# Patient Record
Sex: FEMALE | Race: BLACK OR AFRICAN AMERICAN | NOT HISPANIC OR LATINO | URBAN - METROPOLITAN AREA
[De-identification: names, ages, dates, MRNs, and addresses within clinical notes are randomized per-mention and may not be internally consistent; named-entity substitution may affect disease eponyms.]

---

## 2022-05-08 ENCOUNTER — INPATIENT (INPATIENT)
Facility: HOSPITAL | Age: 87
LOS: 2 days | Discharge: NURSING, REHAB OR LONG-TERM CARE | DRG: 101 | End: 2022-05-11
Attending: INTERNAL MEDICINE | Admitting: INTERNAL MEDICINE
Payer: MEDICARE

## 2022-05-08 VITALS
RESPIRATION RATE: 17 BRPM | OXYGEN SATURATION: 99 % | TEMPERATURE: 99 F | HEART RATE: 62 BPM | DIASTOLIC BLOOD PRESSURE: 76 MMHG | SYSTOLIC BLOOD PRESSURE: 154 MMHG | WEIGHT: 160.94 LBS

## 2022-05-08 DIAGNOSIS — E11.9 TYPE 2 DIABETES MELLITUS WITHOUT COMPLICATIONS: ICD-10-CM

## 2022-05-08 DIAGNOSIS — I10 ESSENTIAL (PRIMARY) HYPERTENSION: ICD-10-CM

## 2022-05-08 DIAGNOSIS — R11.2 NAUSEA WITH VOMITING, UNSPECIFIED: ICD-10-CM

## 2022-05-08 DIAGNOSIS — R56.9 UNSPECIFIED CONVULSIONS: ICD-10-CM

## 2022-05-08 DIAGNOSIS — Z29.9 ENCOUNTER FOR PROPHYLACTIC MEASURES, UNSPECIFIED: ICD-10-CM

## 2022-05-08 DIAGNOSIS — I25.10 ATHEROSCLEROTIC HEART DISEASE OF NATIVE CORONARY ARTERY WITHOUT ANGINA PECTORIS: ICD-10-CM

## 2022-05-08 DIAGNOSIS — I63.9 CEREBRAL INFARCTION, UNSPECIFIED: ICD-10-CM

## 2022-05-08 LAB
ALBUMIN SERPL ELPH-MCNC: 2.9 G/DL — LOW (ref 3.5–5)
ALP SERPL-CCNC: 101 U/L — SIGNIFICANT CHANGE UP (ref 40–120)
ALT FLD-CCNC: 22 U/L DA — SIGNIFICANT CHANGE UP (ref 10–60)
ANION GAP SERPL CALC-SCNC: 6 MMOL/L — SIGNIFICANT CHANGE UP (ref 5–17)
APTT BLD: 29.3 SEC — SIGNIFICANT CHANGE UP (ref 27.5–35.5)
AST SERPL-CCNC: 24 U/L — SIGNIFICANT CHANGE UP (ref 10–40)
BASOPHILS # BLD AUTO: 0.04 K/UL — SIGNIFICANT CHANGE UP (ref 0–0.2)
BASOPHILS NFR BLD AUTO: 0.7 % — SIGNIFICANT CHANGE UP (ref 0–2)
BILIRUB SERPL-MCNC: 0.4 MG/DL — SIGNIFICANT CHANGE UP (ref 0.2–1.2)
BUN SERPL-MCNC: 14 MG/DL — SIGNIFICANT CHANGE UP (ref 7–18)
CALCIUM SERPL-MCNC: 9.2 MG/DL — SIGNIFICANT CHANGE UP (ref 8.4–10.5)
CHLORIDE SERPL-SCNC: 106 MMOL/L — SIGNIFICANT CHANGE UP (ref 96–108)
CO2 SERPL-SCNC: 27 MMOL/L — SIGNIFICANT CHANGE UP (ref 22–31)
CREAT SERPL-MCNC: 1.21 MG/DL — SIGNIFICANT CHANGE UP (ref 0.5–1.3)
EGFR: 41 ML/MIN/1.73M2 — LOW
EOSINOPHIL # BLD AUTO: 0.45 K/UL — SIGNIFICANT CHANGE UP (ref 0–0.5)
EOSINOPHIL NFR BLD AUTO: 7.4 % — HIGH (ref 0–6)
GLUCOSE BLDC GLUCOMTR-MCNC: 117 MG/DL — HIGH (ref 70–99)
GLUCOSE SERPL-MCNC: 170 MG/DL — HIGH (ref 70–99)
HCT VFR BLD CALC: 38.8 % — SIGNIFICANT CHANGE UP (ref 34.5–45)
HGB BLD-MCNC: 12.6 G/DL — SIGNIFICANT CHANGE UP (ref 11.5–15.5)
IMM GRANULOCYTES NFR BLD AUTO: 0.2 % — SIGNIFICANT CHANGE UP (ref 0–1.5)
INR BLD: 1.09 RATIO — SIGNIFICANT CHANGE UP (ref 0.88–1.16)
LYMPHOCYTES # BLD AUTO: 1.58 K/UL — SIGNIFICANT CHANGE UP (ref 1–3.3)
LYMPHOCYTES # BLD AUTO: 26 % — SIGNIFICANT CHANGE UP (ref 13–44)
MCHC RBC-ENTMCNC: 29.6 PG — SIGNIFICANT CHANGE UP (ref 27–34)
MCHC RBC-ENTMCNC: 32.5 GM/DL — SIGNIFICANT CHANGE UP (ref 32–36)
MCV RBC AUTO: 91.3 FL — SIGNIFICANT CHANGE UP (ref 80–100)
MONOCYTES # BLD AUTO: 0.52 K/UL — SIGNIFICANT CHANGE UP (ref 0–0.9)
MONOCYTES NFR BLD AUTO: 8.6 % — SIGNIFICANT CHANGE UP (ref 2–14)
NEUTROPHILS # BLD AUTO: 3.47 K/UL — SIGNIFICANT CHANGE UP (ref 1.8–7.4)
NEUTROPHILS NFR BLD AUTO: 57.1 % — SIGNIFICANT CHANGE UP (ref 43–77)
NRBC # BLD: 0 /100 WBCS — SIGNIFICANT CHANGE UP (ref 0–0)
PLATELET # BLD AUTO: 236 K/UL — SIGNIFICANT CHANGE UP (ref 150–400)
POTASSIUM SERPL-MCNC: 3.4 MMOL/L — LOW (ref 3.5–5.3)
POTASSIUM SERPL-SCNC: 3.4 MMOL/L — LOW (ref 3.5–5.3)
PROT SERPL-MCNC: 8.2 G/DL — SIGNIFICANT CHANGE UP (ref 6–8.3)
PROTHROM AB SERPL-ACNC: 13 SEC — SIGNIFICANT CHANGE UP (ref 10.5–13.4)
RBC # BLD: 4.25 M/UL — SIGNIFICANT CHANGE UP (ref 3.8–5.2)
RBC # FLD: 16.6 % — HIGH (ref 10.3–14.5)
SARS-COV-2 RNA SPEC QL NAA+PROBE: SIGNIFICANT CHANGE UP
SODIUM SERPL-SCNC: 139 MMOL/L — SIGNIFICANT CHANGE UP (ref 135–145)
TROPONIN I, HIGH SENSITIVITY RESULT: 56.7 NG/L — HIGH
TROPONIN I, HIGH SENSITIVITY RESULT: 56.9 NG/L — HIGH
WBC # BLD: 6.07 K/UL — SIGNIFICANT CHANGE UP (ref 3.8–10.5)
WBC # FLD AUTO: 6.07 K/UL — SIGNIFICANT CHANGE UP (ref 3.8–10.5)

## 2022-05-08 PROCEDURE — 70450 CT HEAD/BRAIN W/O DYE: CPT | Mod: 26,MA

## 2022-05-08 PROCEDURE — 99285 EMERGENCY DEPT VISIT HI MDM: CPT

## 2022-05-08 PROCEDURE — 71045 X-RAY EXAM CHEST 1 VIEW: CPT | Mod: 26

## 2022-05-08 RX ORDER — INSULIN LISPRO 100/ML
VIAL (ML) SUBCUTANEOUS EVERY 6 HOURS
Refills: 0 | Status: DISCONTINUED | OUTPATIENT
Start: 2022-05-08 | End: 2022-05-11

## 2022-05-08 RX ORDER — SODIUM CHLORIDE 9 MG/ML
1000 INJECTION, SOLUTION INTRAVENOUS
Refills: 0 | Status: DISCONTINUED | OUTPATIENT
Start: 2022-05-08 | End: 2022-05-11

## 2022-05-08 RX ORDER — FUROSEMIDE 40 MG
1 TABLET ORAL
Qty: 0 | Refills: 0 | DISCHARGE

## 2022-05-08 RX ORDER — HEPARIN SODIUM 5000 [USP'U]/ML
5000 INJECTION INTRAVENOUS; SUBCUTANEOUS EVERY 8 HOURS
Refills: 0 | Status: DISCONTINUED | OUTPATIENT
Start: 2022-05-08 | End: 2022-05-11

## 2022-05-08 RX ORDER — INSULIN GLARGINE 100 [IU]/ML
8 INJECTION, SOLUTION SUBCUTANEOUS AT BEDTIME
Refills: 0 | Status: DISCONTINUED | OUTPATIENT
Start: 2022-05-08 | End: 2022-05-11

## 2022-05-08 RX ORDER — VALPROIC ACID (AS SODIUM SALT) 250 MG/5ML
1000 SOLUTION, ORAL ORAL ONCE
Refills: 0 | Status: COMPLETED | OUTPATIENT
Start: 2022-05-08 | End: 2022-05-08

## 2022-05-08 RX ORDER — DEXTROSE 50 % IN WATER 50 %
15 SYRINGE (ML) INTRAVENOUS ONCE
Refills: 0 | Status: DISCONTINUED | OUTPATIENT
Start: 2022-05-08 | End: 2022-05-11

## 2022-05-08 RX ORDER — DEXTROSE 50 % IN WATER 50 %
25 SYRINGE (ML) INTRAVENOUS ONCE
Refills: 0 | Status: DISCONTINUED | OUTPATIENT
Start: 2022-05-08 | End: 2022-05-11

## 2022-05-08 RX ORDER — ONDANSETRON 8 MG/1
4 TABLET, FILM COATED ORAL EVERY 4 HOURS
Refills: 0 | Status: DISCONTINUED | OUTPATIENT
Start: 2022-05-08 | End: 2022-05-09

## 2022-05-08 RX ORDER — LEVETIRACETAM 250 MG/1
500 TABLET, FILM COATED ORAL EVERY 12 HOURS
Refills: 0 | Status: DISCONTINUED | OUTPATIENT
Start: 2022-05-08 | End: 2022-05-09

## 2022-05-08 RX ORDER — POTASSIUM CHLORIDE 20 MEQ
10 PACKET (EA) ORAL
Refills: 0 | Status: COMPLETED | OUTPATIENT
Start: 2022-05-08 | End: 2022-05-08

## 2022-05-08 RX ADMIN — Medication 60 MILLIGRAM(S): at 16:28

## 2022-05-08 RX ADMIN — Medication 1 MILLIGRAM(S): at 15:06

## 2022-05-08 RX ADMIN — Medication 100 MILLIEQUIVALENT(S): at 20:03

## 2022-05-08 RX ADMIN — ONDANSETRON 4 MILLIGRAM(S): 8 TABLET, FILM COATED ORAL at 21:15

## 2022-05-08 RX ADMIN — Medication 1000 MILLIGRAM(S): at 19:15

## 2022-05-08 RX ADMIN — Medication 100 MILLIEQUIVALENT(S): at 22:24

## 2022-05-08 RX ADMIN — INSULIN GLARGINE 8 UNIT(S): 100 INJECTION, SOLUTION SUBCUTANEOUS at 22:46

## 2022-05-08 RX ADMIN — Medication 100 MILLIEQUIVALENT(S): at 21:16

## 2022-05-08 RX ADMIN — HEPARIN SODIUM 5000 UNIT(S): 5000 INJECTION INTRAVENOUS; SUBCUTANEOUS at 22:24

## 2022-05-08 NOTE — ED ADULT TRIAGE NOTE - CHIEF COMPLAINT QUOTE
Pt from [facility] c/o facial twisting x this morning. As per Daughter, pt with hx.of stroke x last year, noted residual right side weakness.

## 2022-05-08 NOTE — ED ADULT NURSE NOTE - CHIEF COMPLAINT QUOTE
Pt from [facility] c/o facial twisting x this morning. As per Daughter, pt with hx.of stroke x last year, noted residual right side weakness.

## 2022-05-08 NOTE — H&P ADULT - PROBLEM SELECTOR PLAN 1
p/w r sided facial twitching  given depakon 1g in ed  c/w keppra 500 bid as per home meds p/w r sided facial twitching  given depakon 1g in ed  c/w keppra 500 bid as per home meds  neuro checks  CT head with L MCA chronic infarct  eeg  neuro dr raya consulted

## 2022-05-08 NOTE — H&P ADULT - HISTORY OF PRESENT ILLNESS
89F w/ PMH L MCA CVA w/ R side residual 4/2021, DM on insulin, celexa 10 qd, hld, htn, ischemic heart disease, seizures presents to ED with R sided facial twitching. Pt at baseline is aphasic from her previous stroke but is able to follow some commands, and is able to nod yes or no. As per NH papers, pt with keppra for seizures. Son Hari at bedside states that he and the rest of the family take turns visiting her and she was at her baseline yesterday. She started having r facial twitching and is less interactive 5/8 than before. No known fever, chills, sob.    Pt with episode of vomiting in ed x1.

## 2022-05-08 NOTE — ED PROVIDER NOTE - OBJECTIVE STATEMENT
89 year old female with a PHMx of stroke with dense right leg and arm weakness, aphasia and able to receptively understand presents to the ED with complaints of non-stop right-sided facial twitching. As per daughter, states patient was from nursing home. Patient is still able to understand and react normally. Denies having seizures before. States patient is not taking treatment for that. Denies head trauma. History is from daughter and paper from EMS. Patient is unable to give history due to aphasia.   NKDA.

## 2022-05-08 NOTE — H&P ADULT - NSHPPHYSICALEXAM_GEN_ALL_CORE
General - NAD, sitting up in bed, well groomed  Eyes - PERRLA, EOM intact  ENT - Nonicteric sclerae, PERRLA, EOMI. Oropharynx clear. Moist mucous membranes. Conjunctivae appear well perfused.   Neck - No noticeable or palpable swelling, redness or rash around throat or on face  Lymph Nodes - No lymphadenopathy  Cardiovascular - RRR no m/r/g, no JVD, no carotid bruits  Lungs - Clear to auscultation, no use of accessory muscles, no crackles or wheezes.  Skin - No rashes, skin warm and dry, no erythematous areas  Abdomen - Normal bowel sounds, abdomen soft and nontender  Rectal – Rectal exam not performed since no symptoms indicated blood loss.  Extremities - No edema, cyanosis or clubbing  Musculoskeletal - 5/5 strength, normal range of motion, no swollen or erythematous joints.   Neuro– responds to name, and is able to recognize her son's face. able to  with her left hand, and follows some one step commands. (+) aphasia

## 2022-05-08 NOTE — ED PROVIDER NOTE - PROGRESS NOTE DETAILS
Discussed case with . Request patient be given Keppra Load 1000mg and Valproic Acid 250mg 3x a day. (Dariela) s/o from Dr Schmitz to [facility] ([place]) - labs trop 56  EKG - nsr, rate 67, QTc 469, lateral twi (Dariela) - CT head w/ chronic changes  Pt still with ?seizure activity in face despite meds  PCP Dr Dennis who requests admission to Dr Yates  Endorsed to MAR

## 2022-05-08 NOTE — H&P ADULT - PROBLEM SELECTOR PLAN 4
on lipitor and plavix  pt asphasic and r sided residual  pt was on pureed nectar thick diet  f/u dysphagia screen  resume diet if passes, but with aspiration and seizure precautions  npo for now

## 2022-05-08 NOTE — ED PROVIDER NOTE - CLINICAL SUMMARY MEDICAL DECISION MAKING FREE TEXT BOX
Appears to be a partial simple seizure. Will screen to rule out infection. Will obtain CT head for intracranial hemorrhage and discuss with neurology.

## 2022-05-08 NOTE — ED ADULT NURSE NOTE - OBJECTIVE STATEMENT
Daughter at bedside stated that pt. has a new onset of right sided facial twitch this morning when she went to visit her mother at NH. Daughter denies any hx of seizure but pt. has hx of stroke, unable to talk but still understands.

## 2022-05-08 NOTE — ED PROVIDER NOTE - MUSCULOSKELETAL, MLM
Patient is phasic. Right arm and right leg paretic. Twitching on the right side of the face. Patient is aphasic. Right arm and right leg paretic. Twitching on the right side of the face.

## 2022-05-08 NOTE — H&P ADULT - PROBLEM SELECTOR PLAN 3
Pt's caregiver called back about rescheduling his appointment for 1/17/20 (per MD). They were offered 1/24/20 but they are not able to make that day due to meetings with the state. Please advise as to when else they can be seen.   zofran prn

## 2022-05-09 DIAGNOSIS — F32.9 MAJOR DEPRESSIVE DISORDER, SINGLE EPISODE, UNSPECIFIED: ICD-10-CM

## 2022-05-09 LAB
ALBUMIN SERPL ELPH-MCNC: 2.1 G/DL — LOW (ref 3.5–5)
ALP SERPL-CCNC: 75 U/L — SIGNIFICANT CHANGE UP (ref 40–120)
ALT FLD-CCNC: 17 U/L DA — SIGNIFICANT CHANGE UP (ref 10–60)
ANION GAP SERPL CALC-SCNC: 5 MMOL/L — SIGNIFICANT CHANGE UP (ref 5–17)
AST SERPL-CCNC: 15 U/L — SIGNIFICANT CHANGE UP (ref 10–40)
BASOPHILS # BLD AUTO: 0.04 K/UL — SIGNIFICANT CHANGE UP (ref 0–0.2)
BASOPHILS NFR BLD AUTO: 0.6 % — SIGNIFICANT CHANGE UP (ref 0–2)
BILIRUB SERPL-MCNC: 0.4 MG/DL — SIGNIFICANT CHANGE UP (ref 0.2–1.2)
BUN SERPL-MCNC: 14 MG/DL — SIGNIFICANT CHANGE UP (ref 7–18)
CALCIUM SERPL-MCNC: 8.8 MG/DL — SIGNIFICANT CHANGE UP (ref 8.4–10.5)
CHLORIDE SERPL-SCNC: 111 MMOL/L — HIGH (ref 96–108)
CO2 SERPL-SCNC: 27 MMOL/L — SIGNIFICANT CHANGE UP (ref 22–31)
CREAT SERPL-MCNC: 1.02 MG/DL — SIGNIFICANT CHANGE UP (ref 0.5–1.3)
EGFR: 50 ML/MIN/1.73M2 — LOW
EOSINOPHIL # BLD AUTO: 0.31 K/UL — SIGNIFICANT CHANGE UP (ref 0–0.5)
EOSINOPHIL NFR BLD AUTO: 4.3 % — SIGNIFICANT CHANGE UP (ref 0–6)
GLUCOSE BLDC GLUCOMTR-MCNC: 114 MG/DL — HIGH (ref 70–99)
GLUCOSE BLDC GLUCOMTR-MCNC: 118 MG/DL — HIGH (ref 70–99)
GLUCOSE BLDC GLUCOMTR-MCNC: 137 MG/DL — HIGH (ref 70–99)
GLUCOSE BLDC GLUCOMTR-MCNC: 237 MG/DL — HIGH (ref 70–99)
GLUCOSE BLDC GLUCOMTR-MCNC: 56 MG/DL — LOW (ref 70–99)
GLUCOSE BLDC GLUCOMTR-MCNC: 59 MG/DL — LOW (ref 70–99)
GLUCOSE BLDC GLUCOMTR-MCNC: 61 MG/DL — LOW (ref 70–99)
GLUCOSE BLDC GLUCOMTR-MCNC: 95 MG/DL — SIGNIFICANT CHANGE UP (ref 70–99)
GLUCOSE SERPL-MCNC: 83 MG/DL — SIGNIFICANT CHANGE UP (ref 70–99)
HCT VFR BLD CALC: 28.2 % — LOW (ref 34.5–45)
HGB BLD-MCNC: 9.4 G/DL — LOW (ref 11.5–15.5)
IMM GRANULOCYTES NFR BLD AUTO: 0.1 % — SIGNIFICANT CHANGE UP (ref 0–1.5)
LYMPHOCYTES # BLD AUTO: 2.04 K/UL — SIGNIFICANT CHANGE UP (ref 1–3.3)
LYMPHOCYTES # BLD AUTO: 28.2 % — SIGNIFICANT CHANGE UP (ref 13–44)
MCHC RBC-ENTMCNC: 30.3 PG — SIGNIFICANT CHANGE UP (ref 27–34)
MCHC RBC-ENTMCNC: 33.3 GM/DL — SIGNIFICANT CHANGE UP (ref 32–36)
MCV RBC AUTO: 91 FL — SIGNIFICANT CHANGE UP (ref 80–100)
MONOCYTES # BLD AUTO: 0.54 K/UL — SIGNIFICANT CHANGE UP (ref 0–0.9)
MONOCYTES NFR BLD AUTO: 7.5 % — SIGNIFICANT CHANGE UP (ref 2–14)
NEUTROPHILS # BLD AUTO: 4.3 K/UL — SIGNIFICANT CHANGE UP (ref 1.8–7.4)
NEUTROPHILS NFR BLD AUTO: 59.3 % — SIGNIFICANT CHANGE UP (ref 43–77)
NRBC # BLD: 0 /100 WBCS — SIGNIFICANT CHANGE UP (ref 0–0)
PLATELET # BLD AUTO: 203 K/UL — SIGNIFICANT CHANGE UP (ref 150–400)
POTASSIUM SERPL-MCNC: 3.9 MMOL/L — SIGNIFICANT CHANGE UP (ref 3.5–5.3)
POTASSIUM SERPL-SCNC: 3.9 MMOL/L — SIGNIFICANT CHANGE UP (ref 3.5–5.3)
PROT SERPL-MCNC: 6.5 G/DL — SIGNIFICANT CHANGE UP (ref 6–8.3)
RBC # BLD: 3.1 M/UL — LOW (ref 3.8–5.2)
RBC # FLD: 16.6 % — HIGH (ref 10.3–14.5)
SODIUM SERPL-SCNC: 143 MMOL/L — SIGNIFICANT CHANGE UP (ref 135–145)
TROPONIN I, HIGH SENSITIVITY RESULT: 35.3 NG/L — SIGNIFICANT CHANGE UP
WBC # BLD: 7.24 K/UL — SIGNIFICANT CHANGE UP (ref 3.8–10.5)
WBC # FLD AUTO: 7.24 K/UL — SIGNIFICANT CHANGE UP (ref 3.8–10.5)

## 2022-05-09 PROCEDURE — 95819 EEG AWAKE AND ASLEEP: CPT | Mod: 26

## 2022-05-09 RX ORDER — LEVETIRACETAM 250 MG/1
750 TABLET, FILM COATED ORAL EVERY 12 HOURS
Refills: 0 | Status: DISCONTINUED | OUTPATIENT
Start: 2022-05-09 | End: 2022-05-10

## 2022-05-09 RX ORDER — SODIUM CHLORIDE 9 MG/ML
1000 INJECTION, SOLUTION INTRAVENOUS
Refills: 0 | Status: DISCONTINUED | OUTPATIENT
Start: 2022-05-09 | End: 2022-05-09

## 2022-05-09 RX ORDER — CLOPIDOGREL BISULFATE 75 MG/1
75 TABLET, FILM COATED ORAL DAILY
Refills: 0 | Status: DISCONTINUED | OUTPATIENT
Start: 2022-05-09 | End: 2022-05-11

## 2022-05-09 RX ORDER — ATORVASTATIN CALCIUM 80 MG/1
40 TABLET, FILM COATED ORAL AT BEDTIME
Refills: 0 | Status: DISCONTINUED | OUTPATIENT
Start: 2022-05-09 | End: 2022-05-11

## 2022-05-09 RX ORDER — CITALOPRAM 10 MG/1
10 TABLET, FILM COATED ORAL DAILY
Refills: 0 | Status: DISCONTINUED | OUTPATIENT
Start: 2022-05-09 | End: 2022-05-11

## 2022-05-09 RX ORDER — LOSARTAN POTASSIUM 100 MG/1
100 TABLET, FILM COATED ORAL DAILY
Refills: 0 | Status: DISCONTINUED | OUTPATIENT
Start: 2022-05-09 | End: 2022-05-11

## 2022-05-09 RX ORDER — ONDANSETRON 8 MG/1
4 TABLET, FILM COATED ORAL EVERY 8 HOURS
Refills: 0 | Status: DISCONTINUED | OUTPATIENT
Start: 2022-05-09 | End: 2022-05-11

## 2022-05-09 RX ORDER — LEVETIRACETAM 250 MG/1
2000 TABLET, FILM COATED ORAL ONCE
Refills: 0 | Status: COMPLETED | OUTPATIENT
Start: 2022-05-09 | End: 2022-05-09

## 2022-05-09 RX ADMIN — ATORVASTATIN CALCIUM 40 MILLIGRAM(S): 80 TABLET, FILM COATED ORAL at 21:49

## 2022-05-09 RX ADMIN — HEPARIN SODIUM 5000 UNIT(S): 5000 INJECTION INTRAVENOUS; SUBCUTANEOUS at 05:20

## 2022-05-09 RX ADMIN — LEVETIRACETAM 600 MILLIGRAM(S): 250 TABLET, FILM COATED ORAL at 15:41

## 2022-05-09 RX ADMIN — LEVETIRACETAM 420 MILLIGRAM(S): 250 TABLET, FILM COATED ORAL at 05:20

## 2022-05-09 RX ADMIN — HEPARIN SODIUM 5000 UNIT(S): 5000 INJECTION INTRAVENOUS; SUBCUTANEOUS at 21:49

## 2022-05-09 RX ADMIN — INSULIN GLARGINE 8 UNIT(S): 100 INJECTION, SOLUTION SUBCUTANEOUS at 22:00

## 2022-05-09 RX ADMIN — CITALOPRAM 10 MILLIGRAM(S): 10 TABLET, FILM COATED ORAL at 22:32

## 2022-05-09 RX ADMIN — SODIUM CHLORIDE 50 MILLILITER(S): 9 INJECTION, SOLUTION INTRAVENOUS at 08:13

## 2022-05-09 RX ADMIN — LEVETIRACETAM 400 MILLIGRAM(S): 250 TABLET, FILM COATED ORAL at 21:50

## 2022-05-09 RX ADMIN — LOSARTAN POTASSIUM 100 MILLIGRAM(S): 100 TABLET, FILM COATED ORAL at 21:49

## 2022-05-09 RX ADMIN — HEPARIN SODIUM 5000 UNIT(S): 5000 INJECTION INTRAVENOUS; SUBCUTANEOUS at 13:14

## 2022-05-09 RX ADMIN — CLOPIDOGREL BISULFATE 75 MILLIGRAM(S): 75 TABLET, FILM COATED ORAL at 21:50

## 2022-05-09 NOTE — PROGRESS NOTE ADULT - SUBJECTIVE AND OBJECTIVE BOX
NP Note discussed with  Primary Attending    INTERVAL HPI/OVERNIGHT EVENTS: no new complaints    MEDICATIONS  (STANDING):  dextrose 5%. 1000 milliLiter(s) (50 mL/Hr) IV Continuous <Continuous>  dextrose 5%. 1000 milliLiter(s) (50 mL/Hr) IV Continuous <Continuous>  dextrose 50% Injectable 25 Gram(s) IV Push once  heparin   Injectable 5000 Unit(s) SubCutaneous every 8 hours  insulin glargine Injectable (LANTUS) 8 Unit(s) SubCutaneous at bedtime  insulin lispro (ADMELOG) corrective regimen sliding scale   SubCutaneous every 6 hours  levETIRAcetam  IVPB 500 milliGRAM(s) IV Intermittent every 12 hours    MEDICATIONS  (PRN):  dextrose Oral Gel 15 Gram(s) Oral once PRN Blood Glucose LESS THAN 70 milliGRAM(s)/deciliter  ondansetron Injectable 4 milliGRAM(s) IV Push every 4 hours PRN Nausea and/or Vomiting      __________________________________________________  REVIEW OF SYSTEMS:    CONSTITUTIONAL: No fever,   EYES: no acute visual disturbances  NECK: No pain or stiffness  RESPIRATORY: No cough; No shortness of breath  CARDIOVASCULAR: No chest pain, no palpitations  GASTROINTESTINAL: No pain. No nausea or vomiting; No diarrhea   NEUROLOGICAL: No headache or numbness, no tremors  MUSCULOSKELETAL: No joint pain, no muscle pain  GENITOURINARY: no dysuria, no frequency, no hesitancy  PSYCHIATRY: no depression , no anxiety  ALL OTHER  ROS negative        Vital Signs Last 24 Hrs  T(C): 36.5 (09 May 2022 11:37), Max: 37 (08 May 2022 15:56)  T(F): 97.7 (09 May 2022 11:37), Max: 98.6 (08 May 2022 15:56)  HR: 64 (09 May 2022 11:37) (59 - 67)  BP: 149/69 (09 May 2022 11:37) (142/74 - 160/79)  BP(mean): --  RR: 18 (09 May 2022 11:37) (17 - 18)  SpO2: 96% (09 May 2022 11:37) (95% - 98%)    ________________________________________________  PHYSICAL EXAM:  GENERAL: NAD  HEENT: Normocephalic;  conjunctivae and sclerae clear; moist mucous membranes;   NECK : supple  CHEST/LUNG: Clear to auscultation bilaterally with good air entry   HEART: S1 S2  regular; no murmurs, gallops or rubs  ABDOMEN: Soft, Nontender, Nondistended; Bowel sounds present  EXTREMITIES: no cyanosis; no edema; no calf tenderness  SKIN: warm and dry; no rash  NERVOUS SYSTEM:  Awake and alert; Oriented  to place, person and time ; no new deficits    _________________________________________________  LABS:                        9.4    7.24  )-----------( 203      ( 09 May 2022 05:12 )             28.2     05-09    143  |  111<H>  |  14  ----------------------------<  83  3.9   |  27  |  1.02    Ca    8.8      09 May 2022 05:12    TPro  6.5  /  Alb  2.1<L>  /  TBili  0.4  /  DBili  x   /  AST  15  /  ALT  17  /  AlkPhos  75  05-09    PT/INR - ( 08 May 2022 15:30 )   PT: 13.0 sec;   INR: 1.09 ratio         PTT - ( 08 May 2022 15:30 )  PTT:29.3 sec    CAPILLARY BLOOD GLUCOSE      POCT Blood Glucose.: 137 mg/dL (09 May 2022 11:30)  POCT Blood Glucose.: 95 mg/dL (09 May 2022 09:50)  POCT Blood Glucose.: 61 mg/dL (09 May 2022 08:49)  POCT Blood Glucose.: 59 mg/dL (09 May 2022 07:56)  POCT Blood Glucose.: 56 mg/dL (09 May 2022 07:51)  POCT Blood Glucose.: 114 mg/dL (09 May 2022 00:26)  POCT Blood Glucose.: 117 mg/dL (08 May 2022 22:45)        RADIOLOGY & ADDITIONAL TESTS:    Imaging  Reviewed:  YES    Consultant(s) Notes Reviewed:   YES      Plan of care was discussed with patient and /or primary care giver; all questions and concerns were addressed  NP Note discussed with  Primary Attending    INTERVAL HPI/OVERNIGHT EVENTS: seen at bedside, slurred speech. states she is not in pain, feeling okay on exam.     MEDICATIONS  (STANDING):  dextrose 5%. 1000 milliLiter(s) (50 mL/Hr) IV Continuous <Continuous>  dextrose 5%. 1000 milliLiter(s) (50 mL/Hr) IV Continuous <Continuous>  dextrose 50% Injectable 25 Gram(s) IV Push once  heparin   Injectable 5000 Unit(s) SubCutaneous every 8 hours  insulin glargine Injectable (LANTUS) 8 Unit(s) SubCutaneous at bedtime  insulin lispro (ADMELOG) corrective regimen sliding scale   SubCutaneous every 6 hours  levETIRAcetam  IVPB 500 milliGRAM(s) IV Intermittent every 12 hours    MEDICATIONS  (PRN):  dextrose Oral Gel 15 Gram(s) Oral once PRN Blood Glucose LESS THAN 70 milliGRAM(s)/deciliter  ondansetron Injectable 4 milliGRAM(s) IV Push every 4 hours PRN Nausea and/or Vomiting      __________________________________________________  REVIEW OF SYSTEMS:    CONSTITUTIONAL: No fever,   EYES: no acute visual disturbances  NECK: No pain or stiffness  RESPIRATORY: No cough; No shortness of breath  CARDIOVASCULAR: No chest pain, no palpitations  GASTROINTESTINAL: No pain. No nausea or vomiting; No diarrhea   NEUROLOGICAL: No headache or numbness, no tremors  MUSCULOSKELETAL: No joint pain, no muscle pain  GENITOURINARY: no dysuria, no frequency, no hesitancy  PSYCHIATRY: no depression , no anxiety  ALL OTHER  ROS negative        Vital Signs Last 24 Hrs  T(C): 36.5 (09 May 2022 11:37), Max: 37 (08 May 2022 15:56)  T(F): 97.7 (09 May 2022 11:37), Max: 98.6 (08 May 2022 15:56)  HR: 64 (09 May 2022 11:37) (59 - 67)  BP: 149/69 (09 May 2022 11:37) (142/74 - 160/79)  BP(mean): --  RR: 18 (09 May 2022 11:37) (17 - 18)  SpO2: 96% (09 May 2022 11:37) (95% - 98%)    ________________________________________________  PHYSICAL EXAM:  GENERAL: NAD  HEENT: Normocephalic;  conjunctivae and sclerae clear; moist mucous membranes;   NECK : supple  CHEST/LUNG: Clear to auscultation bilaterally with good air entry   HEART: S1 S2  regular; no murmurs, gallops or rubs  ABDOMEN: Soft, Nontender, Nondistended; Bowel sounds present  EXTREMITIES: no cyanosis; no edema; no calf tenderness  SKIN: warm and dry; no rash  NERVOUS SYSTEM:  Awake and alert; Oriented  to place, person and time ; no new deficits    _________________________________________________  LABS:                        9.4    7.24  )-----------( 203      ( 09 May 2022 05:12 )             28.2     05-09    143  |  111<H>  |  14  ----------------------------<  83  3.9   |  27  |  1.02    Ca    8.8      09 May 2022 05:12    TPro  6.5  /  Alb  2.1<L>  /  TBili  0.4  /  DBili  x   /  AST  15  /  ALT  17  /  AlkPhos  75  05-09    PT/INR - ( 08 May 2022 15:30 )   PT: 13.0 sec;   INR: 1.09 ratio         PTT - ( 08 May 2022 15:30 )  PTT:29.3 sec    CAPILLARY BLOOD GLUCOSE      POCT Blood Glucose.: 137 mg/dL (09 May 2022 11:30)  POCT Blood Glucose.: 95 mg/dL (09 May 2022 09:50)  POCT Blood Glucose.: 61 mg/dL (09 May 2022 08:49)  POCT Blood Glucose.: 59 mg/dL (09 May 2022 07:56)  POCT Blood Glucose.: 56 mg/dL (09 May 2022 07:51)  POCT Blood Glucose.: 114 mg/dL (09 May 2022 00:26)  POCT Blood Glucose.: 117 mg/dL (08 May 2022 22:45)        RADIOLOGY & ADDITIONAL TESTS:    Imaging  Reviewed:  YES  < from: CT Head No Cont (05.08.22 @ 17:30) >    ACC: [id number] EXAM:  CT BRAIN                          PROCEDURE DATE:  05/08/2022          INTERPRETATION:  CLINICAL INDICATION: Seizures. Evaluate for intracranial   hemorrhage.    TECHNIQUE: Axial CT scanning of the brain was obtained from the skull   base to the vertex without the administration of intravenous contrast.   Reformatted coronal and sagittal images were subsequently obtained and   reviewed.    COMPARISON: None    FINDINGS:  There is no CT evidence of acute transcortical infarct. Age-related   involutional changes and chronic microvascular ischemic changes. Left MCA   chronic infarct. Chronic lacunar infarcts involving the bilateral   cerebral hemispheres.    There is no hydrocephalus, mass effect, or acute intracranial hemorrhage.   No extra-axial collection. Basal cisterns are patent.    The visualized paranasal sinuses and mastoid air cells are clear.    The calvarium is intact.    Bilateral cataract surgery.    IMPRESSION:  No hydrocephalus, acute intracranial hemorrhage, or mass effect.  Left MCA chronic infarct.    --- End of Report ---    DARA VOGEL MD; Attending Radiologist  This document has been electronically signed. May  8 2022  5:50PM    < end of copied text >  < from: Xray Chest 1 View- PORTABLE-Urgent (Xray Chest 1 View- PORTABLE-Urgent .) (05.08.22 @ 15:10) >    IMPRESSION: Grossly clear lungs.    < end of copied text >    Consultant(s) Notes Reviewed:   YES      Plan of care was discussed with patient and /or primary care giver; all questions and concerns were addressed

## 2022-05-09 NOTE — PATIENT PROFILE ADULT - FALL HARM RISK - HARM RISK INTERVENTIONS

## 2022-05-09 NOTE — PROGRESS NOTE ADULT - ASSESSMENT
89F w/ PMH L MCA CVA w/ R side residual 4/2021, DM on insulin, celexa 10 qd, hld, htn, ischemic heart disease, seizures presents to ED with R sided facial twitching admitted for seizures. 89F w/ PMH L MCA CVA w/ R side residual 4/2021, DM on insulin, celexa 10 qd, hld, htn, ischemic heart disease, seizures presents to ED with R sided facial twitching admitted for seizures. Neurology consulted. increased Keppra dose per dr. Brandon. Diet advanced. Hypoglycemia resolved. `

## 2022-05-09 NOTE — EEG REPORT - NS EEG TEXT BOX
REPORT OF ROUTINE EEG WITH VIDEO  [facility]: [address], [address], [place] [address], Phone: [phone] [facility]: [address], [place] [address], Phone: [phone] Office: [address] [place] [address], [facility] [[email or web address]] [address], [place] [address], Phone: [phone]  Patient Name: MICHAEL BARFIELD   Age: 89 year : 1925 MRN #: -, Location: ER HOLD BED 16 A Referring Physician: DR JUAREZ  EEG #: [id number] Study Date: 2022   Start Time: 4:51:00 PM    Study Duration: 27.3 		  Technical Information:					 On Instrument: - Placement and Labeling of Electrodes: The EEG was performed utilizing 20 channels referential EEG connections (coronal over temporal over parasagittal montage) using all standard 10-20 electrode placements with EKG.  Recording was at a sampling rate of 256 samples per second per channel.  Time synchronized digital video recording was done simultaneously with EEG recording.  A low light infrared camera was used for low light recording.  Clarence and seizure detection algorithms were utilized. CSA Technical Component: Quantitative EEG analysis using a separate Compressed Spectral Array (CSA) software package was conducted in real-time and run at bedside after set up by the technician, digitally displaying the power of electrographic frequencies included in the 1-30Hz band using a graded color map.  This data was reviewed and interpreted independently, and is reported in a separate section below.  History: 89 Y/O FEMALE H/O : L MCA CVA w/R SIDE RESIDUAL ( 2021 ), DM ON INSULIN, ISCHEMIC HEART DESEASE, SEIZURE,  P/W : RIGHT SIDED FACIAL TWITCHING. PATIENT AT BASELINE IS APHASIC FROM HER PREVIOUS STROKE. PATIENT CAN NOT COMMUNICATE. AS PER SON AT BEDSIDE PATIENT IS RIGHT HANDED. INTERMITTENT RIGHT SIDE FACIAL TWITCHING WITH SOME CRYING  Medication Keppra (Levetiracetam)  Study Interpretation:  The study is significantly degraded by continuous muscle artifact correlated with reported facial twitching.  The artifact limits interpretation. Only intermittent brief fragments of the background may be assessed.  FINDINGS:  The background was continuous, spontaneously variable and reactive.  During wakefulness, a clear posterior rhythm is not seen.    Background Slowing: Generalized slowing: the background consists of mix of theta diffusely. Focal slowing: there is a relative loss of amplitude over the left hemisphere.   Sleep Background: Drowsiness and stage II sleep transients were not recorded.  Non-epileptiform activity: There is continuous periodic muscle artifact.   Epileptiform Activity:  No clear epileptiform discharges were present.  Events: There is continuous periodic facial twitching also manifesting as subtle jerks of head. No clear EEG correlate was appreciated.   Activation Procedures:  -Hyperventilation was not performed.   -Photic stimulation was not performed.  Artifacts: Intermittent myogenic and movement artifacts were noted.  ECG: The heart rate on single channel ECG was predominantly between 70-80 BPM.  EEG Classification / Summary:  Technically difficult study with limited interpretation Asymmetry, attenuation of cerebral activity over left hemisphere. Background slowing, generalized.  Clinical Impression:  Technically difficult study due to prevalent periodic muscle artifact.  Possible structural abnormality in the left hemisphere.  Possible mild nonspecific diffuse or multifocal cerebral dysfunction.  There continuous periodic muscle artifact corresponding to facial jerks seen on video.  Although EEG in this case is non-diagnostic, it is possible to have focal motor seizure with surface negative EEG due to the orientation and depth of the central sulcus and the increased distance of sulcal seizure focus from the scalp.  ________________________________________    Omar Polanco MD, PhD Director, Epilepsy Division, [facility]  ------------------------------------ EEG Reading Room: [phone] On Call Service After Hours: [phone]

## 2022-05-09 NOTE — PROGRESS NOTE ADULT - PROBLEM SELECTOR PLAN 3
zofran prn pt takes losartan 100mg and Lasix 10mg daily  resume losartan for now  Will add lasix if bp not controlled  monitor BP/HR

## 2022-05-09 NOTE — CONSULT NOTE ADULT - ASSESSMENT
Focal motor seizures, S/P large L MCA ischemic stroke ~ a year ago.    Suboptimal regimen of 500mg BID levetiracetam.    Pt is WIDE awake and follows instructions (within the limits imposed by her stroke).      NON-INJURIOUS FOCAL SEIZURE ACTIVITY IS NOT A MEDICAL EMERGENCY  - See below.      RECOMMENDATIONS        Levetiracetam 2000 mg IV NOW.    Increase maintenance regimen of levetiracetam to 750mg Q 12 hrs starting with evening dose today.  Oral administration is OK if she can swallow meds.      Serum magnesium level (I have ordered).      Does not need continuous VEEG.  I have D/C'ed the order.    R/O infection (UTI, pneumonia) as trigger for Sz activity.        +++++++++++++++++++++++++++++++++++++++++++++++++    The following guidelines are provided for reference regarding diagnosis and management of seizures in an acute setting.               ** GUIDELINES RE DIAGNOSIS OF GENERALIZED MOTOR SEIZURES**    "Seizure" is not an exam finding (such as pedal edema); it is a medical diagnosis based on exam findings.  Just like appendicitis is a medical diagnosis, so is seizure.   Thus seizure should never be reported as an observation.  The diagnosis should made on the basis of supporting evidence on examination, and the evidence documented in the chart.     RATHER THAN ENTERING IN A CHART NOTE THAT A PATIENT HAD A SEIZURE,  ENTER A DETAILED DESCRIPTION OF THE MOTOR PHENOMENA, PUPILLARY AND EYELID FINDINGS, DURATION, etc,       Generalized motor status epilepticus is a neurologic emergency. The following are NOT neurologic emergencies: focal motor status, sensory seizures, an isolated generalized motor seizure, complex partial seizures.      Please do not administer parenteral medications for seizures, in particular do not administer benzodiazepines IV, UNLESS the Pt is in generalized motor status epilepticus for at least 5 mins. [An exception may be indicated for non-convulsive status epilepticus.  This requires EEG diagnosis and appropriate electrophysiologic monitoring.]  IV benzo administration can cause irreversible (fatal) cardiac electrical mechanical dissociation (pulseless electrical activity) and is therefore contraindicated for focal motor seizures, for pseudoseizures, and for generalized motor seizures unless status epilepticus is present or imminent.      We recommend using a stopwatch or the timer feature of your cell phone when a possible seizure is occurring.  There is a common tendency in a stressful situation to overestimate the duration of the episode.      In order for a clinical seizure to be a generalized motor seizure:     1) pupils must be dilated and unreactive (rare exception if pupils fixed from for example trauma, or "pinpoint" chronically from a pontine lesion);   2) oculocephalic reflexes are absent (no "doll's eyes");  3) eyelids are open (in 99% of episodes).    In addition, the patient routinely becomes tachycardic, hypoxic, tachypneic.    Head turning from side to side, pelvic thrusting, bicycling movements, hand waving are NOT typical manifestations of generalized motor seizures.    Caveat: Not all convulsions are true seizures.    WE ADVISE AGAINST ORDERING PRN ANTICONVULSANT MEDICATION. Entering an order for medication PRN seizure means (usually) requiring a nurse to make a medical diagnosis, which is beyond the nursing scope of practice.   In any event, the emergency is generalized motor status epilepticus, NOT a single seizure. Therefore there is time to make a diagnosis.    Focal motor seizures are not medical emergencies.  Furthermore, particularly when they occur in patients with underlying structural lesions, they can be notoriously difficult if not impossible to suppress entirely.  A bed-bound Pt who has brief focal seizures should not be treated like a patient in generalized motor status epilepticus; IV benzodiazepines are NOT indicated.  Even if there is focal motor status epilepticus, it is not a medical emergency if the patient is not injuring him(her)self.

## 2022-05-09 NOTE — CHART NOTE - NSCHARTNOTEFT_GEN_A_CORE
PRELIMINARY EEG REVIEW    EEG reviewed  Date: 05-09-22    - Diffuse temporalis artifact obscuring much of the recording concerning for facial myoclonus  - recommend reload keppra 1000 mg IV once over 15 minutes and increase standing dose to 750 IV TID    This Preliminary report is based on fellow review. Final report pending Completion of study tomorrow morning and following attending review.    Reading Room: [phone]  On Call Service After Hours: [phone]    Joe Banda MD PGY-5  Epilepsy Fellow

## 2022-05-09 NOTE — PROGRESS NOTE ADULT - PROBLEM SELECTOR PLAN 6
takes lantus 16 qhs  fs q6  lantus 8 qhs for now  sliding scale q6 on plavix  Resume home meds no complaints   c/w zofran prn

## 2022-05-09 NOTE — PROGRESS NOTE ADULT - PROBLEM SELECTOR PLAN 1
p/w r sided facial twitching  given depakon 1g in ed  c/w keppra 500 bid as per home meds  neuro checks  CT head with L MCA chronic infarct  eeg  neuro dr raya consulted pt takes keppra 500 bid at home  p/w r sided facial twitching  given depakon 1g in ed  neuro checks  CT head with L MCA chronic infarct  f/u EEG  neuro dr Brandon consulted  give keppra 2g stat then increase 750mg BID per neurology.   Seizure precaution

## 2022-05-09 NOTE — PATIENT PROFILE ADULT - ARE SIGNIFICANT INDICATORS COMPLETE.
PROCEDURE:ANKLE TWO VIEWS RIGHT

INDICATION:Right ankle pain

COMPARISON:None.

 

FINDINGS:

 

The bones are diffusely osteopenic. No acute, displaced fracture or 

dislocation. Posttraumatic deformity of the distal fibula with 

associated tibiotalar joint space narrowing and subchondral sclerosis. 

No gross soft tissue abnormality.

 

CONCLUSION:

No acute osseous abnormality.

 

Posttraumatic changes of the distal fibula with probable 

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

 

 

 

Dictated by:  Kota Colon M.D. on 4/16/2018 at 11:55     

Electronically approved by:  Kota Colon M.D. on 4/16/2018 at 11:55 No Yes

## 2022-05-09 NOTE — PROGRESS NOTE ADULT - TREATMENT GUIDELINE COMMENT
discussed with son at bedside, test results, treatment plan, goals of care including MOLST. family wishes Full code. aggressive tx.

## 2022-05-09 NOTE — PROGRESS NOTE ADULT - PROBLEM SELECTOR PLAN 2
pt on PO meds Pt takes lantus 16 qhs at home  episode hypoglycemia  56 now improving  to 90s with advanced diet, IVF D5  hold lantus for now  monitor FS ACHS  f/u A1C

## 2022-05-09 NOTE — PROGRESS NOTE ADULT - PROBLEM SELECTOR PLAN 5
on plavix  hold while npo.  resume when able to take meds no complaints   c/w zofran prn pt takes celexa 10mg, Nuedexta 20-10 q12h (not sure hx PBA)  hold Nuedexta for now  monitor mood  resume Celexa

## 2022-05-10 DIAGNOSIS — Z71.89 OTHER SPECIFIED COUNSELING: ICD-10-CM

## 2022-05-10 DIAGNOSIS — E87.6 HYPOKALEMIA: ICD-10-CM

## 2022-05-10 DIAGNOSIS — Z02.9 ENCOUNTER FOR ADMINISTRATIVE EXAMINATIONS, UNSPECIFIED: ICD-10-CM

## 2022-05-10 LAB
A1C WITH ESTIMATED AVERAGE GLUCOSE RESULT: 7 % — HIGH (ref 4–5.6)
ALBUMIN SERPL ELPH-MCNC: 2.8 G/DL — LOW (ref 3.5–5)
ALP SERPL-CCNC: 86 U/L — SIGNIFICANT CHANGE UP (ref 40–120)
ALT FLD-CCNC: 17 U/L DA — SIGNIFICANT CHANGE UP (ref 10–60)
ANION GAP SERPL CALC-SCNC: 6 MMOL/L — SIGNIFICANT CHANGE UP (ref 5–17)
AST SERPL-CCNC: 19 U/L — SIGNIFICANT CHANGE UP (ref 10–40)
BILIRUB SERPL-MCNC: 0.6 MG/DL — SIGNIFICANT CHANGE UP (ref 0.2–1.2)
BUN SERPL-MCNC: 12 MG/DL — SIGNIFICANT CHANGE UP (ref 7–18)
CALCIUM SERPL-MCNC: 9.3 MG/DL — SIGNIFICANT CHANGE UP (ref 8.4–10.5)
CHLORIDE SERPL-SCNC: 107 MMOL/L — SIGNIFICANT CHANGE UP (ref 96–108)
CO2 SERPL-SCNC: 27 MMOL/L — SIGNIFICANT CHANGE UP (ref 22–31)
CREAT SERPL-MCNC: 1.06 MG/DL — SIGNIFICANT CHANGE UP (ref 0.5–1.3)
EGFR: 48 ML/MIN/1.73M2 — LOW
ESTIMATED AVERAGE GLUCOSE: 154 MG/DL — HIGH (ref 68–114)
GLUCOSE BLDC GLUCOMTR-MCNC: 102 MG/DL — HIGH (ref 70–99)
GLUCOSE BLDC GLUCOMTR-MCNC: 114 MG/DL — HIGH (ref 70–99)
GLUCOSE BLDC GLUCOMTR-MCNC: 120 MG/DL — HIGH (ref 70–99)
GLUCOSE BLDC GLUCOMTR-MCNC: 139 MG/DL — HIGH (ref 70–99)
GLUCOSE BLDC GLUCOMTR-MCNC: 151 MG/DL — HIGH (ref 70–99)
GLUCOSE BLDC GLUCOMTR-MCNC: 191 MG/DL — HIGH (ref 70–99)
GLUCOSE BLDC GLUCOMTR-MCNC: 86 MG/DL — SIGNIFICANT CHANGE UP (ref 70–99)
GLUCOSE SERPL-MCNC: 97 MG/DL — SIGNIFICANT CHANGE UP (ref 70–99)
HCT VFR BLD CALC: 33.4 % — LOW (ref 34.5–45)
HGB BLD-MCNC: 11.1 G/DL — LOW (ref 11.5–15.5)
MAGNESIUM SERPL-MCNC: 1.8 MG/DL — SIGNIFICANT CHANGE UP (ref 1.6–2.6)
MCHC RBC-ENTMCNC: 30.2 PG — SIGNIFICANT CHANGE UP (ref 27–34)
MCHC RBC-ENTMCNC: 33.2 GM/DL — SIGNIFICANT CHANGE UP (ref 32–36)
MCV RBC AUTO: 91 FL — SIGNIFICANT CHANGE UP (ref 80–100)
NRBC # BLD: 0 /100 WBCS — SIGNIFICANT CHANGE UP (ref 0–0)
PHOSPHATE SERPL-MCNC: 2.7 MG/DL — SIGNIFICANT CHANGE UP (ref 2.5–4.5)
PLATELET # BLD AUTO: 229 K/UL — SIGNIFICANT CHANGE UP (ref 150–400)
POTASSIUM SERPL-MCNC: 3.2 MMOL/L — LOW (ref 3.5–5.3)
POTASSIUM SERPL-SCNC: 3.2 MMOL/L — LOW (ref 3.5–5.3)
PROT SERPL-MCNC: 7.6 G/DL — SIGNIFICANT CHANGE UP (ref 6–8.3)
RBC # BLD: 3.67 M/UL — LOW (ref 3.8–5.2)
RBC # FLD: 16.4 % — HIGH (ref 10.3–14.5)
SODIUM SERPL-SCNC: 140 MMOL/L — SIGNIFICANT CHANGE UP (ref 135–145)
WBC # BLD: 5.98 K/UL — SIGNIFICANT CHANGE UP (ref 3.8–10.5)
WBC # FLD AUTO: 5.98 K/UL — SIGNIFICANT CHANGE UP (ref 3.8–10.5)

## 2022-05-10 PROCEDURE — 99231 SBSQ HOSP IP/OBS SF/LOW 25: CPT

## 2022-05-10 RX ORDER — LEVETIRACETAM 250 MG/1
750 TABLET, FILM COATED ORAL EVERY 12 HOURS
Refills: 0 | Status: DISCONTINUED | OUTPATIENT
Start: 2022-05-10 | End: 2022-05-11

## 2022-05-10 RX ORDER — POTASSIUM CHLORIDE 20 MEQ
40 PACKET (EA) ORAL ONCE
Refills: 0 | Status: COMPLETED | OUTPATIENT
Start: 2022-05-10 | End: 2022-05-10

## 2022-05-10 RX ADMIN — LEVETIRACETAM 400 MILLIGRAM(S): 250 TABLET, FILM COATED ORAL at 05:40

## 2022-05-10 RX ADMIN — CLOPIDOGREL BISULFATE 75 MILLIGRAM(S): 75 TABLET, FILM COATED ORAL at 12:32

## 2022-05-10 RX ADMIN — CITALOPRAM 10 MILLIGRAM(S): 10 TABLET, FILM COATED ORAL at 14:22

## 2022-05-10 RX ADMIN — ATORVASTATIN CALCIUM 40 MILLIGRAM(S): 80 TABLET, FILM COATED ORAL at 21:49

## 2022-05-10 RX ADMIN — HEPARIN SODIUM 5000 UNIT(S): 5000 INJECTION INTRAVENOUS; SUBCUTANEOUS at 21:49

## 2022-05-10 RX ADMIN — Medication 40 MILLIEQUIVALENT(S): at 09:50

## 2022-05-10 RX ADMIN — HEPARIN SODIUM 5000 UNIT(S): 5000 INJECTION INTRAVENOUS; SUBCUTANEOUS at 14:22

## 2022-05-10 RX ADMIN — LEVETIRACETAM 400 MILLIGRAM(S): 250 TABLET, FILM COATED ORAL at 17:37

## 2022-05-10 RX ADMIN — Medication 1: at 23:47

## 2022-05-10 RX ADMIN — LEVETIRACETAM 750 MILLIGRAM(S): 250 TABLET, FILM COATED ORAL at 21:48

## 2022-05-10 RX ADMIN — Medication 1: at 00:23

## 2022-05-10 RX ADMIN — HEPARIN SODIUM 5000 UNIT(S): 5000 INJECTION INTRAVENOUS; SUBCUTANEOUS at 05:37

## 2022-05-10 NOTE — PROGRESS NOTE ADULT - SUBJECTIVE AND OBJECTIVE BOX
To be completed        Per report of routine EEG with video recording (selected quotes):  "The study is significantly degraded by continuous muscle artifact correlated with reported facial twitching.  The artifact limits interpretation. Only intermittent brief fragments of the background may be assessed.    FINDINGS:  The background was continuous, spontaneously variable and reactive.  During wakefulness, a clear posterior rhythm is not seen.      Background Slowing:  Generalized slowing: the background consists of mix of theta diffusely.  Focal slowing: there is a relative loss of amplitude over the left hemisphere.     Sleep Background:  Drowsiness and stage II sleep transients were not recorded.    Non-epileptiform activity:  There is continuous periodic muscle artifact.     Epileptiform Activity:   No clear epileptiform discharges were present.    Events:  There is continuous periodic facial twitching also manifesting as subtle jerks of head.  No clear EEG correlate was appreciated.     Activation Procedures:   -Hyperventilation was not performed.    -Photic stimulation was not performed.    Artifacts:  Intermittent myogenic and movement artifacts were noted.    ECG:  The heart rate on single channel ECG was predominantly between 70-80 BPM.    EEG Classification / Summary:    Technically difficult study with limited interpretation  Asymmetry, attenuation of cerebral activity over left hemisphere.  Background slowing, generalized.    Clinical Impression:    Technically difficult study due to prevalent periodic muscle artifact.   Possible structural abnormality in the left hemisphere.   Possible mild nonspecific diffuse or multifocal cerebral dysfunction.   There continuous periodic muscle artifact corresponding to facial jerks seen on video.  Although EEG in this case is non-diagnostic, it is possible to have focal motor seizure with surface negative EEG due to the orientation and depth of the central sulcus and the increased distance of sulcal seizure focus from the scalp."    EXAMINATION    Awake, alert.      Continuously present are multifocal asymmetric asynchronous myoclonic jerks involving muscles of the face, neck, upper torso, and both UEs (RUE > LUE).  There is a low amplitude ~4 Hz rhythmic tremor of the L fingers (flex/ext).

## 2022-05-10 NOTE — PROGRESS NOTE ADULT - SUBJECTIVE AND OBJECTIVE BOX
NP Note discussed with  primary attending    Patient is a 89y old  Female who presents with a chief complaint of Seizure (09 May 2022 15:01)      INTERVAL HPI/OVERNIGHT EVENTS: no new complaints    MEDICATIONS  (STANDING):  atorvastatin 40 milliGRAM(s) Oral at bedtime  citalopram 10 milliGRAM(s) Oral daily  clopidogrel Tablet 75 milliGRAM(s) Oral daily  dextrose 5%. 1000 milliLiter(s) (50 mL/Hr) IV Continuous <Continuous>  dextrose 50% Injectable 25 Gram(s) IV Push once  heparin   Injectable 5000 Unit(s) SubCutaneous every 8 hours  insulin glargine Injectable (LANTUS) 8 Unit(s) SubCutaneous at bedtime  insulin lispro (ADMELOG) corrective regimen sliding scale   SubCutaneous every 6 hours  levETIRAcetam  IVPB 750 milliGRAM(s) IV Intermittent every 12 hours  losartan 100 milliGRAM(s) Oral daily    MEDICATIONS  (PRN):  dextrose Oral Gel 15 Gram(s) Oral once PRN Blood Glucose LESS THAN 70 milliGRAM(s)/deciliter  ondansetron   Disintegrating Tablet 4 milliGRAM(s) Oral every 8 hours PRN Nausea and/or Vomiting      __________________________________________________  REVIEW OF SYSTEMS:    Not accessed due to decreased cognition     Vital Signs Last 24 Hrs  T(C): 36.7 (10 May 2022 14:50), Max: 37.3 (09 May 2022 18:54)  T(F): 98.1 (10 May 2022 14:50), Max: 99.2 (10 May 2022 01:38)  HR: 99 (10 May 2022 14:50) (72 - 99)  BP: 159/69 (10 May 2022 14:50) (142/59 - 176/89)  BP(mean): 118 (09 May 2022 15:30) (118 - 118)  RR: 18 (10 May 2022 14:50) (17 - 18)  SpO2: 97% (10 May 2022 14:50) (94% - 97%)    ________________________________________________  PHYSICAL EXAM:  GENERAL: NAD  HEENT: Normocephalic;  conjunctivae and sclerae clear; moist mucous membranes;   NECK : supple  CHEST/LUNG: Clear to ausculitation bilaterally with good air entry   HEART: S1 S2  regular; no murmurs, gallops or rubs  ABDOMEN: Soft, Nontender, Nondistended; Bowel sounds present  EXTREMITIES: no cyanosis; no edema; no calf tenderness  SKIN: warm and dry; no rash  NERVOUS SYSTEM:  Awake and alert to person only, + facial twitching    _________________________________________________  LABS:                        11.1   5.98  )-----------( 229      ( 10 May 2022 07:19 )             33.4     05-10    140  |  107  |  12  ----------------------------<  97  3.2<L>   |  27  |  1.06    Ca    9.3      10 May 2022 07:19  Phos  2.7     05-10  Mg     1.8     05-10    TPro  7.6  /  Alb  2.8<L>  /  TBili  0.6  /  DBili  x   /  AST  19  /  ALT  17  /  AlkPhos  86  05-10    PT/INR - ( 08 May 2022 15:30 )   PT: 13.0 sec;   INR: 1.09 ratio         PTT - ( 08 May 2022 15:30 )  PTT:29.3 sec    CAPILLARY BLOOD GLUCOSE      POCT Blood Glucose.: 114 mg/dL (10 May 2022 11:39)  POCT Blood Glucose.: 102 mg/dL (10 May 2022 08:03)  POCT Blood Glucose.: 86 mg/dL (10 May 2022 06:08)  POCT Blood Glucose.: 191 mg/dL (10 May 2022 00:05)  POCT Blood Glucose.: 237 mg/dL (09 May 2022 21:56)  POCT Blood Glucose.: 118 mg/dL (09 May 2022 17:43)        RADIOLOGY & ADDITIONAL TESTS:  < from: CT Head No Cont (05.08.22 @ 17:30) >    IMPRESSION:  No hydrocephalus, acute intracranial hemorrhage, or mass effect.  Left MCA chronic infarct.    --- End of Report ---      < end of copied text >      Imaging Personally Reviewed:  YES/NO    Consultant(s) Notes Reviewed:   YES/ No    Care Discussed with Consultants :     Plan of care was discussed with patient and /or primary care giver; all questions and concerns were addressed and care was aligned with patient's wishes.     NP Note discussed with  primary attending    Patient is a 89y old  Female who presents with a chief complaint of Seizure (09 May 2022 15:01)      INTERVAL HPI/OVERNIGHT EVENTS: no new complaints    MEDICATIONS  (STANDING):  atorvastatin 40 milliGRAM(s) Oral at bedtime  citalopram 10 milliGRAM(s) Oral daily  clopidogrel Tablet 75 milliGRAM(s) Oral daily  dextrose 5%. 1000 milliLiter(s) (50 mL/Hr) IV Continuous <Continuous>  dextrose 50% Injectable 25 Gram(s) IV Push once  heparin   Injectable 5000 Unit(s) SubCutaneous every 8 hours  insulin glargine Injectable (LANTUS) 8 Unit(s) SubCutaneous at bedtime  insulin lispro (ADMELOG) corrective regimen sliding scale   SubCutaneous every 6 hours  levETIRAcetam  IVPB 750 milliGRAM(s) IV Intermittent every 12 hours  losartan 100 milliGRAM(s) Oral daily    MEDICATIONS  (PRN):  dextrose Oral Gel 15 Gram(s) Oral once PRN Blood Glucose LESS THAN 70 milliGRAM(s)/deciliter  ondansetron   Disintegrating Tablet 4 milliGRAM(s) Oral every 8 hours PRN Nausea and/or Vomiting      __________________________________________________  REVIEW OF SYSTEMS:    Not accessed due to impaired cognition     Vital Signs Last 24 Hrs  T(C): 36.7 (10 May 2022 14:50), Max: 37.3 (09 May 2022 18:54)  T(F): 98.1 (10 May 2022 14:50), Max: 99.2 (10 May 2022 01:38)  HR: 99 (10 May 2022 14:50) (72 - 99)  BP: 159/69 (10 May 2022 14:50) (142/59 - 176/89)  BP(mean): 118 (09 May 2022 15:30) (118 - 118)  RR: 18 (10 May 2022 14:50) (17 - 18)  SpO2: 97% (10 May 2022 14:50) (94% - 97%)    ________________________________________________  PHYSICAL EXAM:  GENERAL: NAD  HEENT: Normocephalic;  conjunctivae and sclerae clear; moist mucous membranes;   NECK : supple  CHEST/LUNG: Clear to ausculitation bilaterally with good air entry   HEART: S1 S2  regular; no murmurs, gallops or rubs  ABDOMEN: Soft, Nontender, Nondistended; Bowel sounds present  EXTREMITIES: no cyanosis; no edema; no calf tenderness  SKIN: warm and dry; no rash  NERVOUS SYSTEM:  Awake and alert to person only, + facial twitching    _________________________________________________  LABS:                        11.1   5.98  )-----------( 229      ( 10 May 2022 07:19 )             33.4     05-10    140  |  107  |  12  ----------------------------<  97  3.2<L>   |  27  |  1.06    Ca    9.3      10 May 2022 07:19  Phos  2.7     05-10  Mg     1.8     05-10    TPro  7.6  /  Alb  2.8<L>  /  TBili  0.6  /  DBili  x   /  AST  19  /  ALT  17  /  AlkPhos  86  05-10    PT/INR - ( 08 May 2022 15:30 )   PT: 13.0 sec;   INR: 1.09 ratio         PTT - ( 08 May 2022 15:30 )  PTT:29.3 sec    CAPILLARY BLOOD GLUCOSE      POCT Blood Glucose.: 114 mg/dL (10 May 2022 11:39)  POCT Blood Glucose.: 102 mg/dL (10 May 2022 08:03)  POCT Blood Glucose.: 86 mg/dL (10 May 2022 06:08)  POCT Blood Glucose.: 191 mg/dL (10 May 2022 00:05)  POCT Blood Glucose.: 237 mg/dL (09 May 2022 21:56)  POCT Blood Glucose.: 118 mg/dL (09 May 2022 17:43)        RADIOLOGY & ADDITIONAL TESTS:  < from: CT Head No Cont (05.08.22 @ 17:30) >    IMPRESSION:  No hydrocephalus, acute intracranial hemorrhage, or mass effect.  Left MCA chronic infarct.    --- End of Report ---      < end of copied text >      Imaging Personally Reviewed:  YES/NO    Consultant(s) Notes Reviewed:   YES/ No    Care Discussed with Consultants :     Plan of care was discussed with patient and /or primary care giver; all questions and concerns were addressed and care was aligned with patient's wishes.

## 2022-05-10 NOTE — PROGRESS NOTE ADULT - ASSESSMENT
89F w/ PMH L MCA CVA w/ R side residual 4/2021, DM on insulin, celexa 10 qd, hld, htn, ischemic heart disease, seizures presents to ED with R sided facial twitching admitted for seizures. CT Head no  acute intracranial hemorrhage, or mass effect. Patient admitted for seizure disorder, & started on IV Keppra. Neuro consulted. F/U with EEG reports & neuro recommendation. PT consulted. GOC discussion done with family. Family wants full code with aggressive treatment.      89F w/ PMH L MCA CVA w/ R side residual 4/2021, DM on insulin, celexa 10 qd, hld, htn, ischemic heart disease, seizures presents to ED with R sided facial twitching admitted for seizures. CT Head no  acute intracranial hemorrhage, or mass effect. Patient admitted for seizure disorder, & started on IV Keppra. Neuro consulted. F/U with EEG noted, unable to r/o seizures due to ongoing facial twitching. Neuro follow up in progress. PT consulted.

## 2022-05-10 NOTE — PROGRESS NOTE ADULT - ASSESSMENT
She is NOT having focal seizures at this time.     Did she ever have seizures, or did she only have myoclonic jerks?    She is having multifocal myoclonic jerks.  While in her age groups they are most commonly seen after hypoxic ischemic brain injury, this patient has not, insofar as we know, had had no such insult.  Myoclonic jerks and tremors can be toxic side effects of medications, such as citalopram and escitalopram.  In rare cases, levetiracetam (at toxic levels) can cause myoclonic jerks.        RECOMMENDATIONS    D/C citalopram.    Levetiracetam serum level (I have ordered it).    Decrease levetiracetam to 500mg Q 12 hrs.     She is NOT having focal seizures at this time.     Did she ever have seizures, or did she only have myoclonic jerks?    She is having multifocal myoclonic jerks.  While in her age groups they are most commonly seen after hypoxic ischemic brain injury, this patient has not, insofar as we know, had had no such insult.  Myoclonic jerks and tremors can be toxic side effects of medications, such as citalopram and escitalopram.  In rare cases, levetiracetam (at toxic levels) can cause myoclonic jerks.        RECOMMENDATIONS    D/C citalopram.    Levetiracetam serum level (I have ordered it).    Decrease levetiracetam to 500mg Q 12 hrs.      Find out from Pt's family if she had an episode of cerebral hypoxia (such as from cardiac arrest, pneumonia or other pulmonary problem), and if so when.

## 2022-05-10 NOTE — PROGRESS NOTE ADULT - PROBLEM SELECTOR PLAN 2
-C/W on Losartan  -DASH diet -Potassium 3.2  -Replaced with PO potassium  -Monitor BMP -Potassium 3.2   -Replaced with PO potassium  -Monitor BMP

## 2022-05-10 NOTE — PROGRESS NOTE ADULT - PROBLEM SELECTOR PLAN 9
-Patient came from nursing home, discharge disposition likely back to nursing home pending clinical course  -GOC discussed with family, family wants full code -C/W Citalopram -Refer to GOC from 5/9  -Pt is full code

## 2022-05-10 NOTE — PROGRESS NOTE ADULT - PROBLEM SELECTOR PLAN 10
-Patient came from nursing home, discharge disposition likely back to nursing home pending clinical course  -PT consulted  -GOC discussed with family, family wants full code  -Spoke with pt's daughter & son at bedside, updated on treatment plan. All questions answered, agreed with plan of care -Patient came from nursing home, discharge disposition likely back to nursing home pending clinical course  -PT consulted  -Spoke with pt's daughter & son at bedside, updated on treatment plan. All questions answered, agreed with plan of care

## 2022-05-10 NOTE — PROGRESS NOTE ADULT - PROBLEM SELECTOR PLAN 1
-Admitted with R sided facial twitching  -CT head with L MCA chronic infarct  -C/W Keppra  -Seizure & aspiration precaution  -Neuro Dr Brandon consulted  -F/U EEG reports -Admitted with R sided facial twitching  -CT head with L MCA chronic infarct  -C/W Keppra  -Seizure & aspiration precaution  -Neuro Dr Brandon following  - EEG reports noted

## 2022-05-11 VITALS
HEART RATE: 91 BPM | TEMPERATURE: 99 F | SYSTOLIC BLOOD PRESSURE: 157 MMHG | RESPIRATION RATE: 18 BRPM | DIASTOLIC BLOOD PRESSURE: 71 MMHG | OXYGEN SATURATION: 94 %

## 2022-05-11 LAB
ANION GAP SERPL CALC-SCNC: 6 MMOL/L — SIGNIFICANT CHANGE UP (ref 5–17)
BUN SERPL-MCNC: 8 MG/DL — SIGNIFICANT CHANGE UP (ref 7–18)
CALCIUM SERPL-MCNC: 9.1 MG/DL — SIGNIFICANT CHANGE UP (ref 8.4–10.5)
CHLORIDE SERPL-SCNC: 106 MMOL/L — SIGNIFICANT CHANGE UP (ref 96–108)
CO2 SERPL-SCNC: 28 MMOL/L — SIGNIFICANT CHANGE UP (ref 22–31)
CREAT SERPL-MCNC: 0.89 MG/DL — SIGNIFICANT CHANGE UP (ref 0.5–1.3)
EGFR: 59 ML/MIN/1.73M2 — LOW
GLUCOSE BLDC GLUCOMTR-MCNC: 135 MG/DL — HIGH (ref 70–99)
GLUCOSE BLDC GLUCOMTR-MCNC: 236 MG/DL — HIGH (ref 70–99)
GLUCOSE SERPL-MCNC: 115 MG/DL — HIGH (ref 70–99)
HCT VFR BLD CALC: 32 % — LOW (ref 34.5–45)
HGB BLD-MCNC: 10.6 G/DL — LOW (ref 11.5–15.5)
MCHC RBC-ENTMCNC: 30.3 PG — SIGNIFICANT CHANGE UP (ref 27–34)
MCHC RBC-ENTMCNC: 33.1 GM/DL — SIGNIFICANT CHANGE UP (ref 32–36)
MCV RBC AUTO: 91.4 FL — SIGNIFICANT CHANGE UP (ref 80–100)
NRBC # BLD: 0 /100 WBCS — SIGNIFICANT CHANGE UP (ref 0–0)
PLATELET # BLD AUTO: 204 K/UL — SIGNIFICANT CHANGE UP (ref 150–400)
POTASSIUM SERPL-MCNC: 3.4 MMOL/L — LOW (ref 3.5–5.3)
POTASSIUM SERPL-SCNC: 3.4 MMOL/L — LOW (ref 3.5–5.3)
RBC # BLD: 3.5 M/UL — LOW (ref 3.8–5.2)
RBC # FLD: 16.8 % — HIGH (ref 10.3–14.5)
SODIUM SERPL-SCNC: 140 MMOL/L — SIGNIFICANT CHANGE UP (ref 135–145)
WBC # BLD: 4.94 K/UL — SIGNIFICANT CHANGE UP (ref 3.8–10.5)
WBC # FLD AUTO: 4.94 K/UL — SIGNIFICANT CHANGE UP (ref 3.8–10.5)

## 2022-05-11 PROCEDURE — 71045 X-RAY EXAM CHEST 1 VIEW: CPT

## 2022-05-11 PROCEDURE — 96374 THER/PROPH/DIAG INJ IV PUSH: CPT

## 2022-05-11 PROCEDURE — 93005 ELECTROCARDIOGRAM TRACING: CPT

## 2022-05-11 PROCEDURE — 95819 EEG AWAKE AND ASLEEP: CPT

## 2022-05-11 PROCEDURE — 95957 EEG DIGITAL ANALYSIS: CPT

## 2022-05-11 PROCEDURE — 83036 HEMOGLOBIN GLYCOSYLATED A1C: CPT

## 2022-05-11 PROCEDURE — 99285 EMERGENCY DEPT VISIT HI MDM: CPT

## 2022-05-11 PROCEDURE — 70450 CT HEAD/BRAIN W/O DYE: CPT | Mod: MA

## 2022-05-11 PROCEDURE — 85730 THROMBOPLASTIN TIME PARTIAL: CPT

## 2022-05-11 PROCEDURE — 80048 BASIC METABOLIC PNL TOTAL CA: CPT

## 2022-05-11 PROCEDURE — 87635 SARS-COV-2 COVID-19 AMP PRB: CPT

## 2022-05-11 PROCEDURE — 85025 COMPLETE CBC W/AUTO DIFF WBC: CPT

## 2022-05-11 PROCEDURE — 85027 COMPLETE CBC AUTOMATED: CPT

## 2022-05-11 PROCEDURE — 84484 ASSAY OF TROPONIN QUANT: CPT

## 2022-05-11 PROCEDURE — 80053 COMPREHEN METABOLIC PANEL: CPT

## 2022-05-11 PROCEDURE — 80177 DRUG SCRN QUAN LEVETIRACETAM: CPT

## 2022-05-11 PROCEDURE — 83735 ASSAY OF MAGNESIUM: CPT

## 2022-05-11 PROCEDURE — 95707 EEG W/O VID 2-12HR CONT MNTR: CPT

## 2022-05-11 PROCEDURE — 82962 GLUCOSE BLOOD TEST: CPT

## 2022-05-11 PROCEDURE — 85610 PROTHROMBIN TIME: CPT

## 2022-05-11 PROCEDURE — 36415 COLL VENOUS BLD VENIPUNCTURE: CPT

## 2022-05-11 PROCEDURE — 84100 ASSAY OF PHOSPHORUS: CPT

## 2022-05-11 RX ORDER — ONDANSETRON 8 MG/1
4 TABLET, FILM COATED ORAL ONCE
Refills: 0 | Status: COMPLETED | OUTPATIENT
Start: 2022-05-11 | End: 2022-05-11

## 2022-05-11 RX ORDER — POTASSIUM CHLORIDE 20 MEQ
40 PACKET (EA) ORAL ONCE
Refills: 0 | Status: COMPLETED | OUTPATIENT
Start: 2022-05-11 | End: 2022-05-11

## 2022-05-11 RX ORDER — CITALOPRAM 10 MG/1
1 TABLET, FILM COATED ORAL
Qty: 0 | Refills: 0 | DISCHARGE

## 2022-05-11 RX ORDER — LEVETIRACETAM 250 MG/1
500 TABLET, FILM COATED ORAL
Refills: 0 | Status: DISCONTINUED | OUTPATIENT
Start: 2022-05-11 | End: 2022-05-11

## 2022-05-11 RX ADMIN — HEPARIN SODIUM 5000 UNIT(S): 5000 INJECTION INTRAVENOUS; SUBCUTANEOUS at 06:57

## 2022-05-11 RX ADMIN — CLOPIDOGREL BISULFATE 75 MILLIGRAM(S): 75 TABLET, FILM COATED ORAL at 12:57

## 2022-05-11 RX ADMIN — Medication 40 MILLIEQUIVALENT(S): at 10:07

## 2022-05-11 RX ADMIN — LEVETIRACETAM 750 MILLIGRAM(S): 250 TABLET, FILM COATED ORAL at 06:57

## 2022-05-11 RX ADMIN — LOSARTAN POTASSIUM 100 MILLIGRAM(S): 100 TABLET, FILM COATED ORAL at 06:57

## 2022-05-11 RX ADMIN — HEPARIN SODIUM 5000 UNIT(S): 5000 INJECTION INTRAVENOUS; SUBCUTANEOUS at 14:02

## 2022-05-11 RX ADMIN — ONDANSETRON 4 MILLIGRAM(S): 8 TABLET, FILM COATED ORAL at 10:07

## 2022-05-11 NOTE — CHART NOTE - NSCHARTNOTEFT_GEN_A_CORE
Spoke to Neurology today and pt was recommended for repeat EEG after discontinuing Celexa for ongoing Multifocal myoclonic jerks   Pt's HCP/Son Hari and daughter Caitlyn at bedside, Neurology recs were reviewed, pt's family adamantly refused EEG and wants the pt to be discharged back to facility today, educated on risks and benefits, vernalized understanding but still refused repeat EEG at this time. Discharge medications changes were reviewed at length with good understanding     Above d/w Dr. Yates and Neuro Dr. Brandon

## 2022-05-11 NOTE — DISCHARGE NOTE PROVIDER - HOSPITAL COURSE
89F w/ PMH L MCA CVA w/ R side residual 4/2021, DM on insulin, celexa 10 qd, hld, htn, ischemic heart disease, seizures presents to ED with R sided facial twitching admitted for seizures. CT Head no  acute intracranial hemorrhage, or mass effect. Patient admitted for seizure disorder, & started on IV Keppra.   Neuro consulted. EEG done , unable to r/o seizures due to ongoing facial twitching. Keppra decreased to 500 mg.  Patient is recommended for a follow up EEG but patient's family refused, requesting pt to be discharged back to the   nursing home. Decision was made to discharge pt back to nursing home and follow up with repeat EEG.   89F w/ PMH L MCA CVA w/ R side residual 4/2021, DM on insulin, celexa 10 qd, hld, htn, ischemic heart disease, seizures presents to ED with R sided facial twitching admitted for seizures. CT Head no  acute intracranial hemorrhage, or mass effect. Patient admitted for seizure disorder, & started on IV Keppra.   Neuro consulted. EEG done , unable to r/o seizures due to ongoing facial twitching. Keppra decreased to 500 mg.  Patient is recommended for a follow up EEG but patient's family refused, requesting pt to be discharged back to the   nursing home. Decision was made to discharge pt back to nursing home and follow up with repeat EEG.  Please note that this a brief summary of hospital course please refer to daily progress notes and consult notes for full course and events

## 2022-05-11 NOTE — DISCHARGE NOTE PROVIDER - CARE PROVIDERS DIRECT ADDRESSES
Patient complained of a productive cough with yellow/green sputum, rhinorrhea, sore throat, headache, fatigue, body aches, nausea, diarrhea, a decreased appetite, loss of taste and smell, ear congestion, yellow/green discharge from his eyes, SOB due to the congestion with intermittent wheezing, and a temperature of 99.0, but stated he feels like he is burning up.  Patient stated he is taking Claritin and trying to stay hydrated, but he is dizzy at times and his urine is dark.  He no longer has an albuterol inhaler.  These symptoms have been present for 5 days.  His family was recently in [place] and they have the same symptoms.  His daughter was diagnosed with a URI and was prescribed antibiotics, but she is not feeling any better.  Patient has been vaccinated against COVID-19 and has not received a booster.  Patient agreeable to go to  for an evaluation to determine if IV rehydration is needed.  RN discussed COVID-19 testing site information, symptom management, isolation precautions and emergency warning signs that warrant immediate medical attention.     Reason for Disposition  • [1] COVID-19 infection suspected by caller or triager AND [2] mild symptoms (cough, fever, or others) AND [3] no complications or SOB    Protocols used: CORONAVIRUS (COVID-19) DIAGNOSED OR [id number]      
Patient was in [place] and took his daughter to urgent care - doctor told them it was upper respiratory infection. Patient states for the last 5 days he has the same thing his daughter has. Coughing up mucus yellow and green, eyes draining green and yellow and congestion. Patient hasn't been tested for covid  
,DirectAddress_Unknown,[email or web address]

## 2022-05-11 NOTE — DISCHARGE NOTE PROVIDER - NSDCCPCAREPLAN_GEN_ALL_CORE_FT
PRINCIPAL DISCHARGE DIAGNOSIS  Diagnosis: Myoclonic jerking  Assessment and Plan of Treatment: You were admitted for facial twitching &  found to have multifocal myoclonic kerk on EEG result. You were evaluated by neurologist, and treated with an increased dose of Keppra. You are being sent back to the nursing home with decreased dose of Keppra 500mg as you were taking before. Your Celaxa was discontinued because it may be related to the symptoms you are having. Follow up with [facility] MD for follow up EEG      SECONDARY DISCHARGE DIAGNOSES  Diagnosis: HTN (hypertension)  Assessment and Plan of Treatment: You have a history of high B/P, you were being maintained on your blood pressure medications while here in the hospital. Continue to take meds as prescribed and follow up with your PCP.    Diagnosis: DM (diabetes mellitus)  Assessment and Plan of Treatment: Continue to monitor your blood sugar and take your medications and insulin as ordered.    Diagnosis: Cerebrovascular accident (CVA)  Assessment and Plan of Treatment: Continue to take your Plavix as prescribed for your stroke history.    Diagnosis: Major depression  Assessment and Plan of Treatment: We are discontinuing Celexa, one of your medications for your depression. We are doing so in recommendation of neurologist to monitor for any subsequent jerking. This will help to determine if your current jerking could be attributed to this medication. Follow up with your PCP.    Diagnosis: CAD (coronary artery disease)  Assessment and Plan of Treatment: Continue to take your cholesterole medications as prescribed. Maintain low fat diet. Follow up with your PCP.

## 2022-05-11 NOTE — DISCHARGE NOTE PROVIDER - PROVIDER TOKENS
PROVIDER:[TOKEN:[[id number]],FOLLOWUP:[1 week]],PROVIDER:[TOKEN:[[id number]],FOLLOWUP:[1 week]]

## 2022-05-11 NOTE — DISCHARGE NOTE NURSING/CASE MANAGEMENT/SOCIAL WORK - NSDCPEFALRISK_GEN_ALL_CORE
For information on Fall & Injury Prevention, visit: [email or web address] OR  [email or web address] OR  https://www.cdc.gov/steadi/patient.html

## 2022-05-11 NOTE — DISCHARGE NOTE PROVIDER - NSDCMRMEDTOKEN_GEN_ALL_CORE_FT
acetaminophen 325 mg oral tablet, disintegratin tab(s) orally every 4 hours, As Needed  ascorbic acid 500 mg/5 mL oral liquid: 5 milliliter(s) orally once a day  atorvastatin 80 mg oral tablet: 1 tab(s) orally once a day  Basaglar KwikPen 100 units/mL subcutaneous solution: 16 unit(s) subcutaneous once a day (at bedtime)  cetirizine 10 mg oral tablet: 1 tab(s) orally once a day  Keppra 100 mg/mL oral solution: 5 milliliter(s) orally 2 times a day  Lasix 20 mg oral tablet: 0.5 tab(s) orally once a day  losartan 100 mg oral tablet: 1 tab(s) orally once a day  NovoLOG FlexPen 100 units/mL injectable solution: sliding scale  Nuedexta 20 mg-10 mg oral capsule: 1 cap(s) orally every 12 hours  Plavix 75 mg oral tablet: 1 tab(s) orally once a day

## 2022-05-11 NOTE — DISCHARGE NOTE NURSING/CASE MANAGEMENT/SOCIAL WORK - PATIENT PORTAL LINK FT
You can access the FollowMyHealth Patient Portal offered by [facility] by registering at the following website: [email or web address]. By joining [facility]’s FollowMyHealth portal, you will also be able to view your health information using other applications (apps) compatible with our system.

## 2022-05-11 NOTE — DISCHARGE NOTE PROVIDER - CARE PROVIDER_API CALL
Geovany Dennis)  Medicine  [address], [address] [place] [address]  Phone: [phone]  Fax: [phone]  Follow Up Time: 1 week    Renee Wallace)  Clinical Neurophysiology; Neurology; Sleep Medicine  [address] [facility] [place] [address]  Phone: [phone]  Fax: [phone]  Follow Up Time: 1 week

## 2022-05-11 NOTE — PROGRESS NOTE ADULT - SUBJECTIVE AND OBJECTIVE BOX
INTERVAL HPI/OVERNIGHT EVENTS:  Patient seen,events noticed,stable  VITAL SIGNS:  T(F): 98.4 (05-11-22 @ 04:42)  HR: 79 (05-11-22 @ 04:42)  BP: 175/80 (05-11-22 @ 04:42)  RR: 18 (05-11-22 @ 04:42)  SpO2: 98% (05-11-22 @ 04:42)  Wt(kg): --    PHYSICAL EXAM:  awake,no distress  Constitutional:  Eyes:  ENMT:perrla  Neck:  Respiratory:clear  Cardiovascular:s1s2,m-none  Gastrointestinal:soft,bs pos  Extremities:  Vascular:  Neurological:no focal deficit  Musculoskeletal:    MEDICATIONS  (STANDING):  atorvastatin 40 milliGRAM(s) Oral at bedtime  clopidogrel Tablet 75 milliGRAM(s) Oral daily  dextrose 5%. 1000 milliLiter(s) (50 mL/Hr) IV Continuous <Continuous>  dextrose 50% Injectable 25 Gram(s) IV Push once  heparin   Injectable 5000 Unit(s) SubCutaneous every 8 hours  insulin glargine Injectable (LANTUS) 8 Unit(s) SubCutaneous at bedtime  insulin lispro (ADMELOG) corrective regimen sliding scale   SubCutaneous every 6 hours  levETIRAcetam  Solution 500 milliGRAM(s) Oral two times a day  losartan 100 milliGRAM(s) Oral daily    MEDICATIONS  (PRN):  dextrose Oral Gel 15 Gram(s) Oral once PRN Blood Glucose LESS THAN 70 milliGRAM(s)/deciliter      Allergies    No Known Allergies    Intolerances        LABS:                        10.6   4.94  )-----------( 204      ( 11 May 2022 06:38 )             32.0     05-11    140  |  106  |  8   ----------------------------<  115<H>  3.4<L>   |  28  |  0.89    Ca    9.1      11 May 2022 06:38  Phos  2.7     05-10  Mg     1.8     05-10    TPro  7.6  /  Alb  2.8<L>  /  TBili  0.6  /  DBili  x   /  AST  19  /  ALT  17  /  AlkPhos  86  05-10          RADIOLOGY & ADDITIONAL TESTS:      Assessment and Plan:   · Assessment	  89F w/ PMH L MCA CVA w/ R side residual 4/2021, DM on insulin, celexa 10 qd, hld, htn, ischemic heart disease, seizures presents to ED with R sided facial twitching admitted for seizures. CT Head no  acute intracranial hemorrhage, or mass effect. Patient admitted for seizure disorder, & started on IV Keppra. Neuro consulted. F/U with EEG noted, unable to r/o seizures due to ongoing facial twitching. Neuro follow up in progress. PT consulted.        Problem/Plan - 1:  ·  Problem: Seizure.   ·  Plan: -Admitted with R sided facial twitching  -CT head with L MCA chronic infarct  -C/W Keppra  -Seizure & aspiration precaution  -Neuro Dr Brandon following  - EEG reports noted.-d/c planning back to nh today     Problem/Plan - 2:  ·  Problem: Acute hypokalemia.   ·  Plan: -Potassium 3.2   -Replaced with PO potassium  -Monitor BMP.     Problem/Plan - 3:  ·  Problem: HTN (hypertension).   ·  Plan: -C/W on Losartan  -DASH diet.     Problem/Plan - 4:  ·  Problem: DM (diabetes mellitus).  ·  Plan: -SSI & adjust as needed  -Diabetic diet.     Problem/Plan - 5:  ·  Problem: Cerebrovascular accident (CVA).  ·  Plan: -C/W lipitor and plavix.     Problem/Plan - 6:  ·  Problem: CAD (coronary artery disease).  ·  Plan: -CW Plavix & Lipitor.     Problem/Plan - 7:  ·  Problem: Prophylactic measure.  ·  Plan: -DVT PPx: Heparin sq.     Problem/Plan - 8:  ·  Problem: Major depression.  ·  Plan: -C/W Citalopram.     Problem/Plan - 9:  ·  Problem: Advance care planning.  ·  Plan: -Refer to GOC from 5/9  -Pt is full code.     Problem/Plan - 10:  ·  Problem: Discharge planning issues.   ·  Plan; -Patient came from nursing home, discharge disposition likely back to nursing home pending clinical course  -PT consulted  -Spoke with pt's daughter & son at bedside, updated on treatment plan. All questions answered, agreed with plan of care.

## 2022-05-16 LAB — LEVETIRACETAM SERPL-MCNC: 53.2 UG/ML — HIGH (ref 10–40)

## 2023-03-20 ENCOUNTER — EMERGENCY (EMERGENCY)
Facility: HOSPITAL | Age: 88
LOS: 1 days | Discharge: HOME | End: 2023-03-20
Attending: EMERGENCY MEDICINE
Payer: MEDICARE

## 2023-03-20 VITALS
SYSTOLIC BLOOD PRESSURE: 163 MMHG | TEMPERATURE: 98 F | OXYGEN SATURATION: 95 % | RESPIRATION RATE: 19 BRPM | DIASTOLIC BLOOD PRESSURE: 77 MMHG | HEART RATE: 68 BPM

## 2023-03-20 VITALS
WEIGHT: 164.91 LBS | DIASTOLIC BLOOD PRESSURE: 86 MMHG | HEART RATE: 73 BPM | OXYGEN SATURATION: 95 % | RESPIRATION RATE: 18 BRPM | TEMPERATURE: 98 F | SYSTOLIC BLOOD PRESSURE: 163 MMHG

## 2023-03-20 PROBLEM — I63.9 CEREBRAL INFARCTION, UNSPECIFIED: Chronic | Status: ACTIVE | Noted: 2022-05-10

## 2023-03-20 PROCEDURE — 70450 CT HEAD/BRAIN W/O DYE: CPT | Mod: 26,MA

## 2023-03-20 PROCEDURE — 73502 X-RAY EXAM HIP UNI 2-3 VIEWS: CPT

## 2023-03-20 PROCEDURE — 73620 X-RAY EXAM OF FOOT: CPT

## 2023-03-20 PROCEDURE — 72125 CT NECK SPINE W/O DYE: CPT | Mod: 26,MA

## 2023-03-20 PROCEDURE — 73562 X-RAY EXAM OF KNEE 3: CPT

## 2023-03-20 PROCEDURE — 73620 X-RAY EXAM OF FOOT: CPT | Mod: 26,RT

## 2023-03-20 PROCEDURE — 73030 X-RAY EXAM OF SHOULDER: CPT

## 2023-03-20 PROCEDURE — 70450 CT HEAD/BRAIN W/O DYE: CPT | Mod: MA

## 2023-03-20 PROCEDURE — 73502 X-RAY EXAM HIP UNI 2-3 VIEWS: CPT | Mod: 26,RT

## 2023-03-20 PROCEDURE — 99285 EMERGENCY DEPT VISIT HI MDM: CPT | Mod: FS

## 2023-03-20 PROCEDURE — 73562 X-RAY EXAM OF KNEE 3: CPT | Mod: 26,RT

## 2023-03-20 PROCEDURE — 72125 CT NECK SPINE W/O DYE: CPT | Mod: MA

## 2023-03-20 PROCEDURE — 71045 X-RAY EXAM CHEST 1 VIEW: CPT

## 2023-03-20 PROCEDURE — 99284 EMERGENCY DEPT VISIT MOD MDM: CPT | Mod: 25

## 2023-03-20 PROCEDURE — 73030 X-RAY EXAM OF SHOULDER: CPT | Mod: 26,RT

## 2023-03-20 PROCEDURE — 71045 X-RAY EXAM CHEST 1 VIEW: CPT | Mod: 26

## 2023-03-20 NOTE — ED PROVIDER NOTE - CLINICAL SUMMARY MEDICAL DECISION MAKING FREE TEXT BOX
89-year-old female, brought via EMS from nursing home for evaluation of an unwitnessed fall.  Patient is well-appearing, vital signs stable, afebrile.  Based on patient presentation and nursing home notes there is a lower suspicion that there was related to seizure activity.  CT head/spine was negative for acute injury.  X-ray shows chronic changes.  No acute fracture.  Will arrange transportation to go back to nursing home.

## 2023-03-20 NOTE — ED PROVIDER NOTE - OBJECTIVE STATEMENT
89 y/o woman history of left MCA CVA with residual right hemiplegia/hemiparesis, aphasia, DM, HTN, Seizures. Presents to ED today after unwitnessed fall from bed at SNF. She was found lying anterior with right side of face on the floor. No neurological changes noted prior to transport to the ED. Patient with pain with movement of the right shoulder, right foot, knee and hip. No pain noted with moved of the left extremities, neck or spine. She wears a diaper for chronic incontinence. Her skin is intact throughout. Her son is at the bedside and confirms that this pain is new and this is her baseline mental status. 89 y/o woman history of left MCA CVA with residual right hemiplegia/hemiparesis, aphasia, DM, HTN, Seizures. Presents to ED today after unwitnessed fall from bed at SNF. She was found lying anterior with right side of face on the protective floor mat on the floor. No neurological changes noted prior to transport to the ED as per nursing home note. Her son is at the bedside and confirms that this pain is new and this is her baseline mental status.

## 2023-03-20 NOTE — ED PROVIDER NOTE - PROGRESS NOTE DETAILS
Discussed results with son at bedside and with the from nursing home.  We will book ambulance for transportation to go back home.

## 2023-03-20 NOTE — ED ADULT NURSE NOTE - WAS YOUR LAST COVID-19 VACCINE GREATER THAN OR EQUAL TO TWO MONTHS AGO?
3rd attempt to contact patient. Left message to call clinic back. Unable to reach letter sent.     Yes

## 2023-03-20 NOTE — ED PROVIDER NOTE - PHYSICAL EXAMINATION
Patient is at baseline mental status (per son) alert, follows simple command, baseline right hemiplegia. No noted swelling, hematoma or abrasions. Grimaces and grunts with pain with movement of the right shoulder, right hip, right, knee and right foot. B/l +2 pedal edema, +pulses throughout, skin intact, <3 sec capillary refill. Baseline movement of left side. No SOB, arrhythmia, no abdominal tenderness, + BSx4 quadrants. Patient is at baseline mental status (per son) alert, follows simple command, baseline right hemiplegia. No noted swelling, hematoma or abrasions. Grimaces and grunts with pain with movement of the right shoulder, right hip, right, knee and right foot. B/l +2 pedal edema, +pulses throughout, skin intact, <3 sec capillary refill. Baseline movement of left side. No SOB, arrhythmia, no abdominal tenderness, + BSx4 quadrants. Her skin is intact throughout.

## 2023-03-20 NOTE — ED PROVIDER NOTE - ATTENDING APP SHARED VISIT CONTRIBUTION OF CARE
Agree with NP H&P.   89 y/o female with PMHx of CVA with residual right sided deficits, aphasia, DM, HTN, and seizures disorder presents to ED after unwitnessed fall from bed at nursing home.  The  patient was found on the floor.  Exam as per NP. Will check CT head/ cervical spine, xrays, and reassess.

## 2023-03-20 NOTE — ED PROVIDER NOTE - PATIENT PORTAL LINK FT
You can access the FollowMyHealth Patient Portal offered by [facility] by registering at the following website: [email or web address]. By joining [employer]’s FollowMyHealth portal, you will also be able to view your health information using other applications (apps) compatible with our system.

## 2023-04-04 NOTE — ED POST DISCHARGE NOTE - ADDITIONAL DOCUMENTATION
Spoke with Dr Sonny GEORGE at [facility] - already completed course of antibiotics based off CXR, not having respiratory symptoms at this time

## 2023-06-24 ENCOUNTER — EMERGENCY (EMERGENCY)
Facility: HOSPITAL | Age: 88
LOS: 1 days | Discharge: HOME | End: 2023-06-24
Attending: STUDENT IN AN ORGANIZED HEALTH CARE EDUCATION/TRAINING PROGRAM | Admitting: STUDENT IN AN ORGANIZED HEALTH CARE EDUCATION/TRAINING PROGRAM
Payer: MEDICARE

## 2023-06-24 VITALS
RESPIRATION RATE: 15 BRPM | HEART RATE: 66 BPM | DIASTOLIC BLOOD PRESSURE: 63 MMHG | SYSTOLIC BLOOD PRESSURE: 150 MMHG | TEMPERATURE: 98 F | OXYGEN SATURATION: 97 %

## 2023-06-24 VITALS
SYSTOLIC BLOOD PRESSURE: 165 MMHG | WEIGHT: 164.91 LBS | OXYGEN SATURATION: 95 % | HEART RATE: 60 BPM | DIASTOLIC BLOOD PRESSURE: 91 MMHG | RESPIRATION RATE: 22 BRPM | TEMPERATURE: 98 F

## 2023-06-24 LAB
ALBUMIN SERPL ELPH-MCNC: 3 G/DL — LOW (ref 3.5–5)
ALP SERPL-CCNC: 80 U/L — SIGNIFICANT CHANGE UP (ref 40–120)
ALT FLD-CCNC: 15 U/L DA — SIGNIFICANT CHANGE UP (ref 10–60)
ANION GAP SERPL CALC-SCNC: 6 MMOL/L — SIGNIFICANT CHANGE UP (ref 5–17)
AST SERPL-CCNC: 18 U/L — SIGNIFICANT CHANGE UP (ref 10–40)
BASOPHILS # BLD AUTO: 0.05 K/UL — SIGNIFICANT CHANGE UP (ref 0–0.2)
BASOPHILS NFR BLD AUTO: 0.9 % — SIGNIFICANT CHANGE UP (ref 0–2)
BILIRUB SERPL-MCNC: 0.4 MG/DL — SIGNIFICANT CHANGE UP (ref 0.2–1.2)
BUN SERPL-MCNC: 23 MG/DL — HIGH (ref 7–18)
CALCIUM SERPL-MCNC: 8.8 MG/DL — SIGNIFICANT CHANGE UP (ref 8.4–10.5)
CHLORIDE SERPL-SCNC: 113 MMOL/L — HIGH (ref 96–108)
CO2 SERPL-SCNC: 27 MMOL/L — SIGNIFICANT CHANGE UP (ref 22–31)
CREAT SERPL-MCNC: 1.35 MG/DL — HIGH (ref 0.5–1.3)
EGFR: 36 ML/MIN/1.73M2 — LOW
EOSINOPHIL # BLD AUTO: 0.36 K/UL — SIGNIFICANT CHANGE UP (ref 0–0.5)
EOSINOPHIL NFR BLD AUTO: 6.7 % — HIGH (ref 0–6)
GLUCOSE SERPL-MCNC: 152 MG/DL — HIGH (ref 70–99)
HCT VFR BLD CALC: 33.5 % — LOW (ref 34.5–45)
HGB BLD-MCNC: 10.5 G/DL — LOW (ref 11.5–15.5)
IMM GRANULOCYTES NFR BLD AUTO: 0.4 % — SIGNIFICANT CHANGE UP (ref 0–0.9)
LYMPHOCYTES # BLD AUTO: 1.59 K/UL — SIGNIFICANT CHANGE UP (ref 1–3.3)
LYMPHOCYTES # BLD AUTO: 29.4 % — SIGNIFICANT CHANGE UP (ref 13–44)
MAGNESIUM SERPL-MCNC: 2.1 MG/DL — SIGNIFICANT CHANGE UP (ref 1.6–2.6)
MCHC RBC-ENTMCNC: 30.2 PG — SIGNIFICANT CHANGE UP (ref 27–34)
MCHC RBC-ENTMCNC: 31.3 GM/DL — LOW (ref 32–36)
MCV RBC AUTO: 96.3 FL — SIGNIFICANT CHANGE UP (ref 80–100)
MONOCYTES # BLD AUTO: 0.47 K/UL — SIGNIFICANT CHANGE UP (ref 0–0.9)
MONOCYTES NFR BLD AUTO: 8.7 % — SIGNIFICANT CHANGE UP (ref 2–14)
NEUTROPHILS # BLD AUTO: 2.91 K/UL — SIGNIFICANT CHANGE UP (ref 1.8–7.4)
NEUTROPHILS NFR BLD AUTO: 53.9 % — SIGNIFICANT CHANGE UP (ref 43–77)
NRBC # BLD: 0 /100 WBCS — SIGNIFICANT CHANGE UP (ref 0–0)
NT-PROBNP SERPL-SCNC: 3941 PG/ML — HIGH (ref 0–450)
PLATELET # BLD AUTO: 200 K/UL — SIGNIFICANT CHANGE UP (ref 150–400)
POTASSIUM SERPL-MCNC: 4 MMOL/L — SIGNIFICANT CHANGE UP (ref 3.5–5.3)
POTASSIUM SERPL-SCNC: 4 MMOL/L — SIGNIFICANT CHANGE UP (ref 3.5–5.3)
PROCALCITONIN SERPL-MCNC: 0.04 NG/ML — SIGNIFICANT CHANGE UP (ref 0.02–0.1)
PROT SERPL-MCNC: 7.3 G/DL — SIGNIFICANT CHANGE UP (ref 6–8.3)
RAPID RVP RESULT: SIGNIFICANT CHANGE UP
RBC # BLD: 3.48 M/UL — LOW (ref 3.8–5.2)
RBC # FLD: 17.2 % — HIGH (ref 10.3–14.5)
SARS-COV-2 RNA SPEC QL NAA+PROBE: SIGNIFICANT CHANGE UP
SODIUM SERPL-SCNC: 146 MMOL/L — HIGH (ref 135–145)
TROPONIN I, HIGH SENSITIVITY RESULT: 48.8 NG/L — SIGNIFICANT CHANGE UP
WBC # BLD: 5.4 K/UL — SIGNIFICANT CHANGE UP (ref 3.8–10.5)
WBC # FLD AUTO: 5.4 K/UL — SIGNIFICANT CHANGE UP (ref 3.8–10.5)

## 2023-06-24 PROCEDURE — 99285 EMERGENCY DEPT VISIT HI MDM: CPT

## 2023-06-24 PROCEDURE — 93005 ELECTROCARDIOGRAM TRACING: CPT

## 2023-06-24 PROCEDURE — 82962 GLUCOSE BLOOD TEST: CPT

## 2023-06-24 PROCEDURE — 83880 ASSAY OF NATRIURETIC PEPTIDE: CPT

## 2023-06-24 PROCEDURE — 71045 X-RAY EXAM CHEST 1 VIEW: CPT

## 2023-06-24 PROCEDURE — 36415 COLL VENOUS BLD VENIPUNCTURE: CPT

## 2023-06-24 PROCEDURE — 80053 COMPREHEN METABOLIC PANEL: CPT

## 2023-06-24 PROCEDURE — 71275 CT ANGIOGRAPHY CHEST: CPT | Mod: 26,MA

## 2023-06-24 PROCEDURE — 71275 CT ANGIOGRAPHY CHEST: CPT | Mod: MA

## 2023-06-24 PROCEDURE — 83735 ASSAY OF MAGNESIUM: CPT

## 2023-06-24 PROCEDURE — 84484 ASSAY OF TROPONIN QUANT: CPT

## 2023-06-24 PROCEDURE — 93010 ELECTROCARDIOGRAM REPORT: CPT

## 2023-06-24 PROCEDURE — 85025 COMPLETE CBC W/AUTO DIFF WBC: CPT

## 2023-06-24 PROCEDURE — 84145 PROCALCITONIN (PCT): CPT

## 2023-06-24 PROCEDURE — 71045 X-RAY EXAM CHEST 1 VIEW: CPT | Mod: 26

## 2023-06-24 PROCEDURE — 96374 THER/PROPH/DIAG INJ IV PUSH: CPT | Mod: XU

## 2023-06-24 PROCEDURE — 99285 EMERGENCY DEPT VISIT HI MDM: CPT | Mod: 25

## 2023-06-24 PROCEDURE — 0225U NFCT DS DNA&RNA 21 SARSCOV2: CPT

## 2023-06-24 RX ORDER — FUROSEMIDE 40 MG
40 TABLET ORAL ONCE
Refills: 0 | Status: COMPLETED | OUTPATIENT
Start: 2023-06-24 | End: 2023-06-24

## 2023-06-24 RX ADMIN — Medication 40 MILLIGRAM(S): at 15:56

## 2023-06-24 NOTE — ED PROVIDER NOTE - PATIENT PORTAL LINK FT
You can access the FollowMyHealth Patient Portal offered by [facility] by registering at the following website: [email or web address]. By joining [employer]’s FollowMyHealth portal, you will also be able to view your health information using other applications (apps) compatible with our system.

## 2023-06-24 NOTE — ED PROVIDER NOTE - PROGRESS NOTE DETAILS
Attending Keysha: labs and imaging consistent w/ CHF exacerbation. pancreatic lesion partially imaged, son reports pt has had this for years, no further ED work up needed. lasix given. spoke w/ RN Elena at [facility]. Pt is requiring 2L of oxygen and RN reports they are able to accommodate oxygen for the pt and are comfortable w/ her being discharged back to them (family also prefers discharge back to facility rather than admission). ambulance arranged to take pt back.

## 2023-06-24 NOTE — ED ADULT NURSE NOTE - NSFALLUNIVINTERV_ED_ALL_ED
Bed/Stretcher in lowest position, wheels locked, appropriate side rails in place/Call bell, personal items and telephone in reach/Instruct patient to call for assistance before getting out of bed/chair/stretcher/Non-slip footwear applied when patient is off stretcher/Agency to call system/Physically safe environment - no spills, clutter or unnecessary equipment/Purposeful proactive rounding/Room/bathroom lighting operational, light cord in reach

## 2023-06-24 NOTE — ED PROVIDER NOTE - CLINICAL SUMMARY MEDICAL DECISION MAKING FREE TEXT BOX
89 y/o F w/ PMH as above presenting w/ SOB and hypoxia. Pt overall well appearing, no acute distress. Given hypoxia and relatively bed bound status (bed to wheelchair only per son) will obtain CTA to r/o PE. Eval for PNA. Possible CHF exacerbation as well. Doubtful ACS. Plan for labs, imaging, EKG, oxygen, meds PRN. Will reassess the need for additional interventions as clinically warranted.

## 2023-06-24 NOTE — ED PROVIDER NOTE - PHYSICAL EXAMINATION
Gen: NAD, awake and alert, non-toxic  Head: NCAT  HEENT: EOMI, oral mucosa moist, normal conjunctiva  Lung: Crackles at the bases b/l, no respiratory distress, oxygen 84% on room air, mid 90s on 2L NC  CV: Bradycardic, no murmurs  Abd: non distended, soft, nontender, no guarding, no CVA tenderness  MSK: no visible deformities  Neuro: Limited due to prior CVA. follows commands, able to sit up in bed  Skin: Warm, well perfused  Psych: normal affect

## 2023-06-24 NOTE — ED PROVIDER NOTE - NSFOLLOWUPINSTRUCTIONS_ED_ALL_ED_FT
1) Follow up with your doctor at the facility  2) Return to the ED immediately for new or worsening symptoms   3) Please continue to take any home medications as prescribed  4) Your test results from your ED visit were discussed with you prior to discharge  5) You were provided with a copy of your test results    Congestive Heart Failure (CHF)    Congestive heart failure is a chronic condition in which the heart has trouble pumping blood. In some cases of heart failure, fluid may back up into your lungs or you may have swelling (edema) in your lower legs. There are many causes of heart failure including high blood pressure, coronary artery disease, abnormal heart valves, heart muscle disease, lung disease, diabetes, etc. Symptoms include shortness of breath with activity or when lying flat, cough, swelling of the legs, fatigue, or increased urination during the night.     Treatment is aimed at managing the symptoms of heart failure and may include lifestyle changes, medications, or surgical procedures. Take medicines only as directed by your health care provider and do not stop unless instructed to do so. Eat heart-healthy foods with low or no trans/saturated fats, cholesterol and salt. Weigh yourself every day for early recognition of fluid accumulation.    SEEK IMMEDIATE MEDICAL CARE IF YOU HAVE ANY OF THE FOLLOWING SYMPTOMS: shortness of breath, change in mental status, chest pain, lightheadedness/dizziness/fainting, or worsening of symptoms including not being able to conduct normal physical activity.

## 2023-06-24 NOTE — ED PROVIDER NOTE - OBJECTIVE STATEMENT
89 y/o F w/ PMH of CVA (residual aphasia), HTN, CHF, CAD, HLD, anemia, DM, DVT, seizure presenting w/ SOB. BIBEMS from nursing facility. Seen w/ children. Pt found to have difficulty breathing by daughter today at facility. EMS were called and pt was found to be hypoxic to 84% on room air, improved to high 90s on NC.

## 2023-06-30 ENCOUNTER — INPATIENT (INPATIENT)
Facility: HOSPITAL | Age: 88
LOS: 4 days | Discharge: NURSING, REHAB OR LONG-TERM CARE | DRG: 101 | End: 2023-07-05
Attending: INTERNAL MEDICINE | Admitting: INTERNAL MEDICINE
Payer: MEDICARE

## 2023-06-30 VITALS
HEART RATE: 63 BPM | TEMPERATURE: 99 F | RESPIRATION RATE: 18 BRPM | OXYGEN SATURATION: 95 % | DIASTOLIC BLOOD PRESSURE: 66 MMHG | SYSTOLIC BLOOD PRESSURE: 146 MMHG

## 2023-06-30 DIAGNOSIS — R25.1 TREMOR, UNSPECIFIED: ICD-10-CM

## 2023-06-30 LAB
ALBUMIN SERPL ELPH-MCNC: 2.7 G/DL — LOW (ref 3.5–5)
ALP SERPL-CCNC: 72 U/L — SIGNIFICANT CHANGE UP (ref 40–120)
ALT FLD-CCNC: 15 U/L DA — SIGNIFICANT CHANGE UP (ref 10–60)
ANION GAP SERPL CALC-SCNC: 5 MMOL/L — SIGNIFICANT CHANGE UP (ref 5–17)
APTT BLD: 22.9 SEC — LOW (ref 27.5–35.5)
AST SERPL-CCNC: 27 U/L — SIGNIFICANT CHANGE UP (ref 10–40)
BASOPHILS # BLD AUTO: 0.04 K/UL — SIGNIFICANT CHANGE UP (ref 0–0.2)
BASOPHILS NFR BLD AUTO: 0.7 % — SIGNIFICANT CHANGE UP (ref 0–2)
BILIRUB SERPL-MCNC: 0.3 MG/DL — SIGNIFICANT CHANGE UP (ref 0.2–1.2)
BUN SERPL-MCNC: 23 MG/DL — HIGH (ref 7–18)
CALCIUM SERPL-MCNC: 8.4 MG/DL — SIGNIFICANT CHANGE UP (ref 8.4–10.5)
CHLORIDE SERPL-SCNC: 108 MMOL/L — SIGNIFICANT CHANGE UP (ref 96–108)
CO2 SERPL-SCNC: 28 MMOL/L — SIGNIFICANT CHANGE UP (ref 22–31)
CREAT SERPL-MCNC: 1.31 MG/DL — HIGH (ref 0.5–1.3)
EGFR: 37 ML/MIN/1.73M2 — LOW
EOSINOPHIL # BLD AUTO: 0.48 K/UL — SIGNIFICANT CHANGE UP (ref 0–0.5)
EOSINOPHIL NFR BLD AUTO: 8 % — HIGH (ref 0–6)
GLUCOSE SERPL-MCNC: 153 MG/DL — HIGH (ref 70–99)
HCT VFR BLD CALC: 32 % — LOW (ref 34.5–45)
HGB BLD-MCNC: 10.2 G/DL — LOW (ref 11.5–15.5)
IMM GRANULOCYTES NFR BLD AUTO: 0.3 % — SIGNIFICANT CHANGE UP (ref 0–0.9)
INR BLD: 1.17 RATIO — HIGH (ref 0.88–1.16)
LYMPHOCYTES # BLD AUTO: 1.53 K/UL — SIGNIFICANT CHANGE UP (ref 1–3.3)
LYMPHOCYTES # BLD AUTO: 25.6 % — SIGNIFICANT CHANGE UP (ref 13–44)
MCHC RBC-ENTMCNC: 30.4 PG — SIGNIFICANT CHANGE UP (ref 27–34)
MCHC RBC-ENTMCNC: 31.9 GM/DL — LOW (ref 32–36)
MCV RBC AUTO: 95.5 FL — SIGNIFICANT CHANGE UP (ref 80–100)
MONOCYTES # BLD AUTO: 0.79 K/UL — SIGNIFICANT CHANGE UP (ref 0–0.9)
MONOCYTES NFR BLD AUTO: 13.2 % — SIGNIFICANT CHANGE UP (ref 2–14)
NEUTROPHILS # BLD AUTO: 3.12 K/UL — SIGNIFICANT CHANGE UP (ref 1.8–7.4)
NEUTROPHILS NFR BLD AUTO: 52.2 % — SIGNIFICANT CHANGE UP (ref 43–77)
NRBC # BLD: 0 /100 WBCS — SIGNIFICANT CHANGE UP (ref 0–0)
PLATELET # BLD AUTO: 200 K/UL — SIGNIFICANT CHANGE UP (ref 150–400)
POTASSIUM SERPL-MCNC: 4 MMOL/L — SIGNIFICANT CHANGE UP (ref 3.5–5.3)
POTASSIUM SERPL-SCNC: 4 MMOL/L — SIGNIFICANT CHANGE UP (ref 3.5–5.3)
PROT SERPL-MCNC: 6.8 G/DL — SIGNIFICANT CHANGE UP (ref 6–8.3)
PROTHROM AB SERPL-ACNC: 13.9 SEC — HIGH (ref 10.5–13.4)
RBC # BLD: 3.35 M/UL — LOW (ref 3.8–5.2)
RBC # FLD: 16.6 % — HIGH (ref 10.3–14.5)
SODIUM SERPL-SCNC: 141 MMOL/L — SIGNIFICANT CHANGE UP (ref 135–145)
WBC # BLD: 5.98 K/UL — SIGNIFICANT CHANGE UP (ref 3.8–10.5)
WBC # FLD AUTO: 5.98 K/UL — SIGNIFICANT CHANGE UP (ref 3.8–10.5)

## 2023-06-30 PROCEDURE — 99285 EMERGENCY DEPT VISIT HI MDM: CPT

## 2023-06-30 PROCEDURE — 70450 CT HEAD/BRAIN W/O DYE: CPT | Mod: 26,MA

## 2023-06-30 NOTE — ED PROVIDER NOTE - OBJECTIVE STATEMENT
89 y.o w/ pmh of seizure presenting with generalized twitching today. patient has history of stroke, baseline nonverbal. patient responds to her name when called, currently noting "twitching" in the b/l arm and torso but respond to verbal stimuli. mental status baseline per patient's son.

## 2023-06-30 NOTE — ED ADULT NURSE NOTE - OBJECTIVE STATEMENT
Patient presented to the ED from [facility] with tremors throughout the body. As per patient's son patient at baseline has facial tremors, but today is the first time where the patient has tremors throughout the body. Patient presented to the ED from [facility] with tremors throughout the body. As per patient's son patient at baseline has facial tremors, but today is the first time where the patient has tremors throughout the body. Patient awake and alert however, unable to verbalize. Patient presented with stage 2 ulcer to sacrum. Patient with right sided weakness from previous stroke. Patient on 2L oxygen, unlabored breathing noted. No distress noted.

## 2023-06-30 NOTE — ED ADULT NURSE NOTE - ED STAT RN HANDOFF DETAILS
Pt is alert and awake. No distress noted. Unlabored breathing noted. Pt noted to be incontinent of stool/urine. PM care provided. Pt is turned and repositioned for comfort. No sign of acute/respiratory distress noted. Safety maintained. Pt is transferred to [facility] in stable condition.

## 2023-06-30 NOTE — ED PROVIDER NOTE - CLINICAL SUMMARY MEDICAL DECISION MAKING FREE TEXT BOX
Patient presenting for "twitching". nh state concern for seizure but patient awake, alert. clinically does not appear to be seizures. will obtain lab, keppra level. ct, ed obs and reassess

## 2023-06-30 NOTE — ED ADULT NURSE NOTE - NSFALLRISKINTERV_ED_ALL_ED
Assistance OOB with selected safe patient handling equipment if applicable/Assistance with ambulation/Communicate fall risk and risk factors to all staff, patient, and family/Monitor gait and stability/Provide visual cue: yellow wristband, yellow gown, etc/Reinforce activity limits and safety measures with patient and family/Call bell, personal items and telephone in reach/Instruct patient to call for assistance before getting out of bed/chair/stretcher/Non-slip footwear applied when patient is off stretcher/[place] to call system/Physically safe environment - no spills, clutter or unnecessary equipment/Purposeful Proactive Rounding/Room/bathroom lighting operational, light cord in reach

## 2023-06-30 NOTE — ED ADULT NURSE NOTE - CHIEF COMPLAINT QUOTE
Pt BIBA from [facility],  As per information from nursing home papers noticed twitching to facial area pt was given keppra 500mg at 5 pm and the twitches stopped and at 6:45 pm the twitching to face, extremities and body started. pt has history of seizure.

## 2023-06-30 NOTE — ED ADULT TRIAGE NOTE - CHIEF COMPLAINT QUOTE
Detail Level: Detailed Pt BIBA from [facility],  As per information from nursing home papers noticed twitching to facial area pt was given keppra 500mg at 5 pm and the twitches stopped and at 6:45 pm the twitching to face, extremities and body started. pt has history of seizure.

## 2023-07-01 DIAGNOSIS — Z86.73 PERSONAL HISTORY OF TRANSIENT ISCHEMIC ATTACK (TIA), AND CEREBRAL INFARCTION WITHOUT RESIDUAL DEFICITS: ICD-10-CM

## 2023-07-01 DIAGNOSIS — I10 ESSENTIAL (PRIMARY) HYPERTENSION: ICD-10-CM

## 2023-07-01 DIAGNOSIS — E78.5 HYPERLIPIDEMIA, UNSPECIFIED: ICD-10-CM

## 2023-07-01 DIAGNOSIS — E11.9 TYPE 2 DIABETES MELLITUS WITHOUT COMPLICATIONS: ICD-10-CM

## 2023-07-01 DIAGNOSIS — E87.6 HYPOKALEMIA: ICD-10-CM

## 2023-07-01 DIAGNOSIS — D64.9 ANEMIA, UNSPECIFIED: ICD-10-CM

## 2023-07-01 DIAGNOSIS — N18.9 CHRONIC KIDNEY DISEASE, UNSPECIFIED: ICD-10-CM

## 2023-07-01 DIAGNOSIS — Z29.9 ENCOUNTER FOR PROPHYLACTIC MEASURES, UNSPECIFIED: ICD-10-CM

## 2023-07-01 DIAGNOSIS — R56.9 UNSPECIFIED CONVULSIONS: ICD-10-CM

## 2023-07-01 LAB
ALBUMIN SERPL ELPH-MCNC: 2.7 G/DL — LOW (ref 3.5–5)
ALP SERPL-CCNC: 67 U/L — SIGNIFICANT CHANGE UP (ref 40–120)
ALT FLD-CCNC: 9 U/L DA — LOW (ref 10–60)
ANION GAP SERPL CALC-SCNC: 4 MMOL/L — LOW (ref 5–17)
AST SERPL-CCNC: 15 U/L — SIGNIFICANT CHANGE UP (ref 10–40)
BILIRUB SERPL-MCNC: 0.4 MG/DL — SIGNIFICANT CHANGE UP (ref 0.2–1.2)
BUN SERPL-MCNC: 21 MG/DL — HIGH (ref 7–18)
CALCIUM SERPL-MCNC: 8.4 MG/DL — SIGNIFICANT CHANGE UP (ref 8.4–10.5)
CHLORIDE SERPL-SCNC: 107 MMOL/L — SIGNIFICANT CHANGE UP (ref 96–108)
CO2 SERPL-SCNC: 30 MMOL/L — SIGNIFICANT CHANGE UP (ref 22–31)
CREAT SERPL-MCNC: 1.34 MG/DL — HIGH (ref 0.5–1.3)
EGFR: 36 ML/MIN/1.73M2 — LOW
GLUCOSE BLDC GLUCOMTR-MCNC: 126 MG/DL — HIGH (ref 70–99)
GLUCOSE BLDC GLUCOMTR-MCNC: 177 MG/DL — HIGH (ref 70–99)
GLUCOSE BLDC GLUCOMTR-MCNC: 185 MG/DL — HIGH (ref 70–99)
GLUCOSE BLDC GLUCOMTR-MCNC: 219 MG/DL — HIGH (ref 70–99)
GLUCOSE SERPL-MCNC: 161 MG/DL — HIGH (ref 70–99)
HCT VFR BLD CALC: 30.9 % — LOW (ref 34.5–45)
HGB BLD-MCNC: 9.9 G/DL — LOW (ref 11.5–15.5)
MAGNESIUM SERPL-MCNC: 1.8 MG/DL — SIGNIFICANT CHANGE UP (ref 1.6–2.6)
MCHC RBC-ENTMCNC: 30.5 PG — SIGNIFICANT CHANGE UP (ref 27–34)
MCHC RBC-ENTMCNC: 32 GM/DL — SIGNIFICANT CHANGE UP (ref 32–36)
MCV RBC AUTO: 95.1 FL — SIGNIFICANT CHANGE UP (ref 80–100)
NRBC # BLD: 0 /100 WBCS — SIGNIFICANT CHANGE UP (ref 0–0)
PHOSPHATE SERPL-MCNC: 2.2 MG/DL — LOW (ref 2.5–4.5)
PLATELET # BLD AUTO: 176 K/UL — SIGNIFICANT CHANGE UP (ref 150–400)
POTASSIUM SERPL-MCNC: 2.9 MMOL/L — CRITICAL LOW (ref 3.5–5.3)
POTASSIUM SERPL-SCNC: 2.9 MMOL/L — CRITICAL LOW (ref 3.5–5.3)
PROT SERPL-MCNC: 6.5 G/DL — SIGNIFICANT CHANGE UP (ref 6–8.3)
RBC # BLD: 3.25 M/UL — LOW (ref 3.8–5.2)
RBC # FLD: 16.5 % — HIGH (ref 10.3–14.5)
SODIUM SERPL-SCNC: 141 MMOL/L — SIGNIFICANT CHANGE UP (ref 135–145)
WBC # BLD: 5.4 K/UL — SIGNIFICANT CHANGE UP (ref 3.8–10.5)
WBC # FLD AUTO: 5.4 K/UL — SIGNIFICANT CHANGE UP (ref 3.8–10.5)

## 2023-07-01 RX ORDER — CETIRIZINE HYDROCHLORIDE 10 MG/1
1 TABLET ORAL
Qty: 0 | Refills: 0 | DISCHARGE

## 2023-07-01 RX ORDER — LOSARTAN POTASSIUM 100 MG/1
100 TABLET, FILM COATED ORAL DAILY
Refills: 0 | Status: DISCONTINUED | OUTPATIENT
Start: 2023-07-01 | End: 2023-07-05

## 2023-07-01 RX ORDER — ATORVASTATIN CALCIUM 80 MG/1
20 TABLET, FILM COATED ORAL AT BEDTIME
Refills: 0 | Status: DISCONTINUED | OUTPATIENT
Start: 2023-07-01 | End: 2023-07-05

## 2023-07-01 RX ORDER — POTASSIUM CHLORIDE 20 MEQ
40 PACKET (EA) ORAL ONCE
Refills: 0 | Status: COMPLETED | OUTPATIENT
Start: 2023-07-01 | End: 2023-07-01

## 2023-07-01 RX ORDER — INSULIN GLARGINE 100 [IU]/ML
16 INJECTION, SOLUTION SUBCUTANEOUS
Qty: 0 | Refills: 0 | DISCHARGE

## 2023-07-01 RX ORDER — ATORVASTATIN CALCIUM 80 MG/1
1 TABLET, FILM COATED ORAL
Qty: 0 | Refills: 0 | DISCHARGE

## 2023-07-01 RX ORDER — SPIRONOLACTONE 25 MG/1
25 TABLET, FILM COATED ORAL DAILY
Refills: 0 | Status: DISCONTINUED | OUTPATIENT
Start: 2023-07-01 | End: 2023-07-05

## 2023-07-01 RX ORDER — AMLODIPINE BESYLATE 2.5 MG/1
5 TABLET ORAL DAILY
Refills: 0 | Status: DISCONTINUED | OUTPATIENT
Start: 2023-07-01 | End: 2023-07-05

## 2023-07-01 RX ORDER — FUROSEMIDE 40 MG
40 TABLET ORAL DAILY
Refills: 0 | Status: DISCONTINUED | OUTPATIENT
Start: 2023-07-01 | End: 2023-07-05

## 2023-07-01 RX ORDER — HEPARIN SODIUM 5000 [USP'U]/ML
5000 INJECTION INTRAVENOUS; SUBCUTANEOUS EVERY 8 HOURS
Refills: 0 | Status: DISCONTINUED | OUTPATIENT
Start: 2023-07-01 | End: 2023-07-05

## 2023-07-01 RX ORDER — ACETAMINOPHEN 500 MG
650 TABLET ORAL EVERY 6 HOURS
Refills: 0 | Status: DISCONTINUED | OUTPATIENT
Start: 2023-07-01 | End: 2023-07-05

## 2023-07-01 RX ORDER — INSULIN LISPRO 100/ML
VIAL (ML) SUBCUTANEOUS AT BEDTIME
Refills: 0 | Status: DISCONTINUED | OUTPATIENT
Start: 2023-07-01 | End: 2023-07-02

## 2023-07-01 RX ORDER — CLOPIDOGREL BISULFATE 75 MG/1
1 TABLET, FILM COATED ORAL
Qty: 0 | Refills: 0 | DISCHARGE

## 2023-07-01 RX ORDER — FUROSEMIDE 40 MG
0.5 TABLET ORAL
Qty: 0 | Refills: 0 | DISCHARGE

## 2023-07-01 RX ORDER — POTASSIUM PHOSPHATE, MONOBASIC POTASSIUM PHOSPHATE, DIBASIC 236; 224 MG/ML; MG/ML
15 INJECTION, SOLUTION INTRAVENOUS ONCE
Refills: 0 | Status: DISCONTINUED | OUTPATIENT
Start: 2023-07-01 | End: 2023-07-01

## 2023-07-01 RX ORDER — POTASSIUM PHOSPHATE, MONOBASIC POTASSIUM PHOSPHATE, DIBASIC 236; 224 MG/ML; MG/ML
30 INJECTION, SOLUTION INTRAVENOUS ONCE
Refills: 0 | Status: COMPLETED | OUTPATIENT
Start: 2023-07-01 | End: 2023-07-01

## 2023-07-01 RX ORDER — INSULIN LISPRO 100/ML
VIAL (ML) SUBCUTANEOUS
Refills: 0 | Status: DISCONTINUED | OUTPATIENT
Start: 2023-07-01 | End: 2023-07-02

## 2023-07-01 RX ORDER — INSULIN ASPART 100 [IU]/ML
0 INJECTION, SOLUTION SUBCUTANEOUS
Qty: 0 | Refills: 0 | DISCHARGE

## 2023-07-01 RX ORDER — POTASSIUM CHLORIDE 20 MEQ
10 PACKET (EA) ORAL
Refills: 0 | Status: DISCONTINUED | OUTPATIENT
Start: 2023-07-01 | End: 2023-07-01

## 2023-07-01 RX ORDER — LEVETIRACETAM 250 MG/1
5 TABLET, FILM COATED ORAL
Qty: 0 | Refills: 0 | DISCHARGE

## 2023-07-01 RX ADMIN — LOSARTAN POTASSIUM 100 MILLIGRAM(S): 100 TABLET, FILM COATED ORAL at 05:27

## 2023-07-01 RX ADMIN — ATORVASTATIN CALCIUM 20 MILLIGRAM(S): 80 TABLET, FILM COATED ORAL at 21:57

## 2023-07-01 RX ADMIN — Medication 2: at 12:31

## 2023-07-01 RX ADMIN — Medication 40 MILLIEQUIVALENT(S): at 10:06

## 2023-07-01 RX ADMIN — HEPARIN SODIUM 5000 UNIT(S): 5000 INJECTION INTRAVENOUS; SUBCUTANEOUS at 13:47

## 2023-07-01 RX ADMIN — POTASSIUM PHOSPHATE, MONOBASIC POTASSIUM PHOSPHATE, DIBASIC 83.33 MILLIMOLE(S): 236; 224 INJECTION, SOLUTION INTRAVENOUS at 06:40

## 2023-07-01 RX ADMIN — Medication 40 MILLIGRAM(S): at 05:27

## 2023-07-01 RX ADMIN — HEPARIN SODIUM 5000 UNIT(S): 5000 INJECTION INTRAVENOUS; SUBCUTANEOUS at 05:27

## 2023-07-01 RX ADMIN — Medication 1: at 08:39

## 2023-07-01 RX ADMIN — SPIRONOLACTONE 25 MILLIGRAM(S): 25 TABLET, FILM COATED ORAL at 05:27

## 2023-07-01 RX ADMIN — HEPARIN SODIUM 5000 UNIT(S): 5000 INJECTION INTRAVENOUS; SUBCUTANEOUS at 21:57

## 2023-07-01 RX ADMIN — AMLODIPINE BESYLATE 5 MILLIGRAM(S): 2.5 TABLET ORAL at 05:27

## 2023-07-01 NOTE — H&P ADULT - PROBLEM SELECTOR PLAN 5
pmhx HLD and CVA on Atorvastatin   c/w home meds Hb 10.2 on admission  no active bleeding   likely anemia of chronic disease   monitor CBC BUN, SCr of 23 and 1.31 on admission [baseline on 24 June 2023: 23, 1.35]  likely CKD 2/2 DM  bladder scan neg for retention  monitor CMP

## 2023-07-01 NOTE — H&P ADULT - NSICDXPASTMEDICALHX_GEN_ALL_CORE_FT
PAST MEDICAL HISTORY:  DM (diabetes mellitus)     HLD (hyperlipidemia)     HTN (hypertension)     Stroke

## 2023-07-01 NOTE — H&P ADULT - PROBLEM SELECTOR PLAN 6
DVT - Heparin sub cut pmhx HTN  on  Norvasc, Lasix, Losartan, Spironolactone  will continue home medication with the holding parameters Hb 10.2 on admission  no active bleeding   likely anemia of chronic disease   monitor CBC

## 2023-07-01 NOTE — H&P ADULT - PROBLEM SELECTOR PLAN 3
hx of DM on Actos (=pioglitazone)  c/w ISS pmhx of CVA  CT head neg for acute findings, chronic L MCA   pt nonverbal on baseline, follows commands, cries   c/w home meds Nuedexta [Dextromethorpan/ Quinidine]

## 2023-07-01 NOTE — H&P ADULT - PROBLEM SELECTOR PLAN 2
pmhx of CVA  CT head neg for acute findings, chronic L MCA   pt nonverbal on baseline, follows commands, cries   c/w home meds Nuedexta [Dextromethorpan/ Quinidine] K 2.9 on AM   s/p KCl 40 powder and Kphos 30   f/u CMP

## 2023-07-01 NOTE — H&P ADULT - PROBLEM SELECTOR PLAN 1
p/w generalized twitch, pmhx of seizure  CT head neg for acute findings, chronic L MCA   f/u Keppra level  EEG in AM  passed bedside dysphagia screen   Neuro Dr. ??? consulted p/w generalized twitch, pmhx of seizure  CT head neg for acute findings, chronic L MCA   f/u Keppra level  EEG in AM  passed bedside dysphagia screen   Neuro Dr. Lara consulted

## 2023-07-01 NOTE — H&P ADULT - NSHPPHYSICALEXAM_GEN_ALL_CORE
GENERAL: NAD, lying in bed comfortably  HEAD:  Atraumatic, Normocephalic  EYES: EOMI, PERRLA, conjunctiva and sclera clear  ENT: Moist mucous membranes  NECK: Supple, No JVD  CHEST/LUNG: Clear to auscultation bilaterally; No rales, rhonchi, wheezing, or rubs. Unlabored respirations  HEART: Regular rate and rhythm; No murmurs, rubs, or gallops  ABDOMEN: Bowel sounds present; Soft, Nontender, Nondistended.   EXTREMITIES:  2+ Peripheral Pulses, brisk capillary refill. No clubbing, cyanosis, +2 bilateral LE edema   NERVOUS SYSTEM:  Alert, orientation could not be assessed, +aphasia, however follows commands, 5/5 Strength in Lt UE and LE, 3/5 in RUE and 1/5 in RLE, facial droop   SKIN: No rashes or lesions

## 2023-07-01 NOTE — H&P ADULT - PROBLEM SELECTOR PLAN 7
pmhx HLD and CVA on Atorvastatin   c/w home meds pmhx HTN  on  Norvasc, Lasix, Losartan, Spironolactone  will continue home medication with the holding parameters

## 2023-07-01 NOTE — H&P ADULT - ASSESSMENT
89F w/ PMH L MCA CVA w/ R side residual weakness, DM HLD, HTN, ischemic heart disease, seizures presents to ED with  generalized twitching. Pt is admitted to rule out seizure.

## 2023-07-01 NOTE — H&P ADULT - NSHPREVIEWOFSYSTEMS_GEN_ALL_CORE
ROS unable to be fully obtained as pt is nonverbal, however communicates by nodding and raising her hand. Denied any pain at the time of exam and showed decreased strength and mobility of the Rt upper and lower extremity,

## 2023-07-01 NOTE — PATIENT PROFILE ADULT - FUNCTIONAL ASSESSMENT - BASIC MOBILITY 6.
1-calculated by average /Not able to assess (calculate score using [facility] averaging method)

## 2023-07-01 NOTE — H&P ADULT - HISTORY OF PRESENT ILLNESS
89F w/ PMH L MCA CVA w/ R side residual weakness, DM HLD, HTN, ischemic heart disease, seizures presents to ED with  generalized twitching. Pt nonverbal and unable to provide history. Collateral information was taken from the ED which stated that  "noting "twitching" in the b/l arm and torso but respond to verbal stimuli. mental status baseline per patient's son."

## 2023-07-01 NOTE — PATIENT PROFILE ADULT - FALL HARM RISK - HARM RISK INTERVENTIONS
Assistance OOB with selected safe patient handling equipment/Discuss with provider need for PT consult/Monitor for mental status changes/Move patient closer to nurses' station/Tailored Fall Risk Interventions/Bed in lowest position, wheels locked, appropriate side rails in place/Call bell, personal items and telephone in reach/[place] to call system/Physically safe environment - no spills, clutter or unnecessary equipment/Purposeful Proactive Rounding/Room/bathroom lighting operational, light cord in reach

## 2023-07-01 NOTE — H&P ADULT - PROBLEM SELECTOR PLAN 4
pmhx HTN  on  Norvasc, Lasix, Losartan, Spironolactone  will continue home medication with the holding parameters BUN, SCr of 23 and 1.31 on admission [baseline on 24 June 2023: 23, 1.35]  likely CKD 2/2 DM  monitor CMP hx of DM on Actos (=pioglitazone)  c/w ISS

## 2023-07-02 LAB
ANION GAP SERPL CALC-SCNC: 1 MMOL/L — LOW (ref 5–17)
ANION GAP SERPL CALC-SCNC: 1 MMOL/L — LOW (ref 5–17)
ANION GAP SERPL CALC-SCNC: 11 MMOL/L — SIGNIFICANT CHANGE UP (ref 5–17)
BUN SERPL-MCNC: 13 MG/DL — SIGNIFICANT CHANGE UP (ref 7–18)
BUN SERPL-MCNC: 14 MG/DL — SIGNIFICANT CHANGE UP (ref 7–18)
BUN SERPL-MCNC: 14 MG/DL — SIGNIFICANT CHANGE UP (ref 7–18)
CALCIUM SERPL-MCNC: 8.2 MG/DL — LOW (ref 8.4–10.5)
CALCIUM SERPL-MCNC: 8.4 MG/DL — SIGNIFICANT CHANGE UP (ref 8.4–10.5)
CALCIUM SERPL-MCNC: 8.4 MG/DL — SIGNIFICANT CHANGE UP (ref 8.4–10.5)
CHLORIDE SERPL-SCNC: 106 MMOL/L — SIGNIFICANT CHANGE UP (ref 96–108)
CHLORIDE SERPL-SCNC: 107 MMOL/L — SIGNIFICANT CHANGE UP (ref 96–108)
CHLORIDE SERPL-SCNC: 107 MMOL/L — SIGNIFICANT CHANGE UP (ref 96–108)
CO2 SERPL-SCNC: 23 MMOL/L — SIGNIFICANT CHANGE UP (ref 22–31)
CO2 SERPL-SCNC: 26 MMOL/L — SIGNIFICANT CHANGE UP (ref 22–31)
CO2 SERPL-SCNC: 28 MMOL/L — SIGNIFICANT CHANGE UP (ref 22–31)
CREAT SERPL-MCNC: 1.2 MG/DL — SIGNIFICANT CHANGE UP (ref 0.5–1.3)
CREAT SERPL-MCNC: 1.27 MG/DL — SIGNIFICANT CHANGE UP (ref 0.5–1.3)
CREAT SERPL-MCNC: 1.36 MG/DL — HIGH (ref 0.5–1.3)
EGFR: 35 ML/MIN/1.73M2 — LOW
EGFR: 38 ML/MIN/1.73M2 — LOW
EGFR: 41 ML/MIN/1.73M2 — LOW
GLUCOSE BLDC GLUCOMTR-MCNC: 159 MG/DL — HIGH (ref 70–99)
GLUCOSE BLDC GLUCOMTR-MCNC: 205 MG/DL — HIGH (ref 70–99)
GLUCOSE BLDC GLUCOMTR-MCNC: 213 MG/DL — HIGH (ref 70–99)
GLUCOSE SERPL-MCNC: 141 MG/DL — HIGH (ref 70–99)
GLUCOSE SERPL-MCNC: 160 MG/DL — HIGH (ref 70–99)
GLUCOSE SERPL-MCNC: 209 MG/DL — HIGH (ref 70–99)
HCT VFR BLD CALC: 30.4 % — LOW (ref 34.5–45)
HGB BLD-MCNC: 9.5 G/DL — LOW (ref 11.5–15.5)
MAGNESIUM SERPL-MCNC: 1.7 MG/DL — SIGNIFICANT CHANGE UP (ref 1.6–2.6)
MCHC RBC-ENTMCNC: 29.8 PG — SIGNIFICANT CHANGE UP (ref 27–34)
MCHC RBC-ENTMCNC: 31.3 GM/DL — LOW (ref 32–36)
MCV RBC AUTO: 95.3 FL — SIGNIFICANT CHANGE UP (ref 80–100)
NRBC # BLD: 0 /100 WBCS — SIGNIFICANT CHANGE UP (ref 0–0)
PHOSPHATE SERPL-MCNC: 2.6 MG/DL — SIGNIFICANT CHANGE UP (ref 2.5–4.5)
PLATELET # BLD AUTO: 162 K/UL — SIGNIFICANT CHANGE UP (ref 150–400)
POTASSIUM SERPL-MCNC: 2.9 MMOL/L — CRITICAL LOW (ref 3.5–5.3)
POTASSIUM SERPL-MCNC: 4.2 MMOL/L — SIGNIFICANT CHANGE UP (ref 3.5–5.3)
POTASSIUM SERPL-MCNC: 5 MMOL/L — SIGNIFICANT CHANGE UP (ref 3.5–5.3)
POTASSIUM SERPL-SCNC: 2.9 MMOL/L — CRITICAL LOW (ref 3.5–5.3)
POTASSIUM SERPL-SCNC: 4.2 MMOL/L — SIGNIFICANT CHANGE UP (ref 3.5–5.3)
POTASSIUM SERPL-SCNC: 5 MMOL/L — SIGNIFICANT CHANGE UP (ref 3.5–5.3)
RBC # BLD: 3.19 M/UL — LOW (ref 3.8–5.2)
RBC # FLD: 16.7 % — HIGH (ref 10.3–14.5)
SODIUM SERPL-SCNC: 134 MMOL/L — LOW (ref 135–145)
SODIUM SERPL-SCNC: 136 MMOL/L — SIGNIFICANT CHANGE UP (ref 135–145)
SODIUM SERPL-SCNC: 140 MMOL/L — SIGNIFICANT CHANGE UP (ref 135–145)
WBC # BLD: 5.34 K/UL — SIGNIFICANT CHANGE UP (ref 3.8–10.5)
WBC # FLD AUTO: 5.34 K/UL — SIGNIFICANT CHANGE UP (ref 3.8–10.5)

## 2023-07-02 PROCEDURE — 99497 ADVNCD CARE PLAN 30 MIN: CPT

## 2023-07-02 PROCEDURE — 70498 CT ANGIOGRAPHY NECK: CPT | Mod: 26

## 2023-07-02 PROCEDURE — 70496 CT ANGIOGRAPHY HEAD: CPT | Mod: 26

## 2023-07-02 PROCEDURE — 71045 X-RAY EXAM CHEST 1 VIEW: CPT | Mod: 26

## 2023-07-02 PROCEDURE — 0042T: CPT

## 2023-07-02 PROCEDURE — 99223 1ST HOSP IP/OBS HIGH 75: CPT | Mod: FS

## 2023-07-02 PROCEDURE — 70450 CT HEAD/BRAIN W/O DYE: CPT | Mod: 26

## 2023-07-02 RX ORDER — ASPIRIN/CALCIUM CARB/MAGNESIUM 324 MG
300 TABLET ORAL DAILY
Refills: 0 | Status: DISCONTINUED | OUTPATIENT
Start: 2023-07-02 | End: 2023-07-05

## 2023-07-02 RX ORDER — LEVETIRACETAM 250 MG/1
500 TABLET, FILM COATED ORAL EVERY 12 HOURS
Refills: 0 | Status: DISCONTINUED | OUTPATIENT
Start: 2023-07-02 | End: 2023-07-05

## 2023-07-02 RX ORDER — POTASSIUM CHLORIDE 20 MEQ
10 PACKET (EA) ORAL
Refills: 0 | Status: COMPLETED | OUTPATIENT
Start: 2023-07-02 | End: 2023-07-02

## 2023-07-02 RX ORDER — INSULIN LISPRO 100/ML
VIAL (ML) SUBCUTANEOUS EVERY 6 HOURS
Refills: 0 | Status: DISCONTINUED | OUTPATIENT
Start: 2023-07-02 | End: 2023-07-05

## 2023-07-02 RX ORDER — LEVETIRACETAM 250 MG/1
1000 TABLET, FILM COATED ORAL ONCE
Refills: 0 | Status: COMPLETED | OUTPATIENT
Start: 2023-07-02 | End: 2023-07-02

## 2023-07-02 RX ORDER — SODIUM CHLORIDE 9 MG/ML
1000 INJECTION, SOLUTION INTRAVENOUS
Refills: 0 | Status: DISCONTINUED | OUTPATIENT
Start: 2023-07-02 | End: 2023-07-03

## 2023-07-02 RX ORDER — MAGNESIUM SULFATE 500 MG/ML
1 VIAL (ML) INJECTION ONCE
Refills: 0 | Status: COMPLETED | OUTPATIENT
Start: 2023-07-02 | End: 2023-07-02

## 2023-07-02 RX ADMIN — Medication 1: at 17:25

## 2023-07-02 RX ADMIN — LEVETIRACETAM 420 MILLIGRAM(S): 250 TABLET, FILM COATED ORAL at 15:30

## 2023-07-02 RX ADMIN — SODIUM CHLORIDE 60 MILLILITER(S): 9 INJECTION, SOLUTION INTRAVENOUS at 10:11

## 2023-07-02 RX ADMIN — Medication 100 GRAM(S): at 14:31

## 2023-07-02 RX ADMIN — LOSARTAN POTASSIUM 100 MILLIGRAM(S): 100 TABLET, FILM COATED ORAL at 06:20

## 2023-07-02 RX ADMIN — Medication 2: at 12:04

## 2023-07-02 RX ADMIN — Medication 300 MILLIGRAM(S): at 18:33

## 2023-07-02 RX ADMIN — AMLODIPINE BESYLATE 5 MILLIGRAM(S): 2.5 TABLET ORAL at 06:19

## 2023-07-02 RX ADMIN — Medication 2 MILLIGRAM(S): at 08:00

## 2023-07-02 RX ADMIN — SPIRONOLACTONE 25 MILLIGRAM(S): 25 TABLET, FILM COATED ORAL at 06:20

## 2023-07-02 RX ADMIN — Medication 100 MILLIEQUIVALENT(S): at 10:11

## 2023-07-02 RX ADMIN — HEPARIN SODIUM 5000 UNIT(S): 5000 INJECTION INTRAVENOUS; SUBCUTANEOUS at 22:15

## 2023-07-02 RX ADMIN — Medication 40 MILLIGRAM(S): at 06:20

## 2023-07-02 RX ADMIN — Medication 100 MILLIEQUIVALENT(S): at 12:45

## 2023-07-02 RX ADMIN — HEPARIN SODIUM 5000 UNIT(S): 5000 INJECTION INTRAVENOUS; SUBCUTANEOUS at 06:19

## 2023-07-02 RX ADMIN — LEVETIRACETAM 400 MILLIGRAM(S): 250 TABLET, FILM COATED ORAL at 08:00

## 2023-07-02 RX ADMIN — Medication 100 MILLIEQUIVALENT(S): at 11:44

## 2023-07-02 RX ADMIN — HEPARIN SODIUM 5000 UNIT(S): 5000 INJECTION INTRAVENOUS; SUBCUTANEOUS at 15:30

## 2023-07-02 RX ADMIN — Medication 2 MILLIGRAM(S): at 08:10

## 2023-07-02 NOTE — CONSULT NOTE ADULT - SUBJECTIVE AND OBJECTIVE BOX
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 worsening of underlying epilepsy      RECOMMENDATIONS:    - Maintain Levetiracetam 500mg PO/IV BID for now    - Routine EEG approved and pending to evaluate for subclinical seizures - If present, increase Levetiracetam dosage to at least 750mg PO/IV BID    - Secondary stroke prevention (for chronic ischemic stroke):       - Start Aspirin 81mg PO Daily (or Aspirin 300mg KS Daily if NPO)       - Increase Atorvastatin from 20mg to 40mg PO QHS if not NPO (may adjust dose further if needed to achieve LDL < 70 mg/dL)       - No role for permissive hypertension for chronic ischemic stroke - Goal /80, Treat BP > 140/90    - Workup and management of metabolic abnormalities and diabetes mellitus type 2 as per primary team    - PT/OT when able to participate    - DVT ppx: SCDs, Heparin          NOTE TO BE COMPLETED - PLEASE REFER TO ABOVE ONLY AND IGNORE INFORMATION BELOW    *******************************      CHIEF COMPLAINT:  Patient is a 89y old  Female who presents with a chief complaint of r/o seizure (02 Jul 2023 09:49)      HPI:  89F w/ PMH L MCA CVA w/ R side residual weakness, DM HLD, HTN, ischemic heart disease, seizures presents to ED with  generalized twitching. Pt nonverbal and unable to provide history. Collateral information was taken from the ED which stated that  "noting "twitching" in the b/l arm and torso but respond to verbal stimuli. mental status baseline per patient's son."  (01 Jul 2023 01:33)      NEURO HPI:      PAST MEDICAL & SURGICAL HISTORY:  Stroke  HTN (hypertension)  HLD (hyperlipidemia)  DM (diabetes mellitus)      MEDICATIONS:  acetaminophen     Tablet .. 650 milliGRAM(s) Oral every 6 hours PRN  amLODIPine   Tablet 5 milliGRAM(s) Oral daily  atorvastatin 20 milliGRAM(s) Oral at bedtime  dextrose 5% + lactated ringers 1000 milliLiter(s) IV Continuous <Continuous>  furosemide    Tablet 40 milliGRAM(s) Oral daily  heparin   Injectable 5000 Unit(s) SubCutaneous every 8 hours  insulin lispro (ADMELOG) corrective regimen sliding scale   SubCutaneous every 6 hours  levETIRAcetam  IVPB 500 milliGRAM(s) IV Intermittent every 12 hours  losartan 100 milliGRAM(s) Oral daily  spironolactone 25 milliGRAM(s) Oral daily      ALLERGIES:  No Known Allergies      FAMILY HISTORY:        SOCIAL HISTORY:  Denies alcohol, tobacco, or illicit drug use      REVIEW OF SYSTEMS:  GENERAL: No fever, weight changes, fatigue  EYES: No eye pain or discharge  EAR/NOSE/MOUTH/THROAT: No sinus or throat pain; No difficulty hearing  NECK: No pain or stiffness  RESPIRATORY: No cough, wheezing, chills, or hemoptysis  CARDIOVASCULAR: No chest pain, palpitations, shortness of breath, or dyspnea on exertion  GASTROINTESTINAL: No abdominal pain, nausea, vomiting, hematemesis, diarrhea, or constipation  GENITOURINARY: No dysuria, frequency, hematuria, or incontinence  SKIN: No rashes or lesions  ENDOCRINE: No heat or cold intolerance  HEMATOLOGIC: No easy bruising or bleeding  PSYCHIATRIC: No depression, anxiety, or mood swings  MUSCULOSKELETAL: No joint pain or swelling  NEUROLOGICAL: As per HPI          OBJECTIVE:    Vital Signs Last 24 Hrs  T(C): 36.1 (02 Jul 2023 13:03), Max: 37.1 (02 Jul 2023 07:35)  T(F): 97 (02 Jul 2023 13:03), Max: 98.7 (02 Jul 2023 07:35)  HR: 63 (02 Jul 2023 13:03) (63 - 121)  BP: 117/64 (02 Jul 2023 13:03) (110/60 - 191/101)  BP(mean): --  RR: 20 (02 Jul 2023 13:03) (20 - 20)  SpO2: 97% (02 Jul 2023 13:03) (90% - 100%)    Parameters below as of 02 Jul 2023 13:03  Patient On (Oxygen Delivery Method): nasal cannula  O2 Flow (L/min): 3      General Examination:  General: No acute distress  HEENT: Atraumatic, Normocephalic  Respiratory: CTA B/l.  No crackles, rhonchi, or wheezes.  Cardiovascular: RRR.  Normal S1 & S2.  Normal b/l radial and pedal pulses.    Neurological Examination:  General / Mental Status: AAO x 3.  No aphasia or dysarthria.  Naming and repetition intact.  Cranial Nerves: VFF x 4.  PERRL.  EOMI x 2, No nystagmus or diplopia.  B/l V1-V3 equal and intact to light touch and pinprick.  Symmetric facial movement and palate elevation.  B/l hearing equal to finger rub.  5/5 strength with b/l sternocleidomastoid and trapezius.  Midline tongue protrusion, with no atrophy or fasciculations.  Motor: Normal bulk & tone in all four extremities.  5/5 strength throughout all four extremities.  No downward drift, rigidity, spasticity, or tremors in any of the four extremities.  Sensory: Intact to light touch and pinprick in all four extremities.  Negative Romberg.  Reflex: 2+ and symmetric at b/l biceps, triceps, brachioradialis, patellae, and ankles.  Downgoing toes b/l.  Coordination: No dysmetria with b/l finger-to-nose and heel raise tests.  Symmetric rapid alternating movements b/l.  Gait: Normal, narrow-based gait.  No difficulty with tiptoe, heel, and tandem gaits.        LABORATORY VALUES:                        9.5    5.34  )-----------( 162      ( 02 Jul 2023 08:30 )             30.4       07-02    134<L>  |  107  |  14  ----------------------------<  141<H>  5.0   |  26  |  1.27    Ca    8.2<L>      02 Jul 2023 16:15  Phos  2.6     07-02  Mg     1.7     07-02    TPro  6.5  /  Alb  2.7<L>  /  TBili  0.4  /  DBili  x   /  AST  15  /  ALT  9<L>  /  AlkPhos  67  07-01                    NEUROIMAGING:          Please contact the Neurology consult service with any neurological questions.    Jose Ramon Lara MD   of Neurology  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 worsening of underlying epilepsy      RECOMMENDATIONS:    - Maintain Levetiracetam 500mg PO/IV BID for now    - Routine EEG approved and pending to evaluate for subclinical seizures - If present, increase Levetiracetam dosage to at least 750mg PO/IV BID    - Secondary stroke prevention (for chronic ischemic stroke):       - Start Aspirin 81mg PO Daily (or Aspirin 300mg [place] Daily if NPO)       - Increase Atorvastatin from 20mg to 40mg PO QHS if not NPO (may adjust dose further if needed to achieve LDL < 70 mg/dL)       - No role for permissive hypertension for chronic ischemic stroke - Goal /80, Treat BP > 140/90    - Workup and management of metabolic abnormalities and diabetes mellitus type 2 as per primary team    - PT/OT when able to participate    - DVT ppx: SCDs, Heparin          *******************************      CHIEF COMPLAINT:  Patient is a 89y old  Female who presents with a chief complaint of r/o seizure (02 Jul 2023 09:49)      HPI:  89F w/ PMH L MCA CVA w/ R side residual weakness, DM HLD, HTN, ischemic heart disease, seizures presents to ED with generalized twitching. Pt nonverbal and unable to provide history. Collateral information was taken from the ED which stated that "noting "twitching" in the b/l arm and torso but respond to verbal stimuli. Mental status is at baseline per patient's son."  (01 Jul 2023 01:33)      NEURO HPI:  89 RHF with right-sided hemiparesis from chronic left MCA territory ischemic stroke, as well as post-stroke epilepsy characterized by head movements, managed with Levetiracetam 500mg PO BID. She presented to the Emergency Department after having a witnessed episode of twitching affecting both arms and torso.      PAST MEDICAL & SURGICAL HISTORY:  Stroke  HTN (hypertension)  HLD (hyperlipidemia)  DM (diabetes mellitus)      MEDICATIONS:  acetaminophen     Tablet .. 650 milliGRAM(s) Oral every 6 hours PRN  amLODIPine   Tablet 5 milliGRAM(s) Oral daily  atorvastatin 20 milliGRAM(s) Oral at bedtime  dextrose 5% + lactated ringers 1000 milliLiter(s) IV Continuous <Continuous>  furosemide    Tablet 40 milliGRAM(s) Oral daily  heparin   Injectable 5000 Unit(s) SubCutaneous every 8 hours  insulin lispro (ADMELOG) corrective regimen sliding scale   SubCutaneous every 6 hours  levETIRAcetam  IVPB 500 milliGRAM(s) IV Intermittent every 12 hours  losartan 100 milliGRAM(s) Oral daily  spironolactone 25 milliGRAM(s) Oral daily      ALLERGIES:  No Known Allergies      FAMILY HISTORY:  No reported family history of epilepsy      SOCIAL HISTORY:  Denies alcohol, tobacco, or illicit drug use      REVIEW OF SYSTEMS: Limited due to encephalopathy  GENERAL: No fever  EYES: No eye discharge  EAR/NOSE/MOUTH/THROAT: No sinus discharge  NECK: No stiffness  RESPIRATORY: No cough  CARDIOVASCULAR: No shortness of breath  GASTROINTESTINAL: No nausea, vomiting, hematemesis  GENITOURINARY: No frequency, hematuria  SKIN: No rashes or lesions  ENDOCRINE: No reported heat or cold intolerance  HEMATOLOGIC: No reported easy bruising or bleeding  PSYCHIATRIC: No known depression or anxiety  MUSCULOSKELETAL: No joint swelling  NEUROLOGICAL: As per HPI          OBJECTIVE:    Vital Signs Last 24 Hrs  T(C): 36.1 (02 Jul 2023 13:03), Max: 37.1 (02 Jul 2023 07:35)  T(F): 97 (02 Jul 2023 13:03), Max: 98.7 (02 Jul 2023 07:35)  HR: 63 (02 Jul 2023 13:03) (63 - 121)  BP: 117/64 (02 Jul 2023 13:03) (110/60 - 191/101)  RR: 20 (02 Jul 2023 13:03) (20 - 20)  SpO2: 97% (02 Jul 2023 13:03) (90% - 100%)  Parameters below as of 02 Jul 2023 13:03  Patient On (Oxygen Delivery Method): nasal cannula  O2 Flow (L/min): 3    General Examination:  General: Lethargic, Awakens to voice, No acute distress  HEENT: Atraumatic, Normocephalic  Respiratory: Rapid rate, Diminished breath sounds b/l  Cardiovascular: RRR.  Normal S1 & S2.  Normal b/l radial and pedal pulses.    Neurological Examination:  General / Mental Status: AAO x 1 (only to person).  Minimally verbal, with no apparent aphasia or dysarthria.  Cranial Nerves: B/l blink to threat present.  PERRL.  EOMI x 2, No nystagmus.  B/l V1-V3 equal and intact to light touch and pinprick.  Symmetric facial movement and palate elevation.  B/l hearing diminished to finger rub.  At least 3/5 strength with b/l sternocleidomastoid and trapezius, limited by poor effort.  Midline tongue protrusion.  Motor: Diminished bulk & tone in all four extremities.  At least 3/5 strength throughout all four extremities, all of which drift to bed after passive elevation.  Sensory: Intact to light touch and pinprick in all four extremities.  Negative Romberg.  Reflex: 1+ and symmetric at b/l biceps, triceps, brachioradialis, patellae, and ankles.  Mute toes b/l.  Unable to assess coordination, gait, or Romberg testing in patient due to poor cooperation with multi-step tasks.          LABORATORY VALUES:                        9.5    5.34  )-----------( 162      ( 02 Jul 2023 08:30 )             30.4       07-02    134<L>  |  107  |  14  ----------------------------<  141<H>  5.0   |  26  |  1.27    Ca    8.2<L>      02 Jul 2023 16:15  Phos  2.6     07-02  Mg     1.7     07-02    TPro  6.5  /  Alb  2.7<L>  /  TBili  0.4  /  DBili  x   /  AST  15  /  ALT  9<L>  /  AlkPhos  67  07-01          NEUROIMAGING:      CT Head (6/30/23):  - No acute intracranial abnormality  - Chronic left frontal, left parietal, and right cerebellar infarcts  - Chronic microvascular changes      CT Head & CTA Head/Neck & CT Perfusion Head (7/2/23):  - No acute intracranial structural abnormality  - Chronic left frontal, left parietal, and right cerebellar infarcts  - Chronic microvascular changes          Please contact the Neurology consult service with any neurological questions.    Jose Ramon Lara MD   of Neurology  [facility] at [facility]

## 2023-07-02 NOTE — CONSULT NOTE ADULT - TIME BILLING
I counseled the patient, family at bedside, and primary team about the testing indicated to help determine the etiology of the patient's presenting symptoms, as well as the medications to maintain for secondary stroke prevention and seizure prophylaxis.

## 2023-07-02 NOTE — STROKE CODE NOTE - NIH STROKE SCALE DATE
Neck,  no lymphadenopathy
02-Jul-2023 08:04
Consent (Spinal Accessory)/Introductory Paragraph: The rationale for Mohs was explained to the patient and consent was obtained. The risks, benefits and alternatives to therapy were discussed in detail. Specifically, the risks of damage to the spinal accessory nerve, infection, scarring, bleeding, prolonged wound healing, incomplete removal, allergy to anesthesia, and recurrence were addressed. Prior to the procedure, the treatment site was clearly identified and confirmed by the patient. All components of Universal Protocol/PAUSE Rule completed.

## 2023-07-02 NOTE — CHART NOTE - NSCHARTNOTEFT_GEN_A_CORE
Rapid Response was called due to patient having worsening tremors and with 1 episode of vomiting concerned for seizures vs stroke  Pt was given 2 doses of ativan and loaded with keppra IV  Pt was then brought to CT scan for r/o stroke, which was negative for any acute findings.     #Seizures  s/p keppra 1g  will continue renal dose keppra 500 mg bid IVPB  keep NPO  add D5 LR gentle hydration  f/u EEG to see if there is improvement  f/u CXR to r/o aspiration pneumonia  f/u neuro recs  GOC completed, family wants DNR/DNI    #Electrolyte abnormality  will replete K and magnesium  monitor BMP    #Diabetes  ISS q6h  will continue FS q6h while NPO    #CKD  CrCl <30  renal dose all medications Rapid Response was called due to patient having worsening tremors and with 1 episode of vomiting concerned for seizures vs stroke  Pt was given 2 doses of ativan and loaded with keppra IV  Pt was then brought to CT scan for r/o stroke, which was negative for any acute findings.     #Seizures  s/p keppra 1g  will continue renal dose keppra 500 mg bid IVPB  keep NPO  add D5 LR gentle hydration  f/u EEG to see if there is improvement  f/u CXR to r/o aspiration pneumonia  f/u neuro recs  GOC completed, family wants DNR/DNI    #Electrolyte abnormality  will replete K and magnesium  monitor BMP    #Diabetes  ISS q6h  will continue FS q6h while NPO    #chronic CVA  add aspirin 300 mg rectally while npo, consider change to 81 mg po when able  f/u lipid panel and a1c     #CKD  CrCl <30  renal dose all medications

## 2023-07-02 NOTE — GOALS OF CARE CONVERSATION - ADVANCED CARE PLANNING - CONVERSATION DETAILS
Pt unable to make medical decisions due to history of prior stroke.   Pt is from nursing facility and was originally full code.   Pt currently having active seizures and is at risk of cardiac/respiratory arrest.     Discussed with health care proxy patient's son Hari at bedside about patient's clinical status and treatment options, son verbalized that pt is at her baseline mental status.  Family understands patient's advanced co-mordities and limited room for improvement in clinical and functional status. Family wants to continue limited medical interventions, such as IV fluids, antibiotics, etc. Family is not interested in pursuing heroic measures such as CPR, intubation, vasopressors, central lines and feeding tube.  MOLST form completed, pt is DNR/DNI and all questions/concerns were answered. Pt unable to make medical decisions due to history of prior stroke.   Pt is from [facility] and was originally full code.   Pt currently having active seizures and is at risk of cardiac/respiratory arrest.     Discussed with health care proxy patient's son Hari at bedside about patient's clinical status and treatment options, son verbalized that pt is at her baseline mental status.  Family understands patient's advanced co-mordities and limited room for improvement in clinical and functional status. Family wants to continue limited medical interventions, such as IV fluids, antibiotics, etc. Family is not interested in pursuing heroic measures such as CPR, intubation, vasopressors, central lines and feeding tube.  MOLST form completed, pt is DNR/DNI and all questions/concerns were answered.

## 2023-07-03 DIAGNOSIS — Z02.9 ENCOUNTER FOR ADMINISTRATIVE EXAMINATIONS, UNSPECIFIED: ICD-10-CM

## 2023-07-03 LAB
A1C WITH ESTIMATED AVERAGE GLUCOSE RESULT: 6.7 % — HIGH (ref 4–5.6)
ANION GAP SERPL CALC-SCNC: 6 MMOL/L — SIGNIFICANT CHANGE UP (ref 5–17)
BUN SERPL-MCNC: 13 MG/DL — SIGNIFICANT CHANGE UP (ref 7–18)
CALCIUM SERPL-MCNC: 8.8 MG/DL — SIGNIFICANT CHANGE UP (ref 8.4–10.5)
CHLORIDE SERPL-SCNC: 108 MMOL/L — SIGNIFICANT CHANGE UP (ref 96–108)
CHOLEST SERPL-MCNC: 125 MG/DL — SIGNIFICANT CHANGE UP
CO2 SERPL-SCNC: 28 MMOL/L — SIGNIFICANT CHANGE UP (ref 22–31)
CREAT SERPL-MCNC: 1.17 MG/DL — SIGNIFICANT CHANGE UP (ref 0.5–1.3)
EGFR: 42 ML/MIN/1.73M2 — LOW
ESTIMATED AVERAGE GLUCOSE: 146 MG/DL — HIGH (ref 68–114)
GLUCOSE BLDC GLUCOMTR-MCNC: 117 MG/DL — HIGH (ref 70–99)
GLUCOSE BLDC GLUCOMTR-MCNC: 118 MG/DL — HIGH (ref 70–99)
GLUCOSE BLDC GLUCOMTR-MCNC: 120 MG/DL — HIGH (ref 70–99)
GLUCOSE BLDC GLUCOMTR-MCNC: 83 MG/DL — SIGNIFICANT CHANGE UP (ref 70–99)
GLUCOSE SERPL-MCNC: 100 MG/DL — HIGH (ref 70–99)
HCT VFR BLD CALC: 33.7 % — LOW (ref 34.5–45)
HDLC SERPL-MCNC: 63 MG/DL — SIGNIFICANT CHANGE UP
HGB BLD-MCNC: 10.6 G/DL — LOW (ref 11.5–15.5)
LIPID PNL WITH DIRECT LDL SERPL: 50 MG/DL — SIGNIFICANT CHANGE UP
MAGNESIUM SERPL-MCNC: 2.2 MG/DL — SIGNIFICANT CHANGE UP (ref 1.6–2.6)
MCHC RBC-ENTMCNC: 29.9 PG — SIGNIFICANT CHANGE UP (ref 27–34)
MCHC RBC-ENTMCNC: 31.5 GM/DL — LOW (ref 32–36)
MCV RBC AUTO: 95.2 FL — SIGNIFICANT CHANGE UP (ref 80–100)
NON HDL CHOLESTEROL: 62 MG/DL — SIGNIFICANT CHANGE UP
NRBC # BLD: 0 /100 WBCS — SIGNIFICANT CHANGE UP (ref 0–0)
PHOSPHATE SERPL-MCNC: 2.6 MG/DL — SIGNIFICANT CHANGE UP (ref 2.5–4.5)
PLATELET # BLD AUTO: 161 K/UL — SIGNIFICANT CHANGE UP (ref 150–400)
POTASSIUM SERPL-MCNC: 3.9 MMOL/L — SIGNIFICANT CHANGE UP (ref 3.5–5.3)
POTASSIUM SERPL-SCNC: 3.9 MMOL/L — SIGNIFICANT CHANGE UP (ref 3.5–5.3)
RBC # BLD: 3.54 M/UL — LOW (ref 3.8–5.2)
RBC # FLD: 17 % — HIGH (ref 10.3–14.5)
SODIUM SERPL-SCNC: 142 MMOL/L — SIGNIFICANT CHANGE UP (ref 135–145)
TRIGL SERPL-MCNC: 60 MG/DL — SIGNIFICANT CHANGE UP
WBC # BLD: 4.8 K/UL — SIGNIFICANT CHANGE UP (ref 3.8–10.5)
WBC # FLD AUTO: 4.8 K/UL — SIGNIFICANT CHANGE UP (ref 3.8–10.5)

## 2023-07-03 PROCEDURE — 95816 EEG AWAKE AND DROWSY: CPT | Mod: 26

## 2023-07-03 RX ORDER — SODIUM CHLORIDE 9 MG/ML
1000 INJECTION, SOLUTION INTRAVENOUS
Refills: 0 | Status: DISCONTINUED | OUTPATIENT
Start: 2023-07-03 | End: 2023-07-04

## 2023-07-03 RX ADMIN — HEPARIN SODIUM 5000 UNIT(S): 5000 INJECTION INTRAVENOUS; SUBCUTANEOUS at 06:38

## 2023-07-03 RX ADMIN — Medication 300 MILLIGRAM(S): at 12:00

## 2023-07-03 RX ADMIN — HEPARIN SODIUM 5000 UNIT(S): 5000 INJECTION INTRAVENOUS; SUBCUTANEOUS at 22:00

## 2023-07-03 RX ADMIN — LEVETIRACETAM 420 MILLIGRAM(S): 250 TABLET, FILM COATED ORAL at 02:43

## 2023-07-03 RX ADMIN — SODIUM CHLORIDE 60 MILLILITER(S): 9 INJECTION, SOLUTION INTRAVENOUS at 17:00

## 2023-07-03 RX ADMIN — HEPARIN SODIUM 5000 UNIT(S): 5000 INJECTION INTRAVENOUS; SUBCUTANEOUS at 14:35

## 2023-07-03 RX ADMIN — LEVETIRACETAM 420 MILLIGRAM(S): 250 TABLET, FILM COATED ORAL at 14:34

## 2023-07-03 NOTE — PROGRESS NOTE ADULT - PROBLEM SELECTOR PLAN 8
Pt. is from [facility]  Failed S/S, currently NPO except meds  Will likely need repeat S/S testing once more awake  F/U PT reccs

## 2023-07-03 NOTE — SWALLOW BEDSIDE ASSESSMENT ADULT - NS SPL SWALLOW CLINIC TRIAL FT
Pt coughed on initial PO ice trial; throat clear on third PO ice trial. Pt inconsistently responsive to SLP and son verbal cue to swallow.

## 2023-07-03 NOTE — DIETITIAN INITIAL EVALUATION ADULT - PERTINENT LABORATORY DATA
07-03    142  |  108  |  13  ----------------------------<  100<H>  3.9   |  28  |  1.17    Ca    8.8      03 Jul 2023 07:55  Phos  2.6     07-03  Mg     2.2     07-03    POCT Blood Glucose.: 83 mg/dL (07-03-23 @ 06:38)

## 2023-07-03 NOTE — SWALLOW BEDSIDE ASSESSMENT ADULT - PHARYNGEAL PHASE
Delayed pharyngeal swallow/Decreased laryngeal elevation/Cough post oral intake/Throat clear post oral intake Delayed pharyngeal swallow/Decreased laryngeal elevation/Absent trigger of swallow/Throat clear post oral intake

## 2023-07-03 NOTE — PROGRESS NOTE ADULT - PROBLEM SELECTOR PLAN 2
pmhx of CVA  CT head neg for acute findings, chronic L MCA   pt nonverbal on baseline, follows commands, smiles  c/w home meds Nuedexta [Dextromethorpan/ Quinidine]  f/u PT reccs

## 2023-07-03 NOTE — DIETITIAN INITIAL EVALUATION ADULT - OTHER INFO
Patient from [facility] admitted for seizure control. S/p Rapid Response Time on 07/02/23 was called due to patient having worsening tremors and with 1 episode of vomiting concerned for seizures vs stroke. Placed on NPO pending Speech/Swallowing consult.   Visited pt. alert & awake with minimal verbal response, [place] [facility] pt. "doing a lot better" today, usual oral intake "okay" & "eating pureed meals" at NH, able to recall last weight at 150 Lbs? prior to admit, dosing wt. 145.8 Lbs on 07/03/23 noted. Presently pt. placed on NPO pending Speech/Swallowing consult & Seen by Wound Care RN,    Patient from [facility] admitted for seizure control. S/p Rapid Response Time on 07/02/23 was called due to patient having worsening tremors and with 1 episode of vomiting concerned for seizures vs stroke.   Visited pt. alert & awake with minimal verbal response, son - Hari at [facility], reports pt. "doing a lot better" today, usual oral intake "okay" & "eating pureed meals" at NH, able to recall last weight at 150 Lbs? prior to admit, dosing wt. 145.8 Lbs on 07/03/23 noted. Presently pt. placed on NPO pending Speech/Swallowing consult & Seen by Wound Care RN,

## 2023-07-03 NOTE — DIETITIAN INITIAL EVALUATION ADULT - FACTORS AFF FOOD INTAKE
weakness/difficulty chewing/difficulty feeding self/difficulty swallowing/difficulty with food procurement/preparation/[employer]/ethnic/cultural/personal food preferences/other (specify)

## 2023-07-03 NOTE — EEG REPORT - NS EEG TEXT BOX
TECH INFORMATION:   Patient Status: Awake.     This is a Routine EEG.     Placement and Labeling of Electrodes: The EEG was performed utilizing at least 20 channel referential EEG connections (coronal over temporal over parasagittal montage) with inferior temporal electrodes when indicting and using all standard 10-20 electrode placements with EKG, with additional electrodes placed in the inferior temporal region using the modified 10-10 montage electrode placements for elective admissions, or if deemed necessary.  Recording was at  a sampling rate of 256 samples per second per channel. Time synchronized digital video recording was done simultaneously with EEG recording.  A low light infrared camera was used for low light recording..    History:  This is a 89 year old Female patient with a history of seizures; left MCA stroke.    EEG Report:  Reason for Test: diagnostic evaluation of paraoxy events and (generalized twitch).    DESCRIPTION OF RECORDING:   The recording is generally poorly organized with intermittently appearing PDR at 7-8Hz better formed on the right compared to left side. Otherwise the background mainly consists of generalized polymorphic synchronous theta with intermittent polymorphic delta activity.     Drowsiness is not reached.     Stage II of sleep was not reached.     Hyperventilation was not performed.     Photic stimulation produced no changes in the recording.    SPECIAL FINDINGS:   There is superimposed muscle artifact.    EEG CLASSIFICATION:   Findings:   Generalized background slowing polymorphic theta with intermittent polymorphic delta activity.    INTERPRETATION:   No focal, lateralized or epileptiform features were present.     This is an abnormal EEG consistent with mild generalized diffuse cerebral dysfunction.    This is a preliminary report pending attending review.    Electronic Signatures:  Gulshan Hamilton)  (Signed 03-Jul-2023 15:03)  	Authored: TECH INFORMATION, DESCRIPTION OF RECORDING, SPECIAL FINDINGS, EEG CLASSIFICATION, INTERPRETATION      Last Updated: 03-Jul-2023 15:03 by Gulshan Hamilton)     MICHAEL BARFIELD [id number]     Study Date: 07-03-23  Duration: 22 min  --------------------------------------------------------------------------------------------------  History:  CC/ HPI Patient is a 89y old  Female who presents with a chief complaint of r/o seizure (03 Jul 2023 15:56)    MEDICATIONS  (STANDING):  amLODIPine   Tablet 5 milliGRAM(s) Oral daily  aspirin Suppository 300 milliGRAM(s) Rectal daily  atorvastatin 20 milliGRAM(s) Oral at bedtime  dextrose 5% + lactated ringers 1000 milliLiter(s) (60 mL/Hr) IV Continuous <Continuous>  furosemide    Tablet 40 milliGRAM(s) Oral daily  heparin   Injectable 5000 Unit(s) SubCutaneous every 8 hours  insulin lispro (ADMELOG) corrective regimen sliding scale   SubCutaneous every 6 hours  levETIRAcetam  IVPB 500 milliGRAM(s) IV Intermittent every 12 hours  losartan 100 milliGRAM(s) Oral daily  spironolactone 25 milliGRAM(s) Oral daily    --------------------------------------------------------------------------------------------------  Study Interpretation:    [[[Abbreviation Key:  PDR=alpha rhythm/posterior dominant rhythm. A-P=anterior posterior.  Amplitude: ‘very low’:<20; ‘low’:20-49; ‘medium’:; ‘high’:>150uV.  Persistence for periodic/rhythmic patterns (% of epoch) ‘rare’:<1%; ‘occasional’:1-10%; ‘frequent’:10-50%; ‘abundant’:50-90%; ‘continuous’:>90%.  Persistence for sporadic discharges: ‘rare’:<1/hr; ‘occasional’:1/min-1/hr; ‘frequent’:>1/min; ‘abundant’:>1/10 sec.  RPP=rhythmic and periodic patterns; GRDA=generalized rhythmic delta activity; FIRDA=frontal intermittent GRDA; LRDA=lateralized rhythmic delta activity; TIRDA=temporal intermittent rhythmic delta activity;  LPD=PLED=lateralized periodic discharges; GPD=generalized periodic discharges; BIPDs =bilateral independent periodic discharges; Mf=multifocal; SIRPDs=stimulus induced rhythmic, periodic, or ictal appearing discharges; BIRDs=brief potentially ictal rhythmic discharges >4 Hz, lasting .5-10s; PFA (paroxysmal bursts >13 Hz or =8 Hz <10s).  Modifiers: +F=with fast component; +S=with spike component; +R=with rhythmic component.  S-B=burst suppression pattern.  Max=maximal. N1-drowsy; N2-stage II sleep; N3-slow wave sleep. SSS/BETS=small sharp spikes/benign epileptiform transients of sleep. HV=hyperventilation; PS=photic stimulation]]]    Daily EEG Visual Analysis    FINDINGS:      Background:  Continuity: Continuous  Symmetry: Symmetric  Posterior dominant rhythm (PDR): 8 Hz, reactive to eye closure. Symmetric low-amplitude frontal beta in wakefulness.  State Change: Present  Voltage: Normal  Anterior Posterior Gradient: Present  Other background findings: Intermittent diffuse polymorphic delta slowing in wakefulness.  Breach: Absent    Background Slowing:  Generalized slowing: As above  Focal slowing: PDR intermittently less well formed on the left; intermittent left hemispheric focal polymorphic delta and theta slowing    State Changes:   Drowsiness and stage 2 sleep are not captured.    Sporadic Epileptiform Discharges:    None    Rhythmic and Periodic Patterns (RPPs):  None     Electrographic and Electroclinical seizures:  None    Other Clinical Events:  None    Activation Procedures:   Hyperventilation was not performed.    Photic stimulation was performed and did not elicit any abnormalities.      Artifacts:  Frequent myogenic and movement artifacts are present, limiting interpretation.    EKG:  Single-lead EKG shows regular rhythm.    EEG Classification / Summary:  Abnormal routine EEG in the awake state.  -Interpretation is limited by frequent myogenic and movement artifacts  -Left hemispheric focal slowing  -Mild diffuse slowing  -No epileptiform abnormalities captured    Clinical Impression:  -Interpretation is limited by frequent myogenic and movement artifacts  -Left hemispheric focal cerebral dysfunction can be structural or functional in etiology.   -Mild diffuse cerebral dysfunction is nonspecific in etiology.           -------------------------------------------------------------------------------------------------------  [facility] EEG Reading Room Ph#: [phone]  Epilepsy Answering Service after 5PM and before 8:30AM: Ph#: [phone]    Opal Parker MD  Attending Physician, [facility]

## 2023-07-03 NOTE — SWALLOW BEDSIDE ASSESSMENT ADULT - SWALLOW EVAL: DIAGNOSIS
Pt p/w s&s of oropharyngeal dysphagia c/b poor oral grading, poor bolus formation, tongue pumping, decreased A-P movement of bolus, bolus holding, delayed swallow trigger, decreased hyolaryngeal elevation; suspected premature spillage of bolus into hypopharynx. Pt throat cleared and coughed on ice and moderately thick liquid trials.

## 2023-07-03 NOTE — DIETITIAN INITIAL EVALUATION ADULT - PHYSCIAL ASSESSMENT
obese/debilitated/other (specify) Observed & per NH documents Ht. 5' 0"   Admission Ht. 5' 4"? on 06/30/23

## 2023-07-03 NOTE — ADVANCED PRACTICE NURSE CONSULT - ASSESSMENT
This is a 89yr old female patient admitted for Tremors, presenting with evidence of a healed wound to the Coccyx area; but the patient is without pressure injury

## 2023-07-03 NOTE — DIETITIAN INITIAL EVALUATION ADULT - PROBLEM SELECTOR PLAN 1
p/w generalized twitch, pmhx of seizure  CT head neg for acute findings, chronic L MCA   f/u Keppra level  EEG in AM  passed bedside dysphagia screen   Neuro Dr. Lara consulted

## 2023-07-03 NOTE — PROGRESS NOTE ADULT - ASSESSMENT
89F w/ PMH L MCA CVA w/ R side residual weakness, DM HLD, HTN, ischemic heart disease, seizures presents to ED with  generalized twitching. Pt is admitted to rule out seizure. Pt. with an episode of  worsening tremors and with 1 episode of vomiting on 7/2 with concern for seizures vs stroke, underwent CTH which was negative for acute finding, MRI brain which showed chronic infarcts and EEG with preliminary results negative for subclinical seizures,  Pt. followed by neuro Dr. Lara, recc Keppra 500mg BID for now.  Course of hospitalization c/b lethargy associated with difficulty swallowing.  Failed s/s eval, recc NPO except meds.  Pt, will need to be re evaluated prior to discharge back to [facility].  Pt.'s son and daughter in attendance, updated on pt.'s condition and continued plan of care.

## 2023-07-03 NOTE — PROGRESS NOTE ADULT - PROBLEM SELECTOR PLAN 1
likely 2/2 metabolic abnormalities vs. worsening epilepsy per neuro  p/w generalized twitch, pmhx of seizure  CT head neg for acute findings, chronic L MCA   MRI brain negative for acute findings  EEG preliminary results negative for subclinical seizures  Cont Keppra 500mg BID  Failed S/S  NPO except meds for now  Neuro Dr. Lara following  Will need repeat S/S eval  Seizure precautions

## 2023-07-03 NOTE — SWALLOW BEDSIDE ASSESSMENT ADULT - ADDITIONAL RECOMMENDATIONS
1) Anthony Free Water Protocol: Pt may have ice chips any time ONLY after oral care is completed, for QOL.  2) Continue Palliative Care consult to determine the GOC with re: to provision of nutrition in this patient.   3) SLP will monitor and f/u if needed

## 2023-07-03 NOTE — DIETITIAN INITIAL EVALUATION ADULT - PROBLEM SELECTOR PLAN 7
pmhx HTN  on  Norvasc, Lasix, Losartan, Spironolactone  will continue home medication with the holding parameters

## 2023-07-03 NOTE — DIETITIAN INITIAL EVALUATION ADULT - NS FNS DIET ORDER
NPO - 07/02/23  Special Instructions for Nursing: CODE STROKE: NPO until Dysphagia Screen Bedside Swallow Evaluation completed & passed

## 2023-07-03 NOTE — DIETITIAN INITIAL EVALUATION ADULT - ORAL INTAKE PTA/DIET HISTORY
NH diet order - Ground consistency, thin liquids, fluid restrictions 1000 ml/day  No known food allergies

## 2023-07-03 NOTE — SWALLOW BEDSIDE ASSESSMENT ADULT - SLP PERTINENT HISTORY OF CURRENT PROBLEM
89F w/ PMH L MCA CVA w/ R side residual weakness, DM HLD, HTN, ischemic heart disease, seizures presents to ED with  generalized twitching. Pt nonverbal and unable to provide history. Collateral information was taken from the ED which stated that  "noting "twitching" in the b/l arm and torso but respond to verbal stimuli. mental status baseline per patient's son." CXR (7/2): Progression of congestion. CT ANGIO HEAD (7/2): 1. Extensive ischemic changes in the left hemisphere with a large area of hypoperfusion in the left KYM and MCA distribution, as well as an area of underlying core infarction as well. 2. Extensive atherosclerotic changes and calcified plaque in the aortic arch and both cavernous and supraclinoid carotid arteries, but only mild disease in the neck. No large vessel occlusion suggested. Attenuation of distal left KYM and MCA vessels, as outlined above. No visible proximal KYM or MCA thrombus. Follow-up MR imaging of the brain recommended for further assessment.

## 2023-07-03 NOTE — DIETITIAN INITIAL EVALUATION ADULT - PROBLEM SELECTOR PLAN 5
BUN, SCr of 23 and 1.31 on admission [baseline on 24 June 2023: 23, 1.35]  likely CKD 2/2 DM  bladder scan neg for retention  monitor CMP

## 2023-07-03 NOTE — DIETITIAN INITIAL EVALUATION ADULT - PERTINENT MEDS FT
MEDICATIONS  (STANDING):  amLODIPine   Tablet 5 milliGRAM(s) Oral daily  aspirin Suppository 300 milliGRAM(s) Rectal daily  atorvastatin 20 milliGRAM(s) Oral at bedtime  dextrose 5% + lactated ringers 1000 milliLiter(s) (60 mL/Hr) IV Continuous <Continuous>  furosemide    Tablet 40 milliGRAM(s) Oral daily  heparin   Injectable 5000 Unit(s) SubCutaneous every 8 hours  insulin lispro (ADMELOG) corrective regimen sliding scale   SubCutaneous every 6 hours  levETIRAcetam  IVPB 500 milliGRAM(s) IV Intermittent every 12 hours  losartan 100 milliGRAM(s) Oral daily  spironolactone 25 milliGRAM(s) Oral daily    MEDICATIONS  (PRN):  acetaminophen     Tablet .. 650 milliGRAM(s) Oral every 6 hours PRN Temp greater or equal to 38C (100.4F), Mild Pain (1 - 3)

## 2023-07-03 NOTE — DIETITIAN INITIAL EVALUATION ADULT - PROBLEM SELECTOR PLAN 3
pmhx of CVA  CT head neg for acute findings, chronic L MCA   pt nonverbal on baseline, follows commands, cries   c/w home meds Nuedexta [Dextromethorpan/ Quinidine]

## 2023-07-03 NOTE — SWALLOW BEDSIDE ASSESSMENT ADULT - COMMENTS
Pt inconsistently responsive to SLP and son verbal cue to swallow. Pt throat clear on initial moderately thick liquid PO trial; absent swallow trigger of third trial. Pt suctioned; exam terminated. HOB elevated to 90°. A+Ox0; pt awake, very lethargic; pt eyes closed throughout exam, open w/ verbal cues. R facial droop visualized. Pt nonverbal; inconsistent delayed response to one step directions (i.e., open mouth, close mouth, swallow). Son at bedside. Oral suctioning provided at end of exam.

## 2023-07-04 LAB
ANION GAP SERPL CALC-SCNC: 8 MMOL/L — SIGNIFICANT CHANGE UP (ref 5–17)
BUN SERPL-MCNC: 9 MG/DL — SIGNIFICANT CHANGE UP (ref 7–18)
CALCIUM SERPL-MCNC: 9 MG/DL — SIGNIFICANT CHANGE UP (ref 8.4–10.5)
CHLORIDE SERPL-SCNC: 107 MMOL/L — SIGNIFICANT CHANGE UP (ref 96–108)
CO2 SERPL-SCNC: 25 MMOL/L — SIGNIFICANT CHANGE UP (ref 22–31)
CREAT SERPL-MCNC: 1.1 MG/DL — SIGNIFICANT CHANGE UP (ref 0.5–1.3)
EGFR: 46 ML/MIN/1.73M2 — LOW
GLUCOSE BLDC GLUCOMTR-MCNC: 108 MG/DL — HIGH (ref 70–99)
GLUCOSE BLDC GLUCOMTR-MCNC: 121 MG/DL — HIGH (ref 70–99)
GLUCOSE BLDC GLUCOMTR-MCNC: 129 MG/DL — HIGH (ref 70–99)
GLUCOSE BLDC GLUCOMTR-MCNC: 141 MG/DL — HIGH (ref 70–99)
GLUCOSE BLDC GLUCOMTR-MCNC: 156 MG/DL — HIGH (ref 70–99)
GLUCOSE SERPL-MCNC: 144 MG/DL — HIGH (ref 70–99)
HCT VFR BLD CALC: 32.4 % — LOW (ref 34.5–45)
HGB BLD-MCNC: 10.5 G/DL — LOW (ref 11.5–15.5)
LEVETIRACETAM SERPL-MCNC: 36.9 UG/ML — SIGNIFICANT CHANGE UP (ref 10–40)
MCHC RBC-ENTMCNC: 30.3 PG — SIGNIFICANT CHANGE UP (ref 27–34)
MCHC RBC-ENTMCNC: 32.4 GM/DL — SIGNIFICANT CHANGE UP (ref 32–36)
MCV RBC AUTO: 93.4 FL — SIGNIFICANT CHANGE UP (ref 80–100)
NRBC # BLD: 0 /100 WBCS — SIGNIFICANT CHANGE UP (ref 0–0)
PLATELET # BLD AUTO: 187 K/UL — SIGNIFICANT CHANGE UP (ref 150–400)
POTASSIUM SERPL-MCNC: 3.9 MMOL/L — SIGNIFICANT CHANGE UP (ref 3.5–5.3)
POTASSIUM SERPL-SCNC: 3.9 MMOL/L — SIGNIFICANT CHANGE UP (ref 3.5–5.3)
RBC # BLD: 3.47 M/UL — LOW (ref 3.8–5.2)
RBC # FLD: 16.8 % — HIGH (ref 10.3–14.5)
SODIUM SERPL-SCNC: 140 MMOL/L — SIGNIFICANT CHANGE UP (ref 135–145)
WBC # BLD: 5.77 K/UL — SIGNIFICANT CHANGE UP (ref 3.8–10.5)
WBC # FLD AUTO: 5.77 K/UL — SIGNIFICANT CHANGE UP (ref 3.8–10.5)

## 2023-07-04 RX ORDER — FUROSEMIDE 40 MG
40 TABLET ORAL ONCE
Refills: 0 | Status: COMPLETED | OUTPATIENT
Start: 2023-07-04 | End: 2023-07-04

## 2023-07-04 RX ORDER — SODIUM CHLORIDE 9 MG/ML
1000 INJECTION, SOLUTION INTRAVENOUS
Refills: 0 | Status: DISCONTINUED | OUTPATIENT
Start: 2023-07-04 | End: 2023-07-05

## 2023-07-04 RX ADMIN — SODIUM CHLORIDE 60 MILLILITER(S): 9 INJECTION, SOLUTION INTRAVENOUS at 13:24

## 2023-07-04 RX ADMIN — Medication 300 MILLIGRAM(S): at 13:31

## 2023-07-04 RX ADMIN — LEVETIRACETAM 420 MILLIGRAM(S): 250 TABLET, FILM COATED ORAL at 14:47

## 2023-07-04 RX ADMIN — HEPARIN SODIUM 5000 UNIT(S): 5000 INJECTION INTRAVENOUS; SUBCUTANEOUS at 06:46

## 2023-07-04 RX ADMIN — HEPARIN SODIUM 5000 UNIT(S): 5000 INJECTION INTRAVENOUS; SUBCUTANEOUS at 21:41

## 2023-07-04 RX ADMIN — LEVETIRACETAM 420 MILLIGRAM(S): 250 TABLET, FILM COATED ORAL at 03:46

## 2023-07-04 RX ADMIN — Medication 40 MILLIGRAM(S): at 06:46

## 2023-07-04 RX ADMIN — HEPARIN SODIUM 5000 UNIT(S): 5000 INJECTION INTRAVENOUS; SUBCUTANEOUS at 13:24

## 2023-07-05 VITALS
OXYGEN SATURATION: 96 % | TEMPERATURE: 100 F | HEART RATE: 94 BPM | DIASTOLIC BLOOD PRESSURE: 69 MMHG | RESPIRATION RATE: 20 BRPM | SYSTOLIC BLOOD PRESSURE: 119 MMHG

## 2023-07-05 LAB
GLUCOSE BLDC GLUCOMTR-MCNC: 115 MG/DL — HIGH (ref 70–99)
GLUCOSE BLDC GLUCOMTR-MCNC: 120 MG/DL — HIGH (ref 70–99)
GLUCOSE BLDC GLUCOMTR-MCNC: 136 MG/DL — HIGH (ref 70–99)
SARS-COV-2 RNA SPEC QL NAA+PROBE: SIGNIFICANT CHANGE UP

## 2023-07-05 PROCEDURE — 80061 LIPID PANEL: CPT

## 2023-07-05 PROCEDURE — 95957 EEG DIGITAL ANALYSIS: CPT

## 2023-07-05 PROCEDURE — 80048 BASIC METABOLIC PNL TOTAL CA: CPT

## 2023-07-05 PROCEDURE — 36415 COLL VENOUS BLD VENIPUNCTURE: CPT

## 2023-07-05 PROCEDURE — 92610 EVALUATE SWALLOWING FUNCTION: CPT

## 2023-07-05 PROCEDURE — 95816 EEG AWAKE AND DROWSY: CPT

## 2023-07-05 PROCEDURE — 85730 THROMBOPLASTIN TIME PARTIAL: CPT

## 2023-07-05 PROCEDURE — 80053 COMPREHEN METABOLIC PANEL: CPT

## 2023-07-05 PROCEDURE — 82962 GLUCOSE BLOOD TEST: CPT

## 2023-07-05 PROCEDURE — 83036 HEMOGLOBIN GLYCOSYLATED A1C: CPT

## 2023-07-05 PROCEDURE — 84100 ASSAY OF PHOSPHORUS: CPT

## 2023-07-05 PROCEDURE — 92526 ORAL FUNCTION THERAPY: CPT

## 2023-07-05 PROCEDURE — 80177 DRUG SCRN QUAN LEVETIRACETAM: CPT

## 2023-07-05 PROCEDURE — 70450 CT HEAD/BRAIN W/O DYE: CPT

## 2023-07-05 PROCEDURE — 85027 COMPLETE CBC AUTOMATED: CPT

## 2023-07-05 PROCEDURE — 85610 PROTHROMBIN TIME: CPT

## 2023-07-05 PROCEDURE — 71045 X-RAY EXAM CHEST 1 VIEW: CPT

## 2023-07-05 PROCEDURE — 99285 EMERGENCY DEPT VISIT HI MDM: CPT | Mod: 25

## 2023-07-05 PROCEDURE — 83735 ASSAY OF MAGNESIUM: CPT

## 2023-07-05 PROCEDURE — 70496 CT ANGIOGRAPHY HEAD: CPT

## 2023-07-05 PROCEDURE — 0042T: CPT

## 2023-07-05 PROCEDURE — 70498 CT ANGIOGRAPHY NECK: CPT

## 2023-07-05 PROCEDURE — 87635 SARS-COV-2 COVID-19 AMP PRB: CPT

## 2023-07-05 PROCEDURE — 85025 COMPLETE CBC W/AUTO DIFF WBC: CPT

## 2023-07-05 PROCEDURE — 93005 ELECTROCARDIOGRAM TRACING: CPT

## 2023-07-05 RX ORDER — LEVETIRACETAM 250 MG/1
7.5 TABLET, FILM COATED ORAL
Refills: 0 | DISCHARGE
Start: 2023-07-05

## 2023-07-05 RX ORDER — LEVETIRACETAM 250 MG/1
500 TABLET, FILM COATED ORAL ONCE
Refills: 0 | Status: COMPLETED | OUTPATIENT
Start: 2023-07-05 | End: 2023-07-05

## 2023-07-05 RX ORDER — FUROSEMIDE 40 MG
40 TABLET ORAL DAILY
Refills: 0 | Status: DISCONTINUED | OUTPATIENT
Start: 2023-07-05 | End: 2023-07-05

## 2023-07-05 RX ORDER — NITROGLYCERIN 6.5 MG
1 CAPSULE, EXTENDED RELEASE ORAL
Refills: 0 | DISCHARGE

## 2023-07-05 RX ADMIN — HEPARIN SODIUM 5000 UNIT(S): 5000 INJECTION INTRAVENOUS; SUBCUTANEOUS at 06:14

## 2023-07-05 RX ADMIN — Medication 40 MILLIGRAM(S): at 06:14

## 2023-07-05 RX ADMIN — Medication 300 MILLIGRAM(S): at 12:23

## 2023-07-05 RX ADMIN — LEVETIRACETAM 500 MILLIGRAM(S): 250 TABLET, FILM COATED ORAL at 17:06

## 2023-07-05 RX ADMIN — LEVETIRACETAM 420 MILLIGRAM(S): 250 TABLET, FILM COATED ORAL at 03:34

## 2023-07-05 RX ADMIN — SODIUM CHLORIDE 60 MILLILITER(S): 9 INJECTION, SOLUTION INTRAVENOUS at 06:16

## 2023-07-05 NOTE — DISCHARGE NOTE PROVIDER - NSDCCPCAREPLAN_GEN_ALL_CORE_FT
PRINCIPAL DISCHARGE DIAGNOSIS  Diagnosis: Seizure  Assessment and Plan of Treatment: You presented with tremors. You were followed by the neurologist and EEG was negative for seizures. Please continue taking keppra 500 twice daily and follow up with the neurologist Dr. Lara.   A seizure is a burst of electrical activity in your brain. A seizure may start in one part of your brain, or both sides may be affected. The seizure may last a few seconds or up to 5 minutes. A new-onset seizure is a seizure that happens for the first time. Some common triggers are alcohol, drugs, lack of sleep, fever, or an infection. High or low blood sugar levels, pregnancy, a head injury, or a stroke could also trigger a seizure. The cause of your seizure may not be known. You have a higher risk for another seizure within the next 2 years.  DISCHARGE INSTRUCTIONS:  Call your local emergency number (671 in the [place]) or have someone else call for any of the following:  Your seizure lasts longer than 5 minutes.  You have a second seizure that happens within 24 hours of your first.  You have trouble breathing after a seizure.  You cannot be woken after your seizure.  You have more than 1 seizure before you are fully awake or aware.         SECONDARY DISCHARGE DIAGNOSES  Diagnosis: History of CVA (cerebrovascular accident)  Assessment and Plan of Treatment: You had a history of stroke. CT head was negative for stroke. MRI showed old stroke. Please continue your home medication nuedexta. Maintain aspiration precautions. You were followed by speech and swallow and reccommended pureed diet.       Diagnosis: HLD (hyperlipidemia)  Assessment and Plan of Treatment: Hyperlipidemia is a high level of lipids (fats) in your blood. These lipids include cholesterol or triglycerides. Lipids are made by your body. They also come from the foods you eat. Your body needs lipids to work properly, but high levels increase your risk for heart disease, heart attack, and stroke.  Eat heart-healthy foods. A dietitian or your provider can give you more information on low-sodium plans or the DASH (Dietary Approaches to Stop Hypertension) eating plan. The DASH plan is low in sodium, processed sugar, unhealthy fats, and total fat. It is high in potassium, calcium, and fiber. It is high in potassium, calcium, and fiber. These can be found in vegetables, fruit, and whole-grain foods. The following are ways to get more heart-healthy foods:  Please continue taking aspirin and atorvastatin . Please follow up with your primary within one week.    Diagnosis: DM (diabetes mellitus)  Assessment and Plan of Treatment: You have a history of diabtes. Please continue the medication pioglatazone   Make sure you get your HgA1c checked every three months.  If you take oral diabetes medications, check your blood glucose two times a day.  If you take insulin, check your blood glucose before meals and at bedtime.  It's important not to skip any meals.  Keep a log of your blood glucose results and always take it with you to your doctor appointments.  Keep a list of your current medications including injectables and over the counter medications and bring this medication list with you to all your doctor appointments.  If you have not seen your ophthalmologist this year call for appointment.  Check your feet daily for redness, sores, or openings. Do not self treat. If no improvement in two days call your primary care physician for an appointment.  Low blood sugar (hypoglycemia) is a blood sugar below 70mg/dl. Check your blood sugar if you feel signs/symptoms of hypoglycemia. If your blood sugar is below 70 take 15 grams of carbohydrates (ex 4 oz of apple juice, 3-4 glucose tablets, or 4-6 oz of regular soda) wait 15 minutes and repeat blood sugar to make sure it comes up above 70.  If your blood sugar is above 70 and you are due for a meal, have a meal.  If you are not due for a meal have a snack.  This snack helps keeps your blood sugar at a safe range.    Diagnosis: Anemia  Assessment and Plan of Treatment: Your hemoglobin was 10.2 during your hospital stay. Please follow up with your primary to trend your lab work.       Diagnosis: HTN (hypertension)  Assessment and Plan of Treatment: your blood pressure ranged between: (119/69 - 173/76)  Please continue  Norvasc, Lasix, Losartan, Spironolactone  follow up with your primary care doctor or cardiologist.  try to take your blood pressure readings twice a day, morning and night time.   Notify your doctor if you have any of the following symptoms:   Dizziness, Lightheadedness, Blurry vision, Headache, Chest pain, Shortness of breath

## 2023-07-05 NOTE — DISCHARGE NOTE NURSING/CASE MANAGEMENT/SOCIAL WORK - NSDCPEFALRISK_GEN_ALL_CORE
For information on Fall & Injury Prevention, visit: [email or web address] OR  [email or web address] OR  https://www.cdc.gov/steadi/patient.html

## 2023-07-05 NOTE — DISCHARGE NOTE NURSING/CASE MANAGEMENT/SOCIAL WORK - NSDCPEPTSTROKESIGNS_GEN_ALL_CORE
Sudden numbness or weakness of the face, arm, or leg, especially on one side of the body. Confusion, trouble speaking or understanding. Trouble seeing in one or both eyes. Trouble walking, dizziness, loss of balance or coordination. Severe headache.  used

## 2023-07-05 NOTE — DISCHARGE NOTE PROVIDER - NSDCMRMEDTOKEN_GEN_ALL_CORE_FT
acetaminophen 325 mg oral tablet, disintegratin tab(s) orally every 4 hours, As Needed  Actos 15 mg oral tablet: 1 orally once a day  ascorbic acid 500 mg/5 mL oral liquid: 5 milliliter(s) orally once a day  atorvastatin 20 mg oral tablet: 1 orally once a day (at bedtime)  Lasix 40 mg oral tablet: 1 orally once a day  levETIRAcetam 100 mg/mL intravenous solution: 5 milliliter(s) intravenous every 12 hours  losartan 100 mg oral tablet: 1 tab(s) orally once a day  Norvasc 10 mg oral tablet: 1 orally once a day  Nuedexta 20 mg-10 mg oral capsule: 1 cap(s) orally every 12 hours  spironolactone 25 mg oral tablet: 1 orally once a day

## 2023-07-05 NOTE — DISCHARGE NOTE PROVIDER - CARE PROVIDER_API CALL
Geovany Dennis  Internal Medicine  [address], [address] [place] [id number]  Phone: [phone]  Fax: [phone]  Follow Up Time: 1 week    Jose Ramon Lara  Neurology  [address] [place] [address], [facility] [address]  [place] [address]  Phone: [phone]  Fax: [phone]  Follow Up Time: 1 week

## 2023-07-05 NOTE — DISCHARGE NOTE NURSING/CASE MANAGEMENT/SOCIAL WORK - PATIENT PORTAL LINK FT
You can access the FollowMyHealth Patient Portal offered by [facility] by registering at the following website: [email or web address]. By joining [facility]’s FollowMyHealth portal, you will also be able to view your health information using other applications (apps) compatible with our system.

## 2023-07-05 NOTE — DISCHARGE NOTE PROVIDER - HOSPITAL COURSE
89F w/ PMH L MCA CVA w/ R side residual weakness, DM HLD, HTN, ischemic heart disease, seizures presents to ED with  generalized twitching. Pt is admitted to rule out seizure. Pt. with an episode of  worsening tremors and with 1 episode of vomiting on 7/2 with concern for seizures vs stroke, underwent CTH which was negative for acute finding, MRI brain which showed chronic infarcts and EEG with preliminary results negative for subclinical seizures,  Pt. followed by neuro Dr. Lara, [facility] Keppra 500mg BID for now.  Course of hospitalization c/b lethargy associated with difficulty swallowing. Pt reevaluated for speech and swallow and recommended pureed. Patient is now ready to be discharge back to [facility].  Pt.'s son and daughter in attendance, updated on pt.'s condition and continued plan of care.        Given the clinical course, decision was made to discharge pt with outpatient follow up   Discharge plan discussed with attending   This is only a brief summary for the whole hospital course, please follow up with EMR.        89F w/ PMH L MCA CVA w/ R side residual weakness, DM HLD, HTN, ischemic heart disease, presents to ED with generalized seizures. Pt is admitted to medicine for further work up of seizures vs. stroke. Pt underwent CTH which was negative for infarcts, hemorrhage, and mass.  MRI brain showed chronic infarcts and EEG with preliminary results negative for subclinical seizures,  Pt. followed by neuro Dr. Lara, [facility] Keppra 500mg BID for now.  Patient will continue medication after discharge. Course of hospitalization c/b lethargy associated with difficulty swallowing. Pt reevaluated for speech and swallow and recommended pureed. Patient is now ready to be discharge back to [facility].  Pt.'s son and daughter in attendance, updated on pt.'s condition and continued plan of care.        Given the clinical course, decision was made to discharge pt with outpatient follow up   Discharge plan discussed with attending   This is only a brief summary for the whole hospital course, please follow up with EMR.

## 2023-07-05 NOTE — DISCHARGE NOTE PROVIDER - PROVIDER TOKENS
PROVIDER:[TOKEN:[[id number]],FOLLOWUP:[1 week]],PROVIDER:[TOKEN:[[id number]],FOLLOWUP:[1 week]]

## 2023-07-05 NOTE — PROGRESS NOTE ADULT - SUBJECTIVE AND OBJECTIVE BOX
INTERVAL HPI/OVERNIGHT EVENTS:  Patient seen,events noticed for overnight  VITAL SIGNS:  T(F): 97.5 (07-02-23 @ 09:00)  HR: 78 (07-02-23 @ 09:00)  BP: 115/66 (07-02-23 @ 09:00)  RR: 20 (07-02-23 @ 09:00)  SpO2: 99% (07-02-23 @ 09:00)  Wt(kg): --    PHYSICAL EXAM:  awake  Constitutional:  Eyes:  ENMT:perrla  Neck:  Respiratory:clear  Cardiovascular:s1s2,m-none  Gastrointestinal:soft,bs pos  Extremities:  Vascular:  Neurological:no focal deficit  Musculoskeletal:    MEDICATIONS  (STANDING):  amLODIPine   Tablet 5 milliGRAM(s) Oral daily  atorvastatin 20 milliGRAM(s) Oral at bedtime  dextrose 5% + lactated ringers 1000 milliLiter(s) (60 mL/Hr) IV Continuous <Continuous>  furosemide    Tablet 40 milliGRAM(s) Oral daily  heparin   Injectable 5000 Unit(s) SubCutaneous every 8 hours  insulin lispro (ADMELOG) corrective regimen sliding scale   SubCutaneous every 6 hours  levETIRAcetam  IVPB 500 milliGRAM(s) IV Intermittent every 12 hours  losartan 100 milliGRAM(s) Oral daily  magnesium sulfate  IVPB 1 Gram(s) IV Intermittent once  potassium chloride  10 mEq/100 mL IVPB 10 milliEquivalent(s) IV Intermittent every 1 hour  spironolactone 25 milliGRAM(s) Oral daily    MEDICATIONS  (PRN):  acetaminophen     Tablet .. 650 milliGRAM(s) Oral every 6 hours PRN Temp greater or equal to 38C (100.4F), Mild Pain (1 - 3)      Allergies    No Known Allergies    Intolerances        LABS:                        9.5    5.34  )-----------( 162      ( 02 Jul 2023 08:30 )             30.4     07-02    140  |  106  |  14  ----------------------------<  209<H>  2.9<LL>   |  23  |  1.36<H>    Ca    8.4      02 Jul 2023 08:30  Phos  2.6     07-02  Mg     1.7     07-02    TPro  6.5  /  Alb  2.7<L>  /  TBili  0.4  /  DBili  x   /  AST  15  /  ALT  9<L>  /  AlkPhos  67  07-01    PT/INR - ( 30 Jun 2023 21:50 )   PT: 13.9 sec;   INR: 1.17 ratio         PTT - ( 30 Jun 2023 21:50 )  PTT:22.9 sec  Urinalysis Basic - ( 02 Jul 2023 08:30 )    Color: x / Appearance: x / SG: x / pH: x  Gluc: 209 mg/dL / Ketone: x  / Bili: x / Urobili: x   Blood: x / Protein: x / Nitrite: x   Leuk Esterase: x / RBC: x / WBC x   Sq Epi: x / Non Sq Epi: x / Bacteria: x        RADIOLOGY & ADDITIONAL TESTS:       Assessment:  · Assessment	  89F w/ PMH L MCA CVA w/ R side residual weakness, DM HLD, HTN, ischemic heart disease, seizures presents to ED with  generalized twitching. Pt is admitted to rule out seizure.        Problem/Plan - 1:  ·  Problem: Seizure.   ·  Plan: p/w generalized twitch, pmhx of seizure  CT head neg for acute findings, chronic L MCA   f/u Keppra level  EEG in AM  passed bedside dysphagia screen   Neuro Dr. Lara consulted.     Problem/Plan - 2:  ·  Problem: Hypokalemia.   supplement K   f/u CMP.     Problem/Plan - 3:  ·  Problem: History of CVA (cerebrovascular accident).   ·  Plan: pmhx of CVA  CT head neg for acute findings, chronic L MCA   pt nonverbal on baseline, follows commands, cries   c/w home meds Nuedexta [Dextromethorpan/ Quinidine].     Problem/Plan - 4:  ·  Problem: DM (diabetes mellitus).   ·  Plan: hx of DM on Actos (=pioglitazone)  c/w ISS.     Problem/Plan - 5:  ·  Problem: Chronic kidney disease (CKD).   ·  Plan: BUN, SCr of 23 and 1.31 on admission [baseline on 24 June 2023: 23, 1.35]  likely CKD 2/2 DM  bladder scan neg for retention  monitor CMP.     Problem/Plan - 6:  ·  Problem: Anemia.   ·  Plan: Hb 10.2 on admission  no active bleeding   likely anemia of chronic disease   monitor CBC.     Problem/Plan - 7:  ·  Problem: HTN (hypertension).   ·  Plan: pmhx HTN  on  Norvasc, Lasix, Losartan, Spironolactone  will continue home medication with the holding parameters.     Problem/Plan - 8:  ·  Problem: HLD (hyperlipidemia).   ·  Plan: pmhx HLD and CVA on Atorvastatin   c/w home meds.     Problem/Plan - 9:  ·  Problem: Prophylactic measure.   ·  Plan: DVT - Heparin sub cut.      
INTERVAL HPI/OVERNIGHT EVENTS:  Patient seen,more awake,no acute issues  VITAL SIGNS:  T(F): 99.6 (07-05-23 @ 13:56)  HR: 94 (07-05-23 @ 13:56)  BP: 119/69 (07-05-23 @ 13:56)  RR: 20 (07-05-23 @ 13:56)  SpO2: 96% (07-05-23 @ 13:56)  Wt(kg): --    PHYSICAL EXAM:  awake  Constitutional:  Eyes:  ENMT:perrla  Neck:  Respiratory:clear  Cardiovascular:s1s2,m-none  Gastrointestinal:soft,bs pos  Extremities:  Vascular:  Neurological:no focal deficit  Musculoskeletal:    MEDICATIONS  (STANDING):  amLODIPine   Tablet 5 milliGRAM(s) Oral daily  aspirin Suppository 300 milliGRAM(s) Rectal daily  atorvastatin 20 milliGRAM(s) Oral at bedtime  furosemide   Injectable 40 milliGRAM(s) IV Push daily  heparin   Injectable 5000 Unit(s) SubCutaneous every 8 hours  insulin lispro (ADMELOG) corrective regimen sliding scale   SubCutaneous every 6 hours  lactated ringers. 1000 milliLiter(s) (60 mL/Hr) IV Continuous <Continuous>  levETIRAcetam  IVPB 500 milliGRAM(s) IV Intermittent every 12 hours  losartan 100 milliGRAM(s) Oral daily  spironolactone 25 milliGRAM(s) Oral daily    MEDICATIONS  (PRN):  acetaminophen     Tablet .. 650 milliGRAM(s) Oral every 6 hours PRN Temp greater or equal to 38C (100.4F), Mild Pain (1 - 3)      Allergies    No Known Allergies    Intolerances        LABS:                        10.5   5.77  )-----------( 187      ( 04 Jul 2023 06:22 )             32.4     07-04    140  |  107  |  9   ----------------------------<  144<H>  3.9   |  25  |  1.10    Ca    9.0      04 Jul 2023 06:22        Urinalysis Basic - ( 04 Jul 2023 06:22 )    Color: x / Appearance: x / SG: x / pH: x  Gluc: 144 mg/dL / Ketone: x  / Bili: x / Urobili: x   Blood: x / Protein: x / Nitrite: x   Leuk Esterase: x / RBC: x / WBC x   Sq Epi: x / Non Sq Epi: x / Bacteria: x        RADIOLOGY & ADDITIONAL TESTS:      Assessment and Plan:   · Assessment	  89F w/ PMH L MCA CVA w/ R side residual weakness, DM HLD, HTN, ischemic heart disease, seizures presents to ED with  generalized twitching. Pt is admitted to rule out seizure. Pt. with an episode of  worsening tremors and with 1 episode of vomiting on 7/2 with concern for seizures vs stroke, underwent CTH which was negative for acute finding, MRI brain which showed chronic infarcts and EEG with preliminary results negative for subclinical seizures,  Pt. followed by neuro Dr. Lara, recc Keppra 500mg BID for now.  Course of hospitalization c/b lethargy associated with difficulty swallowing.  Failed s/s eval, recc NPO except meds.  Pt, will need to be re evaluated prior to discharge back to [facility].  Pt.'s son and daughter in attendance, updated on pt.'s condition and continued plan of care.         Problem/Plan - 1:  ·  Problem: Seizure.   ·  stable clinically  p/w generalized twitch, pmhx of seizure  Cont Keppra 500mg BID  Failed S/S  NPO except meds for now  Neuro Dr. Lara following  Will need repeat S/S eval  Seizure precautions.  speech and swallow evaluation prior to d/c     Problem/Plan - 2:  ·  Problem: History of CVA (cerebrovascular accident).   ·  Plan: pmhx of CVA  c/w home meds Nuedexta [Dextromethorpan/ Quinidine]  f/u PT reccs.     Problem/Plan - 3:  ·  Problem: DM (diabetes mellitus).   ·  Plan: hx of DM on Actos (=pioglitazone)  c/w ISS.     Problem/Plan - 4:  ·  Problem: Anemia.   · stable  monitor CBC.     Problem/Plan - 5:  ·  Problem: HTN (hypertension).   ·  Plan: pmhx HTN  on  Norvasc, Lasix, Losartan, Spironolactone  will continue home medication with the holding parameters.     Problem/Plan - 6:  ·  Problem: HLD (hyperlipidemia).   ·  Plan: pmhx HLD and CVA on Atorvastatin   c/w home meds.     Problem/Plan - 7:  ·  Problem: Prophylactic measure.   ·  Plan: DVT - Heparin sub cut.     Problem/Plan - 8:  ·  Problem: Discharge planning issues.   ·  Plan: Pt. is from [facility]  Failed S/S, currently NPO except meds  Will likely need repeat S/S testing once more awake  F/U PT reccs.    
  INTERVAL HPI/OVERNIGHT EVENTS:  Patient seen,events noticed  VITAL SIGNS:  T(F): 98.3 (07-04-23 @ 21:01)  HR: 72 (07-04-23 @ 21:49)  BP: 155/80 (07-04-23 @ 21:49)  RR: 20 (07-04-23 @ 21:01)  SpO2: 95% (07-04-23 @ 21:01)  Wt(kg): --    PHYSICAL EXAM:  awake  Constitutional:  Eyes:  ENMT:perrla  Neck:  Respiratory:clear  Cardiovascular:s1s2,m-none  Gastrointestinal:soft,bs pos  Extremities:  Vascular:  Neurological:no focal deficit  Musculoskeletal:    MEDICATIONS  (STANDING):  amLODIPine   Tablet 5 milliGRAM(s) Oral daily  aspirin Suppository 300 milliGRAM(s) Rectal daily  atorvastatin 20 milliGRAM(s) Oral at bedtime  furosemide    Tablet 40 milliGRAM(s) Oral daily  heparin   Injectable 5000 Unit(s) SubCutaneous every 8 hours  insulin lispro (ADMELOG) corrective regimen sliding scale   SubCutaneous every 6 hours  lactated ringers. 1000 milliLiter(s) (60 mL/Hr) IV Continuous <Continuous>  levETIRAcetam  IVPB 500 milliGRAM(s) IV Intermittent every 12 hours  losartan 100 milliGRAM(s) Oral daily  spironolactone 25 milliGRAM(s) Oral daily    MEDICATIONS  (PRN):  acetaminophen     Tablet .. 650 milliGRAM(s) Oral every 6 hours PRN Temp greater or equal to 38C (100.4F), Mild Pain (1 - 3)      Allergies    No Known Allergies    Intolerances        LABS:                        10.5   5.77  )-----------( 187      ( 04 Jul 2023 06:22 )             32.4     07-04    140  |  107  |  9   ----------------------------<  144<H>  3.9   |  25  |  1.10    Ca    9.0      04 Jul 2023 06:22  Phos  2.6     07-03  Mg     2.2     07-03        Urinalysis Basic - ( 04 Jul 2023 06:22 )    Color: x / Appearance: x / SG: x / pH: x  Gluc: 144 mg/dL / Ketone: x  / Bili: x / Urobili: x   Blood: x / Protein: x / Nitrite: x   Leuk Esterase: x / RBC: x / WBC x   Sq Epi: x / Non Sq Epi: x / Bacteria: x        RADIOLOGY & ADDITIONAL TESTS:      Assessment and Plan:   · Assessment	  89F w/ PMH L MCA CVA w/ R side residual weakness, DM HLD, HTN, ischemic heart disease, seizures presents to ED with  generalized twitching. Pt is admitted to rule out seizure. Pt. with an episode of  worsening tremors and with 1 episode of vomiting on 7/2 with concern for seizures vs stroke, underwent CTH which was negative for acute finding, MRI brain which showed chronic infarcts and EEG with preliminary results negative for subclinical seizures,  Pt. followed by neuro Dr. Lara, recc Keppra 500mg BID for now.  Course of hospitalization c/b lethargy associated with difficulty swallowing.  Failed s/s eval, recc NPO except meds.  Pt, will need to be re evaluated prior to discharge back to [facility].  Pt.'s son and daughter in attendance, updated on pt.'s condition and continued plan of care.         Problem/Plan - 1:  ·  Problem: Seizure.   ·  Plan: likely 2/2 metabolic abnormalities vs. worsening epilepsy per neuro  p/w generalized twitch, pmhx of seizure  CT head neg for acute findings, chronic L MCA   MRI brain negative for acute findings  EEG preliminary results negative for subclinical seizures  Cont Keppra 500mg BID  Failed S/S  NPO except meds for now  Neuro Dr. Lara following  Will need repeat S/S eval  Seizure precautions.     Problem/Plan - 2:  ·  Problem: History of CVA (cerebrovascular accident).   ·  Plan: pmhx of CVA  CT head neg for acute findings, chronic L MCA   pt nonverbal on baseline, follows commands, smiles  c/w home meds Nuedexta [Dextromethorpan/ Quinidine]  f/u PT reccs.     Problem/Plan - 3:  ·  Problem: DM (diabetes mellitus).   ·  Plan: hx of DM on Actos (=pioglitazone)  c/w ISS.     Problem/Plan - 4:  ·  Problem: Anemia.   ·  Plan: Hb 10.2 on admission  no active bleeding   likely anemia of chronic disease   monitor CBC.     Problem/Plan - 5:  ·  Problem: HTN (hypertension).   ·  Plan: pmhx HTN  on  Norvasc, Lasix, Losartan, Spironolactone  will continue home medication with the holding parameters.     Problem/Plan - 6:  ·  Problem: HLD (hyperlipidemia).   ·  Plan: pmhx HLD and CVA on Atorvastatin   c/w home meds.     Problem/Plan - 7:  ·  Problem: Prophylactic measure.   ·  Plan: DVT - Heparin sub cut.     Problem/Plan - 8:  ·  Problem: Discharge planning issues.   ·  Plan: Pt. is from [facility]  Failed S/S, currently NPO except meds  Will likely need repeat S/S testing once more awake  F/U PT reccs.  
NP Note discussed with  Primary Attending    Patient is a 89y old  Female who presents with a chief complaint of Tremor     (03 Jul 2023 10:35)      INTERVAL HPI/OVERNIGHT EVENTS: no new complaints    MEDICATIONS  (STANDING):  amLODIPine   Tablet 5 milliGRAM(s) Oral daily  aspirin Suppository 300 milliGRAM(s) Rectal daily  atorvastatin 20 milliGRAM(s) Oral at bedtime  dextrose 5% + lactated ringers 1000 milliLiter(s) (60 mL/Hr) IV Continuous <Continuous>  furosemide    Tablet 40 milliGRAM(s) Oral daily  heparin   Injectable 5000 Unit(s) SubCutaneous every 8 hours  insulin lispro (ADMELOG) corrective regimen sliding scale   SubCutaneous every 6 hours  levETIRAcetam  IVPB 500 milliGRAM(s) IV Intermittent every 12 hours  losartan 100 milliGRAM(s) Oral daily  spironolactone 25 milliGRAM(s) Oral daily    MEDICATIONS  (PRN):  acetaminophen     Tablet .. 650 milliGRAM(s) Oral every 6 hours PRN Temp greater or equal to 38C (100.4F), Mild Pain (1 - 3)      __________________________________________________  REVIEW OF SYSTEMS:    CONSTITUTIONAL: No fever,   EYES: no acute visual disturbances  NECK: No pain or stiffness  RESPIRATORY: No cough; No shortness of breath  CARDIOVASCULAR: No chest pain, no palpitations  GASTROINTESTINAL: No pain. No nausea or vomiting; No diarrhea   NEUROLOGICAL: No headache or numbness, no tremors  MUSCULOSKELETAL: No joint pain, no muscle pain  GENITOURINARY: no dysuria, no frequency, no hesitancy  PSYCHIATRY: no depression , no anxiety  ALL OTHER  ROS negative        Vital Signs Last 24 Hrs  T(C): 36.8 (03 Jul 2023 14:12), Max: 36.8 (03 Jul 2023 04:45)  T(F): 98.2 (03 Jul 2023 14:12), Max: 98.2 (03 Jul 2023 04:45)  HR: 67 (03 Jul 2023 14:12) (59 - 69)  BP: 152/54 (03 Jul 2023 14:12) (128/54 - 152/54)  BP(mean): --  RR: 18 (03 Jul 2023 14:12) (18 - 20)  SpO2: 97% (03 Jul 2023 14:12) (96% - 100%)    Parameters below as of 03 Jul 2023 14:12  Patient On (Oxygen Delivery Method): nasal cannula  O2 Flow (L/min): 3      ________________________________________________  PHYSICAL EXAM:  well developed, non verbal   GENERAL: NAD  HEENT: Normocephalic;  conjunctivae and sclerae clear; moist mucous membranes;   NECK : supple  CHEST/LUNG: Clear to auscultation bilaterally with good air entry   HEART: S1 S2  regular; no murmurs, gallops or rubs  ABDOMEN: Soft, Nontender, Nondistended; Bowel sounds present  EXTREMITIES: no cyanosis; no edema; no calf tenderness  SKIN: warm and dry; no rash  NERVOUS SYSTEM:  Awake and alert; Unable to obtain, pt. is non verbal      LABS:                        10.6   4.80  )-----------( 161      ( 03 Jul 2023 07:55 )             33.7     07-03    142  |  108  |  13  ----------------------------<  100<H>  3.9   |  28  |  1.17    Ca    8.8      03 Jul 2023 07:55  Phos  2.6     07-03  Mg     2.2     07-03        Urinalysis Basic - ( 03 Jul 2023 07:55 )    Color: x / Appearance: x / SG: x / pH: x  Gluc: 100 mg/dL / Ketone: x  / Bili: x / Urobili: x   Blood: x / Protein: x / Nitrite: x   Leuk Esterase: x / RBC: x / WBC x   Sq Epi: x / Non Sq Epi: x / Bacteria: x      CAPILLARY BLOOD GLUCOSE      POCT Blood Glucose.: 117 mg/dL (03 Jul 2023 11:15)  POCT Blood Glucose.: 83 mg/dL (03 Jul 2023 06:38)  POCT Blood Glucose.: 118 mg/dL (03 Jul 2023 00:02)  POCT Blood Glucose.: 159 mg/dL (02 Jul 2023 17:01)    RADIOLOGY & ADDITIONAL TESTS:    < from: CT Angio Neck w/ IV Cont (07.02.23 @ 08:32) >    ACC: [id number] EXAM:  CT ANGIO BRAIN (W)AW IC   ORDERED BY: PEGGY ROSENTHAL     ACC: [id number] EXAM:  CT ANGIO NECK (W)AW IC   ORDERED BY: PEGGY ROESNTHAL     ACC: [id number] EXAM:  CT PERFUSION W MAPS IC   ORDERED BY: PEGGY ROESNTHAL     PROCEDURE DATE:  07/02/2023          INTERPRETATION:  INDICATION: Stroke.    TECHNIQUE:  A thin section CT study of the head and  neck was conducted   during rapid infusion of intravenous contrast (power injected).  In   addition to the axial data, reformatted images were generated using a 3D   workstation. Rapid AI was used to screen for hemorrhage.  100 cc   Omnipaque 350 was administered.    FINDINGS:    AORTIC ARCH extensive calcified plaque is noted in the aortic arch, most   prevalent in the region of the subclavian origin and proximal descending   aorta.  COMMON CAROTID ARTERIES: Laterally patent with scattered plaque. Moderate   tortuosity noted.  RIGHT BIFURCATION: Proximal right internal mildly narrowed by calcified   plaque.  LEFT BIFURCATION: More extensive calcified plaque noted in the bulb with   mild narrowing of the proximal internal carotid.  INTERNAL CAROTID ARTERIES: Bilaterally patent with tortuosity  VERTEBRALS bilaterally patent with moderate plaque in the proximal   vertebral arteries bilaterally. Mild plaque noted in the V4 segments as   well.  MISCELLANEOUS:  Degenerative changes noted throughout the cervical spine   with chronic compression deformities.      BRAIN   a subacute left MCA territory infarct is suggested along with   chronic ischemic changes in the left MCA territory.    Extensive calcified plaque noted in the cavernous and supraclinoid   carotid arteries without occlusion      Both anterior cerebral arteries are patent proximally with well formed A1   segments. There is attenuation of KYM branches more distally, left   greater than right.    Both M1 segments are widely patent. There is generalized attenuation of   distal left MCA branches.    Bilaterally patent.    Vertebrobasilar system is patent that.    The venous sinuses are patent.    CT PERFUSION  rapid imaging was conducted.    There are areas of core infarction as well as the more generalized   hypoperfusion in the left hemisphere.  A large area of penumbra is suggested dated the left hemisphere. There is   scattered areas of hypoperfusion in the right hemisphere as well.    CBF<30% volume: 57 mL predominantly left frontal lobe.  Tmax>6.0 s volume: 170 mL, most extensive in the left hemisphere in the   frontal and anterior parietal region.  Mismatch volume: 113 mL  Mismatch ratio: 3.0    IMPRESSION:      1. Extensive ischemic changes in the left hemisphere with a large area of   hypoperfusion in the left KYM and MCA distribution, as well as an area of   underlying core infarction as well. See above.  2. Extensive atherosclerotic changes and calcified plaque in the aortic   arch and both cavernous and supraclinoid carotid arteries, but only mild   disease in the neck. No large vessel occlusion suggested. Attenuation of   distal left KYM and MCA vessels, as outlined above. No visible proximal   KYM or MCA thrombus.    Follow-up MR imaging of the brain recommended for further assessment.    --- End of Report ---    < end of copied text >    < from: CT Brain Stroke Protocol (07.02.23 @ 07:57) >    ACC: [id number] EXAM:  CT BRAIN STROKE PROTOCOL   ORDERED BY: PEGGY ROSENTHAL     PROCEDURE DATE:  07/02/2023          INTERPRETATION:  EXAM: CT HEAD WITHOUT INTRAVENOUS CONTRAST    CLINICAL INFORMATION: Code stroke.    TECHNIQUE: Noncontrast axial CT images were acquired through the head.   Two-dimensional sagittal and coronal reformats were generated.    COMPARISON STUDY: 6/30/2023 CT brain.    FINDINGS:    BRAIN: No apparent acute infarct, hemorrhage or mass. No hydrocephalus.   Chronic appearingwhite matter hypodensities and volume loss. Large   chronic infarct left MCA territory, small chronic infarct right   cerebellar hemisphere, both similar to prior.    CALVARIUM: No skull fracture or agreesive osseous legions.    EXTRACRANIAL SOFT TISSUES: No acute findings.    VISUALIZED PORTIONS OF THE PARANASAL SINUSES AND MASTOID AIR CELLS: No   air fluid levels.    IMPRESSION:  No acute intracranial findings.    Discussed with Dr. Bairon Rosenthal prior to dictation.    < end of copied text >          Imaging Personally Reviewed:  YES/NO    Consultant(s) Notes Reviewed:   YES/ No    Care Discussed with Consultants :     Plan of care was discussed with patient and /or primary care giver; all questions and concerns were addressed and care was aligned with patient's wishes.

## 2023-07-13 NOTE — DISCHARGE NOTE NURSING/CASE MANAGEMENT/SOCIAL WORK - NURSING SECTION COMPLETE
July 13, 2023       Ilana Burgos  [address]  [facility] [address]        Dear Ms. Burgos,    Please review the enclosed prep instructions for your procedure with FRANDY SHAFER MD.    Procedure(s):  Colonoscopy DOUBLE BALLOON ASSISTED  Date of Procedure: Tuesday, January 9, 2024  Time of Procedure: You will be informed by [facility] of your arrival time 2-3 business days prior to your procedure ASKED FOR 1200PM  Arrival Time: You will be informed by [facility] of your arrival time 2-3 business days prior to your procedure 1030AM  Location:  [facility]  [address] [place]  [address]  [phone]  Please enter the main entrance, pass the glass elevator, and follow the orange signs for GI Services.   : Ciera VICKERS [phone]                                                    COLONOSCOPY WITH DOUBLE NULYTELY WITH BALLOON ASSISTED    A colonoscopy is a procedure to examine your large intestine for abnormalities. A flexible tube that has a camera and light at the tip and as thick as your finger is used in the exam.     PRESCRIPTION INFORMATION:  Please pick-up your prescription for a Nulytely Prep at:    [facility] [place] [address]  [address]  [facility] [address]  Phone: [phone] Fax: [phone]    Do not mix the solution until 2 days before the procedure. Bring these instructions with you when you go to the pharmacy.    SPECIAL INSTRUCTIONS: Please ensure you have two nulytely preps from pharmacy.     ADDITIONAL INSTRUCTIONS:  DO NOT TAKE IRBESARTAN AND HYDROCHLOROTHIAZIDE DAY OF PROCEDURE  HOLD OZEMPIC X 7 DAYS PRIOR TO PROCEDURE.      IF APPLICABLE TO YOU:    SEVEN (7) DAYS BEFORE PROCEDURE:   Do not take any Plavix, Aggrenox, Pepto-Bismol or iron supplements, Vitamin E, supplements unless specifically directed otherwise by your physician.  Do not take any  nonsteroidal anti-inflammatory drugs (NSAIDs): ibuprofen, naproxen, aleve, celebrex, vioxx, etc. If you have any questions or concerns about holding any of these medications, please contact your cardiologist or primary care physician.  It is fine to take Tylenol if needed during this time.  Do not eat popcorn, seeds, nuts or whole kernel corn.    FIVE (5) DAYS BEFORE PROCEDURE:  Hold Coumadin (warfarin) for 5 days. Please contact your cardiologist or primary care physician for any questions or concerns regarding holding this medication.  If you are taking Pradaxa, Xarelto, or Eliquis, please hold 1-2 days prior to the procedure and consult with the prescribing provider. If you have kidney disease, please discuss with your gastroenterologist and the prescribing provider on how many days to hold the medication.  Do not eat products with [place] (a fat substitute found in [employer] and [place] Fat-Free chips).    Make arrangements for someone to drive you home after the procedure because you will be very drowsy. Please make these arrangements in advance. A taxi is not acceptable transportation. If you do not have transportation arranged, we will not be able to do the procedure.    Due to everyone's busy schedules, it is important that you keep your appointment. If you must reschedule or cancel, please notify your physician's office at least 48 hours in advance.    If you are diabetic, on the day of the procedure hold the morning dose of short-acting insulin and oral medications. Take 1/2 of night time long-acting insulin the night prior to the procedure.    If you are taking aspirin, continue to take as prescribed by your physician.    2 DAYS BEFORE PROCEDURE:   You may have only clear liquids to eat or drink all day long. Clear liquids (that you can see through) include water, coffee or tea without creamer, Gatorade/clear sports drinks, popsicles, carbonated or non-carbonated soft drinks, Kamaljit-aid or  other flavored drinks, plain Jell-O without fruit or toppings, hard candy and broth. You may also have fruit juices that are clear such as apple or white grape. Do not have any juices that the fruit has pulp such as orange, pineapple, prunes. You may not have any solid foods or dairy products, and you may not eat or drink anything that is red or purple. The more clear liquids you drink, the better off you will be.   Do not drink any alcoholic beverages for at least 24 hours before the procedure.  Do not take Lomotil, Imodium, Effer-syllium, Metamucil, Citrucel, Konsyl, Hydrocil, or FiberCon.  In the morning, mix the first Nulytely prep with the flavor packet and one gallon of water, shake well to mix, and refrigerate to chill. Do not further dilute the prep in any way.  At 5:00 pm, start drinking the Nulytely prep. Drink one large (8-10 oz.) glass every 10-15 minutes. In most cases, loose, liquid bowel movements will begin within 30 minutes to one hour. You should remain within easy access of a bathroom. Continue to drink the prep regardless of stool frequency until the entire gallon is gone.  Continue to drink clear liquids (your physician recommends 64 ounces of Gatorade) throughout the evening. If you are a diabetic, please do not drink Gatorade since it is made with sugar. An alternative is Powerade Zero which can be bought at the same locations as Gatorade.     DAY BEFORE PROCEDURE:   Repeat the process again as you may have only clear liquids to eat or drink all day long.   Do not drink any alcoholic beverages for at least 24 hours before the procedure.  Do not take Lomotil, Imodium, Effer-syllium, Metamucil, Citrucel, Konsyl, Hydrocil, or FiberCon.  In the morning, mix the Nulytely prep with the flavor packet and one gallon of water, shake well to mix, and refrigerate to chill. Do not further dilute the prep in any way.  At 5:00 pm, start drinking the second Nulytely prep. Drink one large (8-10 oz.) glass  Patient/Caregiver provided printed discharge information. every 10-15 minutes. In most cases, loose, liquid bowel movements will begin within 30 minutes to one hour. You should remain within easy access of a bathroom. Continue to drink the prep regardless of stool frequency until the entire gallon is gone.  Continue to drink clear liquids (your physician recommends 64 ounces of Gatorade) throughout the evening. If you are a diabetic, please do not drink Gatorade since it is made with sugar. An alternative is Powerade Zero which can be bought at the same locations as Gatorade.   Do not eat or drink anything after midnight until after the procedure.     DAY OF PROCEDURE:  Take heart, blood pressure, and other medications not mentioned above with a small sip of water. If possible, please wait to take your remaining medication(s) until after your procedure.  In the morning, your stools should have a clear to see-through cloudy yellow appearance with no formed substance.  If your stools are darker or have formed substance, please call the office at [phone].     POST PROCEDURE INSTRUCTIONS:  You will receive information and instructions following the procedure. In addition:  Do not plan on going to work or doing anything for the rest of the day.  You may resume eating and drinking with no limitations following the procedure.     BIOPSY RESULTS:  If tissue samples were taken during your procedure, your results will take approximately 2 weeks to return.  If your results are normal, you will receive a letter of notification from our office.  If you have been told to return to the clinic for a follow up visit, your results will be reviewed at that time.  If your results require immediate follow up, the doctor or the office staff will contact you personally.

## 2023-09-06 NOTE — ED ADULT NURSE NOTE - NS ED NURSE REPORT GIVEN DT
Called Deanna/PT for Dr Vincent verbal approval for physical therapy for 2 x per week for 3 weeks due to patient    01-Jul-2023 00:10

## 2023-11-05 NOTE — ED PROVIDER NOTE - PROGRESS NOTE DETAILS
No indicators present
discussed case with dr pearce, patient symptoms resolved. ct reviewed. no bleed noted. will admit for observation and neuro work up. clinically stable

## 2024-01-14 ENCOUNTER — EMERGENCY (EMERGENCY)
Facility: HOSPITAL | Age: 89
LOS: 1 days | Discharge: HOME | End: 2024-01-14
Attending: EMERGENCY MEDICINE
Payer: MEDICARE

## 2024-01-14 VITALS
WEIGHT: 126.99 LBS | HEART RATE: 79 BPM | HEIGHT: 64 IN | RESPIRATION RATE: 17 BRPM | SYSTOLIC BLOOD PRESSURE: 144 MMHG | OXYGEN SATURATION: 97 % | TEMPERATURE: 98 F | DIASTOLIC BLOOD PRESSURE: 74 MMHG

## 2024-01-14 PROBLEM — E78.5 HYPERLIPIDEMIA, UNSPECIFIED: Chronic | Status: ACTIVE | Noted: 2023-07-01

## 2024-01-14 PROBLEM — E11.9 TYPE 2 DIABETES MELLITUS WITHOUT COMPLICATIONS: Chronic | Status: ACTIVE | Noted: 2023-07-01

## 2024-01-14 PROBLEM — I10 ESSENTIAL (PRIMARY) HYPERTENSION: Chronic | Status: ACTIVE | Noted: 2023-07-01

## 2024-01-14 LAB
ALBUMIN SERPL ELPH-MCNC: 3.2 G/DL — LOW (ref 3.5–5)
ALBUMIN SERPL ELPH-MCNC: 3.2 G/DL — LOW (ref 3.5–5)
ALP SERPL-CCNC: 67 U/L — SIGNIFICANT CHANGE UP (ref 40–120)
ALP SERPL-CCNC: 67 U/L — SIGNIFICANT CHANGE UP (ref 40–120)
ALT FLD-CCNC: 18 U/L DA — SIGNIFICANT CHANGE UP (ref 10–60)
ALT FLD-CCNC: 18 U/L DA — SIGNIFICANT CHANGE UP (ref 10–60)
ANION GAP SERPL CALC-SCNC: 5 MMOL/L — SIGNIFICANT CHANGE UP (ref 5–17)
ANION GAP SERPL CALC-SCNC: 5 MMOL/L — SIGNIFICANT CHANGE UP (ref 5–17)
ANION GAP SERPL CALC-SCNC: 8 MMOL/L — SIGNIFICANT CHANGE UP (ref 5–17)
ANION GAP SERPL CALC-SCNC: 8 MMOL/L — SIGNIFICANT CHANGE UP (ref 5–17)
APPEARANCE UR: CLEAR — SIGNIFICANT CHANGE UP
APPEARANCE UR: CLEAR — SIGNIFICANT CHANGE UP
AST SERPL-CCNC: 18 U/L — SIGNIFICANT CHANGE UP (ref 10–40)
AST SERPL-CCNC: 18 U/L — SIGNIFICANT CHANGE UP (ref 10–40)
BACTERIA # UR AUTO: ABNORMAL /HPF
BACTERIA # UR AUTO: ABNORMAL /HPF
BASOPHILS # BLD AUTO: 0.03 K/UL — SIGNIFICANT CHANGE UP (ref 0–0.2)
BASOPHILS # BLD AUTO: 0.03 K/UL — SIGNIFICANT CHANGE UP (ref 0–0.2)
BASOPHILS NFR BLD AUTO: 0.7 % — SIGNIFICANT CHANGE UP (ref 0–2)
BASOPHILS NFR BLD AUTO: 0.7 % — SIGNIFICANT CHANGE UP (ref 0–2)
BILIRUB SERPL-MCNC: 0.2 MG/DL — SIGNIFICANT CHANGE UP (ref 0.2–1.2)
BILIRUB SERPL-MCNC: 0.2 MG/DL — SIGNIFICANT CHANGE UP (ref 0.2–1.2)
BILIRUB UR-MCNC: NEGATIVE — SIGNIFICANT CHANGE UP
BILIRUB UR-MCNC: NEGATIVE — SIGNIFICANT CHANGE UP
BUN SERPL-MCNC: 36 MG/DL — HIGH (ref 7–18)
BUN SERPL-MCNC: 36 MG/DL — HIGH (ref 7–18)
BUN SERPL-MCNC: 38 MG/DL — HIGH (ref 7–18)
BUN SERPL-MCNC: 38 MG/DL — HIGH (ref 7–18)
CALCIUM SERPL-MCNC: 9.2 MG/DL — SIGNIFICANT CHANGE UP (ref 8.4–10.5)
CALCIUM SERPL-MCNC: 9.2 MG/DL — SIGNIFICANT CHANGE UP (ref 8.4–10.5)
CALCIUM SERPL-MCNC: 9.7 MG/DL — SIGNIFICANT CHANGE UP (ref 8.4–10.5)
CALCIUM SERPL-MCNC: 9.7 MG/DL — SIGNIFICANT CHANGE UP (ref 8.4–10.5)
CHLORIDE SERPL-SCNC: 108 MMOL/L — SIGNIFICANT CHANGE UP (ref 96–108)
CHLORIDE SERPL-SCNC: 108 MMOL/L — SIGNIFICANT CHANGE UP (ref 96–108)
CHLORIDE SERPL-SCNC: 110 MMOL/L — HIGH (ref 96–108)
CHLORIDE SERPL-SCNC: 110 MMOL/L — HIGH (ref 96–108)
CO2 SERPL-SCNC: 24 MMOL/L — SIGNIFICANT CHANGE UP (ref 22–31)
COLOR SPEC: YELLOW — SIGNIFICANT CHANGE UP
COLOR SPEC: YELLOW — SIGNIFICANT CHANGE UP
COMMENT - URINE: SIGNIFICANT CHANGE UP
COMMENT - URINE: SIGNIFICANT CHANGE UP
CREAT SERPL-MCNC: 1.42 MG/DL — HIGH (ref 0.5–1.3)
CREAT SERPL-MCNC: 1.42 MG/DL — HIGH (ref 0.5–1.3)
CREAT SERPL-MCNC: 1.48 MG/DL — HIGH (ref 0.5–1.3)
CREAT SERPL-MCNC: 1.48 MG/DL — HIGH (ref 0.5–1.3)
DIFF PNL FLD: ABNORMAL
DIFF PNL FLD: ABNORMAL
EGFR: 32 ML/MIN/1.73M2 — LOW
EGFR: 32 ML/MIN/1.73M2 — LOW
EGFR: 33 ML/MIN/1.73M2 — LOW
EGFR: 33 ML/MIN/1.73M2 — LOW
EOSINOPHIL # BLD AUTO: 0.15 K/UL — SIGNIFICANT CHANGE UP (ref 0–0.5)
EOSINOPHIL # BLD AUTO: 0.15 K/UL — SIGNIFICANT CHANGE UP (ref 0–0.5)
EOSINOPHIL NFR BLD AUTO: 3.3 % — SIGNIFICANT CHANGE UP (ref 0–6)
EOSINOPHIL NFR BLD AUTO: 3.3 % — SIGNIFICANT CHANGE UP (ref 0–6)
EPI CELLS # UR: PRESENT
EPI CELLS # UR: PRESENT
GLUCOSE SERPL-MCNC: 153 MG/DL — HIGH (ref 70–99)
GLUCOSE SERPL-MCNC: 153 MG/DL — HIGH (ref 70–99)
GLUCOSE SERPL-MCNC: 178 MG/DL — HIGH (ref 70–99)
GLUCOSE SERPL-MCNC: 178 MG/DL — HIGH (ref 70–99)
GLUCOSE UR QL: >=1000 MG/DL
GLUCOSE UR QL: >=1000 MG/DL
HCT VFR BLD CALC: 37.4 % — SIGNIFICANT CHANGE UP (ref 34.5–45)
HCT VFR BLD CALC: 37.4 % — SIGNIFICANT CHANGE UP (ref 34.5–45)
HGB BLD-MCNC: 11.9 G/DL — SIGNIFICANT CHANGE UP (ref 11.5–15.5)
HGB BLD-MCNC: 11.9 G/DL — SIGNIFICANT CHANGE UP (ref 11.5–15.5)
HYALINE CASTS # UR AUTO: PRESENT
HYALINE CASTS # UR AUTO: PRESENT
IMM GRANULOCYTES NFR BLD AUTO: 0.4 % — SIGNIFICANT CHANGE UP (ref 0–0.9)
IMM GRANULOCYTES NFR BLD AUTO: 0.4 % — SIGNIFICANT CHANGE UP (ref 0–0.9)
KETONES UR-MCNC: NEGATIVE MG/DL — SIGNIFICANT CHANGE UP
KETONES UR-MCNC: NEGATIVE MG/DL — SIGNIFICANT CHANGE UP
LACTATE SERPL-SCNC: 1.6 MMOL/L — SIGNIFICANT CHANGE UP (ref 0.7–2)
LACTATE SERPL-SCNC: 1.6 MMOL/L — SIGNIFICANT CHANGE UP (ref 0.7–2)
LEUKOCYTE ESTERASE UR-ACNC: ABNORMAL
LEUKOCYTE ESTERASE UR-ACNC: ABNORMAL
LYMPHOCYTES # BLD AUTO: 1.65 K/UL — SIGNIFICANT CHANGE UP (ref 1–3.3)
LYMPHOCYTES # BLD AUTO: 1.65 K/UL — SIGNIFICANT CHANGE UP (ref 1–3.3)
LYMPHOCYTES # BLD AUTO: 36.3 % — SIGNIFICANT CHANGE UP (ref 13–44)
LYMPHOCYTES # BLD AUTO: 36.3 % — SIGNIFICANT CHANGE UP (ref 13–44)
MCHC RBC-ENTMCNC: 31.2 PG — SIGNIFICANT CHANGE UP (ref 27–34)
MCHC RBC-ENTMCNC: 31.2 PG — SIGNIFICANT CHANGE UP (ref 27–34)
MCHC RBC-ENTMCNC: 31.8 GM/DL — LOW (ref 32–36)
MCHC RBC-ENTMCNC: 31.8 GM/DL — LOW (ref 32–36)
MCV RBC AUTO: 97.9 FL — SIGNIFICANT CHANGE UP (ref 80–100)
MCV RBC AUTO: 97.9 FL — SIGNIFICANT CHANGE UP (ref 80–100)
MONOCYTES # BLD AUTO: 0.3 K/UL — SIGNIFICANT CHANGE UP (ref 0–0.9)
MONOCYTES # BLD AUTO: 0.3 K/UL — SIGNIFICANT CHANGE UP (ref 0–0.9)
MONOCYTES NFR BLD AUTO: 6.6 % — SIGNIFICANT CHANGE UP (ref 2–14)
MONOCYTES NFR BLD AUTO: 6.6 % — SIGNIFICANT CHANGE UP (ref 2–14)
NEUTROPHILS # BLD AUTO: 2.4 K/UL — SIGNIFICANT CHANGE UP (ref 1.8–7.4)
NEUTROPHILS # BLD AUTO: 2.4 K/UL — SIGNIFICANT CHANGE UP (ref 1.8–7.4)
NEUTROPHILS NFR BLD AUTO: 52.7 % — SIGNIFICANT CHANGE UP (ref 43–77)
NEUTROPHILS NFR BLD AUTO: 52.7 % — SIGNIFICANT CHANGE UP (ref 43–77)
NITRITE UR-MCNC: POSITIVE
NITRITE UR-MCNC: POSITIVE
NRBC # BLD: 0 /100 WBCS — SIGNIFICANT CHANGE UP (ref 0–0)
NRBC # BLD: 0 /100 WBCS — SIGNIFICANT CHANGE UP (ref 0–0)
PH UR: 6 — SIGNIFICANT CHANGE UP (ref 5–8)
PH UR: 6 — SIGNIFICANT CHANGE UP (ref 5–8)
PLATELET # BLD AUTO: 157 K/UL — SIGNIFICANT CHANGE UP (ref 150–400)
PLATELET # BLD AUTO: 157 K/UL — SIGNIFICANT CHANGE UP (ref 150–400)
POTASSIUM SERPL-MCNC: 3.7 MMOL/L — SIGNIFICANT CHANGE UP (ref 3.5–5.3)
POTASSIUM SERPL-MCNC: 3.7 MMOL/L — SIGNIFICANT CHANGE UP (ref 3.5–5.3)
POTASSIUM SERPL-MCNC: 3.8 MMOL/L — SIGNIFICANT CHANGE UP (ref 3.5–5.3)
POTASSIUM SERPL-MCNC: 3.8 MMOL/L — SIGNIFICANT CHANGE UP (ref 3.5–5.3)
POTASSIUM SERPL-SCNC: 3.7 MMOL/L — SIGNIFICANT CHANGE UP (ref 3.5–5.3)
POTASSIUM SERPL-SCNC: 3.7 MMOL/L — SIGNIFICANT CHANGE UP (ref 3.5–5.3)
POTASSIUM SERPL-SCNC: 3.8 MMOL/L — SIGNIFICANT CHANGE UP (ref 3.5–5.3)
POTASSIUM SERPL-SCNC: 3.8 MMOL/L — SIGNIFICANT CHANGE UP (ref 3.5–5.3)
PROT SERPL-MCNC: 7.5 G/DL — SIGNIFICANT CHANGE UP (ref 6–8.3)
PROT SERPL-MCNC: 7.5 G/DL — SIGNIFICANT CHANGE UP (ref 6–8.3)
PROT UR-MCNC: ABNORMAL MG/DL
PROT UR-MCNC: ABNORMAL MG/DL
RBC # BLD: 3.82 M/UL — SIGNIFICANT CHANGE UP (ref 3.8–5.2)
RBC # BLD: 3.82 M/UL — SIGNIFICANT CHANGE UP (ref 3.8–5.2)
RBC # FLD: 14.8 % — HIGH (ref 10.3–14.5)
RBC # FLD: 14.8 % — HIGH (ref 10.3–14.5)
RBC CASTS # UR COMP ASSIST: 15 /HPF — HIGH (ref 0–4)
RBC CASTS # UR COMP ASSIST: 15 /HPF — HIGH (ref 0–4)
SODIUM SERPL-SCNC: 139 MMOL/L — SIGNIFICANT CHANGE UP (ref 135–145)
SODIUM SERPL-SCNC: 139 MMOL/L — SIGNIFICANT CHANGE UP (ref 135–145)
SODIUM SERPL-SCNC: 140 MMOL/L — SIGNIFICANT CHANGE UP (ref 135–145)
SODIUM SERPL-SCNC: 140 MMOL/L — SIGNIFICANT CHANGE UP (ref 135–145)
SP GR SPEC: 1.02 — SIGNIFICANT CHANGE UP (ref 1–1.03)
SP GR SPEC: 1.02 — SIGNIFICANT CHANGE UP (ref 1–1.03)
TROPONIN I, HIGH SENSITIVITY RESULT: 40.3 NG/L — SIGNIFICANT CHANGE UP
TROPONIN I, HIGH SENSITIVITY RESULT: 40.3 NG/L — SIGNIFICANT CHANGE UP
UROBILINOGEN FLD QL: 1 MG/DL — SIGNIFICANT CHANGE UP (ref 0.2–1)
UROBILINOGEN FLD QL: 1 MG/DL — SIGNIFICANT CHANGE UP (ref 0.2–1)
WBC # BLD: 4.55 K/UL — SIGNIFICANT CHANGE UP (ref 3.8–10.5)
WBC # BLD: 4.55 K/UL — SIGNIFICANT CHANGE UP (ref 3.8–10.5)
WBC # FLD AUTO: 4.55 K/UL — SIGNIFICANT CHANGE UP (ref 3.8–10.5)
WBC # FLD AUTO: 4.55 K/UL — SIGNIFICANT CHANGE UP (ref 3.8–10.5)
WBC UR QL: 8 /HPF — HIGH (ref 0–5)
WBC UR QL: 8 /HPF — HIGH (ref 0–5)

## 2024-01-14 PROCEDURE — 85025 COMPLETE CBC W/AUTO DIFF WBC: CPT

## 2024-01-14 PROCEDURE — 87077 CULTURE AEROBIC IDENTIFY: CPT

## 2024-01-14 PROCEDURE — 83605 ASSAY OF LACTIC ACID: CPT

## 2024-01-14 PROCEDURE — 81001 URINALYSIS AUTO W/SCOPE: CPT

## 2024-01-14 PROCEDURE — 70450 CT HEAD/BRAIN W/O DYE: CPT | Mod: ME

## 2024-01-14 PROCEDURE — 36415 COLL VENOUS BLD VENIPUNCTURE: CPT

## 2024-01-14 PROCEDURE — 70450 CT HEAD/BRAIN W/O DYE: CPT | Mod: 26,ME

## 2024-01-14 PROCEDURE — 84484 ASSAY OF TROPONIN QUANT: CPT

## 2024-01-14 PROCEDURE — 99285 EMERGENCY DEPT VISIT HI MDM: CPT

## 2024-01-14 PROCEDURE — 96374 THER/PROPH/DIAG INJ IV PUSH: CPT

## 2024-01-14 PROCEDURE — 80053 COMPREHEN METABOLIC PANEL: CPT

## 2024-01-14 PROCEDURE — 99284 EMERGENCY DEPT VISIT MOD MDM: CPT | Mod: 25

## 2024-01-14 PROCEDURE — 82962 GLUCOSE BLOOD TEST: CPT

## 2024-01-14 PROCEDURE — 87086 URINE CULTURE/COLONY COUNT: CPT

## 2024-01-14 PROCEDURE — 96375 TX/PRO/DX INJ NEW DRUG ADDON: CPT

## 2024-01-14 PROCEDURE — 80048 BASIC METABOLIC PNL TOTAL CA: CPT

## 2024-01-14 PROCEDURE — 87186 SC STD MICRODIL/AGAR DIL: CPT

## 2024-01-14 PROCEDURE — 99284 EMERGENCY DEPT VISIT MOD MDM: CPT

## 2024-01-14 PROCEDURE — G1004: CPT

## 2024-01-14 RX ORDER — LEVETIRACETAM 250 MG/1
1000 TABLET, FILM COATED ORAL EVERY 12 HOURS
Refills: 0 | Status: DISCONTINUED | OUTPATIENT
Start: 2024-01-14 | End: 2024-01-14

## 2024-01-14 RX ORDER — LEVETIRACETAM 250 MG/1
1000 TABLET, FILM COATED ORAL ONCE
Refills: 0 | Status: COMPLETED | OUTPATIENT
Start: 2024-01-14 | End: 2024-01-14

## 2024-01-14 RX ORDER — SODIUM CHLORIDE 9 MG/ML
500 INJECTION INTRAMUSCULAR; INTRAVENOUS; SUBCUTANEOUS ONCE
Refills: 0 | Status: COMPLETED | OUTPATIENT
Start: 2024-01-14 | End: 2024-01-14

## 2024-01-14 RX ORDER — CEFTRIAXONE 500 MG/1
1000 INJECTION, POWDER, FOR SOLUTION INTRAMUSCULAR; INTRAVENOUS ONCE
Refills: 0 | Status: COMPLETED | OUTPATIENT
Start: 2024-01-14 | End: 2024-01-14

## 2024-01-14 RX ADMIN — LEVETIRACETAM 400 MILLIGRAM(S): 250 TABLET, FILM COATED ORAL at 15:12

## 2024-01-14 RX ADMIN — CEFTRIAXONE 100 MILLIGRAM(S): 500 INJECTION, POWDER, FOR SOLUTION INTRAMUSCULAR; INTRAVENOUS at 18:45

## 2024-01-14 RX ADMIN — SODIUM CHLORIDE 500 MILLILITER(S): 9 INJECTION INTRAMUSCULAR; INTRAVENOUS; SUBCUTANEOUS at 16:06

## 2024-01-14 NOTE — ED ADULT NURSE NOTE - NSFALLHARMRISKINTERV_ED_ALL_ED
Assistance OOB with selected safe patient handling equipment if applicable/Assistance with ambulation/Communicate risk of Fall with Harm to all staff, patient, and family/Monitor gait and stability/Provide patient with walking aids/Provide visual cue: red socks, yellow wristband, yellow gown, etc/Reinforce activity limits and safety measures with patient and family/Bed in lowest position, wheels locked, appropriate side rails in place/Call bell, personal items and telephone in reach/Instruct patient to call for assistance before getting out of bed/chair/stretcher/Non-slip footwear applied when patient is off stretcher/[place] to call system/Physically safe environment - no spills, clutter or unnecessary equipment/Purposeful Proactive Rounding/Room/bathroom lighting operational, light cord in reach Assistance OOB with selected safe patient handling equipment if applicable/Assistance with ambulation/Communicate risk of Fall with Harm to all staff, patient, and family/Monitor gait and stability/Provide patient with walking aids/Provide visual cue: red socks, yellow wristband, yellow gown, etc/Reinforce activity limits and safety measures with patient and family/Bed in lowest position, wheels locked, appropriate side rails in place/Call bell, personal items and telephone in reach/Instruct patient to call for assistance before getting out of bed/chair/stretcher/Non-slip footwear applied when patient is off stretcher/[place] to call system/Physically safe environment - no spills, clutter or unnecessary equipment/Purposeful Proactive Rounding/Room/bathroom lighting operational, light cord in reach Assistance OOB with selected safe patient handling equipment if applicable/Assistance with ambulation/Communicate risk of Fall with Harm to all staff, patient, and family/Monitor gait and stability/Provide patient with walking aids/Provide visual cue: red socks, yellow wristband, yellow gown, etc/Reinforce activity limits and safety measures with patient and family/Bed in lowest position, wheels locked, appropriate side rails in place/Call bell, personal items and telephone in reach/Instruct patient to call for assistance before getting out of bed/chair/stretcher/Non-slip footwear applied when patient is off stretcher/[place] to call system/Physically safe environment - no spills, clutter or unnecessary equipment/Purposeful Proactive Rounding/Room/bathroom lighting operational, light cord in reach

## 2024-01-14 NOTE — ED ADULT NURSE NOTE - OBJECTIVE STATEMENT
Patient BIB EMS from [facility]  for evaluation of facial twitching, daughter bedside, reports PMHx CVA. Patient BIB EMS from [facility]  for evaluation of facial twitching, daughter bedside, reports PMHx CVA. Patient BIB EMS from [facility]  for evaluation of facial twitching, daughter bedside, reports PMHx CVA.

## 2024-01-14 NOTE — ED ADULT TRIAGE NOTE - IDEAL BODY WEIGHT(KG)
Patient: Evgeny Carter    Procedure Summary     Date:  05/09/18 Room / Location:  [facility] OR 08 / [facility] OR    Anesthesia Start:  1552 Anesthesia Stop:  1620    Procedure:  CYSTOSCOPY TRANSURETHRAL RESECTION OF BLADDER TUMOR (N/A ) Diagnosis:       Bladder tumor      (Bladder tumor [D49.4])    Surgeon:  Donovan Conner MD Provider:  Anthony Medina MD    Anesthesia Type:  general ASA Status:  4          Anesthesia Type: general  Last vitals  BP   169/70 (05/09/18 1342)   Temp   97.7 °F (36.5 °C) (05/09/18 1342)   Pulse   53 (05/09/18 1342)   Resp   18 (05/09/18 1342)     SpO2   98 % (05/09/18 1342)     Post Anesthesia Care and Evaluation    Patient location during evaluation: PACU  Patient participation: complete - patient participated  Level of consciousness: awake and alert and sleepy but conscious  Pain management: adequate  Airway patency: patent  Anesthetic complications: No anesthetic complications    Cardiovascular status: acceptable  Respiratory status: acceptable  Hydration status: acceptable       55

## 2024-01-14 NOTE — ED PROVIDER NOTE - CLINICAL SUMMARY MEDICAL DECISION MAKING FREE TEXT BOX
ATTG: : Assessment/plan: Questionable persistent seizures versus breakthrough seizure we will check labs, neurology consultation, and reeval for dispo

## 2024-01-14 NOTE — ED PROVIDER NOTE - OBJECTIVE STATEMENT
89 year-old female with PMHx of left MCA with residual right-sided hemiparesis, DM on insulin, HTN, HLD, ischemic heart disease, seizure disorder recently started on Keppra 500 mg twice daily presents to the emergency department by EMS from [facility] for evaluation of facial twitching.  Suspected persistent underlying seizure.  Patient was admitted here in July and started on medication Dr. Lara. Daughter is with her at the bedside and providing hx. 89 year-old female with PMHx of left MCA with residual right-sided hemiparesis, DM on insulin, HTN, HLD, ischemic heart disease, seizure disorder recently started on Keppra 500 mg twice daily presents to the emergency department by EMS from [facility] for evaluation of facial twitching.  Suspected persistent underlying seizure.  Patient was admitted here in July and started on medication Dr. Lara. Daughter is with her at the bedside and providing hx.

## 2024-01-14 NOTE — ED PROVIDER NOTE - PATIENT PORTAL LINK FT
You can access the FollowMyHealth Patient Portal offered by [facility] by registering at the following website: [email or web address] By joining [facility]’s FollowMyHealth portal, you will also be able to view your health information using other applications (apps) compatible with our system. You can access the FollowMyHealth Patient Portal offered by [facility] by registering at the following website: [email or web address]. By joining [facility]’s FollowMyHealth portal, you will also be able to view your health information using other applications (apps) compatible with our system.

## 2024-01-14 NOTE — ED PROVIDER NOTE - NSFOLLOWUPCLINICSTOKEN_GEN_ALL_ED_FT
516017:1-3 Days|| ||00\01||False;848160:4-6 Days|| ||00\01||False; 814078:1-3 Days|| ||00\01||False;106242:4-6 Days|| ||00\01||False;

## 2024-01-14 NOTE — CONSULT NOTE ADULT - SUBJECTIVE AND OBJECTIVE BOX
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    - Of note, given the patient's low GFR in the 30's, 750mg PO BID (1500mg total daily dose) is the maximum recommended dose for this patient.    - Continue home Clopidogrel 75mg PO Daily and Atorvastatin 20mg PO QHS (higher dose reportedly not tolerated) for secondary stroke prevention.    - No role for permissive hypertension - Maintain home antihypertensive medications, Goal /80, Treat BP > 140/90.    - If patient is stable and tolerates the higher dose of Levetiracetam, she may be discharged to her facility with routine follow-up with her established outpatient neurologist.          NOTE TO BE COMPLETED - PLEASE REFER TO ABOVE ONLY AND IGNORE INFORMATION BELOW    *******************************      CHIEF COMPLAINT:  Patient is a 89y old  Female who presents with a chief complaint of     HPI:      NEURO HPI:      PAST MEDICAL & SURGICAL HISTORY:  Stroke  HTN (hypertension)  HLD (hyperlipidemia)  DM (diabetes mellitus)      MEDICATIONS:      ALLERGIES:  No Known Allergies      FAMILY HISTORY:        SOCIAL HISTORY:  Denies alcohol, tobacco, or illicit drug use      REVIEW OF SYSTEMS:  GENERAL: No fever, weight changes, fatigue  EYES: No eye pain or discharge  EAR/NOSE/MOUTH/THROAT: No sinus or throat pain; No difficulty hearing  NECK: No pain or stiffness  RESPIRATORY: No cough, wheezing, chills, or hemoptysis  CARDIOVASCULAR: No chest pain, palpitations, shortness of breath, or dyspnea on exertion  GASTROINTESTINAL: No abdominal pain, nausea, vomiting, hematemesis, diarrhea, or constipation  GENITOURINARY: No dysuria, frequency, hematuria, or incontinence  SKIN: No rashes or lesions  ENDOCRINE: No heat or cold intolerance  HEMATOLOGIC: No easy bruising or bleeding  PSYCHIATRIC: No depression, anxiety, or mood swings  MUSCULOSKELETAL: No joint pain or swelling  NEUROLOGICAL: As per HPI          OBJECTIVE:    Vital Signs Last 24 Hrs  T(C): 36.8 (14 Jan 2024 16:35), Max: 36.8 (14 Jan 2024 16:35)  T(F): 98.3 (14 Jan 2024 16:35), Max: 98.3 (14 Jan 2024 16:35)  HR: 79 (14 Jan 2024 16:35) (79 - 79)  BP: 159/66 (14 Jan 2024 16:35) (144/74 - 159/66)  RR: 18 (14 Jan 2024 16:35) (17 - 18)  SpO2: 100% (14 Jan 2024 16:35) (97% - 100%)  Parameters below as of 14 Jan 2024 16:35  Patient On (Oxygen Delivery Method): room air      General Examination:  General: No acute distress  HEENT: Atraumatic, Normocephalic  Respiratory: CTA B/l.  No crackles, rhonchi, or wheezes.  Cardiovascular: RRR.  Normal S1 & S2.  Normal b/l radial and pedal pulses.    Neurological Examination:  General / Mental Status: AAO x 3.  No aphasia or dysarthria.  Naming and repetition intact.  Cranial Nerves: VFF x 4.  PERRL.  EOMI x 2, No nystagmus or diplopia.  B/l V1-V3 equal and intact to light touch and pinprick.  Symmetric facial movement and palate elevation.  B/l hearing equal to finger rub.  5/5 strength with b/l sternocleidomastoid and trapezius.  Midline tongue protrusion, with no atrophy or fasciculations.  Motor: Normal bulk & tone in all four extremities.  5/5 strength throughout all four extremities.  No downward drift, rigidity, spasticity, or tremors in any of the four extremities.  Sensory: Intact to light touch and pinprick in all four extremities.  Negative Romberg.  Reflex: 2+ and symmetric at b/l biceps, triceps, brachioradialis, patellae, and ankles.  Downgoing toes b/l.  Coordination: No dysmetria with b/l finger-to-nose and heel raise tests.  Symmetric rapid alternating movements b/l.  Gait: Normal, narrow-based gait.  No difficulty with tiptoe, heel, and tandem gaits.        LABORATORY VALUES:                        11.9   4.55  )-----------( 157      ( 14 Jan 2024 13:34 )             37.4       01-14    139  |  110<H>  |  36<H>  ----------------------------<  178<H>  3.7   |  24  |  1.42<H>    Ca    9.2      14 Jan 2024 17:44    TPro  7.5  /  Alb  3.2<L>  /  TBili  0.2  /  DBili  x   /  AST  18  /  ALT  18  /  AlkPhos  67  01-14          NEUROIMAGING:          Please contact the Neurology consult service with any neurological questions.    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    - Of note, given the patient's low GFR in the 30's, 750mg PO BID (1500mg total daily dose) is the maximum recommended dose for this patient.    - Continue home Clopidogrel 75mg PO Daily and Atorvastatin 20mg PO QHS (higher dose reportedly not tolerated) for secondary stroke prevention.    - No role for permissive hypertension - Maintain home antihypertensive medications, Goal /80, Treat BP > 140/90.    - If patient is stable and tolerates the higher dose of Levetiracetam, she may be discharged to her facility with routine follow-up with her established outpatient neurologist.          NOTE TO BE COMPLETED - PLEASE REFER TO ABOVE ONLY AND IGNORE INFORMATION BELOW    *******************************      CHIEF COMPLAINT:  Patient is a 89y old  Female who presents with a chief complaint of     HPI:      NEURO HPI:      PAST MEDICAL & SURGICAL HISTORY:  Stroke  HTN (hypertension)  HLD (hyperlipidemia)  DM (diabetes mellitus)      MEDICATIONS:      ALLERGIES:  No Known Allergies      FAMILY HISTORY:        SOCIAL HISTORY:  Denies alcohol, tobacco, or illicit drug use      REVIEW OF SYSTEMS:  GENERAL: No fever, weight changes, fatigue  EYES: No eye pain or discharge  EAR/NOSE/MOUTH/THROAT: No sinus or throat pain; No difficulty hearing  NECK: No pain or stiffness  RESPIRATORY: No cough, wheezing, chills, or hemoptysis  CARDIOVASCULAR: No chest pain, palpitations, shortness of breath, or dyspnea on exertion  GASTROINTESTINAL: No abdominal pain, nausea, vomiting, hematemesis, diarrhea, or constipation  GENITOURINARY: No dysuria, frequency, hematuria, or incontinence  SKIN: No rashes or lesions  ENDOCRINE: No heat or cold intolerance  HEMATOLOGIC: No easy bruising or bleeding  PSYCHIATRIC: No depression, anxiety, or mood swings  MUSCULOSKELETAL: No joint pain or swelling  NEUROLOGICAL: As per HPI          OBJECTIVE:    Vital Signs Last 24 Hrs  T(C): 36.8 (14 Jan 2024 16:35), Max: 36.8 (14 Jan 2024 16:35)  T(F): 98.3 (14 Jan 2024 16:35), Max: 98.3 (14 Jan 2024 16:35)  HR: 79 (14 Jan 2024 16:35) (79 - 79)  BP: 159/66 (14 Jan 2024 16:35) (144/74 - 159/66)  RR: 18 (14 Jan 2024 16:35) (17 - 18)  SpO2: 100% (14 Jan 2024 16:35) (97% - 100%)  Parameters below as of 14 Jan 2024 16:35  Patient On (Oxygen Delivery Method): room air      General Examination:  General: No acute distress  HEENT: Atraumatic, Normocephalic  Respiratory: CTA B/l.  No crackles, rhonchi, or wheezes.  Cardiovascular: RRR.  Normal S1 & S2.  Normal b/l radial and pedal pulses.    Neurological Examination:  General / Mental Status: AAO x 3.  No aphasia or dysarthria.  Naming and repetition intact.  Cranial Nerves: VFF x 4.  PERRL.  EOMI x 2, No nystagmus or diplopia.  B/l V1-V3 equal and intact to light touch and pinprick.  Symmetric facial movement and palate elevation.  B/l hearing equal to finger rub.  5/5 strength with b/l sternocleidomastoid and trapezius.  Midline tongue protrusion, with no atrophy or fasciculations.  Motor: Normal bulk & tone in all four extremities.  5/5 strength throughout all four extremities.  No downward drift, rigidity, spasticity, or tremors in any of the four extremities.  Sensory: Intact to light touch and pinprick in all four extremities.  Negative Romberg.  Reflex: 2+ and symmetric at b/l biceps, triceps, brachioradialis, patellae, and ankles.  Downgoing toes b/l.  Coordination: No dysmetria with b/l finger-to-nose and heel raise tests.  Symmetric rapid alternating movements b/l.  Gait: Normal, narrow-based gait.  No difficulty with tiptoe, heel, and tandem gaits.        LABORATORY VALUES:                        11.9   4.55  )-----------( 157      ( 14 Jan 2024 13:34 )             37.4       01-14    139  |  110<H>  |  36<H>  ----------------------------<  178<H>  3.7   |  24  |  1.42<H>    Ca    9.2      14 Jan 2024 17:44    TPro  7.5  /  Alb  3.2<L>  /  TBili  0.2  /  DBili  x   /  AST  18  /  ALT  18  /  AlkPhos  67  01-14          NEUROIMAGING:          Please contact the Neurology consult service with any neurological questions.    Jose Ramon Lara MD   of Neurology  [facility]

## 2024-01-14 NOTE — ED ADULT TRIAGE NOTE - CHIEF COMPLAINT QUOTE
Plan:  Lab: [facility]. Vaginitis panel  Florajen 1 probiotic one tablet daily, OTC.  Patient left without further questions or conc  
Facial twitching on and off since CVA 3 years ago

## 2024-01-14 NOTE — ED ADULT NURSE NOTE - HISTORY OF COVID-19 VACCINATION
Patient requesting refill of medication.  Medication has been loaded for review.  Please Fax to local pharmacy. Patient will check with pharmacy in 24 to 48 hours  Comments:        Vaccine status unknown

## 2024-01-14 NOTE — ED PROVIDER NOTE - PHYSICAL EXAMINATION
Gen. no acute distress elderly woman appears better than stated age  HEENT: Pharynx is clear, mild twitching noted on right side of the face   lungs: Bilateral breath sounds  CVS: S1S2   Abd; soft nontender nondistended  Ext: No edema  Neuro: Awake and alert, nonverbal at baseline, follows commands  MSK: Strength 4 out of 5 left upper and left lower extremity, 1 out of 5 right upper and right lower extremity at baseline

## 2024-01-14 NOTE — ED ADULT NURSE NOTE - ED STAT RN HANDOFF DETAILS
[facility] back to [facility] facility awaiting for transportation, daughter at bed side. endorsed to DEMI Ramirez, safety maintained.

## 2024-01-14 NOTE — ED PROVIDER NOTE - NSFOLLOWUPCLINICS_GEN_ALL_ED_FT
[facility]  Internal Medicine  [address] [place] [address]  Phone: [phone]  Fax: [phone]  Follow Up Time: 1-3 Days    [facility]  Neurology  [address] [place] [address]  Phone: [phone]  Fax: [phone]  Follow Up Time: 4-6 Days     [facility]  Internal Medicine  [address] [place] [address]  Phone: [phone]  Fax: [phone]  Follow Up Time: 1-3 Days    [facility]  Neurology  [address] [place] [address]  Phone: [phone]  Fax: [phone]  Follow Up Time: 4-6 Days

## 2024-01-14 NOTE — ED PROVIDER NOTE - PROGRESS NOTE DETAILS
ATTG: : Progress note: spoke with Dr. Lara (Attending Neurologist) who recalls the patient. performed a chart review with him. pt was discharged from the hospital with Keppra dosing.  Recommend giving 1000 mg here as a bolus dose and if no seizure after 2-3 hrs, can be discharged to NH with increased dose to 750mg twice daily. Received from Dr Chavez patino repeat BMP results pending.  UA added and +UTI. Abx given  No further twitching after receiving Keppra dosing.  Pt well appearing  Family advised regarding symptomatic/supportive care, importance of PMD/Neuro follow up, and symptoms to prompt ED return. Received from Dr Chavez patino repeat BMP results pending.  UA added and +UTI. Abx given  No further twitching after receiving Keppra dosing.  Pt well appearing  Family advised regarding symptomatic/supportive care, importance of PMD/Neuro follow up, and symptoms to prompt ED return.    Care discussed w Carol Ann, Nursing Supervisor at [facility] and discharge instructions as well as medication adjustments relayed Received from Dr Chavez patino repeat BMP results pending.  UA added and +UTI. Abx given  No further twitching after receiving Keppra dosing.  Pt well appearing  Family advised regarding symptomatic/supportive care, importance of PMD/Neuro follow up, and symptoms to prompt ED return.    Care discussed w Carol Ann, Nursing Supervisor at [facility] and discharge instructions as well as medication adjustments relayed

## 2024-01-14 NOTE — ED ADULT NURSE NOTE - CAS EDP DISCH TYPE
[facility]/Nursing home [facility]/Nursing home [facility]/Nursing home

## 2024-01-14 NOTE — ED PROVIDER NOTE - NSFOLLOWUPINSTRUCTIONS_ED_ALL_ED_FT
Please follow up with your PMD or Medicine Clinic in 2-3 days.  Follow up with Neurology in 1 week.  Return to the ER for worsening or concerning symptoms.  Increase Keppra to 750mg twice a day  Take Cefdinir (Omnicef) twice a day for 10 days.    - - - - - - - - - - - - -  Seizure, Adult  A seizure is a sudden burst of abnormal electrical and chemical activity in the brain. Seizures usually last from 30 seconds to 2 minutes.    What are the causes?  Common causes of this condition include:  Fever or infection.  Problems that affect the brain. These may include:  A brain or head injury.  Bleeding in the brain.  A brain tumor.  Low levels of blood sugar or salt.  Kidney problems or liver problems.  Conditions that are passed from parent to child (are inherited).  Problems with a substance, such as:  Having a reaction to a drug or a medicine.  Stopping the use of a substance all of a sudden (withdrawal).  A stroke.  Disorders that affect how you develop.  Sometimes, the cause may not be known.    What increases the risk?  Having someone in your family who has epilepsy. In this condition, seizures happen again and again over time. They have no clear cause.  Having had a tonic–clonic seizure before. This type of seizure causes you to:  Tighten the muscles of the whole body.  Lose consciousness.  Having had a head injury or strokes before.  Having had a lack of oxygen at birth.  What are the signs or symptoms?  There are many types of seizures. The symptoms vary depending on the type of seizure you have.    Symptoms during a seizure    Shaking that you cannot control (convulsions) with fast, jerky movements of muscles.  Stiffness of the body.  Breathing problems.  Feeling mixed up (confused).  Staring or not responding to sound or touch.  Head nodding.  Eyes that blink, flutter, or move fast.  Drooling, grunting, or making clicking sounds with your mouth  Losing control of when you pee or poop.  Symptoms before a seizure    Feeling afraid, nervous, or worried.  Feeling like you may vomit.  Feeling like:  You are moving when you are not.  Things around you are moving when they are not.  Feeling like you saw or heard something before (déjà vu).  Odd tastes or smells.  Changes in how you see. You may see flashing lights or spots.  Symptoms after a seizure    Feeling confused.  Feeling sleepy.  Headache.  Sore muscles.  How is this treated?  If your seizure stops on its own, you will not need treatment. If your seizure lasts longer than 5 minutes, you will normally need treatment. Treatment may include:  Medicines given through an IV tube.  Avoiding things, such as medicines, that are known to cause your seizures.  Medicines to prevent seizures.  A device to prevent or control seizures.  Surgery.  A diet low in carbohydrates and high in fat (ketogenic diet).  Follow these instructions at home:  Medicines    Take over-the-counter and prescription medicines only as told by your doctor.  Avoid foods or drinks that may keep your medicine from working, such as alcohol.  Activity    Follow instructions about driving, swimming, or doing things that would be dangerous if you had another seizure. Wait until your doctor says it is safe for you to do these things.  If you live in the [place] ask your local department of [place] when you can drive.  Get a lot of rest.  Teaching others      Teach friends and family what to do when you have a seizure. They should:  Help you get down to the ground.  Protect your head and body.  Loosen any clothing around your neck.  Turn you on your side.  Know whether or not you need emergency care.  Stay with you until you are better.  Also, tell them what not to do if you have a seizure. Tell them:  They should not hold you down.  They should not put anything in your mouth.  General instructions    Avoid anything that gives you seizures.  Keep a seizure diary. Write down:  What you remember about each seizure.  What you think caused each seizure.  Keep all follow-up visits.  Contact a doctor if:  You have another seizure or seizures. Call the doctor each time you have a seizure.  The pattern of your seizures changes.  You keep having seizures with treatment.  You have symptoms of being sick or having an infection.  You are not able to take your medicine.  Get help right away if:  You have any of these problems:  A seizure that lasts longer than 5 minutes.  Many seizures in a row and you do not feel better between seizures.  A seizure that makes it harder to breathe.  A seizure and you can no longer speak or use part of your body.  You do not wake up right after a seizure.  You get hurt during a seizure.  You feel confused or have pain right after a seizure.  These symptoms may be an emergency. Get help right away. Call your local emergency services (865 in the [place]  Do not wait to see if the symptoms will go away.  Do not drive yourself to the hospital.  Summary  A seizure is a sudden burst of abnormal electrical and chemical activity in the brain. Seizures normally last from 30 seconds to 2 minutes.  Causes of seizures include illness, injury to the head, low levels of blood sugar or salt, and certain conditions.  Most seizures will stop on their own in less than 5 minutes. Seizures that last longer than 5 minutes are a medical emergency and need treatment right away.  Many medicines are used to treat seizures. Take over-the-counter and prescription medicines only as told by your doctor.  This information is not intended to replace advice given to you by your health care provider. Make sure you discuss any questions you have with your health care provider. Please follow up with your PMD or Medicine Clinic in 2-3 days.  Follow up with Neurology in 1 week.  Return to the ER for worsening or concerning symptoms.  Increase Keppra to 750mg twice a day  Take Cefdinir (Omnicef) twice a day for 10 days.    - - - - - - - - - - - - -  Seizure, Adult  A seizure is a sudden burst of abnormal electrical and chemical activity in the brain. Seizures usually last from 30 seconds to 2 minutes.    What are the causes?  Common causes of this condition include:  Fever or infection.  Problems that affect the brain. These may include:  A brain or head injury.  Bleeding in the brain.  A brain tumor.  Low levels of blood sugar or salt.  Kidney problems or liver problems.  Conditions that are passed from parent to child (are inherited).  Problems with a substance, such as:  Having a reaction to a drug or a medicine.  Stopping the use of a substance all of a sudden (withdrawal).  A stroke.  Disorders that affect how you develop.  Sometimes, the cause may not be known.    What increases the risk?  Having someone in your family who has epilepsy. In this condition, seizures happen again and again over time. They have no clear cause.  Having had a tonic–clonic seizure before. This type of seizure causes you to:  Tighten the muscles of the whole body.  Lose consciousness.  Having had a head injury or strokes before.  Having had a lack of oxygen at birth.  What are the signs or symptoms?  There are many types of seizures. The symptoms vary depending on the type of seizure you have.    Symptoms during a seizure    Shaking that you cannot control (convulsions) with fast, jerky movements of muscles.  Stiffness of the body.  Breathing problems.  Feeling mixed up (confused).  Staring or not responding to sound or touch.  Head nodding.  Eyes that blink, flutter, or move fast.  Drooling, grunting, or making clicking sounds with your mouth  Losing control of when you pee or poop.  Symptoms before a seizure    Feeling afraid, nervous, or worried.  Feeling like you may vomit.  Feeling like:  You are moving when you are not.  Things around you are moving when they are not.  Feeling like you saw or heard something before (déjà vu).  Odd tastes or smells.  Changes in how you see. You may see flashing lights or spots.  Symptoms after a seizure    Feeling confused.  Feeling sleepy.  Headache.  Sore muscles.  How is this treated?  If your seizure stops on its own, you will not need treatment. If your seizure lasts longer than 5 minutes, you will normally need treatment. Treatment may include:  Medicines given through an IV tube.  Avoiding things, such as medicines, that are known to cause your seizures.  Medicines to prevent seizures.  A device to prevent or control seizures.  Surgery.  A diet low in carbohydrates and high in fat (ketogenic diet).  Follow these instructions at home:  Medicines    Take over-the-counter and prescription medicines only as told by your doctor.  Avoid foods or drinks that may keep your medicine from working, such as alcohol.  Activity    Follow instructions about driving, swimming, or doing things that would be dangerous if you had another seizure. Wait until your doctor says it is safe for you to do these things.  If you live in the [place] ask your local department of [place] when you can drive.  Get a lot of rest.  Teaching others      Teach friends and family what to do when you have a seizure. They should:  Help you get down to the ground.  Protect your head and body.  Loosen any clothing around your neck.  Turn you on your side.  Know whether or not you need emergency care.  Stay with you until you are better.  Also, tell them what not to do if you have a seizure. Tell them:  They should not hold you down.  They should not put anything in your mouth.  General instructions    Avoid anything that gives you seizures.  Keep a seizure diary. Write down:  What you remember about each seizure.  What you think caused each seizure.  Keep all follow-up visits.  Contact a doctor if:  You have another seizure or seizures. Call the doctor each time you have a seizure.  The pattern of your seizures changes.  You keep having seizures with treatment.  You have symptoms of being sick or having an infection.  You are not able to take your medicine.  Get help right away if:  You have any of these problems:  A seizure that lasts longer than 5 minutes.  Many seizures in a row and you do not feel better between seizures.  A seizure that makes it harder to breathe.  A seizure and you can no longer speak or use part of your body.  You do not wake up right after a seizure.  You get hurt during a seizure.  You feel confused or have pain right after a seizure.  These symptoms may be an emergency. Get help right away. Call your local emergency services (257 in the [place]  Do not wait to see if the symptoms will go away.  Do not drive yourself to the hospital.  Summary  A seizure is a sudden burst of abnormal electrical and chemical activity in the brain. Seizures normally last from 30 seconds to 2 minutes.  Causes of seizures include illness, injury to the head, low levels of blood sugar or salt, and certain conditions.  Most seizures will stop on their own in less than 5 minutes. Seizures that last longer than 5 minutes are a medical emergency and need treatment right away.  Many medicines are used to treat seizures. Take over-the-counter and prescription medicines only as told by your doctor.  This information is not intended to replace advice given to you by your health care provider. Make sure you discuss any questions you have with your health care provider.

## 2024-01-15 VITALS
TEMPERATURE: 99 F | SYSTOLIC BLOOD PRESSURE: 156 MMHG | HEART RATE: 76 BPM | OXYGEN SATURATION: 98 % | RESPIRATION RATE: 18 BRPM

## 2024-01-17 LAB
-  AMOXICILLIN/CLAVULANIC ACID: SIGNIFICANT CHANGE UP
-  AMPICILLIN/SULBACTAM: SIGNIFICANT CHANGE UP
-  AMPICILLIN: SIGNIFICANT CHANGE UP
-  AZTREONAM: SIGNIFICANT CHANGE UP
-  CEFAZOLIN: SIGNIFICANT CHANGE UP
-  CEFEPIME: SIGNIFICANT CHANGE UP
-  CEFOXITIN: SIGNIFICANT CHANGE UP
-  CEFTRIAXONE: SIGNIFICANT CHANGE UP
-  CIPROFLOXACIN: SIGNIFICANT CHANGE UP
-  ERTAPENEM: SIGNIFICANT CHANGE UP
-  LEVOFLOXACIN: SIGNIFICANT CHANGE UP
-  MEROPENEM: SIGNIFICANT CHANGE UP
-  NITROFURANTOIN: SIGNIFICANT CHANGE UP
-  PIPERACILLIN/TAZOBACTAM: SIGNIFICANT CHANGE UP
-  TRIMETHOPRIM/SULFAMETHOXAZOLE: SIGNIFICANT CHANGE UP
CULTURE RESULTS: ABNORMAL
METHOD TYPE: SIGNIFICANT CHANGE UP
ORGANISM # SPEC MICROSCOPIC CNT: ABNORMAL
ORGANISM # SPEC MICROSCOPIC CNT: ABNORMAL
SPECIMEN SOURCE: SIGNIFICANT CHANGE UP

## 2024-05-15 NOTE — PROGRESS NOTE ADULT - PROBLEM SELECTOR PLAN 4
5/15/2024         RE: Mayi Macias  [address]  [place] [address]        Dear Colleague,    Thank you for referring your patient, Mayi Macias, to the [facility]. Please see a copy of my visit note below.    DERMATOLOGY EXCISION PROCEDURE NOTE    Dermatology Problem List:  Last skin check 03/14/24, recommended yearly    1. Hx of MIS  - MIS, L lateral buttock, s/p bx 4/4/24  - MIS, R tricep, s/p bx 4/4/24  2. 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 History of dysplastic nevi  - Compound dysplastic nevus with mild atypia - left upper paraspinal back, s/p bx 7/12/22  - Compound nevus with atypical features, right groin s/p bx 6/15/17,  - Compound nevus with moderate dysplastic on the left buttock 6/2017  - Compound dysplastic nevus with moderate to severe atypia, right posterior shoulder, s/p excision 10/6/2016  - Compound dysplastic nevus with moderate to severe atypia, right chest, s/p excision 8/11/2015  4. Hypertrophic scar , right upper back, right chest  - s/p PDL and Core laser treatment   5. Keratosis Pilaris - upper arms   - recommend hydrocortisone or AmLactin   ____________________________________________    Case 1  NAME OF PROCEDURE: Excision intermediate layered linear closure  Staff surgeon: Dr. Chuy Albright   Student: LA NENA Sim  Scrub Nurse: Chery Gambino CMA    PRE-OPERATIVE DIAGNOSIS:  Melanoma in situ  POST-OPERATIVE DIAGNOSIS: Same   LOCATION: left lateral buttock  FINAL EXCISION SIZE(DEFECT SIZE): 2.0 x 2.1 cm  MARGIN: 0.5 cm  FINAL REPAIR LENGTH: 6.4 cm   ANESTHESIA: 18 mL 1% lidocaine with 1:100,000 epinephrine    INDICATIONS: This patient presented with a 1.0 x 1.1 cm Melanoma in situ. Excision was indicated. We discussed the principles of treatment and most likely complications including scarring, bleeding, infection, incomplete excision, wound dehiscence, pain, nerve damage, and recurrence. Informed  consent was obtained and the patient underwent the procedure as follows:    PROCEDURE: The patient was taken to the operative suite. Time-out was performed.  The treatment area was anesthetized with 1% lidocaine with epinephrine. The area was prepped with Chlorhexidine and rinsed with sterile saline and draped with sterile towels. The lesion was delineated and excised down to subcutaneous fat in a elliptical manner. Hemostasis was obtained by electrocoagulation.     REPAIR: An intermediate layered linear closure was selected as the procedure which would maximally preserve both function and cosmesis.    After the excision of the tumor, the area was carefully undermined. Hemostasis was obtained with bipolar electrocoagulation.  Closure was oriented so that the wound was in the patient's natural skin tension lines. The deep subcutaneous and dermal layers were then closed with 3-0 vicryl  and 4-0 monocryl sutures. The epidermis was then carefully approximated along the length of the wound using 4-0 monocryl running subcuticular sutures.     Estimated blood loss was less than 10 ml for all surgical sites. A sterile pressure dressing was applied and wound care instructions, with a written handout, were given. [facility] and ambulatory.    The patient elected for pathology results to automatically release and understands that the clinical staff will contact them as soon as possible to notify them of the results.    Dr. Chuy Albright  was immediately available for the entire surgery and was physicially present for the key portions of the procedure.    Anatomic Pathology Results: pending    _________________________________________________________________    Case 2   NAME OF PROCEDURE: Excision intermediate layered linear closure  Staff surgeon: Dr. Chuy Albright   Student: LA NENA Sim  Scrub Nurse: Chery Gambino CMA    PRE-OPERATIVE DIAGNOSIS:  Melanoma in  situ  POST-OPERATIVE DIAGNOSIS: Same   LOCATION: right tricep  FINAL EXCISION SIZE(DEFECT SIZE): 1.7 x 1.3 cm  MARGIN: 0.5 cm  FINAL REPAIR LENGTH: 4.4 cm   ANESTHESIA: 9 mL 1% lidocaine with 1:100,000 epinephrine    INDICATIONS: This patient presented with a 0.7 x 0.3 cm Melanoma in situ. Excision was indicated. We discussed the principles of treatment and most likely complications including scarring, bleeding, infection, incomplete excision, wound dehiscence, pain, nerve damage, and recurrence. Informed consent was obtained and the patient underwent the procedure as follows:    PROCEDURE: The patient was taken to the operative suite. Time-out was performed.  The treatment area was anesthetized with 1% lidocaine with epinephrine. The area was prepped with Chlorhexidine and rinsed with sterile saline and draped with sterile towels. The lesion was delineated and excised down to subcutaneous fat in a elliptical manner. Hemostasis was obtained by electrocoagulation.     REPAIR: An intermediate layered linear closure was selected as the procedure which would maximally preserve both function and cosmesis.    After the excision of the tumor, the area was carefully undermined. Hemostasis was obtained with bipolar electrocoagulation.  Closure was oriented so that the wound was in the patient's natural skin tension lines. The deep subcutaneous and dermal layers were then closed with 3-0 vicryl and 4-0 monocryl sutures. The epidermis was then carefully approximated along the length of the wound using 4-0 monocryl running subcuticular sutures.     Estimated blood loss was less than 10 ml for all surgical sites. A sterile pressure dressing was applied and wound care instructions, with a written handout, were given. The patient was [facility] alert and ambulatory.    The patient elected for pathology results to automatically release and understands that the clinical staff will contact them as  soon as possible to notify them of the results.    Dr. Chuy Albright  was immediately available for the entire surgery and was physicially present for the key portions of the procedure.    Anatomic Pathology Results: pending    Clinical Follow-Up: 6 month skin check with Leah Redepenning on 10/7/24    Staff Involved:  Scribe/Staff    Scribe Disclosure:   I, Apoorva Hernandez, am serving as a scribe; to document services personally performed by Dr. Chuy Albright - -based on data collection and the provider's statements to me.     Provider Disclosure:   The documentation recorded by the scribe accurately reflects the services I personally performed and the decisions made by me. I personally performed the procedures today.      Chuy Albright DO    Department of Dermatology  [facility]: Phone: [phone], Fax:[phone]  [facility]: Phone: [phone], Fax: [phone]      Again, thank you for allowing me to participate in the care of your patient.        Sincerely,        Chuy Albright MD   on lipitor and plavix  pt asphasic and r sided residual  pt was on pureed nectar thick diet  f/u dysphagia screen  resume diet if passes, but with aspiration and seizure precautions  npo for now on lipitor and plavix  pt asphasic and r sided residual  pt was on pureed nectar thick diet  Pt passed bedside dysphagia screen, f/u official dysphagia screen  Pt is on Puree diet at home.   Advance diet.  resume home meds

## 2024-05-30 ENCOUNTER — INPATIENT (INPATIENT)
Facility: HOSPITAL | Age: 89
LOS: 4 days | Discharge: NURSING, REHAB OR LONG-TERM CARE | DRG: 872 | End: 2024-06-04
Attending: INTERNAL MEDICINE | Admitting: INTERNAL MEDICINE
Payer: MEDICARE

## 2024-05-30 VITALS
DIASTOLIC BLOOD PRESSURE: 49 MMHG | WEIGHT: 118.83 LBS | SYSTOLIC BLOOD PRESSURE: 121 MMHG | OXYGEN SATURATION: 94 % | HEART RATE: 104 BPM | RESPIRATION RATE: 20 BRPM | TEMPERATURE: 101 F

## 2024-05-30 LAB
ALBUMIN SERPL ELPH-MCNC: 2.7 G/DL — LOW (ref 3.5–5)
ALP SERPL-CCNC: 111 U/L — SIGNIFICANT CHANGE UP (ref 40–120)
ALT FLD-CCNC: 21 U/L DA — SIGNIFICANT CHANGE UP (ref 10–60)
ANION GAP SERPL CALC-SCNC: 4 MMOL/L — LOW (ref 5–17)
APTT BLD: 29.3 SEC — SIGNIFICANT CHANGE UP (ref 24.5–35.6)
AST SERPL-CCNC: 23 U/L — SIGNIFICANT CHANGE UP (ref 10–40)
BASOPHILS # BLD AUTO: 0.06 K/UL — SIGNIFICANT CHANGE UP (ref 0–0.2)
BASOPHILS NFR BLD AUTO: 0.4 % — SIGNIFICANT CHANGE UP (ref 0–2)
BILIRUB SERPL-MCNC: 0.5 MG/DL — SIGNIFICANT CHANGE UP (ref 0.2–1.2)
BUN SERPL-MCNC: 50 MG/DL — HIGH (ref 7–18)
CALCIUM SERPL-MCNC: 9 MG/DL — SIGNIFICANT CHANGE UP (ref 8.4–10.5)
CHLORIDE SERPL-SCNC: 105 MMOL/L — SIGNIFICANT CHANGE UP (ref 96–108)
CO2 SERPL-SCNC: 25 MMOL/L — SIGNIFICANT CHANGE UP (ref 22–31)
CREAT SERPL-MCNC: 2.46 MG/DL — HIGH (ref 0.5–1.3)
EGFR: 17 ML/MIN/1.73M2 — LOW
EOSINOPHIL # BLD AUTO: 0.01 K/UL — SIGNIFICANT CHANGE UP (ref 0–0.5)
EOSINOPHIL NFR BLD AUTO: 0.1 % — SIGNIFICANT CHANGE UP (ref 0–6)
GLUCOSE SERPL-MCNC: 189 MG/DL — HIGH (ref 70–99)
HCT VFR BLD CALC: 33.1 % — LOW (ref 34.5–45)
HGB BLD-MCNC: 11.1 G/DL — LOW (ref 11.5–15.5)
IMM GRANULOCYTES NFR BLD AUTO: 2 % — HIGH (ref 0–0.9)
INR BLD: 1.66 RATIO — HIGH (ref 0.85–1.18)
LACTATE SERPL-SCNC: 1.4 MMOL/L — SIGNIFICANT CHANGE UP (ref 0.7–2)
LYMPHOCYTES # BLD AUTO: 0.37 K/UL — LOW (ref 1–3.3)
LYMPHOCYTES # BLD AUTO: 2.6 % — LOW (ref 13–44)
MCHC RBC-ENTMCNC: 31.5 PG — SIGNIFICANT CHANGE UP (ref 27–34)
MCHC RBC-ENTMCNC: 33.5 GM/DL — SIGNIFICANT CHANGE UP (ref 32–36)
MCV RBC AUTO: 94 FL — SIGNIFICANT CHANGE UP (ref 80–100)
MONOCYTES # BLD AUTO: 0.52 K/UL — SIGNIFICANT CHANGE UP (ref 0–0.9)
MONOCYTES NFR BLD AUTO: 3.7 % — SIGNIFICANT CHANGE UP (ref 2–14)
NEUTROPHILS # BLD AUTO: 12.77 K/UL — HIGH (ref 1.8–7.4)
NEUTROPHILS NFR BLD AUTO: 91.2 % — HIGH (ref 43–77)
NRBC # BLD: 0 /100 WBCS — SIGNIFICANT CHANGE UP (ref 0–0)
PLATELET # BLD AUTO: 123 K/UL — LOW (ref 150–400)
POTASSIUM SERPL-MCNC: 3.4 MMOL/L — LOW (ref 3.5–5.3)
POTASSIUM SERPL-SCNC: 3.4 MMOL/L — LOW (ref 3.5–5.3)
PROT SERPL-MCNC: 7 G/DL — SIGNIFICANT CHANGE UP (ref 6–8.3)
PROTHROM AB SERPL-ACNC: 18.6 SEC — HIGH (ref 9.5–13)
RBC # BLD: 3.52 M/UL — LOW (ref 3.8–5.2)
RBC # FLD: 15.7 % — HIGH (ref 10.3–14.5)
SODIUM SERPL-SCNC: 134 MMOL/L — LOW (ref 135–145)
WBC # BLD: 14.01 K/UL — HIGH (ref 3.8–10.5)
WBC # FLD AUTO: 14.01 K/UL — HIGH (ref 3.8–10.5)

## 2024-05-30 PROCEDURE — 99285 EMERGENCY DEPT VISIT HI MDM: CPT

## 2024-05-30 PROCEDURE — 93010 ELECTROCARDIOGRAM REPORT: CPT | Mod: 76

## 2024-05-30 PROCEDURE — 71045 X-RAY EXAM CHEST 1 VIEW: CPT | Mod: 26

## 2024-05-30 RX ORDER — SODIUM CHLORIDE 9 MG/ML
2000 INJECTION INTRAMUSCULAR; INTRAVENOUS; SUBCUTANEOUS ONCE
Refills: 0 | Status: COMPLETED | OUTPATIENT
Start: 2024-05-30 | End: 2024-05-30

## 2024-05-30 RX ORDER — ACETAMINOPHEN 500 MG
975 TABLET ORAL ONCE
Refills: 0 | Status: COMPLETED | OUTPATIENT
Start: 2024-05-30 | End: 2024-05-30

## 2024-05-30 RX ORDER — CEFEPIME 1 G/1
2000 INJECTION, POWDER, FOR SOLUTION INTRAMUSCULAR; INTRAVENOUS ONCE
Refills: 0 | Status: COMPLETED | OUTPATIENT
Start: 2024-05-30 | End: 2024-05-30

## 2024-05-30 RX ORDER — VANCOMYCIN HCL 1 G
1000 VIAL (EA) INTRAVENOUS ONCE
Refills: 0 | Status: COMPLETED | OUTPATIENT
Start: 2024-05-30 | End: 2024-05-30

## 2024-05-30 NOTE — ED ADULT TRIAGE NOTE - CHIEF COMPLAINT QUOTE
sent from [facility] for elevated white count 21.6,qwith nausea and vomiting 2 days ago ,with fever

## 2024-05-31 DIAGNOSIS — A41.9 SEPSIS, UNSPECIFIED ORGANISM: ICD-10-CM

## 2024-05-31 DIAGNOSIS — N17.9 ACUTE KIDNEY FAILURE, UNSPECIFIED: ICD-10-CM

## 2024-05-31 DIAGNOSIS — Z29.9 ENCOUNTER FOR PROPHYLACTIC MEASURES, UNSPECIFIED: ICD-10-CM

## 2024-05-31 DIAGNOSIS — E78.5 HYPERLIPIDEMIA, UNSPECIFIED: ICD-10-CM

## 2024-05-31 DIAGNOSIS — E87.6 HYPOKALEMIA: ICD-10-CM

## 2024-05-31 DIAGNOSIS — I82.409 ACUTE EMBOLISM AND THROMBOSIS OF UNSPECIFIED DEEP VEINS OF UNSPECIFIED LOWER EXTREMITY: ICD-10-CM

## 2024-05-31 DIAGNOSIS — I63.9 CEREBRAL INFARCTION, UNSPECIFIED: ICD-10-CM

## 2024-05-31 DIAGNOSIS — R56.9 UNSPECIFIED CONVULSIONS: ICD-10-CM

## 2024-05-31 DIAGNOSIS — E11.9 TYPE 2 DIABETES MELLITUS WITHOUT COMPLICATIONS: ICD-10-CM

## 2024-05-31 DIAGNOSIS — I10 ESSENTIAL (PRIMARY) HYPERTENSION: ICD-10-CM

## 2024-05-31 DIAGNOSIS — N39.0 URINARY TRACT INFECTION, SITE NOT SPECIFIED: ICD-10-CM

## 2024-05-31 DIAGNOSIS — E83.39 OTHER DISORDERS OF PHOSPHORUS METABOLISM: ICD-10-CM

## 2024-05-31 LAB
ALBUMIN SERPL ELPH-MCNC: 2.2 G/DL — LOW (ref 3.5–5)
ALP SERPL-CCNC: 99 U/L — SIGNIFICANT CHANGE UP (ref 40–120)
ALT FLD-CCNC: 18 U/L DA — SIGNIFICANT CHANGE UP (ref 10–60)
ANION GAP SERPL CALC-SCNC: 7 MMOL/L — SIGNIFICANT CHANGE UP (ref 5–17)
ANISOCYTOSIS BLD QL: SLIGHT — SIGNIFICANT CHANGE UP
APPEARANCE UR: ABNORMAL
AST SERPL-CCNC: 21 U/L — SIGNIFICANT CHANGE UP (ref 10–40)
B PERT DNA SPEC QL NAA+PROBE: SIGNIFICANT CHANGE UP
BASOPHILS # BLD AUTO: 0 K/UL — SIGNIFICANT CHANGE UP (ref 0–0.2)
BASOPHILS NFR BLD AUTO: 0 % — SIGNIFICANT CHANGE UP (ref 0–2)
BILIRUB SERPL-MCNC: 0.5 MG/DL — SIGNIFICANT CHANGE UP (ref 0.2–1.2)
BILIRUB UR-MCNC: NEGATIVE — SIGNIFICANT CHANGE UP
BUN SERPL-MCNC: 52 MG/DL — HIGH (ref 7–18)
C PNEUM DNA SPEC QL NAA+PROBE: SIGNIFICANT CHANGE UP
CALCIUM SERPL-MCNC: 8.1 MG/DL — LOW (ref 8.4–10.5)
CHLORIDE SERPL-SCNC: 108 MMOL/L — SIGNIFICANT CHANGE UP (ref 96–108)
CO2 SERPL-SCNC: 22 MMOL/L — SIGNIFICANT CHANGE UP (ref 22–31)
COLOR SPEC: YELLOW — SIGNIFICANT CHANGE UP
CREAT SERPL-MCNC: 2.32 MG/DL — HIGH (ref 0.5–1.3)
CRP SERPL-MCNC: 266 MG/L — HIGH
DACRYOCYTES BLD QL SMEAR: SLIGHT — SIGNIFICANT CHANGE UP
DIFF PNL FLD: ABNORMAL
E COLI DNA BLD POS QL NAA+NON-PROBE: SIGNIFICANT CHANGE UP
EGFR: 19 ML/MIN/1.73M2 — LOW
EOSINOPHIL # BLD AUTO: 0 K/UL — SIGNIFICANT CHANGE UP (ref 0–0.5)
EOSINOPHIL NFR BLD AUTO: 0 % — SIGNIFICANT CHANGE UP (ref 0–6)
ERYTHROCYTE [SEDIMENTATION RATE] IN BLOOD: 79 MM/HR — HIGH (ref 0–20)
FLUAV H1 2009 PAND RNA SPEC QL NAA+PROBE: SIGNIFICANT CHANGE UP
FLUAV H1 RNA SPEC QL NAA+PROBE: SIGNIFICANT CHANGE UP
FLUAV H3 RNA SPEC QL NAA+PROBE: SIGNIFICANT CHANGE UP
FLUAV SUBTYP SPEC NAA+PROBE: SIGNIFICANT CHANGE UP
FLUBV RNA SPEC QL NAA+PROBE: SIGNIFICANT CHANGE UP
GLUCOSE BLDC GLUCOMTR-MCNC: 125 MG/DL — HIGH (ref 70–99)
GLUCOSE BLDC GLUCOMTR-MCNC: 165 MG/DL — HIGH (ref 70–99)
GLUCOSE BLDC GLUCOMTR-MCNC: 187 MG/DL — HIGH (ref 70–99)
GLUCOSE BLDC GLUCOMTR-MCNC: 220 MG/DL — HIGH (ref 70–99)
GLUCOSE BLDC GLUCOMTR-MCNC: 257 MG/DL — HIGH (ref 70–99)
GLUCOSE SERPL-MCNC: 236 MG/DL — HIGH (ref 70–99)
GLUCOSE UR QL: >=1000 MG/DL
GRAM STN FLD: ABNORMAL
HADV DNA SPEC QL NAA+PROBE: SIGNIFICANT CHANGE UP
HCOV PNL SPEC NAA+PROBE: SIGNIFICANT CHANGE UP
HCT VFR BLD CALC: 28 % — LOW (ref 34.5–45)
HCV AB S/CO SERPL IA: 0.11 S/CO — SIGNIFICANT CHANGE UP (ref 0–0.99)
HCV AB SERPL-IMP: SIGNIFICANT CHANGE UP
HGB BLD-MCNC: 9.3 G/DL — LOW (ref 11.5–15.5)
HIV 1+2 AB+HIV1 P24 AG SERPL QL IA: SIGNIFICANT CHANGE UP
HMPV RNA SPEC QL NAA+PROBE: SIGNIFICANT CHANGE UP
HPIV1 RNA SPEC QL NAA+PROBE: SIGNIFICANT CHANGE UP
HPIV2 RNA SPEC QL NAA+PROBE: SIGNIFICANT CHANGE UP
HPIV3 RNA SPEC QL NAA+PROBE: SIGNIFICANT CHANGE UP
HPIV4 RNA SPEC QL NAA+PROBE: SIGNIFICANT CHANGE UP
HYPOCHROMIA BLD QL: SLIGHT — SIGNIFICANT CHANGE UP
KETONES UR-MCNC: ABNORMAL MG/DL
LEUKOCYTE ESTERASE UR-ACNC: ABNORMAL
LG PLATELETS BLD QL AUTO: SLIGHT — SIGNIFICANT CHANGE UP
LYMPHOCYTES # BLD AUTO: 0.45 K/UL — LOW (ref 1–3.3)
LYMPHOCYTES # BLD AUTO: 3 % — LOW (ref 13–44)
M PNEUMO IGM SER-ACNC: 0.18 INDEX — SIGNIFICANT CHANGE UP (ref 0–0.9)
MACROCYTES BLD QL: SLIGHT — SIGNIFICANT CHANGE UP
MAGNESIUM SERPL-MCNC: 2 MG/DL — SIGNIFICANT CHANGE UP (ref 1.6–2.6)
MANUAL SMEAR VERIFICATION: SIGNIFICANT CHANGE UP
MCHC RBC-ENTMCNC: 31.3 PG — SIGNIFICANT CHANGE UP (ref 27–34)
MCHC RBC-ENTMCNC: 33.2 GM/DL — SIGNIFICANT CHANGE UP (ref 32–36)
MCV RBC AUTO: 94.3 FL — SIGNIFICANT CHANGE UP (ref 80–100)
METHOD TYPE: SIGNIFICANT CHANGE UP
MICROCYTES BLD QL: SLIGHT — SIGNIFICANT CHANGE UP
MONOCYTES # BLD AUTO: 1.36 K/UL — HIGH (ref 0–0.9)
MONOCYTES NFR BLD AUTO: 9 % — SIGNIFICANT CHANGE UP (ref 2–14)
MYCOPLASMA AG SPEC QL: NEGATIVE — SIGNIFICANT CHANGE UP
NEUTROPHILS # BLD AUTO: 13.27 K/UL — HIGH (ref 1.8–7.4)
NEUTROPHILS NFR BLD AUTO: 88 % — HIGH (ref 43–77)
NITRITE UR-MCNC: NEGATIVE — SIGNIFICANT CHANGE UP
NRBC # BLD: 0 /100 WBCS — SIGNIFICANT CHANGE UP (ref 0–0)
OVALOCYTES BLD QL SMEAR: SLIGHT — SIGNIFICANT CHANGE UP
PH UR: 5 — SIGNIFICANT CHANGE UP (ref 5–8)
PHOSPHATE SERPL-MCNC: 2.1 MG/DL — LOW (ref 2.5–4.5)
PLAT MORPH BLD: NORMAL — SIGNIFICANT CHANGE UP
PLATELET # BLD AUTO: 94 K/UL — LOW (ref 150–400)
PLATELET COUNT - ESTIMATE: NORMAL — SIGNIFICANT CHANGE UP
POIKILOCYTOSIS BLD QL AUTO: SLIGHT — SIGNIFICANT CHANGE UP
POLYCHROMASIA BLD QL SMEAR: SLIGHT — SIGNIFICANT CHANGE UP
POTASSIUM SERPL-MCNC: 3.2 MMOL/L — LOW (ref 3.5–5.3)
POTASSIUM SERPL-SCNC: 3.2 MMOL/L — LOW (ref 3.5–5.3)
PROT SERPL-MCNC: 5.8 G/DL — LOW (ref 6–8.3)
PROT UR-MCNC: 100 MG/DL
RAPID RVP RESULT: SIGNIFICANT CHANGE UP
RBC # BLD: 2.97 M/UL — LOW (ref 3.8–5.2)
RBC # FLD: 15.6 % — HIGH (ref 10.3–14.5)
RBC BLD AUTO: ABNORMAL
RSV RNA SPEC QL NAA+PROBE: SIGNIFICANT CHANGE UP
RV+EV RNA SPEC QL NAA+PROBE: SIGNIFICANT CHANGE UP
SARS-COV-2 RNA SPEC QL NAA+PROBE: SIGNIFICANT CHANGE UP
SCHISTOCYTES BLD QL AUTO: SLIGHT — SIGNIFICANT CHANGE UP
SODIUM SERPL-SCNC: 137 MMOL/L — SIGNIFICANT CHANGE UP (ref 135–145)
SP GR SPEC: 1.02 — SIGNIFICANT CHANGE UP (ref 1–1.03)
SPECIMEN SOURCE: SIGNIFICANT CHANGE UP
SPECIMEN SOURCE: SIGNIFICANT CHANGE UP
UROBILINOGEN FLD QL: 1 MG/DL — SIGNIFICANT CHANGE UP (ref 0.2–1)
WBC # BLD: 15.08 K/UL — HIGH (ref 3.8–10.5)
WBC # FLD AUTO: 15.08 K/UL — HIGH (ref 3.8–10.5)

## 2024-05-31 RX ORDER — PIOGLITAZONE HYDROCHLORIDE 15 MG/1
1 TABLET ORAL
Refills: 0 | DISCHARGE

## 2024-05-31 RX ORDER — FUROSEMIDE 40 MG
1 TABLET ORAL
Refills: 0 | DISCHARGE

## 2024-05-31 RX ORDER — NITROGLYCERIN 6.5 MG
1 CAPSULE, EXTENDED RELEASE ORAL
Refills: 0 | DISCHARGE

## 2024-05-31 RX ORDER — ACETAMINOPHEN 500 MG
2 TABLET ORAL
Qty: 0 | Refills: 0 | DISCHARGE

## 2024-05-31 RX ORDER — ATORVASTATIN CALCIUM 80 MG/1
20 TABLET, FILM COATED ORAL AT BEDTIME
Refills: 0 | Status: DISCONTINUED | OUTPATIENT
Start: 2024-05-31 | End: 2024-06-04

## 2024-05-31 RX ORDER — SODIUM CHLORIDE 9 MG/ML
1000 INJECTION, SOLUTION INTRAVENOUS
Refills: 0 | Status: DISCONTINUED | OUTPATIENT
Start: 2024-05-31 | End: 2024-06-04

## 2024-05-31 RX ORDER — GUAIFENESIN/DEXTROMETHORPHAN 600MG-30MG
10 TABLET, EXTENDED RELEASE 12 HR ORAL EVERY 4 HOURS
Refills: 0 | Status: DISCONTINUED | OUTPATIENT
Start: 2024-05-31 | End: 2024-06-04

## 2024-05-31 RX ORDER — ONDANSETRON 8 MG/1
4 TABLET, FILM COATED ORAL EVERY 8 HOURS
Refills: 0 | Status: DISCONTINUED | OUTPATIENT
Start: 2024-05-31 | End: 2024-06-04

## 2024-05-31 RX ORDER — AMLODIPINE BESYLATE 2.5 MG/1
1 TABLET ORAL
Refills: 0 | DISCHARGE

## 2024-05-31 RX ORDER — DAPAGLIFLOZIN 10 MG/1
1 TABLET, FILM COATED ORAL
Refills: 0 | DISCHARGE

## 2024-05-31 RX ORDER — SODIUM CHLORIDE 9 MG/ML
1000 INJECTION, SOLUTION INTRAVENOUS
Refills: 0 | Status: DISCONTINUED | OUTPATIENT
Start: 2024-05-31 | End: 2024-05-31

## 2024-05-31 RX ORDER — NYSTATIN CREAM 100000 [USP'U]/G
1 CREAM TOPICAL
Refills: 0 | DISCHARGE

## 2024-05-31 RX ORDER — SPIRONOLACTONE 25 MG/1
1 TABLET, FILM COATED ORAL
Refills: 0 | DISCHARGE

## 2024-05-31 RX ORDER — LEVETIRACETAM 250 MG/1
750 TABLET, FILM COATED ORAL DAILY
Refills: 0 | Status: DISCONTINUED | OUTPATIENT
Start: 2024-05-31 | End: 2024-06-04

## 2024-05-31 RX ORDER — LEVETIRACETAM 250 MG/1
750 TABLET, FILM COATED ORAL DAILY
Refills: 0 | Status: DISCONTINUED | OUTPATIENT
Start: 2024-05-31 | End: 2024-05-31

## 2024-05-31 RX ORDER — APIXABAN 2.5 MG/1
2.5 TABLET, FILM COATED ORAL EVERY 12 HOURS
Refills: 0 | Status: DISCONTINUED | OUTPATIENT
Start: 2024-05-31 | End: 2024-06-04

## 2024-05-31 RX ORDER — LOSARTAN POTASSIUM 100 MG/1
1 TABLET, FILM COATED ORAL
Qty: 0 | Refills: 0 | DISCHARGE

## 2024-05-31 RX ORDER — INSULIN LISPRO 100/ML
VIAL (ML) SUBCUTANEOUS
Refills: 0 | Status: DISCONTINUED | OUTPATIENT
Start: 2024-05-31 | End: 2024-06-04

## 2024-05-31 RX ORDER — CEFTRIAXONE 500 MG/1
1000 INJECTION, POWDER, FOR SOLUTION INTRAMUSCULAR; INTRAVENOUS EVERY 24 HOURS
Refills: 0 | Status: COMPLETED | OUTPATIENT
Start: 2024-05-31 | End: 2024-06-04

## 2024-05-31 RX ORDER — POTASSIUM PHOSPHATE, MONOBASIC POTASSIUM PHOSPHATE, DIBASIC 236; 224 MG/ML; MG/ML
15 INJECTION, SOLUTION INTRAVENOUS ONCE
Refills: 0 | Status: COMPLETED | OUTPATIENT
Start: 2024-05-31 | End: 2024-05-31

## 2024-05-31 RX ORDER — INSULIN LISPRO 100/ML
VIAL (ML) SUBCUTANEOUS AT BEDTIME
Refills: 0 | Status: DISCONTINUED | OUTPATIENT
Start: 2024-05-31 | End: 2024-06-04

## 2024-05-31 RX ORDER — ACETAMINOPHEN 500 MG
650 TABLET ORAL EVERY 6 HOURS
Refills: 0 | Status: DISCONTINUED | OUTPATIENT
Start: 2024-05-31 | End: 2024-06-04

## 2024-05-31 RX ORDER — DEXTROMETHORPHAN HYDROBROMIDE AND QUINIDINE SULFATE 20; 10 MG/1; MG/1
1 CAPSULE, GELATIN COATED ORAL
Qty: 0 | Refills: 0 | DISCHARGE

## 2024-05-31 RX ORDER — POTASSIUM CHLORIDE 20 MEQ
40 PACKET (EA) ORAL ONCE
Refills: 0 | Status: COMPLETED | OUTPATIENT
Start: 2024-05-31 | End: 2024-05-31

## 2024-05-31 RX ORDER — ASCORBIC ACID 60 MG
5 TABLET,CHEWABLE ORAL
Qty: 0 | Refills: 0 | DISCHARGE

## 2024-05-31 RX ORDER — LEVETIRACETAM 250 MG/1
500 TABLET, FILM COATED ORAL AT BEDTIME
Refills: 0 | Status: DISCONTINUED | OUTPATIENT
Start: 2024-05-31 | End: 2024-06-04

## 2024-05-31 RX ADMIN — CEFEPIME 100 MILLIGRAM(S): 1 INJECTION, POWDER, FOR SOLUTION INTRAMUSCULAR; INTRAVENOUS at 00:24

## 2024-05-31 RX ADMIN — Medication 975 MILLIGRAM(S): at 05:03

## 2024-05-31 RX ADMIN — Medication 1: at 17:39

## 2024-05-31 RX ADMIN — SODIUM CHLORIDE 75 MILLILITER(S): 9 INJECTION, SOLUTION INTRAVENOUS at 17:40

## 2024-05-31 RX ADMIN — SODIUM CHLORIDE 75 MILLILITER(S): 9 INJECTION, SOLUTION INTRAVENOUS at 07:04

## 2024-05-31 RX ADMIN — SODIUM CHLORIDE 2000 MILLILITER(S): 9 INJECTION INTRAMUSCULAR; INTRAVENOUS; SUBCUTANEOUS at 00:24

## 2024-05-31 RX ADMIN — Medication 1: at 12:26

## 2024-05-31 RX ADMIN — LEVETIRACETAM 500 MILLIGRAM(S): 250 TABLET, FILM COATED ORAL at 21:16

## 2024-05-31 RX ADMIN — LEVETIRACETAM 750 MILLIGRAM(S): 250 TABLET, FILM COATED ORAL at 12:24

## 2024-05-31 RX ADMIN — CEFTRIAXONE 100 MILLIGRAM(S): 500 INJECTION, POWDER, FOR SOLUTION INTRAMUSCULAR; INTRAVENOUS at 08:56

## 2024-05-31 RX ADMIN — Medication 650 MILLIGRAM(S): at 21:07

## 2024-05-31 RX ADMIN — APIXABAN 2.5 MILLIGRAM(S): 2.5 TABLET, FILM COATED ORAL at 07:04

## 2024-05-31 RX ADMIN — APIXABAN 2.5 MILLIGRAM(S): 2.5 TABLET, FILM COATED ORAL at 17:39

## 2024-05-31 RX ADMIN — ATORVASTATIN CALCIUM 20 MILLIGRAM(S): 80 TABLET, FILM COATED ORAL at 21:15

## 2024-05-31 RX ADMIN — Medication 2: at 08:55

## 2024-05-31 RX ADMIN — Medication 975 MILLIGRAM(S): at 00:52

## 2024-05-31 RX ADMIN — Medication 250 MILLIGRAM(S): at 01:38

## 2024-05-31 RX ADMIN — POTASSIUM PHOSPHATE, MONOBASIC POTASSIUM PHOSPHATE, DIBASIC 62.5 MILLIMOLE(S): 236; 224 INJECTION, SOLUTION INTRAVENOUS at 07:04

## 2024-05-31 RX ADMIN — Medication 40 MILLIEQUIVALENT(S): at 04:59

## 2024-05-31 NOTE — CONSULT NOTE ADULT - SUBJECTIVE AND OBJECTIVE BOX
HPI:  89-year-old female, from [facility], bedbound nonverbal at baseline, with PMH of 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 Nausea/vomiting 2 days ago. No other symptoms. history is limited due to patient's mental status.  In the ED, patient is comfortable, NAD, febrile Tmax 101.1, tachycardic , rest of vitals are stable, patient was found to be leukocytosis WBC 14, Hb 11.1, hypokalemia K3.4, serum creatinine 2.46 (baseline 1.4), lactate 1.4, RVP negative.  EKG shows sinus tachycardia with premature ventricular complexes.  Patient received cefepime, vancomycin, 2L bolus, Tylenol in the ED.  Patient is admitted for sepsis. (31 May 2024 02:52)      PAST MEDICAL & SURGICAL HISTORY:  Stroke      HTN (hypertension)      HLD (hyperlipidemia)      DM (diabetes mellitus)      DVT (deep venous thrombosis)      CVA (cerebrovascular accident)      Aphasia      Dysphagia      Apraxia      Right-sided muscle weakness      Nonverbal      Seizure          No Known Allergies      Meds:  acetaminophen     Tablet .. 650 milliGRAM(s) Oral every 6 hours PRN  aluminum hydroxide/magnesium hydroxide/simethicone Suspension 30 milliLiter(s) Oral every 4 hours PRN  apixaban 2.5 milliGRAM(s) Oral every 12 hours  atorvastatin 20 milliGRAM(s) Oral at bedtime  cefTRIAXone   IVPB 1000 milliGRAM(s) IV Intermittent every 24 hours  guaifenesin/dextromethorphan Oral Liquid 10 milliLiter(s) Oral every 4 hours PRN  insulin lispro (ADMELOG) corrective regimen sliding scale   SubCutaneous three times a day before meals  insulin lispro (ADMELOG) corrective regimen sliding scale   SubCutaneous at bedtime  lactated ringers. 1000 milliLiter(s) IV Continuous <Continuous>  levETIRAcetam  Solution 500 milliGRAM(s) Oral at bedtime  levETIRAcetam  Solution 750 milliGRAM(s) Oral daily  ondansetron Injectable 4 milliGRAM(s) IV Push every 8 hours PRN      SOCIAL HISTORY:  Smoker:  YES / NO        PACK YEARS:                         WHEN QUIT?  ETOH use:  YES / NO               FREQUENCY / QUANTITY:  Ilicit Drug use:  YES / NO  Occupation:  Assisted device use (Cane / Walker):  Live with:    FAMILY HISTORY:      VITALS:  Vital Signs Last 24 Hrs  T(C): 36.5 (31 May 2024 13:09), Max: 38.4 (30 May 2024 23:57)  T(F): 97.7 (31 May 2024 13:09), Max: 101.1 (30 May 2024 23:57)  HR: 78 (31 May 2024 13:09) (72 - 104)  BP: 104/52 (31 May 2024 13:09) (104/52 - 127/53)  BP(mean): 75 (31 May 2024 06:31) (75 - 75)  RR: 18 (31 May 2024 13:09) (18 - 20)  SpO2: 96% (31 May 2024 13:09) (94% - 99%)    Parameters below as of 31 May 2024 13:09  Patient On (Oxygen Delivery Method): room air        LABS/DIAGNOSTIC TESTS:                          9.3    15.08 )-----------( 94       ( 31 May 2024 04:00 )             28.0     WBC Count: 15.08 K/uL (05-31 @ 04:00)  WBC Count: 14.01 K/uL (05-30 @ 22:40)      05-31    137  |  108  |  52<H>  ----------------------------<  236<H>  3.2<L>   |  22  |  2.32<H>    Ca    8.1<L>      31 May 2024 04:00  Phos  2.1     05-31  Mg     2.0     05-31    TPro  5.8<L>  /  Alb  2.2<L>  /  TBili  0.5  /  DBili  x   /  AST  21  /  ALT  18  /  AlkPhos  99  05-31      Urine Microscopic-Add On (NC) (05.31.24 @ 04:40)   Comment - Urine: few amorphous urates  Squamous Epithelial Cells: Present  Red Blood Cell - Urine: 45 /HPF  White Blood Cell - Urine: 15 /HPF  Bacteria: Many /HPFUrinalysis (05.31.24 @ 04:40)   pH Urine: 5.0  Glucose Qualitative, Urine: >=1000 mg/dL  Blood, Urine: Large  Color: Yellow  Urine Appearance: Turbid  Bilirubin: Negative  Ketone - Urine: Trace mg/dL  Specific Gravity: 1.022  Protein, Urine: 100 mg/dL  Urobilinogen: 1.0 mg/dL  Nitrite: Negative  Leukocyte Esterase Concentration: Moderate      LIVER FUNCTIONS - ( 31 May 2024 04:00 )  Alb: 2.2 g/dL / Pro: 5.8 g/dL / ALK PHOS: 99 U/L / ALT: 18 U/L DA / AST: 21 U/L / GGT: x             PT/INR - ( 30 May 2024 22:40 )   PT: 18.6 sec;   INR: 1.66 ratio         PTT - ( 30 May 2024 22:40 )  PTT:29.3 sec    LACTATE:Lactate, Blood: 1.4 mmol/L (05-30 @ 22:40)      ABG -     CULTURES:   .Blood Blood-Peripheral  05-30 @ 22:50   Growth in anaerobic bottle: Gram Negative Rods  Growth in aerobic bottle: Gram Negative Rods  --    Growth in anaerobic bottle: Gram Negative Rods  Growth in aerobic bottle: Gram Negative Rods      .Blood Blood-Peripheral  05-30 @ 22:40   Growth in anaerobic bottle: Gram Negative Rods  Growth in aerobic bottle: Gram Negative Rods  Direct identification is available within approximately 3-5  hours either by Blood Panel Multiplexed PCR or Direct  MALDI-TOF. Details: [email or web address]  --  Blood Culture PCR          RADIOLOGY:< from: Xray Chest 1 View- PORTABLE-Urgent (05.30.24 @ 23:11) >  ACC: [id number] EXAM:  XR CHEST PORTABLE URGENT 1V   ORDERED BY: PENNIE BLISS     PROCEDURE DATE:  05/30/2024          INTERPRETATION:  INDICATION: Sepsis workup    Portable chest 11:01 PM    COMPARISON: 7/2/2023    FINDINGS:  Heart/Vascular: The heart size, mediastinum, hilum and aorta cannot be   adequately evaluated on this projection. Atherosclerotic change.  Pulmonary: Midline trachea. There is no focal infiltrate, congestion or   effusion.  Bones: There is no fracture.  Lines and catheter: None    Impression:    No acute pulmonary disease.    The visualized bowel gas pattern is nonspecific.    --- End of Report ---             JENNI ROWELL DO; Attending Radiologist  This document has been electronically signed. May 31 2024  2:04PM    < end of copied text >        ROS  [  ] UNABLE TO ELICIT               HPI:  89-year-old female, from [facility], bedbound nonverbal at baseline, with PMH of 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 Nausea/vomiting 2 days ago. No other symptoms. history is limited due to patient's mental status.  In the ED, patient is comfortable, NAD, febrile Tmax 101.1, tachycardic , rest of vitals are stable, patient was found to be leukocytosis WBC 14, Hb 11.1, hypokalemia K3.4, serum creatinine 2.46 (baseline 1.4), lactate 1.4, RVP negative.  EKG shows sinus tachycardia with premature ventricular complexes.  Patient received cefepime, vancomycin, 2L bolus, Tylenol in the ED.  Patient is admitted for sepsis. (31 May 2024 02:52)        History as above, asked to see this patient who is from a NH fevers , nausea and vomiting x 2 days and was found to have an elevated WBC count in the NH also. She apparently had altered Mental status  on admission and has underlying dementia and so no history could be gotten from the patient. She was found to have fevers here along with leukocytosis , a UTI and Bacteremia with gram negative rods yet to be identified. Her Mental status is better currently and is awake and talking a little bit, she even knows she is in the hospital but her age wrong and just does not answer  other questions.        PAST MEDICAL & SURGICAL HISTORY:  Stroke      HTN (hypertension)      HLD (hyperlipidemia)      DM (diabetes mellitus)      DVT (deep venous thrombosis)      CVA (cerebrovascular accident)      Aphasia      Dysphagia      Apraxia      Right-sided muscle weakness      Nonverbal      Seizure          No Known Allergies      Meds:  acetaminophen     Tablet .. 650 milliGRAM(s) Oral every 6 hours PRN  aluminum hydroxide/magnesium hydroxide/simethicone Suspension 30 milliLiter(s) Oral every 4 hours PRN  apixaban 2.5 milliGRAM(s) Oral every 12 hours  atorvastatin 20 milliGRAM(s) Oral at bedtime  cefTRIAXone   IVPB 1000 milliGRAM(s) IV Intermittent every 24 hours  guaifenesin/dextromethorphan Oral Liquid 10 milliLiter(s) Oral every 4 hours PRN  insulin lispro (ADMELOG) corrective regimen sliding scale   SubCutaneous three times a day before meals  insulin lispro (ADMELOG) corrective regimen sliding scale   SubCutaneous at bedtime  lactated ringers. 1000 milliLiter(s) IV Continuous <Continuous>  levETIRAcetam  Solution 500 milliGRAM(s) Oral at bedtime  levETIRAcetam  Solution 750 milliGRAM(s) Oral daily  ondansetron Injectable 4 milliGRAM(s) IV Push every 8 hours PRN      SOCIAL HISTORY: unknown    FAMILY HISTORY: unknown      VITALS:  Vital Signs Last 24 Hrs  T(C): 36.5 (31 May 2024 13:09), Max: 38.4 (30 May 2024 23:57)  T(F): 97.7 (31 May 2024 13:09), Max: 101.1 (30 May 2024 23:57)  HR: 78 (31 May 2024 13:09) (72 - 104)  BP: 104/52 (31 May 2024 13:09) (104/52 - 127/53)  BP(mean): 75 (31 May 2024 06:31) (75 - 75)  RR: 18 (31 May 2024 13:09) (18 - 20)  SpO2: 96% (31 May 2024 13:09) (94% - 99%)    Parameters below as of 31 May 2024 13:09  Patient On (Oxygen Delivery Method): room air        LABS/DIAGNOSTIC TESTS:                          9.3    15.08 )-----------( 94       ( 31 May 2024 04:00 )             28.0     WBC Count: 15.08 K/uL (05-31 @ 04:00)  WBC Count: 14.01 K/uL (05-30 @ 22:40)      05-31    137  |  108  |  52<H>  ----------------------------<  236<H>  3.2<L>   |  22  |  2.32<H>    Ca    8.1<L>      31 May 2024 04:00  Phos  2.1     05-31  Mg     2.0     05-31    TPro  5.8<L>  /  Alb  2.2<L>  /  TBili  0.5  /  DBili  x   /  AST  21  /  ALT  18  /  AlkPhos  99  05-31      Urine Microscopic-Add On (NC) (05.31.24 @ 04:40)   Comment - Urine: few amorphous urates  Squamous Epithelial Cells: Present  Red Blood Cell - Urine: 45 /HPF  White Blood Cell - Urine: 15 /HPF  Bacteria: Many /HPFUrinalysis (05.31.24 @ 04:40)   pH Urine: 5.0  Glucose Qualitative, Urine: >=1000 mg/dL  Blood, Urine: Large  Color: Yellow  Urine Appearance: Turbid  Bilirubin: Negative  Ketone - Urine: Trace mg/dL  Specific Gravity: 1.022  Protein, Urine: 100 mg/dL  Urobilinogen: 1.0 mg/dL  Nitrite: Negative  Leukocyte Esterase Concentration: Moderate      LIVER FUNCTIONS - ( 31 May 2024 04:00 )  Alb: 2.2 g/dL / Pro: 5.8 g/dL / ALK PHOS: 99 U/L / ALT: 18 U/L DA / AST: 21 U/L / GGT: x             PT/INR - ( 30 May 2024 22:40 )   PT: 18.6 sec;   INR: 1.66 ratio         PTT - ( 30 May 2024 22:40 )  PTT:29.3 sec    LACTATE:Lactate, Blood: 1.4 mmol/L (05-30 @ 22:40)      ABG -     CULTURES:   .Blood Blood-Peripheral  05-30 @ 22:50   Growth in anaerobic bottle: Gram Negative Rods  Growth in aerobic bottle: Gram Negative Rods  --    Growth in anaerobic bottle: Gram Negative Rods  Growth in aerobic bottle: Gram Negative Rods      .Blood Blood-Peripheral  05-30 @ 22:40   Growth in anaerobic bottle: Gram Negative Rods  Growth in aerobic bottle: Gram Negative Rods  Direct identification is available within approximately 3-5  hours either by Blood Panel Multiplexed PCR or Direct  MALDI-TOF. Details: [email or web address]  --  Blood Culture PCR          RADIOLOGY:< from: Xray Chest 1 View- PORTABLE-Urgent (05.30.24 @ 23:11) >  ACC: [id number] EXAM:  XR CHEST PORTABLE URGENT 1V   ORDERED BY: PENNIE BLISS     PROCEDURE DATE:  05/30/2024          INTERPRETATION:  INDICATION: Sepsis workup    Portable chest 11:01 PM    COMPARISON: 7/2/2023    FINDINGS:  Heart/Vascular: The heart size, mediastinum, hilum and aorta cannot be   adequately evaluated on this projection. Atherosclerotic change.  Pulmonary: Midline trachea. There is no focal infiltrate, congestion or   effusion.  Bones: There is no fracture.  Lines and catheter: None    Impression:    No acute pulmonary disease.    The visualized bowel gas pattern is nonspecific.    --- End of Report ---             JENNI ROWELL DO; Attending Radiologist  This document has been electronically signed. May 31 2024  2:04PM    < end of copied text >        ROS  [ x ] UNABLE TO ELICIT

## 2024-05-31 NOTE — H&P ADULT - PROBLEM SELECTOR PLAN 9
- Patient takes  Farxiga 10 mg daily, Actos 15 mg daily, insulin sliding scale at home  - will hold home medications  - will start sliding scale  - f/u HgA1c  - diabetic diet

## 2024-05-31 NOTE — ED ADULT NURSE NOTE - OBJECTIVE STATEMENT
Patient BIBA from NH for elevated WBC, HR, and temp. Family present at bedside, patient confused as baseline, with periods of vocalizing but unable to understand speech.

## 2024-05-31 NOTE — ED PROVIDER NOTE - CLINICAL SUMMARY MEDICAL DECISION MAKING FREE TEXT BOX
89-year-old female hx of L MCA CVA w/ R side residual weakness, DM, HLD, HTN, ischemic heart disease, presenting with fevers and elevated WBC count as noted in NH. Labs with WBC 14, TRAVIS. CXR with consolidation in R upper lobe, possible pneumonia. Urine pending. IVF/ABX written. Will admit.

## 2024-05-31 NOTE — PROGRESS NOTE ADULT - ASSESSMENT
89-year-old female, from [facility], bedbound nonverbal at baseline, with PMH of 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  In the ED, patient is comfortable, NAD, febrile Tmax 101.1, tachycardic , rest of vitals are stable, patient was found to be leukocytosis WBC 14, Hb 11.1, hypokalemia K3.4, serum creatinine 2.46 (baseline 1.4), lactate 1.4, RVP negative.  EKG shows sinus tachycardia with premature ventricular complexes.  Patient received cefepime, vancomycin, 2L bolus, Tylenol in the ED.    Patient is admitted for sepsis. UA positive. Bcx grew Ecoli. ID consulted. started Ctx.     GOC discussion held with son at bedside, Full code, wishes aggressive tx.

## 2024-05-31 NOTE — H&P ADULT - NSHPPHYSICALEXAM_GEN_ALL_CORE
GENERAL: NAD,   Elderly female  HEAD:  Atraumatic, Normocephalic  EYES: EOMI, PERRLA, conjunctiva and sclera clear  ENMT: No tonsillar erythema, exudates, or enlargement; Moist mucous membranes, Good dentition, No lesions  NECK: Supple, normal appearance, No JVD; Normal thyroid; Trachea midline  NERVOUS SYSTEM:  Alert & Oriented X 0 (nonverbal at baseline),  Motor Strength 3/5   right upper and lower extremities, motor strength 4/5 left upper and lower extremity  CHEST/LUNG: Lungs clear to auscultation bilaterally, No rales, rhonchi, wheezing   HEART:  tachycardia; No murmurs, rubs, or gallops  ABDOMEN: Soft, Nontender, Nondistended; Bowel sounds present  EXTREMITIES:  2+ Peripheral Pulses, No clubbing, cyanosis, or edema  LYMPH: No lymphadenopathy noted  SKIN: No rashes or lesions;  Good capillary refill

## 2024-05-31 NOTE — H&P ADULT - PROBLEM SELECTOR PLAN 1
p/w fever, HR >90,  lactate 1.4  EKG shows sinus tachycardia with premature ventricular complexes  UA positive  S/p cefepime, vancomycin, 2L bolus, Tylenol in the ED  Sepsis likely in the setting of UTI  Follow-up x-ray  continue with IV fluids  f/u blood cultures, urine cultures  tylenol prn

## 2024-05-31 NOTE — H&P ADULT - PROBLEM SELECTOR PLAN 11
history of seizure  Patient takes Keppra 750 mg daily, 500 mg nightly   follow-up Keppra level  Continue with home meds

## 2024-05-31 NOTE — H&P ADULT - PROBLEM SELECTOR PLAN 7
h/o HTN on Lasix 20 mg QOD, losartan 100 mg daily, Norvasc 10 mg daily, spironolactone 25 mg daily  Monitor BP   hold BP meds in the setting of sepsis  May resume BP meds if blood pressure is elevated  DASH diet

## 2024-05-31 NOTE — H&P ADULT - ASSESSMENT
89-year-old female, from [facility], bedbound nonverbal at baseline, with PMH of 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  In the ED, patient is comfortable, NAD, febrile Tmax 101.1, tachycardic , rest of vitals are stable, patient was found to be leukocytosis WBC 14, Hb 11.1, hypokalemia K3.4, serum creatinine 2.46 (baseline 1.4), lactate 1.4, RVP negative.  EKG shows sinus tachycardia with premature ventricular complexes.  Patient received cefepime, vancomycin, 2L bolus, Tylenol in the ED.  Patient is admitted for sepsis.

## 2024-05-31 NOTE — H&P ADULT - PROBLEM SELECTOR PLAN 6
history of CVA with right-sided residual weakness, aphasia, dysphagia, apraxia,  Patient is bedbound, nonverbal at baseline  Speech and swallow evaluation  Fall risk precautions  Bedrest

## 2024-05-31 NOTE — H&P ADULT - PROBLEM SELECTOR PLAN 5
- Phos 2.1 on admission,   - likely secondary to poor PO intake  - replacing kphos 15 x1  - Continue to monitor electrolytes.

## 2024-05-31 NOTE — SWALLOW BEDSIDE ASSESSMENT ADULT - SWALLOW EVAL: DIAGNOSIS
HOB elevated to 90°. Pt AA+Ox2. Pt alert, able to follow commands. Pt is nonverbal, however, utilizes gestures to confirm comprehension (i.e,. head nod, opening her mouth to show that she swallowed. Son at bedside. Per son, pt was consuming a regular diet prior to hosiptaliation. OSME revealed pt is unable to protrude tongue. Pt is edentulous, does not wear dentures. Per son, pt can consume a regular diet and gums her food down into bits small enough to swallow safely. Pt p/w s&s of oropharyngeal dysphagia c/b prolonged mastication, reduced A-P transport, and reduced hyolaryngeal elevation. No overt s&s of penetration/aspiration noted on all trials.

## 2024-05-31 NOTE — PROGRESS NOTE ADULT - PROBLEM SELECTOR PLAN 4
- K is  3.4 on admission, then K3.2  - likely secondary to poor PO intake  - Continue to monitor electrolytes.  - f/u BMP

## 2024-05-31 NOTE — H&P ADULT - NSICDXPASTMEDICALHX_GEN_ALL_CORE_FT
PAST MEDICAL HISTORY:  Aphasia     Apraxia     CVA (cerebrovascular accident)     DM (diabetes mellitus)     DVT (deep venous thrombosis)     Dysphagia     HLD (hyperlipidemia)     HTN (hypertension)     Nonverbal     Right-sided muscle weakness     Stroke

## 2024-05-31 NOTE — ED ADULT NURSE NOTE - NSFALLHARMRISKINTERV_ED_ALL_ED
Assistance OOB with selected safe patient handling equipment if applicable/Assistance with ambulation/Communicate risk of Fall with Harm to all staff, patient, and family/Monitor gait and stability/Provide visual cue: red socks, yellow wristband, yellow gown, etc/Reinforce activity limits and safety measures with patient and family/Bed in lowest position, wheels locked, appropriate side rails in place/Call bell, personal items and telephone in reach/Instruct patient to call for assistance before getting out of bed/chair/stretcher/Non-slip footwear applied when patient is off stretcher/[place] to call system/Physically safe environment - no spills, clutter or unnecessary equipment/Purposeful Proactive Rounding/Room/bathroom lighting operational, light cord in reach

## 2024-05-31 NOTE — H&P ADULT - HISTORY OF PRESENT ILLNESS
89-year-old female, from [facility], bedbound nonverbal at baseline, with PMH of 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 Nausea/vomiting 2 days ago. No other symptoms. history is limited due to patient's mental status.  In the ED, patient is comfortable, NAD, febrile Tmax 101.1, tachycardic , rest of vitals are stable, patient was found to be leukocytosis WBC 14, Hb 11.1, hypokalemia K3.4, serum creatinine 2.46 (baseline 1.4), lactate 1.4, RVP negative.  EKG shows sinus tachycardia with premature ventricular complexes.  Patient received cefepime, vancomycin, 2L bolus, Tylenol in the ED.  Patient is admitted for sepsis.

## 2024-05-31 NOTE — ED ADULT NURSE NOTE - CHIEF COMPLAINT QUOTE
sent from [facility] for elevated white count 21.6,qwith nausea and vomiting 2 days ago ,with fever

## 2024-05-31 NOTE — H&P ADULT - PROBLEM SELECTOR PLAN 4
- K is  3.4 on admission,   - likely secondary to poor PO intake  - replacing KCl 40 mg x1  - Continue to monitor electrolytes.  - f/u BMP

## 2024-05-31 NOTE — SWALLOW BEDSIDE ASSESSMENT ADULT - COMMENTS
HOB elevated to 90°. Pt AA+Ox2. Pt alert, able to follow commands. Pt is nonverbal, however, utilizes gestures to confirm comprehension (i.e,. head nod, opening her mouth to show that she swallowed. Son at bedside. Per son, pt was consuming a regular diet prior to hosiptaliation. OSME revealed pt is unable to protrude tongue. Pt is edentulous, does not wear dentures. Per son, pt can consume a regular diet and gums her food down into bits small enough to swallow safely.

## 2024-05-31 NOTE — CONSULT NOTE ADULT - ASSESSMENT
Sepsis  UTI  Bacteremia - with gram neg rods  Fevers  Leukocytosis      Plan - Cont Rocephin 1 gm iv q24hrs  repeat blood cultures in 48hrs.

## 2024-05-31 NOTE — ED PROVIDER NOTE - OBJECTIVE STATEMENT
89-year-old female hx of L MCA CVA w/ R side residual weakness, DM, HLD, HTN, ischemic heart disease, presenting with fevers and elevated WBC count as noted in NH. Nausea/vomiting 2 days ago. No other symptoms.

## 2024-05-31 NOTE — SWALLOW BEDSIDE ASSESSMENT ADULT - ESOPHAGEAL PHASE
HOB elevated to 90°. Pt AA+Ox2. Pt alert, able to follow commands. Pt is nonverbal, however, utilizes gestures to confirm comprehension (i.e,. head nod, opening her mouth to show that she swallowed. OSME revealed pt is unable to protrude tongue. Pt is edentulous, does not wear dentures. Per son, pt can consume a regular diet and gums her food down into bits small enough to swallow safely.

## 2024-05-31 NOTE — PROGRESS NOTE ADULT - PROBLEM SELECTOR PLAN 12
-DVT PPx Eliquis 2.5 mg twice daily    DC planning   f/u Bcx repeat AM 6/1  f/u Ucx  SLP for swallowing.   ID recs for abt therapy   From [facility], once optimized will go back to same facility

## 2024-05-31 NOTE — PROGRESS NOTE ADULT - SUBJECTIVE AND OBJECTIVE BOX
NP Note discussed with  Primary Attending    INTERVAL HPI/OVERNIGHT EVENTS: seen at bedside, pt non verbal but able to nod when she was asked for pain, denies pain or discomfort. spoke with son for plan of care as well.     MEDICATIONS  (STANDING):  apixaban 2.5 milliGRAM(s) Oral every 12 hours  atorvastatin 20 milliGRAM(s) Oral at bedtime  cefTRIAXone   IVPB 1000 milliGRAM(s) IV Intermittent every 24 hours  insulin lispro (ADMELOG) corrective regimen sliding scale   SubCutaneous three times a day before meals  insulin lispro (ADMELOG) corrective regimen sliding scale   SubCutaneous at bedtime  lactated ringers. 1000 milliLiter(s) (75 mL/Hr) IV Continuous <Continuous>  levETIRAcetam  Solution 500 milliGRAM(s) Oral at bedtime  levETIRAcetam  Solution 750 milliGRAM(s) Oral daily    MEDICATIONS  (PRN):  acetaminophen     Tablet .. 650 milliGRAM(s) Oral every 6 hours PRN Temp greater or equal to 38C (100.4F), Mild Pain (1 - 3)  aluminum hydroxide/magnesium hydroxide/simethicone Suspension 30 milliLiter(s) Oral every 4 hours PRN Dyspepsia  guaifenesin/dextromethorphan Oral Liquid 10 milliLiter(s) Oral every 4 hours PRN Cough  ondansetron Injectable 4 milliGRAM(s) IV Push every 8 hours PRN Nausea and/or Vomiting      __________________________________________________  REVIEW OF SYSTEMS:  Limited due to ROS, only answer Y/N questions by nodding  Denies pain, N/V. headache       Vital Signs Last 24 Hrs  T(C): 36.7 (31 May 2024 06:31), Max: 38.4 (30 May 2024 23:57)  T(F): 98.1 (31 May 2024 06:31), Max: 101.1 (30 May 2024 23:57)  HR: 72 (31 May 2024 06:31) (72 - 104)  BP: 118/57 (31 May 2024 06:31) (114/52 - 127/53)  BP(mean): 75 (31 May 2024 06:31) (75 - 75)  RR: 18 (31 May 2024 06:31) (18 - 20)  SpO2: 99% (31 May 2024 06:31) (94% - 99%)    Parameters below as of 31 May 2024 06:31  Patient On (Oxygen Delivery Method): room air    ________________________________________________  PHYSICAL EXAM:  GENERAL: NAD,   HEENT: Normocephalic;  conjunctivae and sclerae clear; moist mucous membranes;   NECK : supple  CHEST/LUNG: Clear to auscultation bilaterally with good air entry   HEART: S1 S2  regular; no murmurs, gallops or rubs  ABDOMEN: Soft, globular,  Nontender, Nondistended; Bowel sounds present  EXTREMITIES: no cyanosis; no edema; no calf tenderness  SKIN: warm and dry; no rash  NERVOUS SYSTEM:  Awake and alert; Oriented  to self, aphagic,  facial  un-symmetry right sided weakness   _________________________________________________  LABS:                        9.3    15.08 )-----------( 94       ( 31 May 2024 04:00 )             28.0     05-    137  |  108  |  52<H>  ----------------------------<  236<H>  3.2<L>   |  22  |  2.32<H>    Ca    8.1<L>      31 May 2024 04:00  Phos  2.1       Mg     2.0         TPro  5.8<L>  /  Alb  2.2<L>  /  TBili  0.5  /  DBili  x   /  AST  21  /  ALT  18  /  AlkPhos  99  05    PT/INR - ( 30 May 2024 22:40 )   PT: 18.6 sec;   INR: 1.66 ratio         PTT - ( 30 May 2024 22:40 )  PTT:29.3 sec  Urinalysis Basic - ( 31 May 2024 04:40 )    Color: Yellow / Appearance: Turbid / S.022 / pH: x  Gluc: x / Ketone: Trace mg/dL  / Bili: Negative / Urobili: 1.0 mg/dL   Blood: x / Protein: 100 mg/dL / Nitrite: Negative   Leuk Esterase: Moderate / RBC: 45 /HPF / WBC 15 /HPF   Sq Epi: x / Non Sq Epi: x / Bacteria: Many /HPF      CAPILLARY BLOOD GLUCOSE      POCT Blood Glucose.: 187 mg/dL (31 May 2024 11:32)  POCT Blood Glucose.: 220 mg/dL (31 May 2024 08:38)  POCT Blood Glucose.: 257 mg/dL (31 May 2024 07:35)    RADIOLOGY & ADDITIONAL TESTS:    Imaging  Reviewed:  YES  CXR official read pending     Consultant(s) Notes Reviewed:   YES      Plan of care was discussed with patient and /or primary care giver; all questions and concerns were addressed

## 2024-05-31 NOTE — SWALLOW BEDSIDE ASSESSMENT ADULT - SLP PERTINENT HISTORY OF CURRENT PROBLEM
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 Nausea/vomiting. No other symptoms. history is limited due to patient's mental status.  In the ED, patient is comfortable, NAD, febrile Tmax 101.1, tachycardic , rest of vitals are stable, patient was found to be leukocytosis WBC 14, Hb 11.1, hypokalemia K3.4, serum creatinine 2.46 (baseline 1.4), lactate 1.4, RVP negative.  EKG shows sinus tachycardia with premature ventricular complexes.  Patient received cefepime, vancomycin, 2L bolus, Tylenol in the ED.  Patient is admitted for sepsis.

## 2024-05-31 NOTE — SWALLOW BEDSIDE ASSESSMENT ADULT - CONSISTENCIES ADMINISTERED
applesauce + cookie x3 tspn/soft & bite-sized apple sauce x2 tspn/pureed water x4 cup sip/thin liquid ice chip x2 chips/thin liquid cookie x3 bites/soft & bite-sized

## 2024-05-31 NOTE — PATIENT PROFILE ADULT - FALL HARM RISK - HARM RISK INTERVENTIONS

## 2024-06-01 LAB
-  AMPICILLIN/SULBACTAM: SIGNIFICANT CHANGE UP
-  AMPICILLIN: SIGNIFICANT CHANGE UP
-  AZTREONAM: SIGNIFICANT CHANGE UP
-  CEFAZOLIN: SIGNIFICANT CHANGE UP
-  CEFEPIME: SIGNIFICANT CHANGE UP
-  CEFOXITIN: SIGNIFICANT CHANGE UP
-  CEFTRIAXONE: SIGNIFICANT CHANGE UP
-  CIPROFLOXACIN: SIGNIFICANT CHANGE UP
-  ERTAPENEM: SIGNIFICANT CHANGE UP
-  GENTAMICIN: SIGNIFICANT CHANGE UP
-  IMIPENEM: SIGNIFICANT CHANGE UP
-  LEVOFLOXACIN: SIGNIFICANT CHANGE UP
-  MEROPENEM: SIGNIFICANT CHANGE UP
-  PIPERACILLIN/TAZOBACTAM: SIGNIFICANT CHANGE UP
-  TOBRAMYCIN: SIGNIFICANT CHANGE UP
-  TRIMETHOPRIM/SULFAMETHOXAZOLE: SIGNIFICANT CHANGE UP
A1C WITH ESTIMATED AVERAGE GLUCOSE RESULT: 7.8 % — HIGH (ref 4–5.6)
ANION GAP SERPL CALC-SCNC: 6 MMOL/L — SIGNIFICANT CHANGE UP (ref 5–17)
BASOPHILS # BLD AUTO: 0.04 K/UL — SIGNIFICANT CHANGE UP (ref 0–0.2)
BASOPHILS NFR BLD AUTO: 0.3 % — SIGNIFICANT CHANGE UP (ref 0–2)
BUN SERPL-MCNC: 44 MG/DL — HIGH (ref 7–18)
CALCIUM SERPL-MCNC: 8.6 MG/DL — SIGNIFICANT CHANGE UP (ref 8.4–10.5)
CHLORIDE SERPL-SCNC: 112 MMOL/L — HIGH (ref 96–108)
CO2 SERPL-SCNC: 23 MMOL/L — SIGNIFICANT CHANGE UP (ref 22–31)
CREAT SERPL-MCNC: 1.87 MG/DL — HIGH (ref 0.5–1.3)
CULTURE RESULTS: ABNORMAL
CULTURE RESULTS: ABNORMAL
EGFR: 24 ML/MIN/1.73M2 — LOW
EOSINOPHIL # BLD AUTO: 0.22 K/UL — SIGNIFICANT CHANGE UP (ref 0–0.5)
EOSINOPHIL NFR BLD AUTO: 1.8 % — SIGNIFICANT CHANGE UP (ref 0–6)
ESTIMATED AVERAGE GLUCOSE: 177 MG/DL — HIGH (ref 68–114)
GLUCOSE BLDC GLUCOMTR-MCNC: 141 MG/DL — HIGH (ref 70–99)
GLUCOSE BLDC GLUCOMTR-MCNC: 149 MG/DL — HIGH (ref 70–99)
GLUCOSE BLDC GLUCOMTR-MCNC: 209 MG/DL — HIGH (ref 70–99)
GLUCOSE BLDC GLUCOMTR-MCNC: 296 MG/DL — HIGH (ref 70–99)
GLUCOSE SERPL-MCNC: 141 MG/DL — HIGH (ref 70–99)
HCT VFR BLD CALC: 29.8 % — LOW (ref 34.5–45)
HGB BLD-MCNC: 9.5 G/DL — LOW (ref 11.5–15.5)
IMM GRANULOCYTES NFR BLD AUTO: 0.3 % — SIGNIFICANT CHANGE UP (ref 0–0.9)
LYMPHOCYTES # BLD AUTO: 0.91 K/UL — LOW (ref 1–3.3)
LYMPHOCYTES # BLD AUTO: 7.3 % — LOW (ref 13–44)
MCHC RBC-ENTMCNC: 30.3 PG — SIGNIFICANT CHANGE UP (ref 27–34)
MCHC RBC-ENTMCNC: 31.9 GM/DL — LOW (ref 32–36)
MCV RBC AUTO: 94.9 FL — SIGNIFICANT CHANGE UP (ref 80–100)
METHOD TYPE: SIGNIFICANT CHANGE UP
MONOCYTES # BLD AUTO: 1.42 K/UL — HIGH (ref 0–0.9)
MONOCYTES NFR BLD AUTO: 11.4 % — SIGNIFICANT CHANGE UP (ref 2–14)
NEUTROPHILS # BLD AUTO: 9.83 K/UL — HIGH (ref 1.8–7.4)
NEUTROPHILS NFR BLD AUTO: 78.9 % — HIGH (ref 43–77)
NRBC # BLD: 0 /100 WBCS — SIGNIFICANT CHANGE UP (ref 0–0)
ORGANISM # SPEC MICROSCOPIC CNT: ABNORMAL
PHOSPHATE SERPL-MCNC: 2.7 MG/DL — SIGNIFICANT CHANGE UP (ref 2.5–4.5)
PLATELET # BLD AUTO: 85 K/UL — LOW (ref 150–400)
POTASSIUM SERPL-MCNC: 4 MMOL/L — SIGNIFICANT CHANGE UP (ref 3.5–5.3)
POTASSIUM SERPL-SCNC: 4 MMOL/L — SIGNIFICANT CHANGE UP (ref 3.5–5.3)
RBC # BLD: 3.14 M/UL — LOW (ref 3.8–5.2)
RBC # FLD: 16 % — HIGH (ref 10.3–14.5)
SODIUM SERPL-SCNC: 141 MMOL/L — SIGNIFICANT CHANGE UP (ref 135–145)
SPECIMEN SOURCE: SIGNIFICANT CHANGE UP
SPECIMEN SOURCE: SIGNIFICANT CHANGE UP
WBC # BLD: 12.46 K/UL — HIGH (ref 3.8–10.5)
WBC # FLD AUTO: 12.46 K/UL — HIGH (ref 3.8–10.5)

## 2024-06-01 RX ADMIN — LEVETIRACETAM 500 MILLIGRAM(S): 250 TABLET, FILM COATED ORAL at 21:12

## 2024-06-01 RX ADMIN — APIXABAN 2.5 MILLIGRAM(S): 2.5 TABLET, FILM COATED ORAL at 17:17

## 2024-06-01 RX ADMIN — ATORVASTATIN CALCIUM 20 MILLIGRAM(S): 80 TABLET, FILM COATED ORAL at 21:12

## 2024-06-01 RX ADMIN — APIXABAN 2.5 MILLIGRAM(S): 2.5 TABLET, FILM COATED ORAL at 06:03

## 2024-06-01 RX ADMIN — Medication 1: at 21:27

## 2024-06-01 RX ADMIN — LEVETIRACETAM 750 MILLIGRAM(S): 250 TABLET, FILM COATED ORAL at 12:16

## 2024-06-01 RX ADMIN — CEFTRIAXONE 100 MILLIGRAM(S): 500 INJECTION, POWDER, FOR SOLUTION INTRAMUSCULAR; INTRAVENOUS at 09:20

## 2024-06-01 RX ADMIN — Medication 2: at 12:15

## 2024-06-01 NOTE — PROGRESS NOTE ADULT - SUBJECTIVE AND OBJECTIVE BOX
INTERVAL HPI/OVERNIGHT EVENTS:  Patient seen,doing better,no acute issues  VITAL SIGNS:  T(F): 98.1 (06-01-24 @ 05:04)  HR: 78 (06-01-24 @ 05:04)  BP: 118/60 (06-01-24 @ 05:04)  RR: 17 (06-01-24 @ 05:04)  SpO2: 95% (06-01-24 @ 05:04)  Wt(kg): --    PHYSICAL EXAM:  awake  Constitutional:  Eyes:  ENMT:perrla  Neck:  Respiratory:clear  Cardiovascular:s12 m-none  Gastrointestinal:soft,bs pos  Extremities:  Vascular:  Neurological:no focal deficit  Musculoskeletal:    MEDICATIONS  (STANDING):  apixaban 2.5 milliGRAM(s) Oral every 12 hours  atorvastatin 20 milliGRAM(s) Oral at bedtime  cefTRIAXone   IVPB 1000 milliGRAM(s) IV Intermittent every 24 hours  insulin lispro (ADMELOG) corrective regimen sliding scale   SubCutaneous three times a day before meals  insulin lispro (ADMELOG) corrective regimen sliding scale   SubCutaneous at bedtime  lactated ringers. 1000 milliLiter(s) (75 mL/Hr) IV Continuous <Continuous>  levETIRAcetam  Solution 500 milliGRAM(s) Oral at bedtime  levETIRAcetam  Solution 750 milliGRAM(s) Oral daily    MEDICATIONS  (PRN):  acetaminophen     Tablet .. 650 milliGRAM(s) Oral every 6 hours PRN Temp greater or equal to 38C (100.4F), Mild Pain (1 - 3)  aluminum hydroxide/magnesium hydroxide/simethicone Suspension 30 milliLiter(s) Oral every 4 hours PRN Dyspepsia  guaifenesin/dextromethorphan Oral Liquid 10 milliLiter(s) Oral every 4 hours PRN Cough  ondansetron Injectable 4 milliGRAM(s) IV Push every 8 hours PRN Nausea and/or Vomiting      Allergies    No Known Allergies    Intolerances        LABS:                        9.5    12.46 )-----------( 85       ( 01 Jun 2024 05:55 )             29.8     06-01    141  |  112<H>  |  44<H>  ----------------------------<  141<H>  4.0   |  23  |  1.87<H>    Ca    8.6      01 Jun 2024 05:55  Phos  2.7     06-01  Mg     2.0     05-31    TPro  5.8<L>  /  Alb  2.2<L>  /  TBili  0.5  /  DBili  x   /  AST  21  /  ALT  18  /  AlkPhos  99  05-31    PT/INR - ( 30 May 2024 22:40 )   PT: 18.6 sec;   INR: 1.66 ratio         PTT - ( 30 May 2024 22:40 )  PTT:29.3 sec  Urinalysis Basic - ( 01 Jun 2024 05:55 )    Color: x / Appearance: x / SG: x / pH: x  Gluc: 141 mg/dL / Ketone: x  / Bili: x / Urobili: x   Blood: x / Protein: x / Nitrite: x   Leuk Esterase: x / RBC: x / WBC x   Sq Epi: x / Non Sq Epi: x / Bacteria: x        RADIOLOGY & ADDITIONAL TESTS:       Assessment:  · Assessment	  89-year-old female, from [facility], bedbound nonverbal at baseline, with PMH of 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  In the ED, patient is comfortable, NAD, febrile Tmax 101.1, tachycardic , rest of vitals are stable, patient was found to be leukocytosis WBC 14, Hb 11.1, hypokalemia K3.4, serum creatinine 2.46 (baseline 1.4), lactate 1.4, RVP negative.  EKG shows sinus tachycardia with premature ventricular complexes.  Patient received cefepime, vancomycin, 2L bolus, Tylenol in the ED.  Patient is admitted for sepsis.           Problem/Plan - 1:  ·  Problem: Sepsis.   ·  Plan: p/w fever, HR >90,  lactate 1.4  EKG shows sinus tachycardia with premature ventricular complexes  UA positive  S/p cefepime, vancomycin, 2L bolus, Tylenol in the ED  Sepsis likely in the setting of UTI  Follow-up x-ray  continue with IV fluids  f/u blood cultures, urine cultures  tylenol prn.     Problem/Plan - 2:  ·  Problem: UTI (urinary tract infection).   ·  Plan: p/w  sepsis  UA positive  f/u Urine culture  Start ABx - Rocephin 1g qd.     Problem/Plan - 3:  ·  Problem: TRAVIS (acute kidney injury).   ·  Plan: Pt w/ SCr 2.46on admission  Baseline SCr - 1.4  IVF for now  follow BMP daily.     Problem/Plan - 4:  ·  Problem: Hypokalemia.   ·  Plan: - K is  3.4 on admission,   - likely secondary to poor PO intake  - replacing KCl 40 mg x1  - Continue to monitor electrolytes.  - f/u BMP.     Problem/Plan - 5:  ·  Problem: Hypophosphatemia.   ·  Plan: - Phos 2.1 on admission,   - likely secondary to poor PO intake  - replacing kphos 15 x1  - Continue to monitor electrolytes.     Problem/Plan - 6:  ·  Problem: Stroke.   ·  Plan: history of CVA with right-sided residual weakness, aphasia, dysphagia, apraxia,  Patient is bedbound, nonverbal at baseline  Speech and swallow evaluation  Fall risk precautions  Bedrest.     Problem/Plan - 7:  ·  Problem: HTN (hypertension).   ·  Plan: h/o HTN on Lasix 20 mg QOD, losartan 100 mg daily, Norvasc 10 mg daily, spironolactone 25 mg daily  Monitor BP   hold BP meds in the setting of sepsis  May resume BP meds if blood pressure is elevated  DASH diet.     Problem/Plan - 8:  ·  Problem: HLD (hyperlipidemia).   ·  Plan: - hx of HLD  - pt takes  atorvastatin 20 mg at home  - c/w atorvastatin 20 mg  - Dash Diet.     Problem/Plan - 9:  ·  Problem: DM (diabetes mellitus).   ·  Plan: - Patient takes  Farxiga 10 mg daily, Actos 15 mg daily, insulin sliding scale at home  - will hold home medications  - will start sliding scale  - f/u HgA1c  - diabetic diet.     Problem/Plan - 10:  ·  Problem: DVT (deep venous thrombosis).   ·  Plan; history of DVT  Patient takes Eliquis 2.5 mg twice daily at home  Continue with home meds.     Problem/Plan - 11:  ·  Problem: Seizure.   ·  Plan: history of seizure  Patient takes Keppra 750 mg daily, 500 mg nightly   follow-up Keppra level  Continue with home meds.     Problem/Plan - 12:  ·  Problem: Prophylactic measure.   ·  Plan: DVT PPx Eliquis 2.5 mg twice daily.

## 2024-06-02 LAB
ANION GAP SERPL CALC-SCNC: 6 MMOL/L — SIGNIFICANT CHANGE UP (ref 5–17)
BASOPHILS # BLD AUTO: 0.03 K/UL — SIGNIFICANT CHANGE UP (ref 0–0.2)
BASOPHILS NFR BLD AUTO: 0.3 % — SIGNIFICANT CHANGE UP (ref 0–2)
BUN SERPL-MCNC: 35 MG/DL — HIGH (ref 7–18)
CALCIUM SERPL-MCNC: 8.4 MG/DL — SIGNIFICANT CHANGE UP (ref 8.4–10.5)
CHLORIDE SERPL-SCNC: 115 MMOL/L — HIGH (ref 96–108)
CO2 SERPL-SCNC: 23 MMOL/L — SIGNIFICANT CHANGE UP (ref 22–31)
CREAT SERPL-MCNC: 1.45 MG/DL — HIGH (ref 0.5–1.3)
EGFR: 33 ML/MIN/1.73M2 — LOW
EOSINOPHIL # BLD AUTO: 0.4 K/UL — SIGNIFICANT CHANGE UP (ref 0–0.5)
EOSINOPHIL NFR BLD AUTO: 4.2 % — SIGNIFICANT CHANGE UP (ref 0–6)
GLUCOSE BLDC GLUCOMTR-MCNC: 167 MG/DL — HIGH (ref 70–99)
GLUCOSE BLDC GLUCOMTR-MCNC: 186 MG/DL — HIGH (ref 70–99)
GLUCOSE BLDC GLUCOMTR-MCNC: 262 MG/DL — HIGH (ref 70–99)
GLUCOSE BLDC GLUCOMTR-MCNC: 281 MG/DL — HIGH (ref 70–99)
GLUCOSE BLDC GLUCOMTR-MCNC: 281 MG/DL — HIGH (ref 70–99)
GLUCOSE SERPL-MCNC: 173 MG/DL — HIGH (ref 70–99)
HCT VFR BLD CALC: 27 % — LOW (ref 34.5–45)
HGB BLD-MCNC: 8.9 G/DL — LOW (ref 11.5–15.5)
IMM GRANULOCYTES NFR BLD AUTO: 1.1 % — HIGH (ref 0–0.9)
LYMPHOCYTES # BLD AUTO: 1.16 K/UL — SIGNIFICANT CHANGE UP (ref 1–3.3)
LYMPHOCYTES # BLD AUTO: 12.2 % — LOW (ref 13–44)
MCHC RBC-ENTMCNC: 30.7 PG — SIGNIFICANT CHANGE UP (ref 27–34)
MCHC RBC-ENTMCNC: 33 GM/DL — SIGNIFICANT CHANGE UP (ref 32–36)
MCV RBC AUTO: 93.1 FL — SIGNIFICANT CHANGE UP (ref 80–100)
MONOCYTES # BLD AUTO: 1.41 K/UL — HIGH (ref 0–0.9)
MONOCYTES NFR BLD AUTO: 14.9 % — HIGH (ref 2–14)
NEUTROPHILS # BLD AUTO: 6.38 K/UL — SIGNIFICANT CHANGE UP (ref 1.8–7.4)
NEUTROPHILS NFR BLD AUTO: 67.3 % — SIGNIFICANT CHANGE UP (ref 43–77)
NRBC # BLD: 0 /100 WBCS — SIGNIFICANT CHANGE UP (ref 0–0)
PLATELET # BLD AUTO: 82 K/UL — LOW (ref 150–400)
POTASSIUM SERPL-MCNC: 3.7 MMOL/L — SIGNIFICANT CHANGE UP (ref 3.5–5.3)
POTASSIUM SERPL-SCNC: 3.7 MMOL/L — SIGNIFICANT CHANGE UP (ref 3.5–5.3)
RBC # BLD: 2.9 M/UL — LOW (ref 3.8–5.2)
RBC # FLD: 15.6 % — HIGH (ref 10.3–14.5)
SODIUM SERPL-SCNC: 144 MMOL/L — SIGNIFICANT CHANGE UP (ref 135–145)
WBC # BLD: 9.48 K/UL — SIGNIFICANT CHANGE UP (ref 3.8–10.5)
WBC # FLD AUTO: 9.48 K/UL — SIGNIFICANT CHANGE UP (ref 3.8–10.5)

## 2024-06-02 RX ADMIN — Medication 1: at 22:50

## 2024-06-02 RX ADMIN — Medication 3: at 12:12

## 2024-06-02 RX ADMIN — ATORVASTATIN CALCIUM 20 MILLIGRAM(S): 80 TABLET, FILM COATED ORAL at 21:04

## 2024-06-02 RX ADMIN — Medication 1: at 16:55

## 2024-06-02 RX ADMIN — CEFTRIAXONE 100 MILLIGRAM(S): 500 INJECTION, POWDER, FOR SOLUTION INTRAMUSCULAR; INTRAVENOUS at 08:03

## 2024-06-02 RX ADMIN — LEVETIRACETAM 750 MILLIGRAM(S): 250 TABLET, FILM COATED ORAL at 12:12

## 2024-06-02 RX ADMIN — APIXABAN 2.5 MILLIGRAM(S): 2.5 TABLET, FILM COATED ORAL at 05:04

## 2024-06-02 RX ADMIN — LEVETIRACETAM 500 MILLIGRAM(S): 250 TABLET, FILM COATED ORAL at 21:04

## 2024-06-02 RX ADMIN — Medication 1: at 08:03

## 2024-06-02 RX ADMIN — APIXABAN 2.5 MILLIGRAM(S): 2.5 TABLET, FILM COATED ORAL at 17:00

## 2024-06-02 NOTE — PROGRESS NOTE ADULT - SUBJECTIVE AND OBJECTIVE BOX
INTERVAL HPI/OVERNIGHT EVENTS:  Patient seen,doing better,no acute issues  VITAL SIGNS:  T(F): 98.8 (06-02-24 @ 04:59)  HR: 86 (06-02-24 @ 04:59)  BP: 137/61 (06-02-24 @ 04:59)  RR: 17 (06-02-24 @ 04:59)  SpO2: 95% (06-02-24 @ 04:59)  Wt(kg): --    PHYSICAL EXAM:  awake  Constitutional:  Eyes:  ENMT:perrla  Neck:  Respiratory:clear  Cardiovascular:s1s2,m-none  Gastrointestinal:soft,bs pos  Extremities:  Vascular:  Neurological:no focal deficit  Musculoskeletal:    MEDICATIONS  (STANDING):  apixaban 2.5 milliGRAM(s) Oral every 12 hours  atorvastatin 20 milliGRAM(s) Oral at bedtime  cefTRIAXone   IVPB 1000 milliGRAM(s) IV Intermittent every 24 hours  insulin lispro (ADMELOG) corrective regimen sliding scale   SubCutaneous three times a day before meals  insulin lispro (ADMELOG) corrective regimen sliding scale   SubCutaneous at bedtime  lactated ringers. 1000 milliLiter(s) (75 mL/Hr) IV Continuous <Continuous>  levETIRAcetam  Solution 500 milliGRAM(s) Oral at bedtime  levETIRAcetam  Solution 750 milliGRAM(s) Oral daily    MEDICATIONS  (PRN):  acetaminophen     Tablet .. 650 milliGRAM(s) Oral every 6 hours PRN Temp greater or equal to 38C (100.4F), Mild Pain (1 - 3)  aluminum hydroxide/magnesium hydroxide/simethicone Suspension 30 milliLiter(s) Oral every 4 hours PRN Dyspepsia  guaifenesin/dextromethorphan Oral Liquid 10 milliLiter(s) Oral every 4 hours PRN Cough  ondansetron Injectable 4 milliGRAM(s) IV Push every 8 hours PRN Nausea and/or Vomiting      Allergies    No Known Allergies    Intolerances        LABS:                        8.9    9.48  )-----------( 82       ( 02 Jun 2024 05:42 )             27.0     06-02    144  |  115<H>  |  35<H>  ----------------------------<  173<H>  3.7   |  23  |  1.45<H>    Ca    8.4      02 Jun 2024 05:42  Phos  2.7     06-01        Urinalysis Basic - ( 02 Jun 2024 05:42 )    Color: x / Appearance: x / SG: x / pH: x  Gluc: 173 mg/dL / Ketone: x  / Bili: x / Urobili: x   Blood: x / Protein: x / Nitrite: x   Leuk Esterase: x / RBC: x / WBC x   Sq Epi: x / Non Sq Epi: x / Bacteria: x        RADIOLOGY & ADDITIONAL TESTS:       Assessment:  · Assessment	  89-year-old female, from [facility], bedbound nonverbal at baseline, with PMH of 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  In the ED, patient is comfortable, NAD, febrile Tmax 101.1, tachycardic , rest of vitals are stable, patient was found to be leukocytosis WBC 14, Hb 11.1, hypokalemia K3.4, serum creatinine 2.46 (baseline 1.4), lactate 1.4, RVP negative.  EKG shows sinus tachycardia with premature ventricular complexes.  Patient received cefepime, vancomycin, 2L bolus, Tylenol in the ED.  Patient is admitted for sepsis.           Problem/Plan - 1:  ·  Problem: Sepsis.   EKG shows sinus tachycardia with premature ventricular complexes  UA positive  Sepsis likely in the setting of UTI  continue with IV fluids  f/u blood cultures, urine cultures  tylenol prn.     Problem/Plan - 2:  ·  Problem: UTI (urinary tract infection).   ·  Plan: p/w  sepsis  UA positive  f/u Urine culture  Start ABx - Rocephin 1g qd.     Problem/Plan - 3:  ·  Problem: TRAVIS (acute kidney injury).   ·  Plan: Pt w/ SCr 2.46on admission  Baseline SCr - 1.4  IVF for now  follow BMP daily.     Problem/Plan - 4:  ·  Problem: Hypokalemia-resolved   - likely secondary to poor PO intake  - Continue to monitor electrolytes.  - f/u BMP.     Problem/Plan - 5:  ·  Problem: Hypophosphatemia.   ·  Plan: - Phos 2.1 on admission,   - likely secondary to poor PO intake  - replacing kphos 15 x1  - Continue to monitor electrolytes.     Problem/Plan - 6:  ·  Problem: Stroke.   ·  Plan: history of CVA with right-sided residual weakness, aphasia, dysphagia, apraxia,  Patient is bedbound, nonverbal at baseline  Speech and swallow evaluation  Fall risk precautions  Bedrest.     Problem/Plan - 7:  ·  Problem: HTN (hypertension).   ·  Plan: h/o HTN on Lasix 20 mg QOD, losartan 100 mg daily, Norvasc 10 mg daily, spironolactone 25 mg daily   hold BP meds in the setting of sepsis  DASH diet.     Problem/Plan - 8:  ·  Problem: HLD (hyperlipidemia).   ·  Plan: - hx of HLD  - pt takes  atorvastatin 20 mg at home  - c/w atorvastatin 20 mg  - Dash Diet.     Problem/Plan - 9:  ·  Problem: DM (diabetes mellitus).   ·  Plan: - Patient takes  Farxiga 10 mg daily, Actos 15 mg daily, insulin sliding scale at home  - will hold home medications  - will start sliding scale  - f/u HgA1c  - diabetic diet.     Problem/Plan - 10:  ·  Problem: DVT (deep venous thrombosis).   ·  Plan; history of DVT  Patient takes Eliquis 2.5 mg twice daily at home  Continue with home meds.     Problem/Plan - 11:  ·  Problem: Seizure.   ·  Plan: history of seizure  Patient takes Keppra 750 mg daily, 500 mg nightly   follow-up Keppra level  Continue with home meds.     Problem/Plan - 12:  ·  Problem: Prophylactic measure.   ·  Plan: DVT PPx Eliquis 2.5 mg twice daily.

## 2024-06-03 DIAGNOSIS — Z75.8 OTHER PROBLEMS RELATED TO MEDICAL FACILITIES AND OTHER HEALTH CARE: ICD-10-CM

## 2024-06-03 LAB
-  AMOXICILLIN/CLAVULANIC ACID: SIGNIFICANT CHANGE UP
-  AMPICILLIN/SULBACTAM: SIGNIFICANT CHANGE UP
-  AMPICILLIN: SIGNIFICANT CHANGE UP
-  AZTREONAM: SIGNIFICANT CHANGE UP
-  CEFAZOLIN: SIGNIFICANT CHANGE UP
-  CEFEPIME: SIGNIFICANT CHANGE UP
-  CEFOXITIN: SIGNIFICANT CHANGE UP
-  CEFTRIAXONE: SIGNIFICANT CHANGE UP
-  CEFUROXIME: SIGNIFICANT CHANGE UP
-  CIPROFLOXACIN: SIGNIFICANT CHANGE UP
-  ERTAPENEM: SIGNIFICANT CHANGE UP
-  GENTAMICIN: SIGNIFICANT CHANGE UP
-  IMIPENEM: SIGNIFICANT CHANGE UP
-  LEVOFLOXACIN: SIGNIFICANT CHANGE UP
-  MEROPENEM: SIGNIFICANT CHANGE UP
-  NITROFURANTOIN: SIGNIFICANT CHANGE UP
-  PIPERACILLIN/TAZOBACTAM: SIGNIFICANT CHANGE UP
-  TOBRAMYCIN: SIGNIFICANT CHANGE UP
-  TRIMETHOPRIM/SULFAMETHOXAZOLE: SIGNIFICANT CHANGE UP
ANION GAP SERPL CALC-SCNC: 6 MMOL/L — SIGNIFICANT CHANGE UP (ref 5–17)
BASOPHILS # BLD AUTO: 0.06 K/UL — SIGNIFICANT CHANGE UP (ref 0–0.2)
BASOPHILS NFR BLD AUTO: 0.8 % — SIGNIFICANT CHANGE UP (ref 0–2)
BUN SERPL-MCNC: 29 MG/DL — HIGH (ref 7–18)
CALCIUM SERPL-MCNC: 8.7 MG/DL — SIGNIFICANT CHANGE UP (ref 8.4–10.5)
CHLORIDE SERPL-SCNC: 117 MMOL/L — HIGH (ref 96–108)
CO2 SERPL-SCNC: 24 MMOL/L — SIGNIFICANT CHANGE UP (ref 22–31)
CREAT SERPL-MCNC: 1.34 MG/DL — HIGH (ref 0.5–1.3)
CULTURE RESULTS: ABNORMAL
EGFR: 36 ML/MIN/1.73M2 — LOW
EOSINOPHIL # BLD AUTO: 0.37 K/UL — SIGNIFICANT CHANGE UP (ref 0–0.5)
EOSINOPHIL NFR BLD AUTO: 5.2 % — SIGNIFICANT CHANGE UP (ref 0–6)
GLUCOSE BLDC GLUCOMTR-MCNC: 190 MG/DL — HIGH (ref 70–99)
GLUCOSE BLDC GLUCOMTR-MCNC: 204 MG/DL — HIGH (ref 70–99)
GLUCOSE BLDC GLUCOMTR-MCNC: 270 MG/DL — HIGH (ref 70–99)
GLUCOSE BLDC GLUCOMTR-MCNC: 306 MG/DL — HIGH (ref 70–99)
GLUCOSE SERPL-MCNC: 167 MG/DL — HIGH (ref 70–99)
HCT VFR BLD CALC: 27 % — LOW (ref 34.5–45)
HGB BLD-MCNC: 8.9 G/DL — LOW (ref 11.5–15.5)
IMM GRANULOCYTES NFR BLD AUTO: 4.5 % — HIGH (ref 0–0.9)
LEGIONELLA AG UR QL: NEGATIVE — SIGNIFICANT CHANGE UP
LEVETIRACETAM SERPL-MCNC: 52.6 UG/ML — HIGH (ref 10–40)
LYMPHOCYTES # BLD AUTO: 1.28 K/UL — SIGNIFICANT CHANGE UP (ref 1–3.3)
LYMPHOCYTES # BLD AUTO: 18.1 % — SIGNIFICANT CHANGE UP (ref 13–44)
MCHC RBC-ENTMCNC: 30.9 PG — SIGNIFICANT CHANGE UP (ref 27–34)
MCHC RBC-ENTMCNC: 33 GM/DL — SIGNIFICANT CHANGE UP (ref 32–36)
MCV RBC AUTO: 93.8 FL — SIGNIFICANT CHANGE UP (ref 80–100)
METHOD TYPE: SIGNIFICANT CHANGE UP
MONOCYTES # BLD AUTO: 1.18 K/UL — HIGH (ref 0–0.9)
MONOCYTES NFR BLD AUTO: 16.7 % — HIGH (ref 2–14)
MRSA PCR RESULT.: SIGNIFICANT CHANGE UP
NEUTROPHILS # BLD AUTO: 3.87 K/UL — SIGNIFICANT CHANGE UP (ref 1.8–7.4)
NEUTROPHILS NFR BLD AUTO: 54.7 % — SIGNIFICANT CHANGE UP (ref 43–77)
NRBC # BLD: 0 /100 WBCS — SIGNIFICANT CHANGE UP (ref 0–0)
ORGANISM # SPEC MICROSCOPIC CNT: ABNORMAL
ORGANISM # SPEC MICROSCOPIC CNT: ABNORMAL
PLATELET # BLD AUTO: 87 K/UL — LOW (ref 150–400)
POTASSIUM SERPL-MCNC: 3.6 MMOL/L — SIGNIFICANT CHANGE UP (ref 3.5–5.3)
POTASSIUM SERPL-SCNC: 3.6 MMOL/L — SIGNIFICANT CHANGE UP (ref 3.5–5.3)
RBC # BLD: 2.88 M/UL — LOW (ref 3.8–5.2)
RBC # FLD: 15.8 % — HIGH (ref 10.3–14.5)
S AUREUS DNA NOSE QL NAA+PROBE: SIGNIFICANT CHANGE UP
S PNEUM AG UR QL: NEGATIVE — SIGNIFICANT CHANGE UP
SODIUM SERPL-SCNC: 147 MMOL/L — HIGH (ref 135–145)
SPECIMEN SOURCE: SIGNIFICANT CHANGE UP
WBC # BLD: 7.08 K/UL — SIGNIFICANT CHANGE UP (ref 3.8–10.5)
WBC # FLD AUTO: 7.08 K/UL — SIGNIFICANT CHANGE UP (ref 3.8–10.5)

## 2024-06-03 RX ADMIN — Medication 2: at 22:10

## 2024-06-03 RX ADMIN — APIXABAN 2.5 MILLIGRAM(S): 2.5 TABLET, FILM COATED ORAL at 05:18

## 2024-06-03 RX ADMIN — APIXABAN 2.5 MILLIGRAM(S): 2.5 TABLET, FILM COATED ORAL at 17:22

## 2024-06-03 RX ADMIN — Medication 2: at 07:59

## 2024-06-03 RX ADMIN — ATORVASTATIN CALCIUM 20 MILLIGRAM(S): 80 TABLET, FILM COATED ORAL at 22:11

## 2024-06-03 RX ADMIN — CEFTRIAXONE 100 MILLIGRAM(S): 500 INJECTION, POWDER, FOR SOLUTION INTRAMUSCULAR; INTRAVENOUS at 07:57

## 2024-06-03 RX ADMIN — LEVETIRACETAM 500 MILLIGRAM(S): 250 TABLET, FILM COATED ORAL at 22:10

## 2024-06-03 RX ADMIN — Medication 3: at 11:51

## 2024-06-03 RX ADMIN — Medication 1: at 17:22

## 2024-06-03 RX ADMIN — LEVETIRACETAM 750 MILLIGRAM(S): 250 TABLET, FILM COATED ORAL at 11:55

## 2024-06-03 NOTE — PROGRESS NOTE ADULT - PROBLEM SELECTOR PLAN 6
history of CVA with right-sided residual weakness, aphasia, dysphagia, apraxia,  Patient is bedbound, nonverbal at baseline  -Speech and swallow evaluation  -Fall risk precautions
Hx of HTN   Meds held in setting of sepsis   b/p acceptable   Monitor B/p and resume as needed

## 2024-06-03 NOTE — DISCHARGE NOTE PROVIDER - HOSPITAL COURSE
89-year-old female, from [facility], bedbound nonverbal at baseline, with PMH of 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  In the ED, patient is comfortable, NAD, febrile Tmax 101.1, tachycardic , rest of vitals are stable, patient was found to be leukocytosis WBC 14, Hb 11.1, hypokalemia K3.4, serum creatinine 2.46 (baseline 1.4), lactate 1.4, RVP negative.  EKG shows sinus tachycardia with premature ventricular complexes.  Patient received cefepime, vancomycin, 2L bolus, Tylenol in the ED.  Patient is admitted for sepsis.  # Sepsis.   Met sepsis criteria in ED   S/P IVF   C/W ceftriaxone   Urine cx and blood cx grew E. coli  repeat blood culture NGTD  ID Dr. White following.    # UTI (urinary tract infection).   ·  Plan: Met sepsis criteria  UA positive   Urine cx grew E. coli   C/W Ceftriaxone until 6/4 then d/c on ceftin 250mg PO BID x 9 days  ID Dr. White following.      # TRAVIS (acute kidney injury).   ·  Plan: Pt w/ SCr 2.46on admission, Baseline 1.4  likely in setting of dehydration iso decreased PO intake  S/P IVF   Cr 1.34 back to baseline  Trend BMP.    *****INCOMPLETE****** 89-year-old female, from [facility], bedbound nonverbal at baseline, with 'PMH of  of 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  In the ED, patient is comfortable, NAD, febrile Tmax 101.1, tachycardic , rest of vitals are stable, patient was found to be leukocytosis WBC 14, Hb 11.1, hypokalemia K3.4, serum creatinine 2.46 (baseline 1.4), lactate 1.4, RVP negative.  EKG shows sinus tachycardia with premature ventricular complexes.  Patient received cefepime, vancomycin, 2L bolus, Tylenol in the ED.  Patient is admitted for sepsis.  # Sepsis. ID Consulted Dr. White, found to be bacteremic E. Coli. repeated BLD Cx NGTD. treated with Rocephin. upon d/c patient to continue with Ceftin 250BID for 9 more days.   patient also noted with TRAVIS likely prerenal 2/2 dehydration from poor po intake. treated with IVF and sCr returned to baseline.     Patient d/c to [facility]. Given patient's clinical status and current hemodynamic stability decision was made to discharge.   Pt is stable for discharge per attending and is advised to f/u with PCP as out-patient.   Please refer to Pt's complete medical chart with documents for a full hospital course, for this is only a brief summary.

## 2024-06-03 NOTE — DISCHARGE NOTE PROVIDER - NSDCCPCAREPLAN_GEN_ALL_CORE_FT
PRINCIPAL DISCHARGE DIAGNOSIS  Diagnosis: Sepsis  Assessment and Plan of Treatment: You presented with fever and your white blood cell count was high. This condition is called sepsis and it's the body response to infection. A urine test was positive. The source of your infection was likely a urinary tract infection. Blood cultures and urine cultures grew bacteria E. Coli. We gave you IV fluid and IV antibiotic Ceftriaxone,and your symptoms improved. You were seen by infectious disease doctor.   Please continue with oral antibiotic Ceftin 250mg by mouth twice a day for 9 days for and follow up with your primary doctor in 1 week.      SECONDARY DISCHARGE DIAGNOSES  Diagnosis: UTI (urinary tract infection)  Assessment and Plan of Treatment: You came to the hospital with fever. Urine analysis was positive and urine culture showed bacteria E. coli. We treated with  IV antibiotic ceftriaxone and it's improving. Please take oral antibiotic Ceftin 250mg  by mouth for 9 days until complete. Please follow up with your primary care doctor in 2 weeks.    Diagnosis: TRAVIS (acute kidney injury)  Assessment and Plan of Treatment: You presented with Elevated Creatinine likely multifactorial due to  dehydration. You were treated with IV fluids and your kidney funtion improved. Please continue oral hydration as much as possible within the daily drinking limit of 2 L per day  Prevent acute kidney injury: Manage other health conditions such as diabetes, high blood pressure, or heart disease. These conditions increase your risk for acute kidney injury. Talk to your healthcare provider before you take over-the-counter-medicine. NSAIDs, stomach medicine, or laxatives may harm your kidneys and increase your risk for acute kidney injury. Tell healthcare providers you have had acute kidney injury before you get contrast liquid for an x-ray or CT scan.   Follow up with your healthcare provider     PRINCIPAL DISCHARGE DIAGNOSIS  Diagnosis: Sepsis  Assessment and Plan of Treatment: You presented with fever and your white blood cell count was high. This condition is called sepsis and it's the body response to infection. A urine test was positive for infection.   The source of your infection was likely a urinary tract infection.   Blood cultures and urine cultures grew bacteria E. Coli. We gave you IV fluid and IV antibiotic Ceftriaxone,and your symptoms improved. You were seen by infectious disease doctor.   Please continue with oral antibiotic Ceftin 250mg by mouth twice a day for 9 days for and follow up with your primary doctor in 1 week.      SECONDARY DISCHARGE DIAGNOSES  Diagnosis: UTI (urinary tract infection)  Assessment and Plan of Treatment: You came to the hospital with fever. Urine analysis was positive and urine culture showed bacteria E. coli. We treated with  IV antibiotic ceftriaxone and it's improving. Please take oral antibiotic Ceftin 250mg  by mouth for 9 days until complete. Please follow up with your primary care doctor in 2 weeks.    Diagnosis: TRAVIS (acute kidney injury)  Assessment and Plan of Treatment: You presented with Elevated Creatinine likely multifactorial due to  dehydration. You were treated with IV fluids and your kidney funtion improved. Please continue oral hydration as much as possible within the daily drinking limit of 2 L per day  Prevent acute kidney injury: Manage other health conditions such as diabetes, high blood pressure, or heart disease. These conditions increase your risk for acute kidney injury. Talk to your healthcare provider before you take over-the-counter-medicine. NSAIDs, stomach medicine, or laxatives may harm your kidneys and increase your risk for acute kidney injury. Tell healthcare providers you have had acute kidney injury before you get contrast liquid for an x-ray or CT scan.   Follow up with your healthcare provider    Diagnosis: HTN (hypertension)  Assessment and Plan of Treatment: Follow up with your medical doctor to establish long term blood pressure treatment goals.   Low salt diet  Activity as tolerated.  Take all medication as prescribed.  Follow up with your medical doctor for routine blood pressure monitoring at your next visit.  Notify your doctor if you have any of the following symptoms:   Dizziness, Lightheadedness, Blurry vision, Headache, Chest pain, Shortness of breath

## 2024-06-03 NOTE — PROGRESS NOTE ADULT - PROBLEM SELECTOR PLAN 9
- Patient takes  Farxiga 10 mg daily, Actos 15 mg daily, insulin sliding scale at home  - will hold home medications  - will start sliding scale  - f/u HgA1c  - diabetic diet
Hx DM   [facility] 7[facility]8  [facility]   Monitor FS and adjust as needed  C/W Con Carb diet

## 2024-06-03 NOTE — PROGRESS NOTE ADULT - PROBLEM SELECTOR PLAN 11
----- Message from Ivis Anderson sent at 12/1/2020  9:22 AM CST -----  Would like to consult with nurse regarding him having unbearable back pain. Wants to know which medication he can take safely with high bp. Please give a call back at [phone].    
Spoke with patient. States he will discuss appropriate medication during visit with Gordon Queen on 12/1/2020 at 1300.//ddw  
history of seizure  Patient takes Keppra 750 mg daily, 500 mg nightly   -follow-up Keppra level  -Continue with home meds
pt from [facility]   Will need one more day of Ceftriaxone then d/c on Ceftin 250mgs po BID x 9 days

## 2024-06-03 NOTE — PHARMACOTHERAPY INTERVENTION NOTE - COMMENTS
Blood culture result reviewed    Patient Name: CAROLYNE [facility]: [facility]  MRN: [id number]  Location: [address] [address]  Culture Date/Time: 2024-05-30 22:40  BCID: -  Escherichia coli  Detec  ---------------------------------  Organism  Escherichia coli (Prelim)    Organism  Escherichia coli (Final)    Method Type  AGA  -  Trimethoprim/Sulfamethoxazole  S <=0.5/9.5  -  Tobramycin  S <=2  -  Piperacillin/Tazobactam  S <=8  -  Meropenem  S <=1  -  Levofloxacin  R >4  -  Imipenem  S <=1  -  Gentamicin  S <=2  -  Ertapenem  S <=0.5  -  Ciprofloxacin  R >2  -  Ceftriaxone  S <=1  -  Cefoxitin  S <=8  -  Cefepime  S <=2  -  Cefazolin  S <=2  -  Aztreonam  S <=4  -  Ampicillin/Sulbactam  S <=4/2  -  Ampicillin  S <=8 These ampicillin results predict results for amoxicillin

## 2024-06-03 NOTE — PROGRESS NOTE ADULT - PROBLEM SELECTOR PLAN 8
- hx of HLD  - pt takes  atorvastatin 20 mg at home  - c/w atorvastatin 20 mg  - Dash Diet
Hx of CVA with right side residual  C/W Atorvastatin and Eliquis

## 2024-06-03 NOTE — DISCHARGE NOTE PROVIDER - NSDCMRMEDTOKEN_GEN_ALL_CORE_FT
Actos 15 mg oral tablet: 1 orally once a day  ascorbic acid 500 mg/5 mL oral liquid: 5 milliliter(s) orally once a day  atorvastatin 20 mg oral tablet: 1 orally once a day (at bedtime)  Eliquis 2.5 mg oral tablet: 1 tab(s) orally 2 times a day  Farxiga 10 mg oral tablet: 1 tab(s) orally once a day  Lasix 20 mg oral tablet: 1 tab(s) orally every other day  levETIRAcetam 100 mg/mL oral solution: 5 milliliter(s) orally once a day (at bedtime)  levETIRAcetam 100 mg/mL oral solution: 7.5 milliliter(s) orally once a day  losartan 100 mg oral tablet: 1 tab(s) orally once a day  nitroglycerin 0.2 mg/hr transdermal film, extended release: 1 patch transdermally once a day  Norvasc 10 mg oral tablet: 1 orally once a day  Nuedexta 20 mg-10 mg oral capsule: 1 cap(s) orally every 12 hours  nystatin 100,000 units/g topical powder: Apply topically to affected area 2 times a day as needed for  rash  spironolactone 25 mg oral tablet: 1 orally once a day   acetaminophen 325 mg oral tablet: 2 tab(s) orally every 6 hours As needed Temp greater or equal to 38C (100.4F), Mild Pain (1 - 3)  Actos 15 mg oral tablet: 1 orally once a day  apixaban 2.5 mg oral tablet: 1 tab(s) orally every 12 hours  ascorbic acid 500 mg/5 mL oral liquid: 5 milliliter(s) orally once a day  atorvastatin 20 mg oral tablet: 1 tab(s) orally once a day (at bedtime)  cefuroxime 250 mg oral tablet: 1 tab(s) orally 2 times a day  Farxiga 10 mg oral tablet: 1 tab(s) orally once a day  guaiFENesin-dextromethorphan 100 mg-10 mg/5 mL oral liquid: 10 milliliter(s) orally every 4 hours As needed Cough  Lasix 20 mg oral tablet: 1 tab(s) orally every other day  levETIRAcetam 100 mg/mL oral solution: 5 milliliter(s) orally once a day (at bedtime)  levETIRAcetam 100 mg/mL oral solution: 7.5 milliliter(s) orally once a day  losartan 100 mg oral tablet: 1 tab(s) orally once a day  nitroglycerin 0.2 mg/hr transdermal film, extended release: 1 patch transdermally once a day  Norvasc 10 mg oral tablet: 1 orally once a day  Nuedexta 20 mg-10 mg oral capsule: 1 cap(s) orally every 12 hours  nystatin 100,000 units/g topical powder: Apply topically to affected area 2 times a day as needed for  rash  spironolactone 25 mg oral tablet: 1 orally once a day

## 2024-06-03 NOTE — PROGRESS NOTE ADULT - SUBJECTIVE AND OBJECTIVE BOX
Patient is a 89y old  Female who presents with a chief complaint of Sepsis (02 Jun 2024 11:45)      INTERVAL HPI/OVERNIGHT EVENTS: no new complaints    MEDICATIONS  (STANDING):  apixaban 2.5 milliGRAM(s) Oral every 12 hours  atorvastatin 20 milliGRAM(s) Oral at bedtime  cefTRIAXone   IVPB 1000 milliGRAM(s) IV Intermittent every 24 hours  insulin lispro (ADMELOG) corrective regimen sliding scale   SubCutaneous three times a day before meals  insulin lispro (ADMELOG) corrective regimen sliding scale   SubCutaneous at bedtime  lactated ringers. 1000 milliLiter(s) (75 mL/Hr) IV Continuous <Continuous>  levETIRAcetam  Solution 500 milliGRAM(s) Oral at bedtime  levETIRAcetam  Solution 750 milliGRAM(s) Oral daily    MEDICATIONS  (PRN):  acetaminophen     Tablet .. 650 milliGRAM(s) Oral every 6 hours PRN Temp greater or equal to 38C (100.4F), Mild Pain (1 - 3)  aluminum hydroxide/magnesium hydroxide/simethicone Suspension 30 milliLiter(s) Oral every 4 hours PRN Dyspepsia  guaifenesin/dextromethorphan Oral Liquid 10 milliLiter(s) Oral every 4 hours PRN Cough  ondansetron Injectable 4 milliGRAM(s) IV Push every 8 hours PRN Nausea and/or Vomiting      __________________________________________________  REVIEW OF SYSTEMS:    CONSTITUTIONAL: No fever,   EYES: no acute visual disturbances  NECK: No pain or stiffness  RESPIRATORY: No cough; No shortness of breath  CARDIOVASCULAR: No chest pain, no palpitations  GASTROINTESTINAL: No pain. No nausea or vomiting; No diarrhea   NEUROLOGICAL: No headache or numbness, no tremors  MUSCULOSKELETAL: No joint pain, no muscle pain  GENITOURINARY: no dysuria, no frequency, no hesitancy  PSYCHIATRY: no depression , no anxiety  ALL OTHER  ROS negative      Vital Signs Last 24 Hrs  T(C): 36.6 (03 Jun 2024 05:32), Max: 37.8 (02 Jun 2024 20:58)  T(F): 97.9 (03 Jun 2024 05:32), Max: 100 (02 Jun 2024 20:58)  HR: 84 (03 Jun 2024 05:32) (84 - 94)  BP: 130/85 (03 Jun 2024 05:32) (130/85 - 169/60)  BP(mean): --  RR: 17 (03 Jun 2024 05:32) (17 - 18)  SpO2: 96% (03 Jun 2024 05:32) (92% - 96%)    Parameters below as of 03 Jun 2024 05:32  Patient On (Oxygen Delivery Method): room air        ________________________________________________  PHYSICAL EXAM:  GENERAL: NAD  HEENT: Normocephalic;  conjunctivae and sclerae clear; moist mucous membranes;   NECK : supple  CHEST/LUNG: Clear to auscultation bilaterally with good air entry   HEART: S1 S2  regular; no murmurs, gallops or rubs  ABDOMEN: Soft, Nontender, Nondistended; Bowel sounds present  EXTREMITIES: no cyanosis; no edema; no calf tenderness  SKIN: warm and dry; no rash  NERVOUS SYSTEM:  Awake and alert; Oriented  to place, person and time ; no new deficits    _________________________________________________  LABS:                        8.9    7.08  )-----------( 87       ( 03 Jun 2024 05:59 )             27.0     06-03    147<H>  |  117<H>  |  29<H>  ----------------------------<  167<H>  3.6   |  24  |  1.34<H>    Ca    8.7      03 Jun 2024 05:59        Urinalysis Basic - ( 03 Jun 2024 05:59 )    Color: x / Appearance: x / SG: x / pH: x  Gluc: 167 mg/dL / Ketone: x  / Bili: x / Urobili: x   Blood: x / Protein: x / Nitrite: x   Leuk Esterase: x / RBC: x / WBC x   Sq Epi: x / Non Sq Epi: x / Bacteria: x      CAPILLARY BLOOD GLUCOSE      POCT Blood Glucose.: 270 mg/dL (03 Jun 2024 11:46)  POCT Blood Glucose.: 204 mg/dL (03 Jun 2024 07:58)  POCT Blood Glucose.: 281 mg/dL (02 Jun 2024 22:45)  POCT Blood Glucose.: 281 mg/dL (02 Jun 2024 21:18)  POCT Blood Glucose.: 186 mg/dL (02 Jun 2024 16:43)      RADIOLOGY & ADDITIONAL TESTS:    Imaging Personally Reviewed:  YES/NO    Consultant(s) Notes Reviewed:   YES/ No    Care Discussed with Consultants :     Plan of care was discussed with patient and /or primary care giver; all questions and concerns were addressed and care was aligned with patient's wishes.       Patient is a 89y old  Female who presents with a chief complaint of Sepsis (02 Jun 2024 11:45)      INTERVAL HPI/OVERNIGHT EVENTS: pt seen at bedside with son present, pt has no new complaints    MEDICATIONS  (STANDING):  apixaban 2.5 milliGRAM(s) Oral every 12 hours  atorvastatin 20 milliGRAM(s) Oral at bedtime  cefTRIAXone   IVPB 1000 milliGRAM(s) IV Intermittent every 24 hours  insulin lispro (ADMELOG) corrective regimen sliding scale   SubCutaneous three times a day before meals  insulin lispro (ADMELOG) corrective regimen sliding scale   SubCutaneous at bedtime  lactated ringers. 1000 milliLiter(s) (75 mL/Hr) IV Continuous <Continuous>  levETIRAcetam  Solution 500 milliGRAM(s) Oral at bedtime  levETIRAcetam  Solution 750 milliGRAM(s) Oral daily    MEDICATIONS  (PRN):  acetaminophen     Tablet .. 650 milliGRAM(s) Oral every 6 hours PRN Temp greater or equal to 38C (100.4F), Mild Pain (1 - 3)  aluminum hydroxide/magnesium hydroxide/simethicone Suspension 30 milliLiter(s) Oral every 4 hours PRN Dyspepsia  guaifenesin/dextromethorphan Oral Liquid 10 milliLiter(s) Oral every 4 hours PRN Cough  ondansetron Injectable 4 milliGRAM(s) IV Push every 8 hours PRN Nausea and/or Vomiting    __________________________________________________  REVIEW OF SYSTEMS:    CONSTITUTIONAL: No fever,   EYES: no acute visual disturbances  NECK: No pain or stiffness  RESPIRATORY: No cough; No shortness of breath  CARDIOVASCULAR: No chest pain, no palpitations  GASTROINTESTINAL: No pain. No nausea or vomiting; No diarrhea   NEUROLOGICAL: No headache or numbness, no tremors  MUSCULOSKELETAL: No joint pain, no muscle pain  GENITOURINARY: no dysuria, no frequency, no hesitancy  PSYCHIATRY: no depression , no anxiety  ALL OTHER  ROS negative      Vital Signs Last 24 Hrs  T(C): 36.6 (03 Jun 2024 05:32), Max: 37.8 (02 Jun 2024 20:58)  T(F): 97.9 (03 Jun 2024 05:32), Max: 100 (02 Jun 2024 20:58)  HR: 84 (03 Jun 2024 05:32) (84 - 94)  BP: 130/85 (03 Jun 2024 05:32) (130/85 - 169/60)  BP(mean): --  RR: 17 (03 Jun 2024 05:32) (17 - 18)  SpO2: 96% (03 Jun 2024 05:32) (92% - 96%)    Parameters below as of 03 Jun 2024 05:32  Patient On (Oxygen Delivery Method): room air  ________________________________________________  PHYSICAL EXAM:  GENERAL: NAD  HEENT: Normocephalic;  conjunctivae and sclerae clear; moist mucous membranes;   NECK : supple  CHEST/LUNG: Clear to auscultation bilaterally with good air entry   HEART: S1 S2  regular; no murmurs, gallops or rubs  ABDOMEN: Soft, Nontender, Nondistended; Bowel sounds present  EXTREMITIES: no cyanosis; no edema; no calf tenderness  SKIN: warm and dry; no rash  NERVOUS SYSTEM:  Awake and alert; Oriented to place, person ; no new deficits  _________________________________________________  LABS:                        8.9    7.08  )-----------( 87       ( 03 Jun 2024 05:59 )             27.0     06-03    147<H>  |  117<H>  |  29<H>  ----------------------------<  167<H>  3.6   |  24  |  1.34<H>    Ca    8.7      03 Jun 2024 05:59    Urinalysis Basic - ( 03 Jun 2024 05:59 )    Color: x / Appearance: x / SG: x / pH: x  Gluc: 167 mg/dL / Ketone: x  / Bili: x / Urobili: x   Blood: x / Protein: x / Nitrite: x   Leuk Esterase: x / RBC: x / WBC x   Sq Epi: x / Non Sq Epi: x / Bacteria: x    CAPILLARY BLOOD GLUCOSE    POCT Blood Glucose.: 270 mg/dL (03 Jun 2024 11:46)  POCT Blood Glucose.: 204 mg/dL (03 Jun 2024 07:58)  POCT Blood Glucose.: 281 mg/dL (02 Jun 2024 22:45)  POCT Blood Glucose.: 281 mg/dL (02 Jun 2024 21:18)  POCT Blood Glucose.: 186 mg/dL (02 Jun 2024 16:43)    RADIOLOGY & ADDITIONAL TESTS:  < from: Xray Chest 1 View- PORTABLE-Urgent (05.30.24 @ 23:11) >  PROCEDURE DATE:  05/30/2024          INTERPRETATION:  INDICATION: Sepsis workup    Portable chest 11:01 PM    COMPARISON: 7/2/2023    FINDINGS:  Heart/Vascular: The heart size, mediastinum, hilum and aorta cannot be   adequately evaluated on this projection. Atherosclerotic change.  Pulmonary: Midline trachea. There is no focal infiltrate, congestion or   effusion.  Bones: There is no fracture.  Lines and catheter: None    Impression:    No acute pulmonary disease.    The visualized bowel gas pattern is nonspecific.    --- End of Report ---      Imaging Personally Reviewed:  YES    Consultant(s) Notes Reviewed:   YES    Care Discussed with Consultants : YES     Plan of care was discussed with patient and /or primary care giver; all questions and concerns were addressed and care was aligned with patient's wishes.       Patient is a 89y old  Female who presents with a chief complaint of Sepsis (02 Jun 2024 11:45)      INTERVAL HPI/OVERNIGHT EVENTS: pt seen at bedside with son present, pt has no new complaints    MEDICATIONS  (STANDING):  apixaban 2.5 milliGRAM(s) Oral every 12 hours  atorvastatin 20 milliGRAM(s) Oral at bedtime  cefTRIAXone   IVPB 1000 milliGRAM(s) IV Intermittent every 24 hours  insulin lispro (ADMELOG) corrective regimen sliding scale   SubCutaneous three times a day before meals  insulin lispro (ADMELOG) corrective regimen sliding scale   SubCutaneous at bedtime  lactated ringers. 1000 milliLiter(s) (75 mL/Hr) IV Continuous <Continuous>  levETIRAcetam  Solution 500 milliGRAM(s) Oral at bedtime  levETIRAcetam  Solution 750 milliGRAM(s) Oral daily    MEDICATIONS  (PRN):  acetaminophen     Tablet .. 650 milliGRAM(s) Oral every 6 hours PRN Temp greater or equal to 38C (100.4F), Mild Pain (1 - 3)  aluminum hydroxide/magnesium hydroxide/simethicone Suspension 30 milliLiter(s) Oral every 4 hours PRN Dyspepsia  guaifenesin/dextromethorphan Oral Liquid 10 milliLiter(s) Oral every 4 hours PRN Cough  ondansetron Injectable 4 milliGRAM(s) IV Push every 8 hours PRN Nausea and/or Vomiting    __________________________________________________  REVIEW OF SYSTEMS:    CONSTITUTIONAL: No fever,   EYES: no acute visual disturbances  NECK: No pain or stiffness  RESPIRATORY: No cough; No shortness of breath  CARDIOVASCULAR: No chest pain, no palpitations  GASTROINTESTINAL: No pain. No nausea or vomiting; No diarrhea   NEUROLOGICAL: No headache or numbness, no tremors  MUSCULOSKELETAL: No joint pain, no muscle pain  GENITOURINARY: no dysuria, no frequency, no hesitancy  PSYCHIATRY: no depression , no anxiety  ALL OTHER  ROS negative      Vital Signs Last 24 Hrs  T(C): 36.6 (03 Jun 2024 05:32), Max: 37.8 (02 Jun 2024 20:58)  T(F): 97.9 (03 Jun 2024 05:32), Max: 100 (02 Jun 2024 20:58)  HR: 84 (03 Jun 2024 05:32) (84 - 94)  BP: 130/85 (03 Jun 2024 05:32) (130/85 - 169/60)  BP(mean): --  RR: 17 (03 Jun 2024 05:32) (17 - 18)  SpO2: 96% (03 Jun 2024 05:32) (92% - 96%)    Parameters below as of 03 Jun 2024 05:32  Patient On (Oxygen Delivery Method): room air  ________________________________________________  PHYSICAL EXAM:  GENERAL: NAD  HEENT: Normocephalic;  conjunctivae and sclerae clear; moist mucous membranes;   NECK : supple  CHEST/LUNG: Clear to auscultation bilaterally with good air entry   HEART: S1 S2  regular; no murmurs, gallops or rubs  ABDOMEN: Soft, Nontender, Nondistended; Bowel sounds present  EXTREMITIES: no cyanosis; no edema; no calf tenderness  SKIN: warm and dry; no rash  NERVOUS SYSTEM:  Awake and alert; Oriented to place, person ; no new deficits  _________________________________________________  LABS:                        8.9    7.08  )-----------( 87       ( 03 Jun 2024 05:59 )             27.0     06-03    147<H>  |  117<H>  |  29<H>  ----------------------------<  167<H>  3.6   |  24  |  1.34<H>    Ca    8.7      03 Jun 2024 05:59    Urinalysis Basic - ( 03 Jun 2024 05:59 )    Color: x / Appearance: x / SG: x / pH: x  Gluc: 167 mg/dL / Ketone: x  / Bili: x / Urobili: x   Blood: x / Protein: x / Nitrite: x   Leuk Esterase: x / RBC: x / WBC x   Sq Epi: x / Non Sq Epi: x / Bacteria: x    CAPILLARY BLOOD GLUCOSE    POCT Blood Glucose.: 270 mg/dL (03 Jun 2024 11:46)  POCT Blood Glucose.: 204 mg/dL (03 Jun 2024 07:58)  POCT Blood Glucose.: 281 mg/dL (02 Jun 2024 22:45)  POCT Blood Glucose.: 281 mg/dL (02 Jun 2024 21:18)  POCT Blood Glucose.: 186 mg/dL (02 Jun 2024 16:43)    RADIOLOGY & ADDITIONAL TESTS:  < from: Xray Chest 1 View- PORTABLE-Urgent (05.30.24 @ 23:11) >  PROCEDURE DATE:  05/30/2024        INTERPRETATION:  INDICATION: Sepsis workup    Portable chest 11:01 PM    COMPARISON: 7/2/2023    FINDINGS:  Heart/Vascular: The heart size, mediastinum, hilum and aorta cannot be   adequately evaluated on this projection. Atherosclerotic change.  Pulmonary: Midline trachea. There is no focal infiltrate, congestion or   effusion.  Bones: There is no fracture.  Lines and catheter: None    Impression:    No acute pulmonary disease.    The visualized bowel gas pattern is nonspecific.    --- End of Report ---      Imaging Personally Reviewed:  YES    Consultant(s) Notes Reviewed:   YES    Care Discussed with Consultants : YES     Plan of care was discussed with patient and /or primary care giver; all questions and concerns were addressed and care was aligned with patient's wishes.       Patient is a 89y old  Female who presents with a chief complaint of Sepsis (02 Jun 2024 11:45)      INTERVAL HPI/OVERNIGHT EVENTS: pt seen at bedside with son present, pt has no new complaints    MEDICATIONS  (STANDING):  apixaban 2.5 milliGRAM(s) Oral every 12 hours  atorvastatin 20 milliGRAM(s) Oral at bedtime  cefTRIAXone   IVPB 1000 milliGRAM(s) IV Intermittent every 24 hours  insulin lispro (ADMELOG) corrective regimen sliding scale   SubCutaneous three times a day before meals  insulin lispro (ADMELOG) corrective regimen sliding scale   SubCutaneous at bedtime  lactated ringers. 1000 milliLiter(s) (75 mL/Hr) IV Continuous <Continuous>  levETIRAcetam  Solution 500 milliGRAM(s) Oral at bedtime  levETIRAcetam  Solution 750 milliGRAM(s) Oral daily    MEDICATIONS  (PRN):  acetaminophen     Tablet .. 650 milliGRAM(s) Oral every 6 hours PRN Temp greater or equal to 38C (100.4F), Mild Pain (1 - 3)  aluminum hydroxide/magnesium hydroxide/simethicone Suspension 30 milliLiter(s) Oral every 4 hours PRN Dyspepsia  guaifenesin/dextromethorphan Oral Liquid 10 milliLiter(s) Oral every 4 hours PRN Cough  ondansetron Injectable 4 milliGRAM(s) IV Push every 8 hours PRN Nausea and/or Vomiting    __________________________________________________  REVIEW OF SYSTEMS:    CONSTITUTIONAL: No fever, nonverbal baseline , appears comfortable denies pain, able to nod head for simple questions        Vital Signs Last 24 Hrs  T(C): 36.6 (03 Jun 2024 05:32), Max: 37.8 (02 Jun 2024 20:58)  T(F): 97.9 (03 Jun 2024 05:32), Max: 100 (02 Jun 2024 20:58)  HR: 84 (03 Jun 2024 05:32) (84 - 94)  BP: 130/85 (03 Jun 2024 05:32) (130/85 - 169/60)  BP(mean): --  RR: 17 (03 Jun 2024 05:32) (17 - 18)  SpO2: 96% (03 Jun 2024 05:32) (92% - 96%)    Parameters below as of 03 Jun 2024 05:32  Patient On (Oxygen Delivery Method): room air  ________________________________________________  PHYSICAL EXAM:  GENERAL: NAD  HEENT: Normocephalic;  conjunctivae and sclerae clear; moist mucous membranes;   NECK : supple  CHEST/LUNG: Clear to auscultation bilaterally with good air entry   HEART: S1 S2  regular; no murmurs, gallops or rubs  ABDOMEN: Soft, Nontender, Nondistended; Bowel sounds present  EXTREMITIES: no cyanosis; no edema; no calf tenderness  SKIN: warm and dry; no rash  NERVOUS SYSTEM:  Awake and alert; oriented to person; no new deficits  _________________________________________________  LABS:                        8.9    7.08  )-----------( 87       ( 03 Jun 2024 05:59 )             27.0     06-03    147<H>  |  117<H>  |  29<H>  ----------------------------<  167<H>  3.6   |  24  |  1.34<H>    Ca    8.7      03 Jun 2024 05:59    Urinalysis Basic - ( 03 Jun 2024 05:59 )    Color: x / Appearance: x / SG: x / pH: x  Gluc: 167 mg/dL / Ketone: x  / Bili: x / Urobili: x   Blood: x / Protein: x / Nitrite: x   Leuk Esterase: x / RBC: x / WBC x   Sq Epi: x / Non Sq Epi: x / Bacteria: x    CAPILLARY BLOOD GLUCOSE    POCT Blood Glucose.: 270 mg/dL (03 Jun 2024 11:46)  POCT Blood Glucose.: 204 mg/dL (03 Jun 2024 07:58)  POCT Blood Glucose.: 281 mg/dL (02 Jun 2024 22:45)  POCT Blood Glucose.: 281 mg/dL (02 Jun 2024 21:18)  POCT Blood Glucose.: 186 mg/dL (02 Jun 2024 16:43)    RADIOLOGY & ADDITIONAL TESTS:  < from: Xray Chest 1 View- PORTABLE-Urgent (05.30.24 @ 23:11) >  PROCEDURE DATE:  05/30/2024        INTERPRETATION:  INDICATION: Sepsis workup    Portable chest 11:01 PM    COMPARISON: 7/2/2023    FINDINGS:  Heart/Vascular: The heart size, mediastinum, hilum and aorta cannot be   adequately evaluated on this projection. Atherosclerotic change.  Pulmonary: Midline trachea. There is no focal infiltrate, congestion or   effusion.  Bones: There is no fracture.  Lines and catheter: None    Impression:    No acute pulmonary disease.    The visualized bowel gas pattern is nonspecific.    --- End of Report ---      Imaging Personally Reviewed:  YES    Consultant(s) Notes Reviewed:   YES    Care Discussed with Consultants : YES     Plan of care was discussed with patient and /or primary care giver; all questions and concerns were addressed and care was aligned with patient's wishes.

## 2024-06-03 NOTE — PROGRESS NOTE ADULT - SUBJECTIVE AND OBJECTIVE BOX
89y Female is under our care for UTI/Bacteremia. She is alert and verbal but has difficulty expressing, poor speech. She is sitting up in bed and feeding herself. Primifit in place with dark yellow urine in canister. No vomiting or diarrhea.     MEDS:  cefTRIAXone   IVPB 1000 milliGRAM(s) IV Intermittent every 24 hours    ALLERGIES: Allergies    No Known Allergies    Intolerances    REVIEW OF SYSTEMS:  [X  ] Not able to elicit    VITALS:  Vital Signs Last 24 Hrs  T(C): 36.6 (03 Jun 2024 05:32), Max: 37.8 (02 Jun 2024 20:58)  T(F): 97.9 (03 Jun 2024 05:32), Max: 100 (02 Jun 2024 20:58)  HR: 84 (03 Jun 2024 05:32) (84 - 94)  BP: 130/85 (03 Jun 2024 05:32) (130/85 - 169/60)  BP(mean): --  RR: 17 (03 Jun 2024 05:32) (17 - 18)  SpO2: 96% (03 Jun 2024 05:32) (92% - 96%)    Parameters below as of 03 Jun 2024 05:32  Patient On (Oxygen Delivery Method): room air    PHYSICAL EXAM:  HEENT: normocephalic, conjunctivae and sclerae clear; moist mucous membranes  Neck: supple no LN's   Respiratory: lungs clear no rales  Cardiovascular: S1 S2 reg no murmurs  Gastrointestinal: +BS with soft, nondistended abdomen; nontender  : Primifit in place with dark yellow urine in canister   Extremities: mild edema to right LE;  right-sided residual weakness  Skin: no rashes  Ortho: no erythema or joint swelling  Neuro: AAO x 1    LABS/DIAGNOSTIC TESTS:                        8.9    7.08  )-----------( 87       ( 03 Jun 2024 05:59 )             27.0     WBC Count: 7.08 K/uL (06-03 @ 05:59)  WBC Count: 9.48 K/uL (06-02 @ 05:42)  WBC Count: 12.46 K/uL (06-01 @ 05:55)  WBC Count: 15.08 K/uL (05-31 @ 04:00)  WBC Count: 14.01 K/uL (05-30 @ 22:40)    06-03    147<H>  |  117<H>  |  29<H>  ----------------------------<  167<H>  3.6   |  24  |  1.34<H>    Ca    8.7      03 Jun 2024 05:59    CULTURES:   .Blood Blood-Peripheral  06-01 @ 06:00   No growth at 48 Hours  --  --    .Blood Blood-Peripheral  06-01 @ 05:55   No growth at 48 Hours  --  --    Clean Catch Clean Catch (Midstream)  05-31 @ 04:40   10,000 - 49,000 CFU/mL Escherichia coli  Multiple Morphological Strains  --  Escherichia coli    .Blood Blood-Peripheral  05-30 @ 22:50   Growth in aerobic and anaerobic bottles: Escherichia coli  See previous culture [id number]  --    Growth in anaerobic bottle: Gram Negative Rods  Growth in aerobic bottle: Gram Negative Rods    .Blood Blood-Peripheral  05-30 @ 22:40   Growth in aerobic and anaerobic bottles: Escherichia coli  Direct identification is available within approximately 3-5  hours either by Blood Panel Multiplexed PCR or Direct  MALDI-TOF. Details: [email or web address]  --  Blood Culture PCR  Escherichia coli    RADIOLOGY:  no new studies

## 2024-06-03 NOTE — PROGRESS NOTE ADULT - PROBLEM SELECTOR PLAN 3
Pt w/ SCr 2.46on admission  Baseline SCr - 1.4  -IVF for now  -follow BMP daily  -If Scr worsens, will consult nephrology
Pt w/ SCr 2.46on admission, Baseline 1.4  likely in setting of dehydration iso decreased PO intake  S/P IVF   Cr 1.34 back to baseline  Trend BMP

## 2024-06-03 NOTE — PROGRESS NOTE ADULT - PROBLEM SELECTOR PLAN 10
dvt ppx eliquis
history of DVT  Patient takes Eliquis 2.5 mg twice daily at home  -Continue with home meds

## 2024-06-03 NOTE — PROGRESS NOTE ADULT - PROBLEM SELECTOR PLAN 7
h/o HTN on Lasix 20 mg QOD, losartan 100 mg daily, Norvasc 10 mg daily, spironolactone 25 mg daily  -Monitor BP  -hold BP meds in the setting of sepsis  -May resume BP meds if blood pressure is elevated  -DASH diet
Hx of DANIEL   C/W Atorvastatin

## 2024-06-03 NOTE — PROGRESS NOTE ADULT - ASSESSMENT
89-year-old female, from 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  In the ED, patient is comfortable, NAD, febrile Tmax 101.1, tachycardic , rest of vitals are stable, patient was found to be leukocytosis WBC 14, Hb 11.1, hypokalemia K3.4, serum creatinine 2.46 (baseline 1.4), lactate 1.4, RVP negative.  EKG shows sinus tachycardia with premature ventricular complexes.  Patient received cefepime, vancomycin, 2L bolus, Tylenol in the ED.  Patient is admitted for sepsis. 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  In the ED, patient is comfortable, NAD, febrile Tmax 101.1, tachycardic , rest of vitals are stable, patient was found to be leukocytosis WBC 14, Hb 11.1, hypokalemia K3.4, serum creatinine 2.46 (baseline 1.4), lactate 1.4, RVP negative.  EKG shows sinus tachycardia with premature ventricular complexes.  Patient received cefepime, vancomycin, 2L bolus, Tylenol in the ED.  Patient is admitted for sepsis.

## 2024-06-03 NOTE — PROGRESS NOTE ADULT - PROBLEM SELECTOR PLAN 5
- Phos 2.1 on admission,   - likely secondary to poor PO intake  - replacing kphos 15 x1  - Continue to monitor electrolytes.
Hx of Seizure   C/W Keppra solution   Seizure precautions

## 2024-06-03 NOTE — PROGRESS NOTE ADULT - PROBLEM SELECTOR PLAN 1
p/w fever, HR >90,  lactate 1.4  EKG shows sinus tachycardia with premature ventricular complexes  Sepsis likely in the setting of UTI  -UA positive  -S/p cefepime, vancomycin, 2L bolus, Tylenol in the ED  -Follow-up CXR official read   -continue with IV fluids and Rocephin   -f/u cultures  -tylenol prn  -ID dr. White consulted    ####E coli bacteremia  -see above  -repeat Bcx 6/1  -ID f/u and c/w Ctx for now.
P/w fevers and elevated WBC   Met sepsis criteria in ED   S/P IVF   C/W ceftriaxone   Urine cx and blood cx grew E. coli  repeat blood culture NGTD  ID Dr. White following

## 2024-06-03 NOTE — PROGRESS NOTE ADULT - PROBLEM SELECTOR PLAN 2
p/w  sepsis  UA positive  f/u Urine culture  Start ABx - Rocephin 1g qd  ID f/u
Met sepsis criteria  UA positive   Urine cx grew E. coli   C/W Ceftriaxone until 6/4 then d/c on ceftin   ID Dr. White following

## 2024-06-03 NOTE — PROGRESS NOTE ADULT - ASSESSMENT
Sepsis  UTI  Bacteremia - with gram neg rods  Fevers- low grade  Leukocytosis- resolved    Plan:   ·	Continue Rocephin 1 gm iv q24hrs  ·	f/u repeat blood cultures pending final result Sepsis  UTI  Bacteremia - with E. Coli   Fevers- low grade  Leukocytosis- resolved    Plan:   ·	Continue Rocephin 1 gm iv q24hrs  ·	can DC tomorrow on Ceftin 250mgs po BID x 9 days  ·	reconsult prn

## 2024-06-03 NOTE — DISCHARGE NOTE PROVIDER - CARE PROVIDER_API CALL
Geovany Dennis  Internal Medicine  [address] [address] [place] [address], [address] [place] [id number]  Phone: [phone]  Fax: [phone]  Established Patient  Follow Up Time: 1 week

## 2024-06-04 VITALS
SYSTOLIC BLOOD PRESSURE: 168 MMHG | HEART RATE: 87 BPM | OXYGEN SATURATION: 96 % | TEMPERATURE: 99 F | DIASTOLIC BLOOD PRESSURE: 79 MMHG | RESPIRATION RATE: 18 BRPM

## 2024-06-04 LAB
ANION GAP SERPL CALC-SCNC: 4 MMOL/L — LOW (ref 5–17)
BUN SERPL-MCNC: 27 MG/DL — HIGH (ref 7–18)
CALCIUM SERPL-MCNC: 8.6 MG/DL — SIGNIFICANT CHANGE UP (ref 8.4–10.5)
CHLORIDE SERPL-SCNC: 115 MMOL/L — HIGH (ref 96–108)
CO2 SERPL-SCNC: 26 MMOL/L — SIGNIFICANT CHANGE UP (ref 22–31)
CREAT SERPL-MCNC: 1.27 MG/DL — SIGNIFICANT CHANGE UP (ref 0.5–1.3)
EGFR: 38 ML/MIN/1.73M2 — LOW
GLUCOSE BLDC GLUCOMTR-MCNC: 194 MG/DL — HIGH (ref 70–99)
GLUCOSE BLDC GLUCOMTR-MCNC: 285 MG/DL — HIGH (ref 70–99)
GLUCOSE SERPL-MCNC: 198 MG/DL — HIGH (ref 70–99)
HCT VFR BLD CALC: 28.5 % — LOW (ref 34.5–45)
HGB BLD-MCNC: 9.2 G/DL — LOW (ref 11.5–15.5)
MCHC RBC-ENTMCNC: 30.8 PG — SIGNIFICANT CHANGE UP (ref 27–34)
MCHC RBC-ENTMCNC: 32.3 GM/DL — SIGNIFICANT CHANGE UP (ref 32–36)
MCV RBC AUTO: 95.3 FL — SIGNIFICANT CHANGE UP (ref 80–100)
NRBC # BLD: 0 /100 WBCS — SIGNIFICANT CHANGE UP (ref 0–0)
PLATELET # BLD AUTO: 107 K/UL — LOW (ref 150–400)
POTASSIUM SERPL-MCNC: 3.8 MMOL/L — SIGNIFICANT CHANGE UP (ref 3.5–5.3)
POTASSIUM SERPL-SCNC: 3.8 MMOL/L — SIGNIFICANT CHANGE UP (ref 3.5–5.3)
RBC # BLD: 2.99 M/UL — LOW (ref 3.8–5.2)
RBC # FLD: 15.6 % — HIGH (ref 10.3–14.5)
SODIUM SERPL-SCNC: 145 MMOL/L — SIGNIFICANT CHANGE UP (ref 135–145)
WBC # BLD: 6.8 K/UL — SIGNIFICANT CHANGE UP (ref 3.8–10.5)
WBC # FLD AUTO: 6.8 K/UL — SIGNIFICANT CHANGE UP (ref 3.8–10.5)

## 2024-06-04 PROCEDURE — 83605 ASSAY OF LACTIC ACID: CPT

## 2024-06-04 PROCEDURE — 87449 NOS EACH ORGANISM AG IA: CPT

## 2024-06-04 PROCEDURE — 36415 COLL VENOUS BLD VENIPUNCTURE: CPT

## 2024-06-04 PROCEDURE — 87389 HIV-1 AG W/HIV-1&-2 AB AG IA: CPT

## 2024-06-04 PROCEDURE — 96375 TX/PRO/DX INJ NEW DRUG ADDON: CPT

## 2024-06-04 PROCEDURE — 93005 ELECTROCARDIOGRAM TRACING: CPT

## 2024-06-04 PROCEDURE — 85652 RBC SED RATE AUTOMATED: CPT

## 2024-06-04 PROCEDURE — 83735 ASSAY OF MAGNESIUM: CPT

## 2024-06-04 PROCEDURE — 87040 BLOOD CULTURE FOR BACTERIA: CPT

## 2024-06-04 PROCEDURE — 87150 DNA/RNA AMPLIFIED PROBE: CPT

## 2024-06-04 PROCEDURE — 87899 AGENT NOS ASSAY W/OPTIC: CPT

## 2024-06-04 PROCEDURE — 84100 ASSAY OF PHOSPHORUS: CPT

## 2024-06-04 PROCEDURE — 92610 EVALUATE SWALLOWING FUNCTION: CPT

## 2024-06-04 PROCEDURE — 81001 URINALYSIS AUTO W/SCOPE: CPT

## 2024-06-04 PROCEDURE — 85610 PROTHROMBIN TIME: CPT

## 2024-06-04 PROCEDURE — 80053 COMPREHEN METABOLIC PANEL: CPT

## 2024-06-04 PROCEDURE — 83036 HEMOGLOBIN GLYCOSYLATED A1C: CPT

## 2024-06-04 PROCEDURE — 80177 DRUG SCRN QUAN LEVETIRACETAM: CPT

## 2024-06-04 PROCEDURE — 96374 THER/PROPH/DIAG INJ IV PUSH: CPT

## 2024-06-04 PROCEDURE — 86803 HEPATITIS C AB TEST: CPT

## 2024-06-04 PROCEDURE — 87640 STAPH A DNA AMP PROBE: CPT

## 2024-06-04 PROCEDURE — 80048 BASIC METABOLIC PNL TOTAL CA: CPT

## 2024-06-04 PROCEDURE — 82962 GLUCOSE BLOOD TEST: CPT

## 2024-06-04 PROCEDURE — 87641 MR-STAPH DNA AMP PROBE: CPT

## 2024-06-04 PROCEDURE — 87077 CULTURE AEROBIC IDENTIFY: CPT

## 2024-06-04 PROCEDURE — 85025 COMPLETE CBC W/AUTO DIFF WBC: CPT

## 2024-06-04 PROCEDURE — 99285 EMERGENCY DEPT VISIT HI MDM: CPT

## 2024-06-04 PROCEDURE — 85730 THROMBOPLASTIN TIME PARTIAL: CPT

## 2024-06-04 PROCEDURE — 86140 C-REACTIVE PROTEIN: CPT

## 2024-06-04 PROCEDURE — 86738 MYCOPLASMA ANTIBODY: CPT

## 2024-06-04 PROCEDURE — 87086 URINE CULTURE/COLONY COUNT: CPT

## 2024-06-04 PROCEDURE — 85027 COMPLETE CBC AUTOMATED: CPT

## 2024-06-04 PROCEDURE — 87186 SC STD MICRODIL/AGAR DIL: CPT

## 2024-06-04 PROCEDURE — 71045 X-RAY EXAM CHEST 1 VIEW: CPT

## 2024-06-04 PROCEDURE — 0225U NFCT DS DNA&RNA 21 SARSCOV2: CPT

## 2024-06-04 RX ORDER — LEVETIRACETAM 250 MG/1
7.5 TABLET, FILM COATED ORAL
Refills: 0 | DISCHARGE

## 2024-06-04 RX ORDER — LEVETIRACETAM 250 MG/1
5 TABLET, FILM COATED ORAL
Refills: 0 | DISCHARGE

## 2024-06-04 RX ORDER — LEVETIRACETAM 250 MG/1
7.5 TABLET, FILM COATED ORAL
Qty: 0 | Refills: 0 | DISCHARGE
Start: 2024-06-04

## 2024-06-04 RX ORDER — ATORVASTATIN CALCIUM 80 MG/1
1 TABLET, FILM COATED ORAL
Qty: 0 | Refills: 0 | DISCHARGE
Start: 2024-06-04

## 2024-06-04 RX ORDER — ATORVASTATIN CALCIUM 80 MG/1
1 TABLET, FILM COATED ORAL
Refills: 0 | DISCHARGE

## 2024-06-04 RX ORDER — APIXABAN 2.5 MG/1
1 TABLET, FILM COATED ORAL
Refills: 0 | DISCHARGE

## 2024-06-04 RX ORDER — APIXABAN 2.5 MG/1
1 TABLET, FILM COATED ORAL
Qty: 0 | Refills: 0 | DISCHARGE
Start: 2024-06-04

## 2024-06-04 RX ORDER — CEFUROXIME AXETIL 250 MG
1 TABLET ORAL
Qty: 18 | Refills: 0
Start: 2024-06-04 | End: 2024-06-12

## 2024-06-04 RX ORDER — GUAIFENESIN/DEXTROMETHORPHAN 600MG-30MG
10 TABLET, EXTENDED RELEASE 12 HR ORAL
Qty: 0 | Refills: 0 | DISCHARGE
Start: 2024-06-04

## 2024-06-04 RX ORDER — ACETAMINOPHEN 500 MG
2 TABLET ORAL
Qty: 0 | Refills: 0 | DISCHARGE
Start: 2024-06-04

## 2024-06-04 RX ORDER — LEVETIRACETAM 250 MG/1
5 TABLET, FILM COATED ORAL
Qty: 0 | Refills: 0 | DISCHARGE
Start: 2024-06-04

## 2024-06-04 RX ADMIN — Medication 3: at 11:54

## 2024-06-04 RX ADMIN — APIXABAN 2.5 MILLIGRAM(S): 2.5 TABLET, FILM COATED ORAL at 05:55

## 2024-06-04 RX ADMIN — CEFTRIAXONE 100 MILLIGRAM(S): 500 INJECTION, POWDER, FOR SOLUTION INTRAMUSCULAR; INTRAVENOUS at 07:58

## 2024-06-04 RX ADMIN — Medication 1: at 07:49

## 2024-06-04 RX ADMIN — LEVETIRACETAM 750 MILLIGRAM(S): 250 TABLET, FILM COATED ORAL at 11:54

## 2024-06-04 NOTE — GOALS OF CARE CONVERSATION - ADVANCED CARE PLANNING - CONVERSATION DETAILS
Son, Hari Kel at bedside with patient. Son states he had a GOC conversation on 5/31 with ACP regarding Diagnosis, Prognosis and MOLST. Son reiterated that he would like Patient to continue to be FULL CODE. MOLST in paper chart, completed on 5/31.

## 2024-06-04 NOTE — DISCHARGE NOTE NURSING/CASE MANAGEMENT/SOCIAL WORK - PATIENT PORTAL LINK FT
You can access the FollowMyHealth Patient Portal offered by [facility] by registering at the following website: [email or web address]. By joining [employer]’s FollowMyHealth portal, you will also be able to view your health information using other applications (apps) compatible with our system.

## 2024-06-04 NOTE — DISCHARGE NOTE NURSING/CASE MANAGEMENT/SOCIAL WORK - NSDCPEFALRISK_GEN_ALL_CORE
For information on Fall & Injury Prevention, visit: [email or web address] OR  [email or web address] OR  https://www.cdc.gov/steadi/patient.html

## 2024-06-06 LAB
CULTURE RESULTS: SIGNIFICANT CHANGE UP
CULTURE RESULTS: SIGNIFICANT CHANGE UP
SPECIMEN SOURCE: SIGNIFICANT CHANGE UP
SPECIMEN SOURCE: SIGNIFICANT CHANGE UP

## 2024-06-27 NOTE — PATIENT PROFILE ADULT - PRO INTERPRETER NEED 2
----- Message from Kayley Peres MA sent at 6/27/2024  8:33 AM EDT -----  Regarding: RE: OPEN ACCESS  MyChart message sent to the Patient to contact the office  ----- Message -----  From: Kayley Peres MA  Sent: 6/24/2024   9:01 AM EDT  To: Do [id number] [facility] Clerical  Subject: RE: OPEN ACCESS                                  Left message on machine to return call     ----- Message -----  From: Kayley Peres MA  Sent: 6/17/2024  12:00 AM EDT  To: Do [id number] [facility] Clerical  Subject: RE: OPEN ACCESS                                  PATIENT IS DUE FOR REPEAT COLONOSCOPY AFTER JULY 26. WILL CALL AND SCHEDULE ONCE THAT SCHEDULE IS AVAILABLE  ----- Message -----  From: Sri Lopez RN  Sent: 5/13/2024   2:36 PM EDT  To: Do [id number] [facility] Clerical  Subject: OPEN ACCESS                                      OA Dr. Bautista           English

## 2024-07-02 NOTE — DIETITIAN INITIAL EVALUATION ADULT - WEIGHT FOR BMI (KG)
Pt is present on the milieu and social with select peers with intervals of napping. He consumed 100% of breakfast and refused lunch. Took his medications without incidence. Norvasc held due to blood pressure parameters. Nicorette gum given at 0809. No behavioral issues.   66.1

## 2024-10-17 ENCOUNTER — INPATIENT (INPATIENT)
Facility: HOSPITAL | Age: 89
LOS: 5 days | Discharge: NURSING, REHAB OR LONG-TERM CARE | DRG: 872 | End: 2024-10-23
Attending: INTERNAL MEDICINE | Admitting: INTERNAL MEDICINE
Payer: MEDICARE

## 2024-10-17 VITALS
HEART RATE: 105 BPM | WEIGHT: 130.07 LBS | SYSTOLIC BLOOD PRESSURE: 107 MMHG | TEMPERATURE: 99 F | HEIGHT: 63 IN | DIASTOLIC BLOOD PRESSURE: 61 MMHG | OXYGEN SATURATION: 99 % | RESPIRATION RATE: 17 BRPM

## 2024-10-17 DIAGNOSIS — D72.829 ELEVATED WHITE BLOOD CELL COUNT, UNSPECIFIED: ICD-10-CM

## 2024-10-17 DIAGNOSIS — N39.0 URINARY TRACT INFECTION, SITE NOT SPECIFIED: ICD-10-CM

## 2024-10-17 DIAGNOSIS — N17.9 ACUTE KIDNEY FAILURE, UNSPECIFIED: ICD-10-CM

## 2024-10-17 DIAGNOSIS — D64.9 ANEMIA, UNSPECIFIED: ICD-10-CM

## 2024-10-17 DIAGNOSIS — Z87.898 PERSONAL HISTORY OF OTHER SPECIFIED CONDITIONS: ICD-10-CM

## 2024-10-17 DIAGNOSIS — I63.9 CEREBRAL INFARCTION, UNSPECIFIED: ICD-10-CM

## 2024-10-17 DIAGNOSIS — I10 ESSENTIAL (PRIMARY) HYPERTENSION: ICD-10-CM

## 2024-10-17 DIAGNOSIS — R19.7 DIARRHEA, UNSPECIFIED: ICD-10-CM

## 2024-10-17 DIAGNOSIS — Z29.9 ENCOUNTER FOR PROPHYLACTIC MEASURES, UNSPECIFIED: ICD-10-CM

## 2024-10-17 DIAGNOSIS — E78.5 HYPERLIPIDEMIA, UNSPECIFIED: ICD-10-CM

## 2024-10-17 DIAGNOSIS — A41.9 SEPSIS, UNSPECIFIED ORGANISM: ICD-10-CM

## 2024-10-17 DIAGNOSIS — E11.9 TYPE 2 DIABETES MELLITUS WITHOUT COMPLICATIONS: ICD-10-CM

## 2024-10-17 PROBLEM — R56.9 UNSPECIFIED CONVULSIONS: Chronic | Status: ACTIVE | Noted: 2024-05-31

## 2024-10-17 PROBLEM — M62.81 MUSCLE WEAKNESS (GENERALIZED): Chronic | Status: ACTIVE | Noted: 2024-05-31

## 2024-10-17 PROBLEM — R47.01 APHASIA: Chronic | Status: ACTIVE | Noted: 2024-05-31

## 2024-10-17 PROBLEM — I82.409 ACUTE EMBOLISM AND THROMBOSIS OF UNSPECIFIED DEEP VEINS OF UNSPECIFIED LOWER EXTREMITY: Chronic | Status: ACTIVE | Noted: 2024-05-31

## 2024-10-17 PROBLEM — R48.2 APRAXIA: Chronic | Status: ACTIVE | Noted: 2024-05-31

## 2024-10-17 PROBLEM — R13.10 DYSPHAGIA, UNSPECIFIED: Chronic | Status: ACTIVE | Noted: 2024-05-31

## 2024-10-17 LAB
ALBUMIN SERPL ELPH-MCNC: 2.2 G/DL — LOW (ref 3.5–5)
ALP SERPL-CCNC: 117 U/L — SIGNIFICANT CHANGE UP (ref 40–120)
ALT FLD-CCNC: 21 U/L DA — SIGNIFICANT CHANGE UP (ref 10–60)
ANION GAP SERPL CALC-SCNC: 9 MMOL/L — SIGNIFICANT CHANGE UP (ref 5–17)
ANION GAP SERPL CALC-SCNC: 9 MMOL/L — SIGNIFICANT CHANGE UP (ref 5–17)
APPEARANCE UR: ABNORMAL
APTT BLD: 29.8 SEC — SIGNIFICANT CHANGE UP (ref 24.5–35.6)
AST SERPL-CCNC: 42 U/L — HIGH (ref 10–40)
BASOPHILS # BLD AUTO: 0 K/UL — SIGNIFICANT CHANGE UP (ref 0–0.2)
BASOPHILS NFR BLD AUTO: 0 % — SIGNIFICANT CHANGE UP (ref 0–2)
BILIRUB SERPL-MCNC: 0.3 MG/DL — SIGNIFICANT CHANGE UP (ref 0.2–1.2)
BILIRUB UR-MCNC: NEGATIVE — SIGNIFICANT CHANGE UP
BUN SERPL-MCNC: 58 MG/DL — HIGH (ref 7–18)
BUN SERPL-MCNC: 61 MG/DL — HIGH (ref 7–18)
CALCIUM SERPL-MCNC: 8.1 MG/DL — LOW (ref 8.4–10.5)
CALCIUM SERPL-MCNC: 9.1 MG/DL — SIGNIFICANT CHANGE UP (ref 8.4–10.5)
CHLORIDE SERPL-SCNC: 107 MMOL/L — SIGNIFICANT CHANGE UP (ref 96–108)
CHLORIDE SERPL-SCNC: 108 MMOL/L — SIGNIFICANT CHANGE UP (ref 96–108)
CO2 SERPL-SCNC: 21 MMOL/L — LOW (ref 22–31)
CO2 SERPL-SCNC: 22 MMOL/L — SIGNIFICANT CHANGE UP (ref 22–31)
COLOR SPEC: SIGNIFICANT CHANGE UP
CREAT SERPL-MCNC: 3.15 MG/DL — HIGH (ref 0.5–1.3)
CREAT SERPL-MCNC: 3.44 MG/DL — HIGH (ref 0.5–1.3)
DIFF PNL FLD: ABNORMAL
EGFR: 11 ML/MIN/1.73M2 — LOW
EGFR: 13 ML/MIN/1.73M2 — LOW
EOSINOPHIL # BLD AUTO: 0 K/UL — SIGNIFICANT CHANGE UP (ref 0–0.5)
EOSINOPHIL NFR BLD AUTO: 0 % — SIGNIFICANT CHANGE UP (ref 0–6)
FLUAV AG NPH QL: SIGNIFICANT CHANGE UP
FLUBV AG NPH QL: SIGNIFICANT CHANGE UP
GLUCOSE SERPL-MCNC: 185 MG/DL — HIGH (ref 70–99)
GLUCOSE SERPL-MCNC: 236 MG/DL — HIGH (ref 70–99)
GLUCOSE UR QL: 250 MG/DL
HCT VFR BLD CALC: 26.3 % — LOW (ref 34.5–45)
HCT VFR BLD CALC: 28.7 % — LOW (ref 34.5–45)
HGB BLD-MCNC: 8.6 G/DL — LOW (ref 11.5–15.5)
HGB BLD-MCNC: 9.1 G/DL — LOW (ref 11.5–15.5)
INR BLD: 1.79 RATIO — HIGH (ref 0.85–1.16)
KETONES UR-MCNC: ABNORMAL MG/DL
LACTATE SERPL-SCNC: 1.7 MMOL/L — SIGNIFICANT CHANGE UP (ref 0.7–2)
LACTATE SERPL-SCNC: 4.5 MMOL/L — CRITICAL HIGH (ref 0.7–2)
LEUKOCYTE ESTERASE UR-ACNC: ABNORMAL
LYMPHOCYTES # BLD AUTO: 0.88 K/UL — LOW (ref 1–3.3)
LYMPHOCYTES # BLD AUTO: 2 % — LOW (ref 13–44)
MCHC RBC-ENTMCNC: 29.2 PG — SIGNIFICANT CHANGE UP (ref 27–34)
MCHC RBC-ENTMCNC: 29.7 PG — SIGNIFICANT CHANGE UP (ref 27–34)
MCHC RBC-ENTMCNC: 31.7 GM/DL — LOW (ref 32–36)
MCHC RBC-ENTMCNC: 32.7 GM/DL — SIGNIFICANT CHANGE UP (ref 32–36)
MCV RBC AUTO: 90.7 FL — SIGNIFICANT CHANGE UP (ref 80–100)
MCV RBC AUTO: 92 FL — SIGNIFICANT CHANGE UP (ref 80–100)
MONOCYTES # BLD AUTO: 1.77 K/UL — HIGH (ref 0–0.9)
MONOCYTES NFR BLD AUTO: 4 % — SIGNIFICANT CHANGE UP (ref 2–14)
NEUTROPHILS # BLD AUTO: 41.53 K/UL — HIGH (ref 1.8–7.4)
NEUTROPHILS NFR BLD AUTO: 94 % — HIGH (ref 43–77)
NITRITE UR-MCNC: NEGATIVE — SIGNIFICANT CHANGE UP
NRBC # BLD: 0 /100 WBCS — SIGNIFICANT CHANGE UP (ref 0–0)
PH UR: 5 — SIGNIFICANT CHANGE UP (ref 5–8)
PLATELET # BLD AUTO: 158 K/UL — SIGNIFICANT CHANGE UP (ref 150–400)
PLATELET # BLD AUTO: 177 K/UL — SIGNIFICANT CHANGE UP (ref 150–400)
POTASSIUM SERPL-MCNC: 4 MMOL/L — SIGNIFICANT CHANGE UP (ref 3.5–5.3)
POTASSIUM SERPL-MCNC: 4.9 MMOL/L — SIGNIFICANT CHANGE UP (ref 3.5–5.3)
POTASSIUM SERPL-SCNC: 4 MMOL/L — SIGNIFICANT CHANGE UP (ref 3.5–5.3)
POTASSIUM SERPL-SCNC: 4.9 MMOL/L — SIGNIFICANT CHANGE UP (ref 3.5–5.3)
PROT SERPL-MCNC: 7.2 G/DL — SIGNIFICANT CHANGE UP (ref 6–8.3)
PROT UR-MCNC: 100 MG/DL
PROTHROM AB SERPL-ACNC: 20.6 SEC — HIGH (ref 9.9–13.4)
RBC # BLD: 2.9 M/UL — LOW (ref 3.8–5.2)
RBC # BLD: 3.12 M/UL — LOW (ref 3.8–5.2)
RBC # FLD: 16.9 % — HIGH (ref 10.3–14.5)
RBC # FLD: 17.1 % — HIGH (ref 10.3–14.5)
SARS-COV-2 RNA SPEC QL NAA+PROBE: SIGNIFICANT CHANGE UP
SODIUM SERPL-SCNC: 138 MMOL/L — SIGNIFICANT CHANGE UP (ref 135–145)
SODIUM SERPL-SCNC: 138 MMOL/L — SIGNIFICANT CHANGE UP (ref 135–145)
SP GR SPEC: 1.02 — SIGNIFICANT CHANGE UP (ref 1–1.03)
UROBILINOGEN FLD QL: 0.2 MG/DL — SIGNIFICANT CHANGE UP (ref 0.2–1)
WBC # BLD: 44.18 K/UL — CRITICAL HIGH (ref 3.8–10.5)
WBC # BLD: 44.39 K/UL — CRITICAL HIGH (ref 3.8–10.5)
WBC # FLD AUTO: 44.18 K/UL — CRITICAL HIGH (ref 3.8–10.5)
WBC # FLD AUTO: 44.39 K/UL — CRITICAL HIGH (ref 3.8–10.5)

## 2024-10-17 PROCEDURE — 99285 EMERGENCY DEPT VISIT HI MDM: CPT

## 2024-10-17 PROCEDURE — 93010 ELECTROCARDIOGRAM REPORT: CPT

## 2024-10-17 PROCEDURE — 71045 X-RAY EXAM CHEST 1 VIEW: CPT | Mod: 26

## 2024-10-17 PROCEDURE — 71250 CT THORAX DX C-: CPT | Mod: 26,MC

## 2024-10-17 PROCEDURE — 74176 CT ABD & PELVIS W/O CONTRAST: CPT | Mod: 26,MC

## 2024-10-17 RX ORDER — SODIUM CHLORIDE 9 MG/ML
1000 INJECTION, SOLUTION INTRAMUSCULAR; INTRAVENOUS; SUBCUTANEOUS ONCE
Refills: 0 | Status: COMPLETED | OUTPATIENT
Start: 2024-10-17 | End: 2024-10-17

## 2024-10-17 RX ORDER — SODIUM CHLORIDE 9 MG/ML
1850 INJECTION, SOLUTION INTRAMUSCULAR; INTRAVENOUS; SUBCUTANEOUS ONCE
Refills: 0 | Status: COMPLETED | OUTPATIENT
Start: 2024-10-17 | End: 2024-10-17

## 2024-10-17 RX ORDER — CEFEPIME 2 G/1
1000 INJECTION, POWDER, FOR SOLUTION INTRAVENOUS EVERY 12 HOURS
Refills: 0 | Status: DISCONTINUED | OUTPATIENT
Start: 2024-10-17 | End: 2024-10-17

## 2024-10-17 RX ORDER — MELATONIN 5 MG
3 TABLET ORAL AT BEDTIME
Refills: 0 | Status: DISCONTINUED | OUTPATIENT
Start: 2024-10-17 | End: 2024-10-23

## 2024-10-17 RX ORDER — MAGNESIUM, ALUMINUM HYDROXIDE 200-200 MG
30 TABLET,CHEWABLE ORAL EVERY 4 HOURS
Refills: 0 | Status: DISCONTINUED | OUTPATIENT
Start: 2024-10-17 | End: 2024-10-23

## 2024-10-17 RX ORDER — CEFEPIME 2 G/1
INJECTION, POWDER, FOR SOLUTION INTRAVENOUS
Refills: 0 | Status: DISCONTINUED | OUTPATIENT
Start: 2024-10-17 | End: 2024-10-17

## 2024-10-17 RX ORDER — APIXABAN 5 MG/1
2.5 TABLET, FILM COATED ORAL EVERY 12 HOURS
Refills: 0 | Status: DISCONTINUED | OUTPATIENT
Start: 2024-10-17 | End: 2024-10-23

## 2024-10-17 RX ORDER — ACETAMINOPHEN 500 MG
1000 TABLET ORAL ONCE
Refills: 0 | Status: COMPLETED | OUTPATIENT
Start: 2024-10-17 | End: 2024-10-17

## 2024-10-17 RX ORDER — VANCOMYCIN HYDROCHLORIDE 50 MG/ML
900 KIT ORAL EVERY 12 HOURS
Refills: 0 | Status: DISCONTINUED | OUTPATIENT
Start: 2024-10-17 | End: 2024-10-17

## 2024-10-17 RX ORDER — FERROUS SULFATE 325(65) MG
325 TABLET ORAL DAILY
Refills: 0 | Status: DISCONTINUED | OUTPATIENT
Start: 2024-10-17 | End: 2024-10-23

## 2024-10-17 RX ORDER — ONDANSETRON HYDROCHLORIDE 2 MG/ML
4 INJECTION, SOLUTION INTRAMUSCULAR; INTRAVENOUS EVERY 8 HOURS
Refills: 0 | Status: DISCONTINUED | OUTPATIENT
Start: 2024-10-17 | End: 2024-10-23

## 2024-10-17 RX ORDER — CEFEPIME 2 G/1
250 INJECTION, POWDER, FOR SOLUTION INTRAVENOUS EVERY 24 HOURS
Refills: 0 | Status: DISCONTINUED | OUTPATIENT
Start: 2024-10-18 | End: 2024-10-19

## 2024-10-17 RX ORDER — VANCOMYCIN HYDROCHLORIDE 50 MG/ML
1000 KIT ORAL ONCE
Refills: 0 | Status: COMPLETED | OUTPATIENT
Start: 2024-10-17 | End: 2024-10-17

## 2024-10-17 RX ORDER — CEFEPIME 2 G/1
2000 INJECTION, POWDER, FOR SOLUTION INTRAVENOUS ONCE
Refills: 0 | Status: COMPLETED | OUTPATIENT
Start: 2024-10-17 | End: 2024-10-17

## 2024-10-17 RX ORDER — LEVETIRACETAM 500 MG/1
500 TABLET, FILM COATED ORAL
Refills: 0 | Status: DISCONTINUED | OUTPATIENT
Start: 2024-10-17 | End: 2024-10-23

## 2024-10-17 RX ORDER — CEFEPIME 2 G/1
250 INJECTION, POWDER, FOR SOLUTION INTRAVENOUS ONCE
Refills: 0 | Status: COMPLETED | OUTPATIENT
Start: 2024-10-17 | End: 2024-10-17

## 2024-10-17 RX ORDER — VANCOMYCIN HYDROCHLORIDE 50 MG/ML
1000 KIT ORAL ONCE
Refills: 0 | Status: DISCONTINUED | OUTPATIENT
Start: 2024-10-17 | End: 2024-10-17

## 2024-10-17 RX ORDER — VANCOMYCIN HYDROCHLORIDE 50 MG/ML
750 KIT ORAL EVERY 12 HOURS
Refills: 0 | Status: DISCONTINUED | OUTPATIENT
Start: 2024-10-17 | End: 2024-10-17

## 2024-10-17 RX ORDER — VANCOMYCIN HYDROCHLORIDE 50 MG/ML
KIT ORAL
Refills: 0 | Status: DISCONTINUED | OUTPATIENT
Start: 2024-10-17 | End: 2024-10-17

## 2024-10-17 RX ORDER — GLUCAGON INJECTION, SOLUTION 1 MG/.2ML
1 INJECTION, SOLUTION SUBCUTANEOUS ONCE
Refills: 0 | Status: DISCONTINUED | OUTPATIENT
Start: 2024-10-17 | End: 2024-10-23

## 2024-10-17 RX ORDER — INSULIN LISPRO 100/ML
VIAL (ML) SUBCUTANEOUS
Refills: 0 | Status: DISCONTINUED | OUTPATIENT
Start: 2024-10-17 | End: 2024-10-23

## 2024-10-17 RX ORDER — ACETAMINOPHEN 500 MG
650 TABLET ORAL EVERY 6 HOURS
Refills: 0 | Status: DISCONTINUED | OUTPATIENT
Start: 2024-10-17 | End: 2024-10-23

## 2024-10-17 RX ADMIN — Medication 1000 MILLIGRAM(S): at 14:03

## 2024-10-17 RX ADMIN — SODIUM CHLORIDE 1850 MILLILITER(S): 9 INJECTION, SOLUTION INTRAMUSCULAR; INTRAVENOUS; SUBCUTANEOUS at 13:30

## 2024-10-17 RX ADMIN — Medication 1000 MILLIGRAM(S): at 13:18

## 2024-10-17 RX ADMIN — CEFEPIME 2000 MILLIGRAM(S): 2 INJECTION, POWDER, FOR SOLUTION INTRAVENOUS at 12:33

## 2024-10-17 RX ADMIN — Medication 65 MILLILITER(S): at 18:37

## 2024-10-17 RX ADMIN — CEFEPIME 100 MILLIGRAM(S): 2 INJECTION, POWDER, FOR SOLUTION INTRAVENOUS at 12:03

## 2024-10-17 RX ADMIN — VANCOMYCIN HYDROCHLORIDE 1000 MILLIGRAM(S): KIT at 13:03

## 2024-10-17 RX ADMIN — SODIUM CHLORIDE 1850 MILLILITER(S): 9 INJECTION, SOLUTION INTRAMUSCULAR; INTRAVENOUS; SUBCUTANEOUS at 12:02

## 2024-10-17 RX ADMIN — Medication 400 MILLIGRAM(S): at 13:03

## 2024-10-17 RX ADMIN — SODIUM CHLORIDE 1000 MILLILITER(S): 9 INJECTION, SOLUTION INTRAMUSCULAR; INTRAVENOUS; SUBCUTANEOUS at 18:10

## 2024-10-17 RX ADMIN — VANCOMYCIN HYDROCHLORIDE 250 MILLIGRAM(S): KIT at 12:03

## 2024-10-17 NOTE — PATIENT PROFILE ADULT - FALL HARM RISK - HARM RISK INTERVENTIONS
Communicate Risk of Fall with Harm to all staff/Monitor for mental status changes/Reinforce activity limits and safety measures with patient and family/Tailored Fall Risk Interventions/Use of alarms - bed, chair and/or voice tab/Visual Cue: Yellow wristband and red socks/Bed in lowest position, wheels locked, appropriate side rails in place/Call bell, personal items and telephone in reach/Instruct patient to call for assistance before getting out of bed or chair/Non-slip footwear when patient is out of bed/Lyle to call system/Physically safe environment - no spills, clutter or unnecessary equipment/Purposeful Proactive Rounding/Room/bathroom lighting operational, light cord in reach/Unable to comprehend

## 2024-10-17 NOTE — CHART NOTE - NSCHARTNOTEFT_GEN_A_CORE
EVENT: Contacted by pharmacist regrading Cefepime dose    OBJECTIVE:  Vital Signs Last 24 Hrs  T(C): 36.4 (17 Oct 2024 21:21), Max: 38.6 (17 Oct 2024 11:40)  T(F): 97.5 (17 Oct 2024 21:21), Max: 101.5 (17 Oct 2024 11:40)  HR: 86 (17 Oct 2024 21:21) (84 - 105)  BP: 104/61 (17 Oct 2024 21:21) (95/50 - 107/61)  BP(mean): --  RR: 18 (17 Oct 2024 21:21) (17 - 18)  SpO2: 96% (17 Oct 2024 21:21) (96% - 100%)    Parameters below as of 17 Oct 2024 21:21  Patient On (Oxygen Delivery Method): nasal cannula        FOCUSED PHYSICAL EXAM:    LABS:                        8.6    44.39 )-----------( 158      ( 17 Oct 2024 17:40 )             26.3     10-17    138  |  108  |  61[H]  ----------------------------<  236[H]  4.0   |  21[L]  |  3.15[H]    Ca    8.1[L]      17 Oct 2024 17:40    TPro  7.2  /  Alb  2.2[L]  /  TBili  0.3  /  DBili  x   /  AST  42[H]  /  ALT  21  /  AlkPhos  117  10-17      EKG:   IMGAGING:    ASSESSMENT:  HPI:  89-year-old female bedbound , non  verbal  from [facility] and . PMH CVA w rt sided weakness, aphasia, bedbound, DM. HTN, HLD, CAD.Sz.  presents with fever since last night and low blood pressures.  As per the patient's son she did have an episode of vomiting yesterday but not today.  History is limited secondary to the patient's history of aphasia. Patients daughter bedside explains pt was found to be hypoxic to 80s on RA prior t o arrival.   Family denies an y recent changes when they saw the patient yesterday. Endorse she appeared fatigued and was not eating well.       In the ER pt met SIRS with lactate > 4 , hypotensive to 91/ 54, code sepsis called   s/p Vanc , cefepime   patient was found to have loose watery stool on exam, + UA   Labs s/o WBC 44K , Hb 8.6, BUn / Cr 61/ 3.15, covid , flu negative     CTAP   Evaluation of the solid organs, vascular structures and GI   tract is limited without oral and IV contrast.    Enlarged polycystic kidneys. Minimal bilateral hydroureter. Limited   evaluation for solid renal mass.    Enlarging 7.0 x 6.9 cm multicystic pancreatic mass concerning for   malignancy. Pancreatic duct distention redemonstrated.    Ill-defined uterus inseparable from the anterior rectum, the sigmoid   colon and pelvic small bowel loops. Cannot exclude uterine, sigmoid or   rectal malignancy. Mildly distended sigmoid colon may be related to   pseudoobstruction or early or partial bowel obstruction.    Probable proctitis, either infectious or inflammatory.    If possible, follow-up CT with oral and IV contrast would be of   diagnostic benefit.    CXR-No active disease   (17 Oct 2024 17:26)      PLAN:   MEDICATIONS  (STANDING):  apixaban 2.5 milliGRAM(s) Oral every 12 hours  atorvastatin 20 milliGRAM(s) Oral at bedtime  cefepime   IVPB      dextrose 5%. 1000 milliLiter(s) (100 mL/Hr) IV Continuous <Continuous>  dextrose 5%. 1000 milliLiter(s) (50 mL/Hr) IV Continuous <Continuous>  dextrose 50% Injectable 25 Gram(s) IV Push once  dextrose 50% Injectable 25 Gram(s) IV Push once  dextrose 50% Injectable 12.5 Gram(s) IV Push once  ferrous    sulfate 325 milliGRAM(s) Oral daily  glucagon  Injectable 1 milliGRAM(s) IntraMuscular once  insulin lispro (ADMELOG) corrective regimen sliding scale   SubCutaneous three times a day before meals  lactated ringers. 1000 milliLiter(s) (65 mL/Hr) IV Continuous <Continuous>  levETIRAcetam  Solution 500 milliGRAM(s) Oral two times a day    MEDICATIONS  (PRN):  acetaminophen     Tablet .. 650 milliGRAM(s) Oral every 6 hours PRN Temp greater or equal to 38C (100.4F), Mild Pain (1 - 3)  aluminum hydroxide/magnesium hydroxide/simethicone Suspension 30 milliLiter(s) Oral every 4 hours PRN Dyspepsia  dextrose Oral Gel 15 Gram(s) Oral once PRN Blood Glucose LESS THAN 70 milliGRAM(s)/deciliter  melatonin 3 milliGRAM(s) Oral at bedtime PRN Insomnia  ondansetron Injectable 4 milliGRAM(s) IV Push every 8 hours PRN Nausea and/or Vomiting    FOLLOW UP / RESULT: EVENT: BP follow up. Contacted by pharmacist regrading Cefepime dose    BRIEF HPI: 89-year-old female bedbound , non  verbal  from [facility] and . PMH CVA w rt sided weakness, aphasia, bedbound, DM. HTN, HDL, CAD, Sz.  presents with fever since last night and low blood pressures.  As per the patient's son she did have an episode of vomiting yesterday but not today.  History is limited secondary to the patient's history of aphasia. Patients daughter bedside explains pt was found to be hypoxic to 80s on RA prior t o arrival.   Admitted for severe sepsis likely 2/2 UTI vs proctocolitis F/u cx, ID Dr. White. R/o c-diff    OBJECTIVE:  Vital Signs Last 24 Hrs  T(C): 36.4 (17 Oct 2024 21:21), Max: 38.6 (17 Oct 2024 11:40)  T(F): 97.5 (17 Oct 2024 21:21), Max: 101.5 (17 Oct 2024 11:40)  HR: 86 (17 Oct 2024 21:21) (84 - 105)  BP: 104/61 (17 Oct 2024 21:21) (95/50 - 107/61)  BP(mean): --  RR: 18 (17 Oct 2024 21:21) (17 - 18)  SpO2: 96% (17 Oct 2024 21:21) (96% - 100%)    Parameters below as of 17 Oct 2024 21:21  Patient On (Oxygen Delivery Method): nasal cannula    FOCUSED PHYSICAL EXAM:  GEN: Elderly female mostly non interactive, following simple commands    LABS:                        8.6    44.39 )-----------( 158      ( 17 Oct 2024 17:40 )             26.3     10-17    138  |  108  |  61[H]  ----------------------------<  236[H]  4.0   |  21[L]  |  3.15[H]    Ca    8.1[L]      17 Oct 2024 17:40    TPro  7.2  /  Alb  2.2[L]  /  TBili  0.3  /  DBili  x   /  AST  42[H]  /  ALT  21  /  Alk Phos  117  10-17    PLAN:   Cefepime IVPB 250 collin GRAM(s) IV Intermittent every 24 hours  Continue contact precaution    FOLLOW UP / RESULT: effect of medication, c-diff results

## 2024-10-17 NOTE — H&P ADULT - PROBLEM SELECTOR PLAN 1
Meets SIRS Criteria  Source likely _________  Lactate _______    -IV fluids: _______  -IV Abx : __________  -If lactate is elevated, please trend until Normalization q3hrs  -f/u cx  -as below Meets SIRS Criteria  Source likely UTI vs proctocolitis   Lactate > 4 ( severe sepsis)   s/p IVF sepsis protocol with improvement in BP ( MAP 71)   -IV fluids: c/w LR   -IV Abx :c/w vanc , cefepime   -If lactate is elevated, please trend until Normalization q3hrs  -f/u cx  -as below  IDx Dr. White consulted

## 2024-10-17 NOTE — H&P ADULT - PROBLEM SELECTOR PLAN 3
watery diarrhea on exam   CTAP findings as above    - IVF, replace electrolytes   - stool Cx , C diff, GI PCR, stool count   - c/w vanc , cefepime  as above

## 2024-10-17 NOTE — H&P ADULT - PROBLEM SELECTOR PLAN 10
SCD , given anemia - pt takes rosuvastatin 20 mg at home  - c/w atorvastatin 40 mg   - f/u lipid profile  - Dash Diet

## 2024-10-17 NOTE — H&P ADULT - PROBLEM SELECTOR PLAN 11
A1c 7.8  C/W [place] [facility]   Monitor FS and adjust as needed  C/W Con Carb diet.  Adjust insulin as indicated  FS ACHS q6 while NPO

## 2024-10-17 NOTE — H&P ADULT - PROBLEM SELECTOR PLAN 2
U/a pos  Pt symptomatic with dysuria  No CVA tenderness, low suspicion for pyelo  WBC ____    -CTX empirically  -f/u cx U/a pos  Pt symptomatic with dysuria  No CVA tenderness, low suspicion for pyelo  WBC 44K     -c/w vanc and cefepime   -f/u cx

## 2024-10-17 NOTE — H&P ADULT - PROBLEM SELECTOR PLAN 8
- pt takes rosuvastatin 20 mg at home  - c/w atorvastatin 40 mg   - f/u lipid profile  - Dash Diet Hb8.6   Trend Hb   Hx of chronic anemia   Baseline 8.9-9.2     c/w monitoring   holding off chemical DVT PPx for now   consider resuming if stable

## 2024-10-17 NOTE — H&P ADULT - PROBLEM SELECTOR PLAN 9
h/o DM on ____  will  hold oral dm meds while inpatient   f/u A1c  c/w lantus + lispro + sliding scale  Adjust insulin as indicated  FS ACHS q6 while NPO h/o HTN on  Monitor BP  c/w home meds -  Lower MAP 20-25% in next 2-4 hrs

## 2024-10-17 NOTE — H&P ADULT - HISTORY OF PRESENT ILLNESS
89-year-old female bedbound , non  verbal  from [facility] and . PMH CVA w rt sided weakness, aphasia, bedbound, DM. HTN, HLD, CAD.Sz.  presents with fever since last night and low blood pressures.  As per the patient's son she did have an episode of vomiting yesterday but not today.  History is limited secondary to the patient's history of aphasia. Patients daughter bedside explains pt was found to be hypoxic to 80s on RA prior t o arrival.   Family denies an y recent changes when they saw the patient yesterday. Endorse she appeared fatigued and was not eating well.       In the ER pt met SIRS with lactate > 4 , hypotensive to 91/ 54, code sepsis called   s/p Vanc , cefepime   patient was found to have loose watery stool on exam, + UA   Labs s/o WBC 44K , Hb 8.6, BUn / Cr 61/ 3.15, covid , flu negative     CTAP   Evaluation of the solid organs, vascular structures and GI   tract is limited without oral and IV contrast.    Enlarged polycystic kidneys. Minimal bilateral hydroureter. Limited   evaluation for solid renal mass.    Enlarging 7.0 x 6.9 cm multicystic pancreatic mass concerning for   malignancy. Pancreatic duct distention redemonstrated.    Ill-defined uterus inseparable from the anterior rectum, the sigmoid   colon and pelvic small bowel loops. Cannot exclude uterine, sigmoid or   rectal malignancy. Mildly distended sigmoid colon may be related to   pseudoobstruction or early or partial bowel obstruction.    Probable proctitis, either infectious or inflammatory.    If possible, follow-up CT with oral and IV contrast would be of   diagnostic benefit.    CXR-No active disease

## 2024-10-17 NOTE — ED ADULT NURSE NOTE - SUICIDE SCREENING DEPRESSION
PRE-OP EVALUATION    Patient Name: Kassidy Uribe    Admit Diagnosis: Kidney stone [N20.0]    Pre-op Diagnosis: Ureteral calculus, right [N20.1]    CYSTOSCOPY, right laser LITHOTRIPSY, ureteroscopy,retrograde pyleogram,stent insertion    Anesthesia Procedure: CYSTOSCOPY, right laser LITHOTRIPSY, ureteroscopy,retrograde pyleogram,stent insertion (Right)    Surgeons and Role:     * Matthew Quiñones MD - Primary    Pre-op vitals reviewed.  Temp: 98.3 °F (36.8 °C)  Pulse: 65  Resp: 17  BP: 123/65  SpO2: 97 %  Body mass index is 33.45 kg/m².    Current medications reviewed.  Hospital Medications:   ceFAZolin (Ancef) 2g in 10mL IV syringe premix  2 g Intravenous On Call to OR    [COMPLETED] ondansetron (Zofran) 4 MG/2ML injection 4 mg  4 mg Intravenous Once    [COMPLETED] ketorolac (Toradol) 30 MG/ML injection 30 mg  30 mg Intravenous Once    [] sodium chloride 0.9% infusion   Intravenous Continuous    [] HYDROmorphone (Dilaudid) 1 MG/ML injection 0.5 mg  0.5 mg Intravenous Q30 Min PRN    [] ondansetron (Zofran) 4 MG/2ML injection 4 mg  4 mg Intravenous Q4H PRN    lactated ringers infusion   Intravenous Continuous    acetaminophen (Tylenol) tab 650 mg  650 mg Oral Q4H PRN    Or    HYDROcodone-acetaminophen (Norco) 5-325 MG per tab 1 tablet  1 tablet Oral Q4H PRN    Or    HYDROcodone-acetaminophen (Norco) 5-325 MG per tab 2 tablet  2 tablet Oral Q4H PRN    polyethylene glycol (PEG 3350) (Miralax) 17 g oral packet 17 g  17 g Oral Daily PRN    sennosides (Senokot) tab 17.2 mg  17.2 mg Oral Nightly PRN    bisacodyl (Dulcolax) 10 MG rectal suppository 10 mg  10 mg Rectal Daily PRN    fleet enema (Fleet) rectal enema 133 mL  1 enema Rectal Once PRN    ondansetron (Zofran) 4 MG/2ML injection 4 mg  4 mg Intravenous Q6H PRN    prochlorperazine (Compazine) 10 MG/2ML injection 5 mg  5 mg Intravenous Q8H PRN       Outpatient Medications:     Medications Prior to Admission   Medication Sig Dispense Refill Last  Dose    [] HYDROcodone-acetaminophen 5-325 MG Oral Tab Take 1-2 tablets by mouth every 6 (six) hours as needed for Pain. 20 tablet 0 2024    [] ondansetron 4 MG Oral Tablet Dispersible Take 1 tablet (4 mg total) by mouth every 8 (eight) hours as needed for Nausea. 20 tablet 0 2024    Fexofenadine HCl 180 MG Oral Tab Take 1 tablet (180 mg total) by mouth daily. 90 tablet 3 2024    omeprazole 20 MG Oral Capsule Delayed Release Take 1 capsule (20 mg total) by mouth every morning before breakfast.   2024    Fluticasone Propionate 50 MCG/ACT Nasal Suspension 1 spray by Each Nare route daily.   2024    Clobetasol Propionate 0.05 % External Ointment Apply to the elbows twice a day for 2 weeks.  Stop for 2 weeks.  Repeat as needed for flare ups 60 g 2 More than a month    SYMBICORT 160-4.5 MCG/ACT Inhalation Aerosol Inhale 1 puff into the lungs 2 (two) times daily.   More than a month    Montelukast Sodium (SINGULAIR) 10 MG Oral Tab Take 1 tablet (10 mg total) by mouth nightly. 21 tablet 0 More than a month       Allergies: Patient has no known allergies.      Anesthesia Evaluation    Patient summary reviewed.    Anesthetic Complications  (-) history of anesthetic complications         GI/Hepatic/Renal      (+) GERD       (+) chronic renal disease                    Cardiovascular    Negative cardiovascular ROS.                                                   Endo/Other    Negative endo/other ROS.                              Pulmonary      (+) asthma                     Neuro/Psych    Negative neuro/psych ROS.                          Kidney stones        Past Surgical History:   Procedure Laterality Date    Colonoscopy N/A 2021    Procedure: COLONOSCOPY;  Surgeon: Pierce Amin MD;  [facility]           x 2      Social History     Socioeconomic History    Marital status:    Tobacco Use    Smoking status: Never    Smokeless  tobacco: Never   Vaping Use    Vaping status: Never Used   Substance and Sexual Activity    Alcohol use: No    Drug use: No    Sexual activity: Yes     Partners: Male     Birth control/protection: Vasectomy     History   Drug Use No     Available pre-op labs reviewed.  Lab Results   Component Value Date    WBC 8.2 09/10/2024    RBC 4.16 09/10/2024    HGB 11.5 (L) 09/10/2024    HCT 35.4 09/10/2024    MCV 85.1 09/10/2024    MCH 27.6 09/10/2024    MCHC 32.5 09/10/2024    RDW 13.7 09/10/2024    .0 09/10/2024     Lab Results   Component Value Date     09/10/2024    K 3.6 09/10/2024     09/10/2024    CO2 28.0 09/10/2024    BUN 14 09/10/2024    CREATSERUM 0.83 09/10/2024    GLU 97 09/10/2024    CA 9.0 09/10/2024            Airway      Mallampati: II  Mouth opening: >3 FB  TM distance: 4 - 6 cm  Neck ROM: full Cardiovascular      Rhythm: regular  Rate: normal     Dental             Pulmonary    Pulmonary exam normal.                 Other findings              ASA: 2   Plan: general  NPO status verified and           Plan/risks discussed with: patient                Present on Admission:  **None**         Negative

## 2024-10-17 NOTE — H&P ADULT - NSHPPHYSICALEXAM_GEN_ALL_CORE
PHYSICAL EXAM:  GENERAL: NAD, speaks in full sentences, no signs of respiratory distress  HEAD:  Atraumatic, Normocephalic  EYES: EOMI, PERRLA, conjunctiva and sclera clear  NECK: Supple, No JVD  CHEST/LUNG: Clear to auscultation bilaterally; No wheeze; No crackles; No accessory muscles used  HEART: Regular rate and rhythm; No murmurs;   ABDOMEN: Soft, Nontender, Nondistended; Bowel sounds present; No guarding  EXTREMITIES:  2+ Peripheral Pulses, No cyanosis or edema  PSYCH: AAOx3  NEUROLOGY: non-focal  SKIN: No rashes or lesions PHYSICAL EXAM:  GENERAL: NAD, speaks in full sentences, no signs of respiratory distress  HEAD:  Atraumatic, Normocephalic  EYES: EOMI, PERRLA, conjunctiva and sclera clear  NECK: Supple, No JVD  CHEST/LUNG: Clear to auscultation bilaterally; No wheeze; No crackles; No accessory muscles used  HEART: Regular rate and rhythm; No murmurs;   ABDOMEN: Soft, Nontender, Nondistended; Bowel sounds present; No guarding, + diarrhea on per rectal exam   EXTREMITIES:  2+ Peripheral Pulses, No cyanosis or edema  PSYCH: AAOx1   NEUROLOGY: non-verbal , does not follow commands, .understands family   SKIN: No rashes or lesions

## 2024-10-17 NOTE — ED ADULT NURSE NOTE - NSFALLRISK_ED_ALL_ED
Post-Care Instructions: I reviewed with the patient in detail post-care instructions. Patient is to wear sunprotection, and avoid picking at any of the treated lesions. Pt may apply Vaseline to crusted or scabbing areas.
Consent: The patient's consent was obtained including but not limited to risks of crusting, scabbing, blistering, scarring, darker or lighter pigmentary change, recurrence, incomplete removal and infection.
Price (Use Numbers Only, No Special Characters Or $): 136.00
Anesthesia Volume In Cc: 1
Detail Level: Detailed
No

## 2024-10-17 NOTE — H&P ADULT - PROBLEM SELECTOR PLAN 4
WBC 44K   likely in setting of infection vs malignancy     - c/w monitoring and ABx   - holding Chem DVT PPx ivo low Hb   consider resuming  if Hb stable ( hyperviscocity state

## 2024-10-17 NOTE — H&P ADULT - NSICDXPASTMEDICALHX_GEN_ALL_CORE_FT
PAST MEDICAL HISTORY:  Aphasia     Apraxia     CVA (cerebrovascular accident)     DM (diabetes mellitus)     DVT (deep venous thrombosis)     Dysphagia     HLD (hyperlipidemia)     HTN (hypertension)     Nonverbal     Right-sided muscle weakness     Seizure     Stroke

## 2024-10-17 NOTE — ED PROVIDER NOTE - NS ED ATTENDING STATEMENT MOD
Attending Only Return to the ER immediately for any worsening symptoms, concerns, chest pain, fevers, shortness of breath, vomiting, abdominal pain, rashes, neck pain, back pain, numbness, paresthesias, pain or any difficulties at all.  Please follow up with your own private physician or our medical clinic at [phone] in the next 2-3 days.  Find a doctor at [phone].  Copies of your tests were provided to you for follow-up.  You must address all your findings with your doctor.

## 2024-10-17 NOTE — H&P ADULT - PROBLEM SELECTOR PLAN 7
h/o HTN on  Monitor BP  c/w home meds -  Lower MAP 20-25% in next 2-4 hrs C/W Keppra solution   Seizure precautions.

## 2024-10-17 NOTE — H&P ADULT - ASSESSMENT
89-year-old female with a past medical history of stroke with residual right-sided weakness, aphasia, bedbound, diabetes, hypertension, hyperlipidemia, CAD, seizure disorder presents with fever since last night and low blood pressures.  As per the patient's son she did have an episode of vomiting yesterday but not today.  History is limited secondary to the patient's history of aphasia.  In the ER pt met SIRS with lactate > 4 , hypotensive to 91/ 54, code sepsis called , s/p Vanc , cefepime, IVF patient was found to have loose watery stool on exam, + UA . Labs s/o WBC 44K , Hb 8.6, BUN / Cr 61/ 3.15, covid , flu negative     CTAP - Enlarged polycystic kidneys. Minimal bilateral hydroureter. 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 Pancreatic duct distention redemonstrated. Mildly distended sigmoid colon may be related to   pseudoobstruction or early or partial bowel obstruction. Probable proctitis, either infectious or inflammatory.    CXR-No active disease    Patient is being admitted for severe sepsis likely 2/2 UTI vs proctocolitis    89-year-old female with a past medical history of stroke with residual right-sided weakness, aphasia, bedbound, diabetes, hypertension, hyperlipidemia, CAD, seizure disorder presents with fever since last night and low blood pressures.  As per the patient's son she did have an episode of vomiting yesterday but not today.  History is limited secondary to the patient's history of aphasia.  In the ER pt met SIRS with lactate > 4 , hypotensive to 91/ 54, code sepsis called , s/p Vanc , cefepime, IVF patient was found to have loose watery stool on exam, + UA . Labs s/o WBC 44K , Hb 8.6, BUN / Cr 61/ 3.15, covid , flu negative     CTAP - Enlarged polycystic kidneys. Minimal bilateral hydroureter. 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 Pancreatic duct distention re- demonstrated. Mildly distended sigmoid colon may be related to   pseudoobstruction or early or partial bowel obstruction. Probable proctitis, either infectious or inflammatory.    CXR-No active disease    Patient is being admitted for severe sepsis likely 2/2 UTI vs proctocolitis

## 2024-10-17 NOTE — ED PROVIDER NOTE - PROGRESS NOTE DETAILS
Labs with white blood cell count of 44 and TRAVIS.  Lactate elevated.  Chest x-ray is clear.  Pending urinalysis.  CT chest/abdomen/pelvis ordered for eval of possible infectious cause.  Pending repeat labs. CT with no obvious infection but with worsening pancreatic mass which is already known about.  Will admit for sepsis.

## 2024-10-17 NOTE — ED PROVIDER NOTE - OBJECTIVE STATEMENT
89-year-old female with a past medical history of stroke with residual right-sided weakness, aphasia, bedbound, diabetes, hypertension, hyperlipidemia, CAD, seizure disorder presents with fever since last night and low blood pressures.  As per the patient's son she did have an episode of vomiting yesterday but not today.  History is limited secondary to the patient's history of aphasia.

## 2024-10-18 DIAGNOSIS — R78.81 BACTEREMIA: ICD-10-CM

## 2024-10-18 LAB
A1C WITH ESTIMATED AVERAGE GLUCOSE RESULT: 7.9 % — HIGH (ref 4–5.6)
ALBUMIN SERPL ELPH-MCNC: 1.7 G/DL — LOW (ref 3.5–5)
ALP SERPL-CCNC: 120 U/L — SIGNIFICANT CHANGE UP (ref 40–120)
ALT FLD-CCNC: 19 U/L DA — SIGNIFICANT CHANGE UP (ref 10–60)
ANION GAP SERPL CALC-SCNC: 12 MMOL/L — SIGNIFICANT CHANGE UP (ref 5–17)
ANISOCYTOSIS BLD QL: SLIGHT — SIGNIFICANT CHANGE UP
AST SERPL-CCNC: 40 U/L — SIGNIFICANT CHANGE UP (ref 10–40)
BASOPHILS # BLD AUTO: 0 K/UL — SIGNIFICANT CHANGE UP (ref 0–0.2)
BASOPHILS NFR BLD AUTO: 0 % — SIGNIFICANT CHANGE UP (ref 0–2)
BILIRUB SERPL-MCNC: 0.4 MG/DL — SIGNIFICANT CHANGE UP (ref 0.2–1.2)
BUN SERPL-MCNC: 56 MG/DL — HIGH (ref 7–18)
CALCIUM SERPL-MCNC: 8.3 MG/DL — LOW (ref 8.4–10.5)
CHLORIDE SERPL-SCNC: 108 MMOL/L — SIGNIFICANT CHANGE UP (ref 96–108)
CO2 SERPL-SCNC: 17 MMOL/L — LOW (ref 22–31)
CREAT SERPL-MCNC: 2.76 MG/DL — HIGH (ref 0.5–1.3)
E COLI DNA BLD POS QL NAA+NON-PROBE: SIGNIFICANT CHANGE UP
EGFR: 15 ML/MIN/1.73M2 — LOW
EOSINOPHIL # BLD AUTO: 0 K/UL — SIGNIFICANT CHANGE UP (ref 0–0.5)
EOSINOPHIL NFR BLD AUTO: 0 % — SIGNIFICANT CHANGE UP (ref 0–6)
ESTIMATED AVERAGE GLUCOSE: 180 MG/DL — HIGH (ref 68–114)
GLUCOSE BLDC GLUCOMTR-MCNC: 147 MG/DL — HIGH (ref 70–99)
GLUCOSE BLDC GLUCOMTR-MCNC: 156 MG/DL — HIGH (ref 70–99)
GLUCOSE BLDC GLUCOMTR-MCNC: 203 MG/DL — HIGH (ref 70–99)
GLUCOSE SERPL-MCNC: 131 MG/DL — HIGH (ref 70–99)
GRAM STN FLD: ABNORMAL
HCT VFR BLD CALC: 25.9 % — LOW (ref 34.5–45)
HGB BLD-MCNC: 8.3 G/DL — LOW (ref 11.5–15.5)
LYMPHOCYTES # BLD AUTO: 1.31 K/UL — SIGNIFICANT CHANGE UP (ref 1–3.3)
LYMPHOCYTES # BLD AUTO: 3 % — LOW (ref 13–44)
MAGNESIUM SERPL-MCNC: 2.1 MG/DL — SIGNIFICANT CHANGE UP (ref 1.6–2.6)
MCHC RBC-ENTMCNC: 29 PG — SIGNIFICANT CHANGE UP (ref 27–34)
MCHC RBC-ENTMCNC: 32 GM/DL — SIGNIFICANT CHANGE UP (ref 32–36)
MCV RBC AUTO: 90.6 FL — SIGNIFICANT CHANGE UP (ref 80–100)
METHOD TYPE: SIGNIFICANT CHANGE UP
MONOCYTES # BLD AUTO: 1.31 K/UL — HIGH (ref 0–0.9)
MONOCYTES NFR BLD AUTO: 3 % — SIGNIFICANT CHANGE UP (ref 2–14)
NEUTROPHILS # BLD AUTO: 41.11 K/UL — HIGH (ref 1.8–7.4)
NEUTROPHILS NFR BLD AUTO: 94 % — HIGH (ref 43–77)
NRBC # BLD: 0 /100 WBCS — SIGNIFICANT CHANGE UP (ref 0–0)
OVALOCYTES BLD QL SMEAR: SLIGHT — SIGNIFICANT CHANGE UP
PHOSPHATE SERPL-MCNC: 3.3 MG/DL — SIGNIFICANT CHANGE UP (ref 2.5–4.5)
PLAT MORPH BLD: NORMAL — SIGNIFICANT CHANGE UP
PLATELET # BLD AUTO: 124 K/UL — LOW (ref 150–400)
POTASSIUM SERPL-MCNC: 4.8 MMOL/L — SIGNIFICANT CHANGE UP (ref 3.5–5.3)
POTASSIUM SERPL-SCNC: 4.8 MMOL/L — SIGNIFICANT CHANGE UP (ref 3.5–5.3)
PROT SERPL-MCNC: 6.5 G/DL — SIGNIFICANT CHANGE UP (ref 6–8.3)
RBC # BLD: 2.86 M/UL — LOW (ref 3.8–5.2)
RBC # FLD: 16.9 % — HIGH (ref 10.3–14.5)
RBC BLD AUTO: ABNORMAL
SODIUM SERPL-SCNC: 137 MMOL/L — SIGNIFICANT CHANGE UP (ref 135–145)
SPECIMEN SOURCE: SIGNIFICANT CHANGE UP
SPECIMEN SOURCE: SIGNIFICANT CHANGE UP
WBC # BLD: 43.73 K/UL — CRITICAL HIGH (ref 3.8–10.5)
WBC # FLD AUTO: 43.73 K/UL — CRITICAL HIGH (ref 3.8–10.5)

## 2024-10-18 RX ADMIN — APIXABAN 2.5 MILLIGRAM(S): 5 TABLET, FILM COATED ORAL at 17:20

## 2024-10-18 RX ADMIN — APIXABAN 2.5 MILLIGRAM(S): 5 TABLET, FILM COATED ORAL at 05:44

## 2024-10-18 RX ADMIN — CEFEPIME 100 MILLIGRAM(S): 2 INJECTION, POWDER, FOR SOLUTION INTRAVENOUS at 18:46

## 2024-10-18 RX ADMIN — LEVETIRACETAM 500 MILLIGRAM(S): 500 TABLET, FILM COATED ORAL at 17:19

## 2024-10-18 RX ADMIN — Medication 20 MILLIGRAM(S): at 22:12

## 2024-10-18 RX ADMIN — Medication 65 MILLILITER(S): at 06:54

## 2024-10-18 RX ADMIN — LEVETIRACETAM 500 MILLIGRAM(S): 500 TABLET, FILM COATED ORAL at 05:44

## 2024-10-18 RX ADMIN — Medication 20 MILLIGRAM(S): at 01:24

## 2024-10-18 RX ADMIN — Medication 2: at 12:11

## 2024-10-18 RX ADMIN — Medication 1: at 17:19

## 2024-10-18 RX ADMIN — Medication 325 MILLIGRAM(S): at 12:11

## 2024-10-18 NOTE — PROGRESS NOTE ADULT - PROBLEM SELECTOR PLAN 2
Blood culture growing gram negative rods   bacteremia likely from uti   continue antibiotics  ID Dr Keating consulted   follow up repeat blood cultures

## 2024-10-18 NOTE — DIETITIAN INITIAL EVALUATION ADULT - DIET TYPE
Recommend oral supplement Glucerna Therapeutic Shake BID (220 kcal, 10 g pro each) and Banatrol 1pkt QD as medically feasible./supplement (specify)

## 2024-10-18 NOTE — CONSULT NOTE ADULT - SUBJECTIVE AND OBJECTIVE BOX
HPI:  89-year-old female bedbound , non  verbal  from [facility] and . PMH CVA w rt sided weakness, aphasia, bedbound, DM. HTN, HLD, CAD.Sz.  presents with fever since last night and low blood pressures.  As per the patient's son she did have an episode of vomiting yesterday but not today.  History is limited secondary to the patient's history of aphasia. Patients daughter bedside explains pt was found to be hypoxic to 80s on RA prior t o arrival.   Family denies an y recent changes when they saw the patient yesterday. Endorse she appeared fatigued and was not eating well.       In the ER pt met SIRS with lactate > 4 , hypotensive to 91/ 54, code sepsis called   s/p Vanc , cefepime   patient was found to have loose watery stool on exam, + UA   Labs s/o WBC 44K , Hb 8.6, BUn / Cr 61/ 3.15, covid , flu negative     CTAP   Evaluation of the solid organs, vascular structures and GI   tract is limited without oral and IV contrast.    Enlarged polycystic kidneys. Minimal bilateral hydroureter. Limited   evaluation for solid renal mass.    Enlarging 7.0 x 6.9 cm multicystic pancreatic mass concerning for   malignancy. Pancreatic duct distention redemonstrated.    Ill-defined uterus inseparable from the anterior rectum, the sigmoid   colon and pelvic small bowel loops. Cannot exclude uterine, sigmoid or   rectal malignancy. Mildly distended sigmoid colon may be related to   pseudoobstruction or early or partial bowel obstruction.    Probable proctitis, either infectious or inflammatory.    If possible, follow-up CT with oral and IV contrast would be of   diagnostic benefit.    CXR-No active disease   (17 Oct 2024 17:26)      PAST MEDICAL & SURGICAL HISTORY:  Stroke      HTN (hypertension)      HLD (hyperlipidemia)      DM (diabetes mellitus)      DVT (deep venous thrombosis)      CVA (cerebrovascular accident)      Aphasia      Dysphagia      Apraxia      Right-sided muscle weakness      Nonverbal      Seizure          No Known Allergies      Meds:  acetaminophen     Tablet .. 650 milliGRAM(s) Oral every 6 hours PRN  aluminum hydroxide/magnesium hydroxide/simethicone Suspension 30 milliLiter(s) Oral every 4 hours PRN  apixaban 2.5 milliGRAM(s) Oral every 12 hours  atorvastatin 20 milliGRAM(s) Oral at bedtime  cefepime   IVPB 250 milliGRAM(s) IV Intermittent every 24 hours  dextrose 5%. 1000 milliLiter(s) IV Continuous <Continuous>  dextrose 5%. 1000 milliLiter(s) IV Continuous <Continuous>  dextrose 50% Injectable 25 Gram(s) IV Push once  dextrose 50% Injectable 25 Gram(s) IV Push once  dextrose 50% Injectable 12.5 Gram(s) IV Push once  dextrose Oral Gel 15 Gram(s) Oral once PRN  ferrous    sulfate 325 milliGRAM(s) Oral daily  glucagon  Injectable 1 milliGRAM(s) IntraMuscular once  insulin lispro (ADMELOG) corrective regimen sliding scale   SubCutaneous three times a day before meals  lactated ringers. 1000 milliLiter(s) IV Continuous <Continuous>  levETIRAcetam  Solution 500 milliGRAM(s) Oral two times a day  melatonin 3 milliGRAM(s) Oral at bedtime PRN  ondansetron Injectable 4 milliGRAM(s) IV Push every 8 hours PRN      SOCIAL HISTORY:  Smoker:  YES / NO        PACK YEARS:                         WHEN QUIT?  ETOH use:  YES / NO               FREQUENCY / QUANTITY:  Ilic[employer] Drug use:  YES / NO  Occupation:  Assisted device use (Cane / Walker):  Live with:    FAMILY HISTORY:      VITALS:  Vital Signs Last 24 Hrs  T(C): 36.9 (18 Oct 2024 14:49), Max: 36.9 (18 Oct 2024 14:49)  T(F): 98.5 (18 Oct 2024 14:49), Max: 98.5 (18 Oct 2024 14:49)  HR: 70 (18 Oct 2024 14:49) (70 - 87)  BP: 115/75 (18 Oct 2024 14:49) (102/62 - 118/58)  BP(mean): --  RR: 16 (18 Oct 2024 14:49) (16 - 18)  SpO2: 95% (18 Oct 2024 14:49) (95% - 100%)    Parameters below as of 18 Oct 2024 14:49  Patient On (Oxygen Delivery Method): room air  O2 Flow (L/min): 2      LABS/DIAGNOSTIC TESTS:                          8.3    43.73 )-----------( 124      ( 18 Oct 2024 09:30 )             25.9     WBC Count: 43.73 K/uL (10-18 @ 09:30)  WBC Count: 44.39 K/uL (10-17 @ 17:40)  WBC Count: 44.18 K/uL (10-17 @ 12:15)      10-18    137  |  108  |  56[H]  ----------------------------<  131[H]  4.8   |  17[L]  |  2.76[H]    Ca    8.3[L]      18 Oct 2024 09:30  Phos  3.3     10-18  Mg     2.1     10-18    TPro  6.5  /  Alb  1.7[L]  /  TBili  0.4  /  DBili  x   /  AST  40  /  ALT  19  /  AlkPhos  120  10-18      Urine Microscopic-Add On (NC) (10.17.24 @ 16:21)   Bacteria: Moderate /HPF  Squamous Epithelial Cells: Present  Red Blood Cell - Urine: >25 /HPF  White Blood Cell - Urine: >50 /HPF  Hyaline Casts: PresentUrinalysis with Rflx Culture (10.17.24 @ 16:21)   Urine Appearance: Turbid  Color: Dark Yellow  Specific Gravity: 1.018  pH Urine: 5.0  Protein, Urine: 100 mg/dL  Glucose Qualitative, Urine: 250 mg/dL  Ketone - Urine: Trace mg/dL  Blood, Urine: Large  Bilirubin: Negative  Urobilinogen: 0.2 mg/dL  Leukocyte Esterase Concentration: Large  Nitrite: Negative      LIVER FUNCTIONS - ( 18 Oct 2024 09:30 )  Alb: 1.7 g/dL / Pro: 6.5 g/dL / ALK PHOS: 120 U/L / ALT: 19 U/L DA / AST: 40 U/L / GGT: x             PT/INR - ( 17 Oct 2024 12:15 )   PT: 20.6 sec;   INR: 1.79 ratio         PTT - ( 17 Oct 2024 12:15 )  PTT:29.8 sec    LACTATE:    ABG -     CULTURES:   Catheterized  10-17 @ 16:21   >100,000 CFU/ml Escherichia coli  --  --      .Blood BLOOD  10-17 @ 11:00   Growth in anaerobic bottle: Gram Negative Rods  Growth in aerobic bottle: Gram Negative Rods  --    Growth in anaerobic bottle: Gram Negative Rods  Growth in aerobic bottle: Gram Negative Rods      .Blood BLOOD  10-17 @ 10:50   Growth in aerobic and anaerobic bottles: Escherichia coli  Direct identification is available within approximately 3-5  hours either by Blood Panel Multiplexed PCR or Direct  MALDI-TOF. Details: [email or web address]  --  Blood Culture PCR          RADIOLOGY:< from: CT Chest No Cont (10.17.24 @ 15:10) >  ACC: [id number] EXAM:  CT CHEST   ORDERED BY: KALLI NIX     ACC: [id number] EXAM:  CT ABDOMEN AND PELVIS   ORDERED BY: KALLI NIX     PROCEDURE DATE:  10/17/2024          INTERPRETATION:  CLINICAL INFORMATION: 89 years  Female with sepsis.    COMPARISON: Contrast-enhanced chest CT 6/24/2023    CONTRAST/COMPLICATIONS:  IV Contrast: NONE  Oral Contrast: NONE  Complications: None reported at time of study completion    PROCEDURE:  CT of the Chest, Abdomen and Pelvis was performed.  Sagittal and coronal reformats were performed.    LIMITATIONS: Evaluation of the solid organs, vascular structures and GI   tract is limited without oral and IV contrast.    FINDINGS:  CHEST:  LUNGS AND LARGE AIRWAYS: Patent central airways. No pulmonary nodules.   Mild bilateral lower lobe bronchiectasis  PLEURA: Trace bilateral pleural effusions, significantly improved from   prior.  VESSELS: Within normal limits.  HEART: Cardiomegaly. Aortic and coronary atherosclerosis. No pericardial   effusion.  MEDIASTINUM AND WALTER: No lymphadenopathy.  CHEST WALL AND LOWER NECK: Within normal limits.    ABDOMEN AND PELVIS:  LIVER: Within normal limits.  BILE DUCTS: Normal caliber.  GALLBLADDER: Cholelithiasis.  SPLEEN: Stable partially calcified 3.5 cm splenic cyst.  PANCREAS: 7.0 x 6.9 cm multiseptated pancreatic head cystic mass. 1 cm   internal calcified septation. Pancreatic duct distention in the body and   tail measuring up to 7.5 mm. Mass is incompletely visualized on the prior   chest CT but has likely enlarged.  ADRENALS: Within normal limits.  KIDNEYS/URETERS: Polycystic kidneys with innumerable simple cysts and   indeterminate hyperdense lesions. No urolithiasis. Left renal vascular   calcifications. Minimal bilateral hydroureter. No hydronephrosis.   Nonspecific bilateral perinephric fat stranding.    BLADDER: Within normal limits.  REPRODUCTIVE ORGANS: Ill-defined uterus measuring approximately 8.1 x 7.3   x 4.1 cm containing calcified fibroid. The uterus is ill-defined and   inseparable from the anterior rectal wall, the sigmoid colon and pelvic   small bowel loops.    BOWEL: No bowel obstruction. Appendix is not visualized and cannot be   assessed.. Small sliding hiatus hernia. Moderate rectal wall thickening.   The sigmoid colon is inseparable from the uterus. A sigmoid a uterine   mass is not excluded. Mild distended proximal sigmoid measuring up to 4.3   cm.  PERITONEUM/RETROPERITONEUM: Within normal limits.  VESSELS: Atherosclerotic changes.  LYMPH NODES: No lymphadenopathy.  ABDOMINAL WALL: Mild anasarca.  BONES: Degenerative changes of the spine and hips.    IMPRESSION:  LIMITATIONS: Evaluation of the solid organs, vascular structures and GI   tract is limited without oral and IV contrast.    Enlarged polycystic kidneys. Minimal bilateral hydroureter. Limited   evaluation for solid renal mass.    Enlarging 7.0 x 6.9 cm multicystic pancreatic mass concerning for   malignancy. Pancreatic duct distention redemonstrated.    Ill-defined uterus inseparable from the anteriorrectum, the sigmoid   colon and pelvic small bowel loops. Cannot exclude uterine, sigmoid or   rectal malignancy. Mildly distended sigmoid colon may be related to   pseudoobstruction or early or partial bowel obstruction.    Probable proctitis, either infectious or inflammatory.    If possible, follow-up CT with oral and IV contrast would be of   diagnostic benefit.        --- End of Report ---            VALERIA SARMIENTO MD; Attending Radiologist  This document has been electronically signed. Oct [id number]  3:41PM    < end of copied text >        ROS  [  ] UNABLE TO ELICIT               HPI:  89-year-old female bedbound , non  verbal  from [facility] and . PMH CVA w rt sided weakness, aphasia, bedbound, DM. HTN, HLD, CAD.Sz.  presents with fever since last night and low blood pressures.  As per the patient's son she did have an episode of vomiting yesterday but not today.  History is limited secondary to the patient's history of aphasia. Patients daughter bedside explains pt was found to be hypoxic to 80s on RA prior t o arrival.   Family denies an y recent changes when they saw the patient yesterday. Endorse she appeared fatigued and was not eating well.   In the ER pt met SIRS with lactate > 4 , hypotensive to 91/ 54, code sepsis called   s/p Vanc , cefepime   patient was found to have loose watery stool on exam, + UA   Labs s/o WBC 44K , Hb 8.6, BUn / Cr 61/ 3.15, covid , flu negative           History as above, asked to see this patient who is non verbal and bedbound who is from a NH with fevers, low blood pressure, vomiting and hypoxia, hence sent to the hospital , here she was found to have a high WBC count , a UTI and Bacteremia with E. Coli. She is aphasic and so unable to give any history or follow commands. She is dehydrated and having loose stools here. She is Bacteremic with E. Coli.        PAST MEDICAL & SURGICAL HISTORY:  Stroke      HTN (hypertension)      HLD (hyperlipidemia)      DM (diabetes mellitus)      DVT (deep venous thrombosis)      CVA (cerebrovascular accident)      Aphasia      Dysphagia      Apraxia      Right-sided muscle weakness      Nonverbal      Seizure          No Known Allergies      Meds:  acetaminophen     Tablet .. 650 milliGRAM(s) Oral every 6 hours PRN  aluminum hydroxide/magnesium hydroxide/simethicone Suspension 30 milliLiter(s) Oral every 4 hours PRN  apixaban 2.5 milliGRAM(s) Oral every 12 hours  atorvastatin 20 milliGRAM(s) Oral at bedtime  cefepime   IVPB 250 milliGRAM(s) IV Intermittent every 24 hours  dextrose 5%. 1000 milliLiter(s) IV Continuous <Continuous>  dextrose 5%. 1000 milliLiter(s) IV Continuous <Continuous>  dextrose 50% Injectable 25 Gram(s) IV Push once  dextrose 50% Injectable 25 Gram(s) IV Push once  dextrose 50% Injectable 12.5 Gram(s) IV Push once  dextrose Oral Gel 15 Gram(s) Oral once PRN  ferrous    sulfate 325 milliGRAM(s) Oral daily  glucagon  Injectable 1 milliGRAM(s) IntraMuscular once  insulin lispro (ADMELOG) corrective regimen sliding scale   SubCutaneous three times a day before meals  lactated ringers. 1000 milliLiter(s) IV Continuous <Continuous>  levETIRAcetam  Solution 500 milliGRAM(s) Oral two times a day  melatonin 3 milliGRAM(s) Oral at bedtime PRN  ondansetron Injectable 4 milliGRAM(s) IV Push every 8 hours PRN      SOCIAL HISTORY: unknown    FAMILY HISTORY: unknown      VITALS:  Vital Signs Last 24 Hrs  T(C): 36.9 (18 Oct 2024 14:49), Max: 36.9 (18 Oct 2024 14:49)  T(F): 98.5 (18 Oct 2024 14:49), Max: 98.5 (18 Oct 2024 14:49)  HR: 70 (18 Oct 2024 14:49) (70 - 87)  BP: 115/75 (18 Oct 2024 14:49) (102/62 - 118/58)  BP(mean): --  RR: 16 (18 Oct 2024 14:49) (16 - 18)  SpO2: 95% (18 Oct 2024 14:49) (95% - 100%)    Parameters below as of 18 Oct 2024 14:49  Patient On (Oxygen Delivery Method): room air  O2 Flow (L/min): 2      LABS/DIAGNOSTIC TESTS:                          8.3    43.73 )-----------( 124      ( 18 Oct 2024 09:30 )             25.9     WBC Count: 43.73 K/uL (10-18 @ 09:30)  WBC Count: 44.39 K/uL (10-17 @ 17:40)  WBC Count: 44.18 K/uL (10-17 @ 12:15)      10-18    137  |  108  |  56[H]  ----------------------------<  131[H]  4.8   |  17[L]  |  2.76[H]    Ca    8.3[L]      18 Oct 2024 09:30  Phos  3.3     10-18  Mg     2.1     10-18    TPro  6.5  /  Alb  1.7[L]  /  TBili  0.4  /  DBili  x   /  AST  40  /  ALT  19  /  AlkPhos  120  10-18      Urine Microscopic-Add On (NC) (10.17.24 @ 16:21)   Bacteria: Moderate /HPF  Squamous Epithelial Cells: Present  Red Blood Cell - Urine: >25 /HPF  White Blood Cell - Urine: >50 /HPF  Hyaline Casts: PresentUrinalysis with Rflx Culture (10.17.24 @ 16:21)   Urine Appearance: Turbid  Color: Dark Yellow  Specific Gravity: 1.018  pH Urine: 5.0  Protein, Urine: 100 mg/dL  Glucose Qualitative, Urine: 250 mg/dL  Ketone - Urine: Trace mg/dL  Blood, Urine: Large  Bilirubin: Negative  Urobilinogen: 0.2 mg/dL  Leukocyte Esterase Concentration: Large  Nitrite: Negative      LIVER FUNCTIONS - ( 18 Oct 2024 09:30 )  Alb: 1.7 g/dL / Pro: 6.5 g/dL / ALK PHOS: 120 U/L / ALT: 19 U/L DA / AST: 40 U/L / GGT: x             PT/INR - ( 17 Oct 2024 12:15 )   PT: 20.6 sec;   INR: 1.79 ratio         PTT - ( 17 Oct 2024 12:15 )  PTT:29.8 sec    LACTATE:    ABG -     CULTURES:   Catheterized  10-17 @ 16:21   >100,000 CFU/ml Escherichia coli  --  --      .Blood BLOOD  10-17 @ 11:00   Growth in anaerobic bottle: Gram Negative Rods  Growth in aerobic bottle: Gram Negative Rods  --    Growth in anaerobic bottle: Gram Negative Rods  Growth in aerobic bottle: Gram Negative Rods      .Blood BLOOD  10-17 @ 10:50   Growth in aerobic and anaerobic bottles: Escherichia coli  Direct identification is available within approximately 3-5  hours either by Blood Panel Multiplexed PCR or Direct  MALDI-TOF. Details: [email or web address]  --  Blood Culture PCR          RADIOLOGY:< from: CT Chest No Cont (10.17.24 @ 15:10) >  ACC: [id number] EXAM:  CT CHEST   ORDERED BY: KALLI NIX     ACC: [id number] EXAM:  CT ABDOMEN AND PELVIS   ORDERED BY: KALLI NIX     PROCEDURE DATE:  10/17/2024          INTERPRETATION:  CLINICAL INFORMATION: 89 years  Female with sepsis.    COMPARISON: Contrast-enhanced chest CT 6/24/2023    CONTRAST/COMPLICATIONS:  IV Contrast: NONE  Oral Contrast: NONE  Complications: None reported at time of study completion    PROCEDURE:  CT of the Chest, Abdomen and Pelvis was performed.  Sagittal and coronal reformats were performed.    LIMITATIONS: Evaluation of the solid organs, vascular structures and GI   tract is limited without oral and IV contrast.    FINDINGS:  CHEST:  LUNGS AND LARGE AIRWAYS: Patent central airways. No pulmonary nodules.   Mild bilateral lower lobe bronchiectasis  PLEURA: Trace bilateral pleural effusions, significantly improved from   prior.  VESSELS: Within normal limits.  HEART: Cardiomegaly. Aortic and coronary atherosclerosis. No pericardial   effusion.  MEDIASTINUM AND WALTER: No lymphadenopathy.  CHEST WALL AND LOWER NECK: Within normal limits.    ABDOMEN AND PELVIS:  LIVER: Within normal limits.  BILE DUCTS: Normal caliber.  GALLBLADDER: Cholelithiasis.  SPLEEN: Stable partially calcified 3.5 cm splenic cyst.  PANCREAS: 7.0 x 6.9 cm multiseptated pancreatic head cystic mass. 1 cm   internal calcified septation. Pancreatic duct distention in the body and   tail measuring up to 7.5 mm. Mass is incompletely visualized on the prior   chest CT but has likely enlarged.  ADRENALS: Within normal limits.  KIDNEYS/URETERS: Polycystic kidneys with innumerable simple cysts and   indeterminate hyperdense lesions. No urolithiasis. Left renal vascular   calcifications. Minimal bilateral hydroureter. No hydronephrosis.   Nonspecific bilateral perinephric fat stranding.    BLADDER: Within normal limits.  REPRODUCTIVE ORGANS: Ill-defined uterus measuring approximately 8.1 x 7.3   x 4.1 cm containing calcified fibroid. The uterus is ill-defined and   inseparable from the anterior rectal wall, the sigmoid colon and pelvic   small bowel loops.    BOWEL: No bowel obstruction. Appendix is not visualized and cannot be   assessed.. Small sliding hiatus hernia. Moderate rectal wall thickening.   The sigmoid colon is inseparable from the uterus. A sigmoid a uterine   mass is not excluded. Mild distended proximal sigmoid measuring up to 4.3   cm.  PERITONEUM/RETROPERITONEUM: Within normal limits.  VESSELS: Atherosclerotic changes.  LYMPH NODES: No lymphadenopathy.  ABDOMINAL WALL: Mild anasarca.  BONES: Degenerative changes of the spine and hips.    IMPRESSION:  LIMITATIONS: Evaluation of the solid organs, vascular structures and GI   tract is limited without oral and IV contrast.    Enlarged polycystic kidneys. Minimal bilateral hydroureter. Limited   evaluation for solid renal mass.    Enlarging 7.0 x 6.9 cm multicystic pancreatic mass concerning for   malignancy. Pancreatic duct distention redemonstrated.    Ill-defined uterus inseparable from the anteriorrectum, the sigmoid   colon and pelvic small bowel loops. Cannot exclude uterine, sigmoid or   rectal malignancy. Mildly distended sigmoid colon may be related to   pseudoobstruction or early or partial bowel obstruction.    Probable proctitis, either infectious or inflammatory.    If possible, follow-up CT with oral and IV contrast would be of   diagnostic benefit.        --- End of Report ---            VALERIA SARMIENTO MD; Attending Radiologist  This document has been electronically signed. Oct [id number]  3:41PM    < end of copied text >        ROS  [ x ] UNABLE TO ELICIT

## 2024-10-18 NOTE — DIETITIAN INITIAL EVALUATION ADULT - ADD RECOMMEND
Recommend to liberalize current diet order to Consistent Carbohydrate (No Snacks) with oral supplement Glucerna Therapeutic Shake BID (220 kcal, 10 g pro each) and Banatrol 1pkt QD as medically feasible.

## 2024-10-18 NOTE — DIETITIAN INITIAL EVALUATION ADULT - NS FNS DIET ORDER
Diet, Soft and Bite Sized:   Consistent Carbohydrate {No Snacks}  DASH/TLC {Sodium & Cholesterol Restricted} (10-17-24 @ 18:59)

## 2024-10-18 NOTE — DIETITIAN INITIAL EVALUATION ADULT - PROBLEM SELECTOR PLAN 2
U/a pos  Pt symptomatic with dysuria  No CVA tenderness, low suspicion for pyelo  WBC 44K     -c/w vanc and cefepime   -f/u cx

## 2024-10-18 NOTE — PROGRESS NOTE ADULT - PROBLEM SELECTOR PLAN 1
Meets SIRS Criteria  Source likely UTI, proctocolitis   -IV fluids: c/w LR   -c/w cefepime   -f/u urine culture and blood culture   -IDx Dr. White consulted

## 2024-10-18 NOTE — DIETITIAN INITIAL EVALUATION ADULT - FACTORS AFF FOOD INTAKE
acute illness, advanced age/persistent diarrhea/persistent lack of appetite/Alevism/ethnic/cultural/personal food preferences/vomiting/other (specify)

## 2024-10-18 NOTE — PHARMACOTHERAPY INTERVENTION NOTE - COMMENTS
Culture Date/Time: 2024-10-17 10:50  BCID: -  Escherichia coli  Detec    No prior infectious disease consultation    Antibiotics:  cefepime    Biofire reviewed: non-ESBL E.coli bacteremia.     Recommend de-escalation to Ceftriaxone 2g Q24H Culture Date/Time: 2024-10-17 10:50  BCID: -  Escherichia coli  Detec    No prior infectious disease consultation    Antibiotics:  cefepime    Biofire reviewed: non-ESBL E.coli bacteremia.     Recommend de-escalation to Ceftriaxone 1g Q24H

## 2024-10-18 NOTE — PROGRESS NOTE ADULT - PROBLEM SELECTOR PLAN 12
A1c 7.8  C/W [facility]   Monitor FS and adjust as needed  C/W Con Carb diet.  Adjust insulin as indicated

## 2024-10-18 NOTE — DIETITIAN INITIAL EVALUATION ADULT - PROBLEM SELECTOR PLAN 8
Hb8.6   Trend Hb   Hx of chronic anemia   Baseline 8.9-9.2     c/w monitoring   holding off chemical DVT PPx for now   consider resuming if stable

## 2024-10-18 NOTE — PROVIDER CONTACT NOTE (CRITICAL VALUE NOTIFICATION) - NS PROVIDER READ BACK
Patient has the following symptoms: Left leg pain.  Bruise or varicose vein on the leg.  Sometimes itches but painful.  Size of quarter.   The patient is concerned it may be a blood clot and is not sure if she needs to see Dr Ventura.    The symptoms have been present for 2-3 days  Please contact the patient at [phone] to discuss.    Pharmacy has been verified.      yes

## 2024-10-18 NOTE — DIETITIAN INITIAL EVALUATION ADULT - PROBLEM SELECTOR PLAN 1
Meets SIRS Criteria  Source likely UTI vs proctocolitis   Lactate > 4 ( severe sepsis)   s/p IVF sepsis protocol with improvement in BP ( MAP 71)   -IV fluids: c/w LR   -IV Abx :c/w vanc , cefepime   -If lactate is elevated, please trend until Normalization q3hrs  -f/u cx  -as below  IDx Dr. White consulted

## 2024-10-18 NOTE — DIETITIAN INITIAL EVALUATION ADULT - ORAL INTAKE PTA/DIET HISTORY
Pt is from nursing home, bedound with aphasia, and sleeping at time of visit. Pt's son at bedside and able to participate in nutritional interview. H/o CVA, DM, dysphagia, seizure, DVT; admitted for sepsis. Pt's son reports that pt is with poor PO intake x 2 days likely d/t acute illness, pt is also with 1 episode of vomiting PTA and persistent diarrhea at this time. Pt's son reports that PO intake is slowly improving and pt is generally with fair PO intake consuming 50% of meals PTA. Pt consumes oral supplement 1 bottle daily PTA with good accpetance. Denies any weight changes. No chewing/ swallowing issues reported. Food prefs obtained and communicated to kitchen. NKFA.

## 2024-10-18 NOTE — DIETITIAN INITIAL EVALUATION ADULT - PERTINENT LABORATORY DATA
10-18    137  |  108  |  56[H]  ----------------------------<  131[H]  4.8   |  17[L]  |  2.76[H]    Ca    8.3[L]      18 Oct 2024 09:30  Phos  3.3     10-18  Mg     2.1     10-18    TPro  6.5  /  Alb  1.7[L]  /  TBili  0.4  /  DBili  x   /  AST  40  /  ALT  19  /  AlkPhos  120  10-18  POCT Blood Glucose.: 203 mg/dL (10-18-24 @ 12:02)  A1C with Estimated Average Glucose Result: 7.8 % (05-31-24 @ 04:00)

## 2024-10-18 NOTE — PROGRESS NOTE ADULT - PROBLEM SELECTOR PLAN 4
diarrhea on admission, CTAP: + Probable proctitis, either infectious or inflammatory.  - IVF, replace electrolytes   - stool Cx , C diff, GI PCR, stool count   - c/w  cefepime

## 2024-10-18 NOTE — PROGRESS NOTE ADULT - SUBJECTIVE AND OBJECTIVE BOX
NP Note discussed with  primary attending    Patient is a 89y old  Female who presents with a chief complaint of severe sepsis unknown source (17 Oct 2024 17:26)      INTERVAL HPI/OVERNIGHT EVENTS: no new complaints    MEDICATIONS  (STANDING):  apixaban 2.5 milliGRAM(s) Oral every 12 hours  atorvastatin 20 milliGRAM(s) Oral at bedtime  cefepime   IVPB 250 milliGRAM(s) IV Intermittent every 24 hours  dextrose 5%. 1000 milliLiter(s) (100 mL/Hr) IV Continuous <Continuous>  dextrose 5%. 1000 milliLiter(s) (50 mL/Hr) IV Continuous <Continuous>  dextrose 50% Injectable 25 Gram(s) IV Push once  dextrose 50% Injectable 25 Gram(s) IV Push once  dextrose 50% Injectable 12.5 Gram(s) IV Push once  ferrous    sulfate 325 milliGRAM(s) Oral daily  glucagon  Injectable 1 milliGRAM(s) IntraMuscular once  insulin lispro (ADMELOG) corrective regimen sliding scale   SubCutaneous three times a day before meals  lactated ringers. 1000 milliLiter(s) (65 mL/Hr) IV Continuous <Continuous>  levETIRAcetam  Solution 500 milliGRAM(s) Oral two times a day    MEDICATIONS  (PRN):  acetaminophen     Tablet .. 650 milliGRAM(s) Oral every 6 hours PRN Temp greater or equal to 38C (100.4F), Mild Pain (1 - 3)  aluminum hydroxide/magnesium hydroxide/simethicone Suspension 30 milliLiter(s) Oral every 4 hours PRN Dyspepsia  dextrose Oral Gel 15 Gram(s) Oral once PRN Blood Glucose LESS THAN 70 milliGRAM(s)/deciliter  melatonin 3 milliGRAM(s) Oral at bedtime PRN Insomnia  ondansetron Injectable 4 milliGRAM(s) IV Push every 8 hours PRN Nausea and/or Vomiting      __________________________________________________  REVIEW OF SYSTEMS:    CONSTITUTIONAL: No fever,         Vital Signs Last 24 Hrs  T(C): 36.6 (18 Oct 2024 04:33), Max: 38.6 (17 Oct 2024 11:40)  T(F): 97.8 (18 Oct 2024 04:33), Max: 101.5 (17 Oct 2024 11:40)  HR: 87 (18 Oct 2024 04:33) (82 - 87)  BP: 118/58 (18 Oct 2024 04:33) (95/50 - 118/58)  BP(mean): --  RR: 18 (18 Oct 2024 04:33) (17 - 18)  SpO2: 97% (18 Oct 2024 04:33) (96% - 100%)    Parameters below as of 18 Oct 2024 04:33  Patient On (Oxygen Delivery Method): nasal cannula  O2 Flow (L/min): 2      ________________________________________________  PHYSICAL EXAM:  GENERAL: NAD  CHEST/LUNG: Clear to ausculitation bilaterally   HEART: S1 S2  regular;  ABDOMEN: Soft, Nontender, + distended; Bowel sounds present  EXTREMITIES: no cyanosis; no edema; no calf tenderness  SKIN: warm and dry; no rash  NERVOUS SYSTEM:  Awake and alert; nonverbal     _________________________________________________  LABS:                        8.6    44.39 )-----------( 158      ( 17 Oct 2024 17:40 )             26.3     10-18    137  |  108  |  56[H]  ----------------------------<  131[H]  4.8   |  17[L]  |  2.76[H]    Ca    8.3[L]      18 Oct 2024 09:30  Phos  3.3     10-18  Mg     2.1     10-18    TPro  6.5  /  Alb  1.7[L]  /  TBili  0.4  /  DBili  x   /  AST  40  /  ALT  19  /  AlkPhos  120  10-18    PT/INR - ( 17 Oct 2024 12:15 )   PT: 20.6 sec;   INR: 1.79 ratio         PTT - ( 17 Oct 2024 12:15 )  PTT:29.8 sec  Urinalysis Basic - ( 18 Oct 2024 09:30 )    Color: x / Appearance: x / SG: x / pH: x  Gluc: 131 mg/dL / Ketone: x  / Bili: x / Urobili: x   Blood: x / Protein: x / Nitrite: x   Leuk Esterase: x / RBC: x / WBC x   Sq Epi: x / Non Sq Epi: x / Bacteria: x      CAPILLARY BLOOD GLUCOSE      POCT Blood Glucose.: 147 mg/dL (18 Oct 2024 08:14)        RADIOLOGY & ADDITIONAL TESTS:    Imaging Personally Reviewed:  YES/NO    Consultant(s) Notes Reviewed:   YES/ No    Care Discussed with Consultants :     Plan of care was discussed with patient and /or primary care giver; all questions and concerns were addressed and care was aligned with patient's wishes.

## 2024-10-18 NOTE — PROGRESS NOTE ADULT - ASSESSMENT
89-year-old female bedbound , non  verbal  from [facility] and . PMH CVA w rt sided weakness, aphasia, bedbound, DM,  HTN, HLD, CAD, presented with fever, hypoxia and  hypotension.   In the ER: lactate > 4 , WBC 44k,  hypotensive to 91/ 54, code sepsis called   Received vanomycin and cefepime   Urinalysis positive, Hb 8.6, creatinine 3.15,     CTAP: Enlarged polycystic kidneys. Minimal bilateral hydroureter.   Enlarging 7.0 x 6.9 cm multicystic pancreatic mass concerning for   malignancy. Pancreatic duct distention redemonstrated.  Ill-defined uterus inseparable from the anterior rectum, the sigmoid   colon and pelvic small bowel loops. Cannot exclude uterine, sigmoid or   rectal malignancy. Probable proctitis, either infectious or inflammatory.    CXR-No active disease  Admitted with  severe sepsis likely 2/2 UTI and  proctocolitis.   Started on Cefepime.  Blood culture growing gram negative rods. ID consulted for further recommendations     Pt was seen, NAD, alert, awake, non verbal, son at bedside, updated on pt's condition and plan of care

## 2024-10-18 NOTE — DIETITIAN INITIAL EVALUATION ADULT - PERTINENT MEDS FT
MEDICATIONS  (STANDING):  apixaban 2.5 milliGRAM(s) Oral every 12 hours  atorvastatin 20 milliGRAM(s) Oral at bedtime  cefepime   IVPB 250 milliGRAM(s) IV Intermittent every 24 hours  dextrose 5%. 1000 milliLiter(s) (100 mL/Hr) IV Continuous <Continuous>  dextrose 5%. 1000 milliLiter(s) (50 mL/Hr) IV Continuous <Continuous>  dextrose 50% Injectable 25 Gram(s) IV Push once  dextrose 50% Injectable 25 Gram(s) IV Push once  dextrose 50% Injectable 12.5 Gram(s) IV Push once  ferrous    sulfate 325 milliGRAM(s) Oral daily  glucagon  Injectable 1 milliGRAM(s) IntraMuscular once  insulin lispro (ADMELOG) corrective regimen sliding scale   SubCutaneous three times a day before meals  lactated ringers. 1000 milliLiter(s) (65 mL/Hr) IV Continuous <Continuous>  levETIRAcetam  Solution 500 milliGRAM(s) Oral two times a day    MEDICATIONS  (PRN):  acetaminophen     Tablet .. 650 milliGRAM(s) Oral every 6 hours PRN Temp greater or equal to 38C (100.4F), Mild Pain (1 - 3)  aluminum hydroxide/magnesium hydroxide/simethicone Suspension 30 milliLiter(s) Oral every 4 hours PRN Dyspepsia  dextrose Oral Gel 15 Gram(s) Oral once PRN Blood Glucose LESS THAN 70 milliGRAM(s)/deciliter  melatonin 3 milliGRAM(s) Oral at bedtime PRN Insomnia  ondansetron Injectable 4 milliGRAM(s) IV Push every 8 hours PRN Nausea and/or Vomiting

## 2024-10-19 LAB
-  AMPICILLIN/SULBACTAM: SIGNIFICANT CHANGE UP
-  AMPICILLIN: SIGNIFICANT CHANGE UP
-  AZTREONAM: SIGNIFICANT CHANGE UP
-  CEFAZOLIN: SIGNIFICANT CHANGE UP
-  CEFEPIME: SIGNIFICANT CHANGE UP
-  CEFOXITIN: SIGNIFICANT CHANGE UP
-  CEFTRIAXONE: SIGNIFICANT CHANGE UP
-  CIPROFLOXACIN: SIGNIFICANT CHANGE UP
-  ERTAPENEM: SIGNIFICANT CHANGE UP
-  GENTAMICIN: SIGNIFICANT CHANGE UP
-  IMIPENEM: SIGNIFICANT CHANGE UP
-  LEVOFLOXACIN: SIGNIFICANT CHANGE UP
-  MEROPENEM: SIGNIFICANT CHANGE UP
-  PIPERACILLIN/TAZOBACTAM: SIGNIFICANT CHANGE UP
-  TOBRAMYCIN: SIGNIFICANT CHANGE UP
-  TRIMETHOPRIM/SULFAMETHOXAZOLE: SIGNIFICANT CHANGE UP
24R-OH-CALCIDIOL SERPL-MCNC: 18.2 NG/ML — LOW (ref 30–80)
ANION GAP SERPL CALC-SCNC: 10 MMOL/L — SIGNIFICANT CHANGE UP (ref 5–17)
ANISOCYTOSIS BLD QL: SLIGHT — SIGNIFICANT CHANGE UP
BUN SERPL-MCNC: 59 MG/DL — HIGH (ref 7–18)
BURR CELLS BLD QL SMEAR: PRESENT — SIGNIFICANT CHANGE UP
CALCIUM SERPL-MCNC: 8.2 MG/DL — LOW (ref 8.4–10.5)
CHLORIDE SERPL-SCNC: 112 MMOL/L — HIGH (ref 96–108)
CO2 SERPL-SCNC: 18 MMOL/L — LOW (ref 22–31)
CREAT ?TM UR-MCNC: 112 MG/DL — SIGNIFICANT CHANGE UP
CREAT SERPL-MCNC: 2.45 MG/DL — HIGH (ref 0.5–1.3)
CULTURE RESULTS: ABNORMAL
CULTURE RESULTS: ABNORMAL
EGFR: 17 ML/MIN/1.73M2 — LOW
GLUCOSE BLDC GLUCOMTR-MCNC: 154 MG/DL — HIGH (ref 70–99)
GLUCOSE BLDC GLUCOMTR-MCNC: 170 MG/DL — HIGH (ref 70–99)
GLUCOSE BLDC GLUCOMTR-MCNC: 187 MG/DL — HIGH (ref 70–99)
GLUCOSE BLDC GLUCOMTR-MCNC: 232 MG/DL — HIGH (ref 70–99)
GLUCOSE SERPL-MCNC: 202 MG/DL — HIGH (ref 70–99)
HCT VFR BLD CALC: 26 % — LOW (ref 34.5–45)
HGB BLD-MCNC: 8.5 G/DL — LOW (ref 11.5–15.5)
MANUAL SMEAR VERIFICATION: SIGNIFICANT CHANGE UP
MCHC RBC-ENTMCNC: 29.2 PG — SIGNIFICANT CHANGE UP (ref 27–34)
MCHC RBC-ENTMCNC: 32.7 GM/DL — SIGNIFICANT CHANGE UP (ref 32–36)
MCV RBC AUTO: 89.3 FL — SIGNIFICANT CHANGE UP (ref 80–100)
METHOD TYPE: SIGNIFICANT CHANGE UP
NRBC # BLD: 0 /100 WBCS — SIGNIFICANT CHANGE UP (ref 0–0)
ORGANISM # SPEC MICROSCOPIC CNT: ABNORMAL
OVALOCYTES BLD QL SMEAR: SLIGHT — SIGNIFICANT CHANGE UP
PLAT MORPH BLD: NORMAL — SIGNIFICANT CHANGE UP
PLATELET # BLD AUTO: 82 K/UL — LOW (ref 150–400)
PLATELET COUNT - ESTIMATE: ABNORMAL
POIKILOCYTOSIS BLD QL AUTO: SLIGHT — SIGNIFICANT CHANGE UP
POTASSIUM SERPL-MCNC: 4 MMOL/L — SIGNIFICANT CHANGE UP (ref 3.5–5.3)
POTASSIUM SERPL-SCNC: 4 MMOL/L — SIGNIFICANT CHANGE UP (ref 3.5–5.3)
POTASSIUM UR-SCNC: 25 MMOL/L — SIGNIFICANT CHANGE UP
RBC # BLD: 2.91 M/UL — LOW (ref 3.8–5.2)
RBC # FLD: 17 % — HIGH (ref 10.3–14.5)
RBC BLD AUTO: ABNORMAL
SCHISTOCYTES BLD QL AUTO: SLIGHT — SIGNIFICANT CHANGE UP
SODIUM SERPL-SCNC: 140 MMOL/L — SIGNIFICANT CHANGE UP (ref 135–145)
SODIUM UR-SCNC: 13 MMOL/L — SIGNIFICANT CHANGE UP
SPECIMEN SOURCE: SIGNIFICANT CHANGE UP
SPECIMEN SOURCE: SIGNIFICANT CHANGE UP
WBC # BLD: 36.02 K/UL — HIGH (ref 3.8–10.5)
WBC # FLD AUTO: 36.02 K/UL — HIGH (ref 3.8–10.5)

## 2024-10-19 RX ORDER — CEFTRIAXONE SODIUM 10 G
1000 VIAL (EA) INJECTION EVERY 24 HOURS
Refills: 0 | Status: DISCONTINUED | OUTPATIENT
Start: 2024-10-19 | End: 2024-10-23

## 2024-10-19 RX ADMIN — Medication 20 MILLIGRAM(S): at 22:29

## 2024-10-19 RX ADMIN — Medication 2: at 08:27

## 2024-10-19 RX ADMIN — Medication 65 MILLILITER(S): at 22:29

## 2024-10-19 RX ADMIN — APIXABAN 2.5 MILLIGRAM(S): 5 TABLET, FILM COATED ORAL at 05:57

## 2024-10-19 RX ADMIN — Medication 100 MILLIGRAM(S): at 18:02

## 2024-10-19 RX ADMIN — Medication 1: at 17:44

## 2024-10-19 RX ADMIN — LEVETIRACETAM 500 MILLIGRAM(S): 500 TABLET, FILM COATED ORAL at 17:27

## 2024-10-19 RX ADMIN — Medication 65 MILLILITER(S): at 11:35

## 2024-10-19 RX ADMIN — Medication 1: at 11:46

## 2024-10-19 RX ADMIN — LEVETIRACETAM 500 MILLIGRAM(S): 500 TABLET, FILM COATED ORAL at 05:57

## 2024-10-19 RX ADMIN — APIXABAN 2.5 MILLIGRAM(S): 5 TABLET, FILM COATED ORAL at 17:27

## 2024-10-19 NOTE — CONSULT NOTE ADULT - ASSESSMENT
89 years old admitted for Urosepsis - EColi in blood and urine. Consult was called for TRAVIS .  History of diabetes, UTI in the past.   CKD stage 3B  AS disease and diabetes.     TRAVIS due to sepsis , hypotension , dehydration , improved with IV fluids.  Dehydration due to medications and sepsis.     PLAN:  Continue IV fluids .  Avoid SGLT2 inhibitors at present and in the future due to recurrent UTI.  Hold diuretics , ACE I and ARB. Avoid NSAID and Radiocontrast while in TRAVIS.   Urine lytes and osmolality  Iron studies  Phosphorus in am and Vitamin D.     
Sepsis  Fevers  UTI  Bacteremia  Leukocytosis      Plan - DC Maxipime  Start Rocephin 1gm iv q24hrs  repeat blood cultures.

## 2024-10-19 NOTE — PROGRESS NOTE ADULT - SUBJECTIVE AND OBJECTIVE BOX
INTERVAL HPI/OVERNIGHT EVENTS:  Patient seen,no acute issues for overnight  VITAL SIGNS:  T(F): 97.7 (10-19-24 @ 05:40)  HR: 96 (10-19-24 @ 05:40)  BP: 114/68 (10-19-24 @ 05:40)  RR: 16 (10-19-24 @ 05:40)  SpO2: 95% (10-19-24 @ 05:40)  Wt(kg): --    PHYSICAL EXAM:  awake,poor historian  Constitutional:  Eyes:  ENMT:perrla  Neck:  Respiratory:few rales  Cardiovascular:s1s2,m-none  Gastrointestinal:soft,bs pos  Extremities:  Vascular:  Neurological:no focal deficit  Musculoskeletal:    MEDICATIONS  (STANDING):  apixaban 2.5 milliGRAM(s) Oral every 12 hours  atorvastatin 20 milliGRAM(s) Oral at bedtime  cefepime   IVPB 250 milliGRAM(s) IV Intermittent every 24 hours  dextrose 5%. 1000 milliLiter(s) (100 mL/Hr) IV Continuous <Continuous>  dextrose 5%. 1000 milliLiter(s) (50 mL/Hr) IV Continuous <Continuous>  dextrose 50% Injectable 25 Gram(s) IV Push once  dextrose 50% Injectable 25 Gram(s) IV Push once  dextrose 50% Injectable 12.5 Gram(s) IV Push once  ferrous    sulfate 325 milliGRAM(s) Oral daily  glucagon  Injectable 1 milliGRAM(s) IntraMuscular once  insulin lispro (ADMELOG) corrective regimen sliding scale   SubCutaneous three times a day before meals  lactated ringers. 1000 milliLiter(s) (65 mL/Hr) IV Continuous <Continuous>  levETIRAcetam  Solution 500 milliGRAM(s) Oral two times a day    MEDICATIONS  (PRN):  acetaminophen     Tablet .. 650 milliGRAM(s) Oral every 6 hours PRN Temp greater or equal to 38C (100.4F), Mild Pain (1 - 3)  aluminum hydroxide/magnesium hydroxide/simethicone Suspension 30 milliLiter(s) Oral every 4 hours PRN Dyspepsia  dextrose Oral Gel 15 Gram(s) Oral once PRN Blood Glucose LESS THAN 70 milliGRAM(s)/deciliter  melatonin 3 milliGRAM(s) Oral at bedtime PRN Insomnia  ondansetron Injectable 4 milliGRAM(s) IV Push every 8 hours PRN Nausea and/or Vomiting      Allergies    No Known Allergies    Intolerances        LABS:                        8.3    43.73 )-----------( 124      ( 18 Oct 2024 09:30 )             25.9     10-18    137  |  108  |  56[H]  ----------------------------<  131[H]  4.8   |  17[L]  |  2.76[H]    Ca    8.3[L]      18 Oct 2024 09:30  Phos  3.3     10-18  Mg     2.1     10-18    TPro  6.5  /  Alb  1.7[L]  /  TBili  0.4  /  DBili  x   /  AST  40  /  ALT  19  /  AlkPhos  120  10-18    PT/INR - ( 17 Oct 2024 12:15 )   PT: 20.6 sec;   INR: 1.79 ratio         PTT - ( 17 Oct 2024 12:15 )  PTT:29.8 sec  Urinalysis Basic - ( 18 Oct 2024 09:30 )    Color: x / Appearance: x / SG: x / pH: x  Gluc: 131 mg/dL / Ketone: x  / Bili: x / Urobili: x   Blood: x / Protein: x / Nitrite: x   Leuk Esterase: x / RBC: x / WBC x   Sq Epi: x / Non Sq Epi: x / Bacteria: x        RADIOLOGY & ADDITIONAL TESTS:      Assessment and Plan:   · Assessment	  89-year-old female bedbound , non  verbal  from [facility] and . PMH CVA w rt sided weakness, aphasia, bedbound, DM,  HTN, HLD, CAD, presented with fever, hypoxia and  hypotension.   In the ER: lactate > 4 , WBC 44k,  hypotensive to 91/ 54, code sepsis called   Received vanomycin and cefepime   Urinalysis positive, Hb 8.6, creatinine 3.15,     CTAP: Enlarged polycystic kidneys. Minimal bilateral hydroureter.   Enlarging 7.0 x 6.9 cm multicystic pancreatic mass concerning for   malignancy. Pancreatic duct distention redemonstrated.  Ill-defined uterus inseparable from the anterior rectum, the sigmoid   colon and pelvic small bowel loops. Cannot exclude uterine, sigmoid or   rectal malignancy. Probable proctitis, either infectious or inflammatory.    CXR-No active disease  Admitted with  severe sepsis likely 2/2 UTI and  proctocolitis.   Started on Cefepime.  Blood culture growing gram negative rods. ID consulted for further recommendations     Pt was seen, NAD, alert, awake, non verbal, son at bedside, updated on pt's condition and plan of care          Problem/Plan - 1:  ·  Problem: Sepsis.   ·  Plan: Meets SIRS Criteria  Source likely UTI, proctocolitis   -IV fluids: c/w LR   -c/w cefepime   -f/u urine culture and blood culture   -IDx f/up appret     Problem/Plan - 2:  ·  Problem: Bacteremia.   ·  Plan: Blood culture growing gram negative rods   bacteremia likely from uti   continue antibiotics  ID Dr Keating consulted   follow up repeat blood cultures.     Problem/Plan - 3:  ·  Problem: Acute UTI.   ·  Plan: WBC 44K urinalysis positive   - c/w cefepime   - follow up urine culture   -ID dr Keating consulted.     Problem/Plan - 4:  ·  Problem: Acute diarrhea.   ·  Plan: diarrhea on admission, CTAP: + Probable proctitis, either infectious or inflammatory.  - IVF, replace electrolytes   - stool Cx , C diff, GI PCR, stool count   - c/w  cefepime.     Problem/Plan - 5:  ·  Problem: Leucocytosis.   ·  Plan: WBC 44K   likely in setting of infection vs malignancy   - c/w antibiotics   - ID consulted.  -daily labs     Problem/Plan - 6:  ·  Problem: TRAVIS (acute kidney injury).   ·  Plan: TRAVIS  on CKD  continue IV fluids  monitor kidney function.     Problem/Plan - 7:  ·  Problem: Stroke.   ·  Plan: Hx of CVA with right side residual  C/W Atorvastatin and Eliquis.     Problem/Plan - 8:  ·  Problem: History of seizures.   ·  Plan: C/W Keppra solution   Seizure precautions.     Problem/Plan - 9:  ·  Problem: Anemia.   ·  Plan: Hb8.6   Trend Hb   Hx of chronic anemia   Baseline 8.9-9.2     c/w monitoring.     Problem/Plan - 10:  ·  Problem: HTN (hypertension).   ·  Plan; h/o HTN on  Monitor BP  c/w home meds -  Lower MAP 20-25% in next 2-4 hrs.     Problem/Plan - 11:  ·  Problem: HLD (hyperlipidemia).   ·  Plan: - pt takes rosuvastatin 20 mg at home  - c/w atorvastatin 40 mg   - f/u lipid profile  - Dash Diet.     Problem/Plan - 12:  ·  Problem: DM (diabetes mellitus).   ·  Plan: A1c 7.8  C/W [facility]   Monitor FS and adjust as needed  C/W Con Carb diet.  Adjust insulin as indicated.

## 2024-10-19 NOTE — CONSULT NOTE ADULT - SUBJECTIVE AND OBJECTIVE BOX
Chief complain/HPI  89 years old with . PMH CVA w rt sided weakness, aphasia, bedbound, DM. HTN, HLD, CAD.Sz.  presents with fever AND  low blood pressures,  lactate > 4 , hypotensive to 91/ 54, code sepsis called IN THE er   s/p Vanc , cefepime . Renal consult was called for TRAVIS and Urosepsis.   BC and urine culture positive to E Coli.      CT Chest No Cont (10.17.24 @ 15:10) >  LIMITATIONS: Evaluation of the solid organs, vascular structures and GI   tract is limited without oral and IV contrast.    Enlarged polycystic kidneys. Minimal bilateral hydroureter. Limited   evaluation for solid renal mass.    Enlarging 7.0 x 6.9 cm multicystic pancreatic mass concerning for   malignancy. Pancreatic duct distention redemonstrated.    Ill-defined uterus inseparable from the anteriorrectum, the sigmoid   colon and pelvic small bowel loops. Cannot exclude uterine, sigmoid or   rectal malignancy. Mildly distended sigmoid colon may be related to   pseudoobstruction or early or partial bowel obstruction.    Probable proctitis, either infectious or inflammatory.    If possible, follow-up CT with oral and IV contrast would be of   diagnostic benefit.          PAST MEDICAL & SURGICAL HISTORY:  Stroke      HTN (hypertension)      HLD (hyperlipidemia)      DM (diabetes mellitus)      DVT (deep venous thrombosis)      CVA (cerebrovascular accident)      Aphasia      Dysphagia      Apraxia      Right-sided muscle weakness      Nonverbal      Seizure          Home Medications Reviewed  atorvastatin 20 mg oral tablet: Last Dose Taken:  , 1 tab(s) orally once a day (at bedtime)  · 	levETIRAcetam 100 mg/mL oral solution: Last Dose Taken:  , 5 milliliter(s) orally 2 times a day  · 	apixaban 2.5 mg oral tablet: Last Dose Taken:  , 1 tab(s) orally every 12 hours  · 	acetaminophen 325 mg oral tablet: Last Dose Taken:  , 2 tab(s) orally every 6 hours As needed Temp greater or equal to 38C (100.4F), Mild Pain (1 - 3)  · 	Farxiga 10 mg oral tablet: Last Dose Taken:  , 1 tab(s) orally once a day  · 	Nuedexta 20 mg-10 mg oral capsule: Last Dose Taken:  , 1 cap(s) orally every 12 hours  · 	MiraLax oral powder for reconstitution: Last Dose Taken:  , 17 gram(s) orally 2 times a day  · 	Actos 15 mg oral tablet: Last Dose Taken:  , 1 orally once a day  · 	nystatin 100,000 units/g topical powder: Last Dose Taken:  , Apply topically to affected area 2 times a day as needed for  rash  · 	HumaLOG KwikPen 100 units/mL injectable solution: Last Dose Taken:  , subcutaneously 3 times a day (-250: 2 units, 251-300: 4 units, 301-350: 6 units, 351-400: 8 units)  · 	ferrous sulfate 325 mg (65 mg elemental iron) oral tablet: 1 tab(s) orally once a day  · 	losartan 100 mg oral tablet: Last Dose Taken:  , 1 tab(s) orally once a day  · 	zinc oxide 20% topical ointment: Last Dose Taken:  , Apply topically to affected area  · 	Multiple Vitamins oral tablet: Last Dose Taken:  , 1 tab(s) orally once a day  · 	simethicone 125 mg oral tablet, chewable: Last Dose Taken:  , 1 tab(s) chewed once a day AFTER OTHER MEDICATIONS  · 	senna (sennosides) 8.6 mg oral tablet: Last Dose Taken:  , 2 tab(s) orally once a day (at bedtime)  · 	Norvasc 10 mg oral tablet: Last Dose Taken:  , 1 orally once a day  · 	ascorbic acid 500 mg/5 mL oral liquid: Last Dose Taken:  , 5 milliliter(s) orally once a day  · 	spironolactone 25 mg oral tablet: Last Dose Taken:  , 1 tablet orally once a week (Monday at 9am)  · 	nitroglycerin 0.2 mg/hr transder  Lasix 20 mg daily.   Hospital Medications:   MEDICATIONS  (STANDING):  apixaban 2.5 milliGRAM(s) Oral every 12 hours  atorvastatin 20 milliGRAM(s) Oral at bedtime  cefepime   IVPB 250 milliGRAM(s) IV Intermittent every 24 hours  dextrose 5%. 1000 milliLiter(s) (100 mL/Hr) IV Continuous <Continuous>  dextrose 5%. 1000 milliLiter(s) (50 mL/Hr) IV Continuous <Continuous>  dextrose 50% Injectable 25 Gram(s) IV Push once  dextrose 50% Injectable 25 Gram(s) IV Push once  dextrose 50% Injectable 12.5 Gram(s) IV Push once  ferrous    sulfate 325 milliGRAM(s) Oral daily  glucagon  Injectable 1 milliGRAM(s) IntraMuscular once  insulin lispro (ADMELOG) corrective regimen sliding scale   SubCutaneous three times a day before meals  lactated ringers. 1000 milliLiter(s) (65 mL/Hr) IV Continuous <Continuous>  levETIRAcetam  Solution 500 milliGRAM(s) Oral two times a day    MEDICATIONS  (PRN):  acetaminophen     Tablet .. 650 milliGRAM(s) Oral every 6 hours PRN Temp greater or equal to 38C (100.4F), Mild Pain (1 - 3)  aluminum hydroxide/magnesium hydroxide/simethicone Suspension 30 milliLiter(s) Oral every 4 hours PRN Dyspepsia  dextrose Oral Gel 15 Gram(s) Oral once PRN Blood Glucose LESS THAN 70 milliGRAM(s)/deciliter  melatonin 3 milliGRAM(s) Oral at bedtime PRN Insomnia  ondansetron Injectable 4 milliGRAM(s) IV Push every 8 hours PRN Nausea and/or Vomiting      Allergies    No Known Allergies    Intolerances    Creatinine: 3.44 mg/dL (10.17.24 @ 12:15) Blood Urea Nitrogen: 58 mg/dL (10.17.24 @ 12:15)                           8.5    36.02 )-----------( x        ( 19 Oct 2024 06:55 )             26.0     10-19    140  |  112[H]  |  59[H]  ----------------------------<  202[H]  4.0   |  18[L]  |  2.45[H]    Ca    8.2[L]      19 Oct 2024 06:55  Phos  3.3     10-18  Mg     2.1     10-18    TPro  6.5  /  Alb  1.7[L]  /  TBili  0.4  /  DBili  x   /  AST  40  /  ALT  19  /  AlkPhos  120  10-18    PT/INR - ( 17 Oct 2024 12:15 )   PT: 20.6 sec;   INR: 1.79 ratio         PTT - ( 17 Oct 2024 12:15 )  PTT:29.8 sec  Urine Microscopic-Add On (NC) (10.17.24 @ 16:21)   Bacteria: Moderate /HPF  Squamous Epithelial Cells: Present  Red Blood Cell - Urine: >25 /HPF  White Blood Cell - Urine: >50 /HPF  Hyaline Casts: PresentUrine Appearance: Turbid  Color: Dark Yellow  Specific Gravity: 1.018  pH Urine: 5.0  Protein, Urine: 100 mg/dL  Glucose Qualitative, Urine: 250 mg/dL  Ketone - Urine: Trace mg/dL  Blood, Urine: Large  Bilirubin: Negative  Urobilinogen: 0.2 mg/dL  Leukocyte Esterase Concentration: Large  Nitrite: Negative        RADIOLOGY & ADDITIONAL STUDIES:    SOCIAL HISTORY: Denies ETOh,Smoking,     FAMILY HISTORY:      REVIEW OF SYSTEMS:  dementia, awake but not verbal.     VITALS:  Vital Signs Last 24 Hrs  T(C): 36.5 (19 Oct 2024 05:40), Max: 36.9 (18 Oct 2024 14:49)  T(F): 97.7 (19 Oct 2024 05:40), Max: 98.5 (18 Oct 2024 14:49)  HR: 96 (19 Oct 2024 05:40) (70 - 96)  BP: 114/68 (19 Oct 2024 05:40) (114/68 - 118/70)  BP(mean): --  RR: 16 (19 Oct 2024 05:40) (16 - 17)  SpO2: 95% (19 Oct 2024 05:40) (94% - 95%)    Parameters below as of 19 Oct 2024 05:40  Patient On (Oxygen Delivery Method): nasal cannula  O2 Flow (L/min): 2      10-18 @ 07:01  -  10-19 @ 07:00  --------------------------------------------------------  IN: 845 mL / OUT: 500 mL / NET: 345 mL          PHYSICAL EXAM:  Constitutional: NAD  HEENT: anicteric sclera, oropharynx clear, MMM  Neck: No JVD  Respiratory: good air entrance B/L, no wheezes, rales or rhonchi  Cardiovascular: S1, S2, RRR, no pericardial rub, no murmur  Gastrointestinal: BS+, soft, no tenderness, no distension, no bruit  Pelvis: bladder non-distended, no CVA tenderness  Extremities: No cyanosis or clubbing. No peripheral edema  Neurological: non verbal.

## 2024-10-20 LAB
ANION GAP SERPL CALC-SCNC: 7 MMOL/L — SIGNIFICANT CHANGE UP (ref 5–17)
BUN SERPL-MCNC: 58 MG/DL — HIGH (ref 7–18)
CALCIUM SERPL-MCNC: 8.2 MG/DL — LOW (ref 8.4–10.5)
CHLORIDE SERPL-SCNC: 113 MMOL/L — HIGH (ref 96–108)
CO2 SERPL-SCNC: 20 MMOL/L — LOW (ref 22–31)
CREAT SERPL-MCNC: 1.99 MG/DL — HIGH (ref 0.5–1.3)
EGFR: 22 ML/MIN/1.73M2 — LOW
FERRITIN SERPL-MCNC: 438 NG/ML — HIGH (ref 13–330)
GLUCOSE BLDC GLUCOMTR-MCNC: 178 MG/DL — HIGH (ref 70–99)
GLUCOSE BLDC GLUCOMTR-MCNC: 204 MG/DL — HIGH (ref 70–99)
GLUCOSE BLDC GLUCOMTR-MCNC: 213 MG/DL — HIGH (ref 70–99)
GLUCOSE BLDC GLUCOMTR-MCNC: 222 MG/DL — HIGH (ref 70–99)
GLUCOSE SERPL-MCNC: 166 MG/DL — HIGH (ref 70–99)
HCT VFR BLD CALC: 23 % — LOW (ref 34.5–45)
HGB BLD-MCNC: 7.6 G/DL — LOW (ref 11.5–15.5)
IRON SATN MFR SERPL: 12 % — LOW (ref 15–50)
IRON SATN MFR SERPL: 18 UG/DL — LOW (ref 40–150)
MCHC RBC-ENTMCNC: 29 PG — SIGNIFICANT CHANGE UP (ref 27–34)
MCHC RBC-ENTMCNC: 33 GM/DL — SIGNIFICANT CHANGE UP (ref 32–36)
MCV RBC AUTO: 87.8 FL — SIGNIFICANT CHANGE UP (ref 80–100)
NRBC # BLD: 0 /100 WBCS — SIGNIFICANT CHANGE UP (ref 0–0)
OSMOLALITY UR: 372 MOSM/KG — SIGNIFICANT CHANGE UP (ref 50–1200)
PLATELET # BLD AUTO: 91 K/UL — LOW (ref 150–400)
POTASSIUM SERPL-MCNC: 3.7 MMOL/L — SIGNIFICANT CHANGE UP (ref 3.5–5.3)
POTASSIUM SERPL-SCNC: 3.7 MMOL/L — SIGNIFICANT CHANGE UP (ref 3.5–5.3)
RBC # BLD: 2.62 M/UL — LOW (ref 3.8–5.2)
RBC # FLD: 16.8 % — HIGH (ref 10.3–14.5)
SODIUM SERPL-SCNC: 140 MMOL/L — SIGNIFICANT CHANGE UP (ref 135–145)
TIBC SERPL-MCNC: 148 UG/DL — LOW (ref 250–450)
UIBC SERPL-MCNC: 130 UG/DL — SIGNIFICANT CHANGE UP (ref 110–370)
WBC # BLD: 29.34 K/UL — HIGH (ref 3.8–10.5)
WBC # FLD AUTO: 29.34 K/UL — HIGH (ref 3.8–10.5)

## 2024-10-20 RX ADMIN — APIXABAN 2.5 MILLIGRAM(S): 5 TABLET, FILM COATED ORAL at 05:32

## 2024-10-20 RX ADMIN — LEVETIRACETAM 500 MILLIGRAM(S): 500 TABLET, FILM COATED ORAL at 17:39

## 2024-10-20 RX ADMIN — Medication 1: at 08:37

## 2024-10-20 RX ADMIN — Medication 2: at 17:39

## 2024-10-20 RX ADMIN — Medication 100 MILLIGRAM(S): at 17:38

## 2024-10-20 RX ADMIN — APIXABAN 2.5 MILLIGRAM(S): 5 TABLET, FILM COATED ORAL at 17:39

## 2024-10-20 RX ADMIN — Medication 20 MILLIGRAM(S): at 22:55

## 2024-10-20 RX ADMIN — LEVETIRACETAM 500 MILLIGRAM(S): 500 TABLET, FILM COATED ORAL at 05:32

## 2024-10-20 RX ADMIN — Medication 2: at 11:49

## 2024-10-20 RX ADMIN — Medication 325 MILLIGRAM(S): at 11:49

## 2024-10-20 NOTE — PROGRESS NOTE ADULT - ASSESSMENT
Sepsis  Fevers  UTI  Bacteremia  Leukocytosis- decreasing     Plan -   ·	Continue Rocephin 1gm iv q24hrs  ·	repeat blood cultures ordered

## 2024-10-20 NOTE — PROGRESS NOTE ADULT - SUBJECTIVE AND OBJECTIVE BOX
INTERVAL HPI/OVERNIGHT EVENTS:  Patient see,awake,confused  VITAL SIGNS:  T(F): 97.7 (10-20-24 @ 05:04)  HR: 83 (10-20-24 @ 05:04)  BP: 103/64 (10-20-24 @ 05:04)  RR: 17 (10-20-24 @ 05:04)  SpO2: 99% (10-20-24 @ 05:04)  Wt(kg): --    PHYSICAL EXAM:  awake,poor historian  Constitutional:  Eyes:  ENMT:perrla  Neck:  Respiratory:few rales  Cardiovascular:s1s2,m-none  Gastrointestinal:soft,bs pos  Extremities:  Vascular:  Neurological:no focal deficit  Musculoskeletal:    MEDICATIONS  (STANDING):  apixaban 2.5 milliGRAM(s) Oral every 12 hours  atorvastatin 20 milliGRAM(s) Oral at bedtime  cefTRIAXone   IVPB 1000 milliGRAM(s) IV Intermittent every 24 hours  dextrose 5%. 1000 milliLiter(s) (100 mL/Hr) IV Continuous <Continuous>  dextrose 5%. 1000 milliLiter(s) (50 mL/Hr) IV Continuous <Continuous>  dextrose 50% Injectable 25 Gram(s) IV Push once  dextrose 50% Injectable 25 Gram(s) IV Push once  dextrose 50% Injectable 12.5 Gram(s) IV Push once  ferrous    sulfate 325 milliGRAM(s) Oral daily  glucagon  Injectable 1 milliGRAM(s) IntraMuscular once  insulin lispro (ADMELOG) corrective regimen sliding scale   SubCutaneous three times a day before meals  lactated ringers. 1000 milliLiter(s) (65 mL/Hr) IV Continuous <Continuous>  levETIRAcetam  Solution 500 milliGRAM(s) Oral two times a day    MEDICATIONS  (PRN):  acetaminophen     Tablet .. 650 milliGRAM(s) Oral every 6 hours PRN Temp greater or equal to 38C (100.4F), Mild Pain (1 - 3)  aluminum hydroxide/magnesium hydroxide/simethicone Suspension 30 milliLiter(s) Oral every 4 hours PRN Dyspepsia  dextrose Oral Gel 15 Gram(s) Oral once PRN Blood Glucose LESS THAN 70 milliGRAM(s)/deciliter  melatonin 3 milliGRAM(s) Oral at bedtime PRN Insomnia  ondansetron Injectable 4 milliGRAM(s) IV Push every 8 hours PRN Nausea and/or Vomiting      Allergies    No Known Allergies    Intolerances        LABS:                        7.6    29.34 )-----------( 91       ( 20 Oct 2024 06:36 )             23.0     10-20    140  |  113[H]  |  58[H]  ----------------------------<  166[H]  3.7   |  20[L]  |  1.99[H]    Ca    8.2[L]      20 Oct 2024 06:36        Urinalysis Basic - ( 20 Oct 2024 06:36 )    Color: x / Appearance: x / SG: x / pH: x  Gluc: 166 mg/dL / Ketone: x  / Bili: x / Urobili: x   Blood: x / Protein: x / Nitrite: x   Leuk Esterase: x / RBC: x / WBC x   Sq Epi: x / Non Sq Epi: x / Bacteria: x        RADIOLOGY & ADDITIONAL TESTS:      Assessment and Plan:   · Assessment	  89-year-old female bedbound , non  verbal  from [facility] and . PMH CVA w rt sided weakness, aphasia, bedbound, DM,  HTN, HLD, CAD, presented with fever, hypoxia and  hypotension.   In the ER: lactate > 4 , WBC 44k,  hypotensive to 91/ 54, code sepsis called   Received vanomycin and cefepime   Urinalysis positive, Hb 8.6, creatinine 3.15,     CTAP: Enlarged polycystic kidneys. Minimal bilateral hydroureter.   Enlarging 7.0 x 6.9 cm multicystic pancreatic mass concerning for   malignancy. Pancreatic duct distention redemonstrated.  Ill-defined uterus inseparable from the anterior rectum, the sigmoid   colon and pelvic small bowel loops. Cannot exclude uterine, sigmoid or   rectal malignancy. Probable proctitis, either infectious or inflammatory.    CXR-No active disease  Admitted with  severe sepsis likely 2/2 UTI and  proctocolitis.   Started on Cefepime.  Blood culture growing gram negative rods. ID consulted for further recommendations     Pt was seen, NAD, alert, awake, non verbal, son at bedside, updated on pt's condition and plan of care          Problem/Plan - 1:  ·  Problem: Sepsis.   Source likely UTI, proctocolitis   -IV fluids: c/w LR   -c/w cefepime   -f/u urine culture and blood culture   -IDx f/up appret     Problem/Plan - 2:  ·  Problem: Bacteremia.   ·  Plan: Blood culture growing gram negative rods   bacteremia likely from uti   continue antibiotics  ID Dr Keating consulted   follow up repeat blood cultures.     Problem/Plan - 3:  ·  Problem: Acute UTI.   ·  Plan: WBC 44K urinalysis positive   - c/w cefepime   - follow up urine culture   -ID dr Keating consulted.     Problem/Plan - 4:  ·  Problem: Acute diarrhea.   ·  Plan: diarrhea on admission, CTAP: + Probable proctitis, either infectious or inflammatory.  - IVF, replace electrolytes   - stool Cx , C diff, GI PCR, stool count   - c/w  cefepime.     Problem/Plan - 5:  ·  Problem: Leucocytosis.   ·  Plan: WBC 44K   likely in setting of infection vs malignancy   - c/w antibiotics   - ID consulted.  -daily labs     Problem/Plan - 6:  ·  Problem: TRAVIS (acute kidney injury).   ·  Plan: TRAVIS  on CKD  continue IV fluids  monitor kidney function.     Problem/Plan - 7:  ·  Problem: Stroke.   ·  Plan: Hx of CVA with right side residual  C/W Atorvastatin and Eliquis.     Problem/Plan - 8:  ·  Problem: History of seizures.   ·  Plan: C/W Keppra solution   Seizure precautions.     Problem/Plan - 9:  ·  Problem: Anemia.   ·  Plan: Hb8.6   Trend Hb   Hx of chronic anemia   Baseline 8.9-9.2     c/w monitoring.     Problem/Plan - 10:  ·  Problem: HTN (hypertension).   ·  Plan; h/o HTN on  Monitor BP  c/w home meds -  Lower MAP 20-25% in next 2-4 hrs.     Problem/Plan - 11:  ·  Problem: HLD (hyperlipidemia).   ·  Plan: - pt takes rosuvastatin 20 mg at home  - c/w atorvastatin 40 mg   - f/u lipid profile  - Dash Diet.     Problem/Plan - 12:  ·  Problem: DM (diabetes mellitus).   ·  Plan: A1c 7.8  C/W [facility]   Monitor FS and adjust as needed  C/W Con Carb diet.  Adjust insulin as indicated.

## 2024-10-20 NOTE — PROGRESS NOTE ADULT - SUBJECTIVE AND OBJECTIVE BOX
89y Female is under our care for     MEDS:  cefTRIAXone   IVPB 1000 milliGRAM(s) IV Intermittent every 24 hours    ALLERGIES: Allergies    No Known Allergies    Intolerances    REVIEW OF SYSTEMS:  [  ] Not able to elicit  General:	  Chest:	  GI:	  :  Skin:	  Musculoskeletal:	  Neuro:	    VITALS:  Vital Signs Last 24 Hrs  T(C): 36.5 (20 Oct 2024 05:04), Max: 36.8 (19 Oct 2024 20:05)  T(F): 97.7 (20 Oct 2024 05:04), Max: 98.3 (19 Oct 2024 20:05)  HR: 83 (20 Oct 2024 05:04) (83 - 97)  BP: 103/64 (20 Oct 2024 05:04) (103/64 - 122/71)  BP(mean): --  RR: 17 (20 Oct 2024 05:04) (17 - 17)  SpO2: 99% (20 Oct 2024 05:04) (95% - 99%)    Parameters below as of 20 Oct 2024 05:04  Patient On (Oxygen Delivery Method): nasal cannula  O2 Flow (L/min): 2    PHYSICAL EXAM:  HEENT:  Neck:  Respiratory:  Cardiovascular:  Gastrointestinal:  :  Extremities:  Skin:  Ortho:  Neuro:    LABS/DIAGNOSTIC TESTS:                        7.6    29.34 )-----------( 91       ( 20 Oct 2024 06:36 )             23.0     WBC Count: 29.34 K/uL (10-20 @ 06:36)  WBC Count: 36.02 K/uL (10-19 @ 06:55)  WBC Count: 43.73 K/uL (10-18 @ 09:30)  WBC Count: 44.39 K/uL (10-17 @ 17:40)  WBC Count: 44.18 K/uL (10-17 @ 12:15)    10-20    140  |  113[H]  |  58[H]  ----------------------------<  166[H]  3.7   |  20[L]  |  1.99[H]    Ca    8.2[L]      20 Oct 2024 06:36    CULTURES:   Catheterized  10-17 @ 16:21   >100,000 CFU/ml Escherichia coli  --  --    .Blood BLOOD  10-17 @ 11:00   Growth in aerobic and anaerobic bottles: Escherichia coli  See previous culture [id number]  --    Growth in anaerobic bottle: Gram Negative Rods  Growth in aerobic bottle: Gram Negative Rods    .Blood BLOOD  10-17 @ 10:50   Growth in aerobic and anaerobic bottles: Escherichia coli  Direct identification is available within approximately 3-5  hours either by Blood Panel Multiplexed PCR or Direct  MALDI-TOF. Details: [email or web address]  --  Blood Culture PCR  Escherichia coli    RADIOLOGY:  no new studies 89y Female lying in bed and in no acute distress. She is aphasic but alert and smiling at me. Primafit in place with cloudy yellow urine in cannister. Has not had any fevers and wbc count is downtrending.     MEDS:  cefTRIAXone   IVPB 1000 milliGRAM(s) IV Intermittent every 24 hours    ALLERGIES: Allergies    No Known Allergies    Intolerances    REVIEW OF SYSTEMS:  [ X  ] Not able to elicit    VITALS:  Vital Signs Last 24 Hrs  T(C): 36.5 (20 Oct 2024 05:04), Max: 36.8 (19 Oct 2024 20:05)  T(F): 97.7 (20 Oct 2024 05:04), Max: 98.3 (19 Oct 2024 20:05)  HR: 83 (20 Oct 2024 05:04) (83 - 97)  BP: 103/64 (20 Oct 2024 05:04) (103/64 - 122/71)  BP(mean): --  RR: 17 (20 Oct 2024 05:04) (17 - 17)  SpO2: 99% (20 Oct 2024 05:04) (95% - 99%)    Parameters below as of 20 Oct 2024 05:04  Patient On (Oxygen Delivery Method): nasal cannula  O2 Flow (L/min): 2    PHYSICAL EXAM:  HEENT: normocephalic, conjunctivae and sclerae clear; moist mucous membranes  Neck: supple no LN's   Respiratory: lungs clear no rales  Cardiovascular: S1 S2 reg no murmurs  Gastrointestinal: +BS with soft, nondistended abdomen; nontender  : Primafit in place with cloudy yellow urine in cannister  Extremities: right hemiplegia  Skin: no rashes  Ortho: no erythema or joint swelling  Neuro: awake and alert, aphasic     LABS/DIAGNOSTIC TESTS:                        7.6    29.34 )-----------( 91       ( 20 Oct 2024 06:36 )             23.0     WBC Count: 29.34 K/uL (10-20 @ 06:36)  WBC Count: 36.02 K/uL (10-19 @ 06:55)  WBC Count: 43.73 K/uL (10-18 @ 09:30)  WBC Count: 44.39 K/uL (10-17 @ 17:40)  WBC Count: 44.18 K/uL (10-17 @ 12:15)    10-20    140  |  113[H]  |  58[H]  ----------------------------<  166[H]  3.7   |  20[L]  |  1.99[H]    Ca    8.2[L]      20 Oct 2024 06:36    CULTURES:   Catheterized  10-17 @ 16:21   >100,000 CFU/ml Escherichia coli  --  --    .Blood BLOOD  10-17 @ 11:00   Growth in aerobic and anaerobic bottles: Escherichia coli  See previous culture [id number]  --    Growth in anaerobic bottle: Gram Negative Rods  Growth in aerobic bottle: Gram Negative Rods    .Blood BLOOD  10-17 @ 10:50   Growth in aerobic and anaerobic bottles: Escherichia coli  Direct identification is available within approximately 3-5  hours either by Blood Panel Multiplexed PCR or Direct  MALDI-TOF. Details: [email or web address]  --  Blood Culture PCR  Escherichia coli    RADIOLOGY:  no new studies

## 2024-10-21 LAB
ANION GAP SERPL CALC-SCNC: 8 MMOL/L — SIGNIFICANT CHANGE UP (ref 5–17)
BUN SERPL-MCNC: 51 MG/DL — HIGH (ref 7–18)
BURR CELLS BLD QL SMEAR: SLIGHT — SIGNIFICANT CHANGE UP
CALCIUM SERPL-MCNC: 8.6 MG/DL — SIGNIFICANT CHANGE UP (ref 8.4–10.5)
CHLORIDE SERPL-SCNC: 114 MMOL/L — HIGH (ref 96–108)
CO2 SERPL-SCNC: 21 MMOL/L — LOW (ref 22–31)
CREAT SERPL-MCNC: 1.7 MG/DL — HIGH (ref 0.5–1.3)
DACRYOCYTES BLD QL SMEAR: SLIGHT — SIGNIFICANT CHANGE UP
EGFR: 27 ML/MIN/1.73M2 — LOW
GLUCOSE BLDC GLUCOMTR-MCNC: 166 MG/DL — HIGH (ref 70–99)
GLUCOSE BLDC GLUCOMTR-MCNC: 205 MG/DL — HIGH (ref 70–99)
GLUCOSE BLDC GLUCOMTR-MCNC: 224 MG/DL — HIGH (ref 70–99)
GLUCOSE BLDC GLUCOMTR-MCNC: 96 MG/DL — SIGNIFICANT CHANGE UP (ref 70–99)
GLUCOSE SERPL-MCNC: 195 MG/DL — HIGH (ref 70–99)
HCT VFR BLD CALC: 23.4 % — LOW (ref 34.5–45)
HGB BLD-MCNC: 7.6 G/DL — LOW (ref 11.5–15.5)
HYPOCHROMIA BLD QL: SIGNIFICANT CHANGE UP
LG PLATELETS BLD QL AUTO: SLIGHT — SIGNIFICANT CHANGE UP
MANUAL SMEAR VERIFICATION: SIGNIFICANT CHANGE UP
MCHC RBC-ENTMCNC: 28.8 PG — SIGNIFICANT CHANGE UP (ref 27–34)
MCHC RBC-ENTMCNC: 32.5 GM/DL — SIGNIFICANT CHANGE UP (ref 32–36)
MCV RBC AUTO: 88.6 FL — SIGNIFICANT CHANGE UP (ref 80–100)
NRBC # BLD: 0 /100 WBCS — SIGNIFICANT CHANGE UP (ref 0–0)
OVALOCYTES BLD QL SMEAR: SLIGHT — SIGNIFICANT CHANGE UP
PLAT MORPH BLD: NORMAL — SIGNIFICANT CHANGE UP
PLATELET # BLD AUTO: 107 K/UL — LOW (ref 150–400)
PLATELET COUNT - ESTIMATE: ABNORMAL
POIKILOCYTOSIS BLD QL AUTO: SLIGHT — SIGNIFICANT CHANGE UP
POTASSIUM SERPL-MCNC: 3.6 MMOL/L — SIGNIFICANT CHANGE UP (ref 3.5–5.3)
POTASSIUM SERPL-SCNC: 3.6 MMOL/L — SIGNIFICANT CHANGE UP (ref 3.5–5.3)
RBC # BLD: 2.64 M/UL — LOW (ref 3.8–5.2)
RBC # FLD: 16.7 % — HIGH (ref 10.3–14.5)
RBC BLD AUTO: ABNORMAL
SCHISTOCYTES BLD QL AUTO: SLIGHT — SIGNIFICANT CHANGE UP
SODIUM SERPL-SCNC: 143 MMOL/L — SIGNIFICANT CHANGE UP (ref 135–145)
WBC # BLD: 26.61 K/UL — HIGH (ref 3.8–10.5)
WBC # FLD AUTO: 26.61 K/UL — HIGH (ref 3.8–10.5)

## 2024-10-21 RX ORDER — ERGOCALCIFEROL (VITAMIN D2) 200 MCG/ML
50000 DROPS ORAL
Refills: 0 | Status: DISCONTINUED | OUTPATIENT
Start: 2024-10-21 | End: 2024-10-23

## 2024-10-21 RX ORDER — IRON SUCROSE 20 MG/ML
200 INJECTION, SOLUTION INTRAVENOUS EVERY 24 HOURS
Refills: 0 | Status: COMPLETED | OUTPATIENT
Start: 2024-10-21 | End: 2024-10-23

## 2024-10-21 RX ADMIN — Medication 50000 UNIT(S): at 16:10

## 2024-10-21 RX ADMIN — LEVETIRACETAM 500 MILLIGRAM(S): 500 TABLET, FILM COATED ORAL at 17:12

## 2024-10-21 RX ADMIN — LEVETIRACETAM 500 MILLIGRAM(S): 500 TABLET, FILM COATED ORAL at 07:03

## 2024-10-21 RX ADMIN — Medication 2: at 11:58

## 2024-10-21 RX ADMIN — Medication 20 MILLIGRAM(S): at 22:42

## 2024-10-21 RX ADMIN — Medication 3 MILLIGRAM(S): at 22:42

## 2024-10-21 RX ADMIN — APIXABAN 2.5 MILLIGRAM(S): 5 TABLET, FILM COATED ORAL at 17:12

## 2024-10-21 RX ADMIN — Medication 100 MILLIGRAM(S): at 17:11

## 2024-10-21 RX ADMIN — Medication 2: at 08:14

## 2024-10-21 RX ADMIN — APIXABAN 2.5 MILLIGRAM(S): 5 TABLET, FILM COATED ORAL at 07:03

## 2024-10-21 RX ADMIN — Medication 325 MILLIGRAM(S): at 11:57

## 2024-10-21 RX ADMIN — IRON SUCROSE 110 MILLIGRAM(S): 20 INJECTION, SOLUTION INTRAVENOUS at 16:09

## 2024-10-21 NOTE — PROGRESS NOTE ADULT - ASSESSMENT
89-year-old female bedbound , non  verbal  from [facility] and . PMH CVA w rt sided weakness, aphasia, bedbound, DM,  HTN, HLD, CAD, presented with fever, hypoxia and  hypotension.   In the ER: lactate > 4 , WBC 44k,  hypotensive to 91/ 54, code sepsis called   Received vanomycin and cefepime   Urinalysis positive, Hb 8.6, creatinine 3.15,     CTAP: Enlarged polycystic kidneys. Minimal bilateral hydroureter.   Enlarging 7.0 x 6.9 cm multicystic pancreatic mass concerning for   malignancy. Pancreatic duct distention redemonstrated.  Ill-defined uterus inseparable from the anterior rectum, the sigmoid   colon and pelvic small bowel loops. Cannot exclude uterine, sigmoid or   rectal malignancy. Probable proctitis, either infectious or inflammatory.    CXR-No active disease  Admitted with  severe sepsis likely 2/2 UTI and  proctocolitis.   Started on Cefepime.  Blood culture growing gram negative rods. ID consulted for further recommendations     Pt was seen, NAD, alert, awake, non verbal, son at bedside, updated on pt's condition and plan of care

## 2024-10-21 NOTE — PROGRESS NOTE ADULT - SUBJECTIVE AND OBJECTIVE BOX
NP Note discussed with  Primary Attending    Patient is a 89y old  Female who presents with a chief complaint of severe sepsis unknown source (21 Oct 2024 12:22)      INTERVAL HPI/OVERNIGHT EVENTS: no acute events overnight     MEDICATIONS  (STANDING):  apixaban 2.5 milliGRAM(s) Oral every 12 hours  atorvastatin 20 milliGRAM(s) Oral at bedtime  cefTRIAXone   IVPB 1000 milliGRAM(s) IV Intermittent every 24 hours  dextrose 5%. 1000 milliLiter(s) (100 mL/Hr) IV Continuous <Continuous>  dextrose 5%. 1000 milliLiter(s) (50 mL/Hr) IV Continuous <Continuous>  dextrose 50% Injectable 25 Gram(s) IV Push once  dextrose 50% Injectable 25 Gram(s) IV Push once  dextrose 50% Injectable 12.5 Gram(s) IV Push once  ergocalciferol 38003 Unit(s) Oral <User Schedule>  ferrous    sulfate 325 milliGRAM(s) Oral daily  glucagon  Injectable 1 milliGRAM(s) IntraMuscular once  insulin lispro (ADMELOG) corrective regimen sliding scale   SubCutaneous three times a day before meals  iron sucrose IVPB 200 milliGRAM(s) IV Intermittent every 24 hours  lactated ringers. 1000 milliLiter(s) (65 mL/Hr) IV Continuous <Continuous>  levETIRAcetam  Solution 500 milliGRAM(s) Oral two times a day    MEDICATIONS  (PRN):  acetaminophen     Tablet .. 650 milliGRAM(s) Oral every 6 hours PRN Temp greater or equal to 38C (100.4F), Mild Pain (1 - 3)  aluminum hydroxide/magnesium hydroxide/simethicone Suspension 30 milliLiter(s) Oral every 4 hours PRN Dyspepsia  dextrose Oral Gel 15 Gram(s) Oral once PRN Blood Glucose LESS THAN 70 milliGRAM(s)/deciliter  melatonin 3 milliGRAM(s) Oral at bedtime PRN Insomnia  ondansetron Injectable 4 milliGRAM(s) IV Push every 8 hours PRN Nausea and/or Vomiting      __________________________________________________  REVIEW OF SYSTEMS:    CONSTITUTIONAL: No fever,   EYES: no acute visual disturbances  NECK: No pain or stiffness  RESPIRATORY: No cough; No shortness of breath  CARDIOVASCULAR: No chest pain, no palpitations  GASTROINTESTINAL: No pain. No nausea or vomiting; No diarrhea   NEUROLOGICAL: No headache or numbness, no tremors  MUSCULOSKELETAL: No joint pain, no muscle pain  GENITOURINARY: no dysuria, no frequency, no hesitancy  PSYCHIATRY: no depression , no anxiety  ALL OTHER  ROS negative        Vital Signs Last 24 Hrs  T(C): 37.1 (21 Oct 2024 14:18), Max: 37.1 (21 Oct 2024 14:18)  T(F): 98.7 (21 Oct 2024 14:18), Max: 98.7 (21 Oct 2024 14:18)  HR: 89 (21 Oct 2024 14:18) (84 - 94)  BP: 148/52 (21 Oct 2024 14:18) (117/66 - 148/52)  BP(mean): --  RR: 18 (21 Oct 2024 14:18) (18 - 18)  SpO2: 94% (21 Oct 2024 14:18) (93% - 96%)    Parameters below as of 21 Oct 2024 14:18  Patient On (Oxygen Delivery Method): room air        ________________________________________________  PHYSICAL EXAM:  GENERAL: NAD  HEENT: Normocephalic  NECK : supple  CHEST/LUNG: Clear to auscultation bilaterally with good air entry   HEART: S1 S2  regular  ABDOMEN: Soft, Nontender, Nondistended; Bowel sounds present  EXTREMITIES: no edema  SKIN: warm and dry; no rash  NERVOUS SYSTEM:  Awake and alert; Oriented  to place, person and time    _________________________________________________  LABS:                        7.6    26.61 )-----------( 107      ( 21 Oct 2024 05:20 )             23.4     10-21    143  |  114[H]  |  51[H]  ----------------------------<  195[H]  3.6   |  21[L]  |  1.70[H]    Ca    8.6      21 Oct 2024 05:20        Urinalysis Basic - ( 21 Oct 2024 05:20 )    Color: x / Appearance: x / SG: x / pH: x  Gluc: 195 mg/dL / Ketone: x  / Bili: x / Urobili: x   Blood: x / Protein: x / Nitrite: x   Leuk Esterase: x / RBC: x / WBC x   Sq Epi: x / Non Sq Epi: x / Bacteria: x      CAPILLARY BLOOD GLUCOSE      POCT Blood Glucose.: 96 mg/dL (21 Oct 2024 17:04)  POCT Blood Glucose.: 205 mg/dL (21 Oct 2024 11:40)  POCT Blood Glucose.: 224 mg/dL (21 Oct 2024 07:52)  POCT Blood Glucose.: 222 mg/dL (20 Oct 2024 21:30)        RADIOLOGY & ADDITIONAL TESTS:    < from: CT Chest No Cont (10.17.24 @ 15:10) >  IMPRESSION:  LIMITATIONS: Evaluation of the solid organs, vascular structures and GI   tract is limited without oral and IV contrast.    Enlarged polycystic kidneys. Minimal bilateral hydroureter. Limited   evaluation for solid renal mass.    Enlarging 7.0 x 6.9 cm multicystic pancreatic mass concerning for   malignancy. Pancreatic duct distention redemonstrated.    Ill-defined uterus inseparable from the anteriorrectum, the sigmoid   colon and pelvic small bowel loops. Cannot exclude uterine, sigmoid or   rectal malignancy. Mildly distended sigmoid colon may be related to   pseudoobstruction or early or partial bowel obstruction.    Probable proctitis, either infectious or inflammatory.    If possible, follow-up CT with oral and IV contrast would be of   diagnostic benefit.    < end of copied text >    Imaging Personally Reviewed:  YES    Consultant(s) Notes Reviewed:   YES      Plan of care was discussed with patient and /or primary care giver; all questions and concerns were addressed and care was aligned with patient's wishes.

## 2024-10-21 NOTE — PROGRESS NOTE ADULT - ASSESSMENT
89 years old admitted for Urosepsis - EColi in blood and urine. Consult was called for TRAVIS .  History of diabetes, UTI in the past.   CKD stage 3B  AS disease and diabetes.     TRAVIS due to sepsis , hypotension , dehydration , improved with IV fluids. Creatinine and urea trending down   Dehydration due to medications and sepsis.   Anemia low iron sat .  Low vitamin D .   PLAN:  Continue IV fluids .  Avoid SGLT2 inhibitors at present and in the future due to recurrent UTI.  Hold diuretics , ACE I and ARB. Avoid NSAID and Radiocontrast while in TRAVIS.   Urine lytes and osmolality  Start Venofer  Vitamin D supplement.

## 2024-10-21 NOTE — PROGRESS NOTE ADULT - PROBLEM SELECTOR PLAN 12
A1c 7.8  C/W [facility]   Monitor FS and adjust as needed  C/W Con Carb diet.  Adjust insulin as indicated

## 2024-10-21 NOTE — PROGRESS NOTE ADULT - SUBJECTIVE AND OBJECTIVE BOX
Problem List:  TRAVIS and Urosepsis. Renal function improving  MS back to baseline, Son is at bed sided.  Appetite improved.         PAST MEDICAL & SURGICAL HISTORY:  Stroke      HTN (hypertension)      HLD (hyperlipidemia)      DM (diabetes mellitus)      DVT (deep venous thrombosis)      CVA (cerebrovascular accident)      Aphasia      Dysphagia      Apraxia      Right-sided muscle weakness      Nonverbal      Seizure          No Known Allergies      MEDICATIONS  (STANDING):  apixaban 2.5 milliGRAM(s) Oral every 12 hours  atorvastatin 20 milliGRAM(s) Oral at bedtime  cefTRIAXone   IVPB 1000 milliGRAM(s) IV Intermittent every 24 hours  dextrose 5%. 1000 milliLiter(s) (100 mL/Hr) IV Continuous <Continuous>  dextrose 5%. 1000 milliLiter(s) (50 mL/Hr) IV Continuous <Continuous>  dextrose 50% Injectable 25 Gram(s) IV Push once  dextrose 50% Injectable 25 Gram(s) IV Push once  dextrose 50% Injectable 12.5 Gram(s) IV Push once  ferrous    sulfate 325 milliGRAM(s) Oral daily  glucagon  Injectable 1 milliGRAM(s) IntraMuscular once  insulin lispro (ADMELOG) corrective regimen sliding scale   SubCutaneous three times a day before meals  lactated ringers. 1000 milliLiter(s) (65 mL/Hr) IV Continuous <Continuous>  levETIRAcetam  Solution 500 milliGRAM(s) Oral two times a day    MEDICATIONS  (PRN):  acetaminophen     Tablet .. 650 milliGRAM(s) Oral every 6 hours PRN Temp greater or equal to 38C (100.4F), Mild Pain (1 - 3)  aluminum hydroxide/magnesium hydroxide/simethicone Suspension 30 milliLiter(s) Oral every 4 hours PRN Dyspepsia  dextrose Oral Gel 15 Gram(s) Oral once PRN Blood Glucose LESS THAN 70 milliGRAM(s)/deciliter  melatonin 3 milliGRAM(s) Oral at bedtime PRN Insomnia  ondansetron Injectable 4 milliGRAM(s) IV Push every 8 hours PRN Nausea and/or Vomiting    Iron Sat 12 %.  Ferritin 438.                           7.6    26.61 )-----------( 107      ( 21 Oct 2024 05:20 )             23.4     10-21    143  |  114[H]  |  51[H]  ----------------------------<  195[H]  3.6   |  21[L]  |  1.70[H]    Ca    8.6      21 Oct 2024 05:20          Potassium, Random Urine: 25 mmol/L (10-19 @ 11:30)  Osmolality, Random Urine: 372 mosm/Kg (10-19 @ 11:30)  Sodium, Random Urine: 13 mmol/L (10-19 @ 11:30)  Creatinine, Random Urine: 112 mg/dL (10-19 @ 11:30)      REVIEW OF SYSTEMS:  as per the son eating well with a good fluid intake.       VITALS:  T(F): 97.8 (10-21-24 @ 05:24), Max: 98.1 (10-20-24 @ 16:39)  HR: 84 (10-21-24 @ 05:24)  BP: 126/66 (10-21-24 @ 05:24)  RR: 18 (10-21-24 @ 05:24)  SpO2: 96% (10-21-24 @ 05:24)  Wt(kg): --      PHYSICAL EXAM:  Constitutional: well developed, no diaphoresis, no distress.  Neck: No JVD, no carotid bruit, supple, no adenopathy  Respiratory: Good air entrance B/L, no wheezes, rales or rhonchi  Cardiovascular: S1, S2, RRR, no pericardial rub, no murmur  Abdomen: BS+, soft, no tenderness, no bruit  Pelvis: bladder nondistended  Extremities: No cyanosis or clubbing. No peripheral edema.   Dementoa and no verbal communication awake.

## 2024-10-22 DIAGNOSIS — Z75.8 OTHER PROBLEMS RELATED TO MEDICAL FACILITIES AND OTHER HEALTH CARE: ICD-10-CM

## 2024-10-22 LAB
GLUCOSE BLDC GLUCOMTR-MCNC: 132 MG/DL — HIGH (ref 70–99)
GLUCOSE BLDC GLUCOMTR-MCNC: 165 MG/DL — HIGH (ref 70–99)
GLUCOSE BLDC GLUCOMTR-MCNC: 218 MG/DL — HIGH (ref 70–99)
GLUCOSE BLDC GLUCOMTR-MCNC: 235 MG/DL — HIGH (ref 70–99)

## 2024-10-22 RX ADMIN — Medication 20 MILLIGRAM(S): at 22:35

## 2024-10-22 RX ADMIN — Medication 1: at 17:11

## 2024-10-22 RX ADMIN — LEVETIRACETAM 500 MILLIGRAM(S): 500 TABLET, FILM COATED ORAL at 06:58

## 2024-10-22 RX ADMIN — APIXABAN 2.5 MILLIGRAM(S): 5 TABLET, FILM COATED ORAL at 06:58

## 2024-10-22 RX ADMIN — Medication 325 MILLIGRAM(S): at 12:17

## 2024-10-22 RX ADMIN — LEVETIRACETAM 500 MILLIGRAM(S): 500 TABLET, FILM COATED ORAL at 17:10

## 2024-10-22 RX ADMIN — APIXABAN 2.5 MILLIGRAM(S): 5 TABLET, FILM COATED ORAL at 17:10

## 2024-10-22 RX ADMIN — Medication 2: at 08:36

## 2024-10-22 RX ADMIN — IRON SUCROSE 110 MILLIGRAM(S): 20 INJECTION, SOLUTION INTRAVENOUS at 17:09

## 2024-10-22 RX ADMIN — Medication 2: at 12:16

## 2024-10-22 RX ADMIN — Medication 100 MILLIGRAM(S): at 17:09

## 2024-10-22 NOTE — PROGRESS NOTE ADULT - PROBLEM SELECTOR PLAN 12
A1c 7.8  C/W [facility]   Monitor FS and adjust as needed  C/W Con Carb diet.  Adjust insulin as indicated patient from [facility]  d/c planning for return to facility  pending repeat blood cx

## 2024-10-22 NOTE — PROGRESS NOTE ADULT - ASSESSMENT
89 years old admitted for Urosepsis - EColi in blood and urine. Consult was called for TRAVIS .  History of diabetes, UTI in the past.   CKD stage 3B  AS disease and diabetes.     TRAVIS due to sepsis , hypotension , dehydration , improved with IV fluids. Creatinine and urea trending down   Dehydration due to medications and sepsis.   Anemia low iron sat .  Low vitamin D .     Avoid SGLT2 inhibitors at present and in the future due to recurrent UTI.  Hold diuretics , ACE I and ARB. Avoid NSAID and Radiocontrast while in TRAVIS.   Urine lytes and osmolality  Start Venofer  Vitamin D supplement.

## 2024-10-22 NOTE — PROGRESS NOTE ADULT - SUBJECTIVE AND OBJECTIVE BOX
Problem List:  89 years old with . PMH CVA w rt sided weakness, aphasia, bedbound, DM. HTN, HLD, CAD.Sz.  presents with fever AND  low blood pressures,  lactate > 4 , hypotensive to 91/ 54, code sepsis called IN THE er   s/p Vanc , cefepime . Renal consult was called for TRAVIS and Urosepsis.   BC and urine culture positive to E Coli.      CT Chest No Cont (10.17.24 @ 15:10) >  LIMITATIONS: Evaluation of the solid organs, vascular structures and GI   tract is limited without oral and IV contrast.    Enlarged polycystic kidneys. Minimal bilateral hydroureter. Limited   evaluation for solid renal mass.    Enlarging 7.0 x 6.9 cm multicystic pancreatic mass concerning for   malignancy. Pancreatic duct distention redemonstrated.    Ill-defined uterus inseparable from the anteriorrectum, the sigmoid   colon and pelvic small bowel loops. Cannot exclude uterine, sigmoid or   rectal malignancy. Mildly distended sigmoid colon may be related to   pseudoobstruction or early or partial bowel obstruction.    Probable proctitis, either infectious or inflammatory.    If possible, follow-up CT with oral and IV contrast would be of   diagnostic benefit.  PAST MEDICAL & SURGICAL HISTORY:  Stroke      HTN (hypertension)      HLD (hyperlipidemia)      DM (diabetes mellitus)      DVT (deep venous thrombosis)      CVA (cerebrovascular accident)      Aphasia      Dysphagia      Apraxia      Right-sided muscle weakness      Nonverbal      Seizure          No Known Allergies      MEDICATIONS  (STANDING):  apixaban 2.5 milliGRAM(s) Oral every 12 hours  atorvastatin 20 milliGRAM(s) Oral at bedtime  cefTRIAXone   IVPB 1000 milliGRAM(s) IV Intermittent every 24 hours  dextrose 5%. 1000 milliLiter(s) (50 mL/Hr) IV Continuous <Continuous>  dextrose 5%. 1000 milliLiter(s) (100 mL/Hr) IV Continuous <Continuous>  dextrose 50% Injectable 12.5 Gram(s) IV Push once  dextrose 50% Injectable 25 Gram(s) IV Push once  dextrose 50% Injectable 25 Gram(s) IV Push once  ergocalciferol 95688 Unit(s) Oral <User Schedule>  ferrous    sulfate 325 milliGRAM(s) Oral daily  glucagon  Injectable 1 milliGRAM(s) IntraMuscular once  insulin lispro (ADMELOG) corrective regimen sliding scale   SubCutaneous three times a day before meals  iron sucrose IVPB 200 milliGRAM(s) IV Intermittent every 24 hours  lactated ringers. 1000 milliLiter(s) (65 mL/Hr) IV Continuous <Continuous>  levETIRAcetam  Solution 500 milliGRAM(s) Oral two times a day    MEDICATIONS  (PRN):  acetaminophen     Tablet .. 650 milliGRAM(s) Oral every 6 hours PRN Temp greater or equal to 38C (100.4F), Mild Pain (1 - 3)  aluminum hydroxide/magnesium hydroxide/simethicone Suspension 30 milliLiter(s) Oral every 4 hours PRN Dyspepsia  dextrose Oral Gel 15 Gram(s) Oral once PRN Blood Glucose LESS THAN 70 milliGRAM(s)/deciliter  melatonin 3 milliGRAM(s) Oral at bedtime PRN Insomnia  ondansetron Injectable 4 milliGRAM(s) IV Push every 8 hours PRN Nausea and/or Vomiting                            7.6    26.61 )-----------( 107      ( 21 Oct 2024 05:20 )             23.4     10-21    143  |  114[H]  |  51[H]  ----------------------------<  195[H]  3.6   |  21[L]  |  1.70[H]    Ca    8.6      21 Oct 2024 05:20      Culture - Blood in AM (10.21.24 @ 05:20)   Specimen Source: .Blood BLOOD  Culture Results:   No growth at 24 hours    Potassium, Random Urine: 25 mmol/L (10-19 @ 11:30)  Osmolality, Random Urine: 372 mosm/Kg (10-19 @ 11:30)  Sodium, Random Urine: 13 mmol/L (10-19 @ 11:30)  Creatinine, Random Urine: 112 mg/dL (10-19 @ 11:30)      REVIEW OF SYSTEMS:  son at bedside  Appetite good.  Back to baseline function.       VITALS:  T(F): 98 (10-22-24 @ 14:07), Max: 99.1 (10-21-24 @ 20:54)  HR: 78 (10-22-24 @ 14:07)  BP: 133/67 (10-22-24 @ 14:07)  RR: 18 (10-22-24 @ 14:07)  SpO2: 97% (10-22-24 @ 14:07)  Wt(kg): --    10-21 @ 07:01  -  10-22 @ 07:00  --------------------------------------------------------  IN: 0 mL / OUT: 700 mL / NET: -700 mL        PHYSICAL EXAM:  Constitutional: well developed, no diaphoresis, no distress.  Neck: No JVD, no carotid bruit, supple, no adenopathy  Respiratory: Good air entrance B/L, no wheezes, rales or rhonchi  Cardiovascular: S1, S2, RRR, no pericardial rub, no murmur  Abdomen: BS+, soft, no tenderness, no bruit  Pelvis: bladder nondistended  Extremities: No cyanosis or clubbing. No peripheral edema.   No verbal communication

## 2024-10-22 NOTE — PROGRESS NOTE ADULT - PROBLEM SELECTOR PLAN 7
Hx of CVA with right side residual  C/W Atorvastatin and Eliquis. C/W Keppra solution   Seizure precautions.

## 2024-10-22 NOTE — PROGRESS NOTE ADULT - PROBLEM SELECTOR PLAN 9
Hb8.6   Trend Hb   Hx of chronic anemia   Baseline 8.9-9.2     c/w monitoring h/o HTN noted to be on losartan and norvasc  not started on admission 2/2 admission  BP controlled

## 2024-10-22 NOTE — PROGRESS NOTE ADULT - ASSESSMENT
89-year-old female bedbound , non  verbal  from [facility] and . PMH CVA w rt sided weakness, aphasia, bedbound, DM,  HTN, HLD, CAD, presented with fever, hypoxia and  hypotension.   In the ER: lactate > 4 , WBC 44k,  hypotensive to 91/ 54, code sepsis called   Received vanomycin and cefepime   Urinalysis positive, Hb 8.6, creatinine 3.15,     CTAP: Enlarged polycystic kidneys. Minimal bilateral hydroureter.   Enlarging 7.0 x 6.9 cm multicystic pancreatic mass concerning for   malignancy. Pancreatic duct distention redemonstrated.  Ill-defined uterus inseparable from the anterior rectum, the sigmoid   colon and pelvic small bowel loops. Cannot exclude uterine, sigmoid or   rectal malignancy. Probable proctitis, either infectious or inflammatory.    CXR-No active disease  Admitted with  severe sepsis likely 2/2 UTI and  proctocolitis.   Started on Cefepime.  Blood culture growing gram negative rods. ID consulted for further recommendations     Pt was seen, NAD, alert, awake, non verbal, son at bedside, updated on pt's condition and plan of care    89-year-old female bedbound , non  verbal  from [facility] and . PMH CVA w rt sided weakness, aphasia, bedbound, DM,  HTN, HLD, CAD, presented with fever, hypoxia and  hypotension.   In the ER: lactate > 4 , WBC 44k,  hypotensive to 91/ 54, code sepsis called, received vancomycin and cefepime. Urinalysis positive, CTAP: Enlarging 7.0 x 6.9 cm multicystic pancreatic mass concerning for malignancy. Pancreatic duct distention re-demonstrated. Ill-defined uterus inseparable from the anterior rectum, the sigmoid colon and pelvic small bowel loops. Cannot exclude uterine, sigmoid or rectal malignancy. Probable proctitis, either infectious or inflammatory.--->son ( Hari) is aware of findings, per son they have known these findings for years and they do not wish to pursue invasive measures  Admitted with  severe sepsis likely 2/2 UTI and  proctocolitis, started on Cefepime. Blood culture aslo growing gram negative rods. ID Dr. White following, currently remains on IV Ceftriaxone   pending final blood cx results and final ID plan.

## 2024-10-22 NOTE — PROGRESS NOTE ADULT - PROBLEM SELECTOR PLAN 2
Blood culture growing gram negative rods   bacteremia likely from uti   continue antibiotics  ID Dr Keating consulted   follow up repeat blood cultures plan as above

## 2024-10-22 NOTE — PROGRESS NOTE ADULT - PROBLEM SELECTOR PLAN 3
WBC 44K urinalysis positive   - c/w cefepime   - follow up urine culture   -ID dr Keating consulted plan as above

## 2024-10-22 NOTE — PROGRESS NOTE ADULT - SUBJECTIVE AND OBJECTIVE BOX
NP Note discussed with  Primary Attending    Patient is a 89y old  Female who presents with a chief complaint of severe sepsis unknown source (22 Oct 2024 09:50)      INTERVAL HPI/OVERNIGHT EVENTS: no new complaints    MEDICATIONS  (STANDING):  apixaban 2.5 milliGRAM(s) Oral every 12 hours  atorvastatin 20 milliGRAM(s) Oral at bedtime  cefTRIAXone   IVPB 1000 milliGRAM(s) IV Intermittent every 24 hours  dextrose 5%. 1000 milliLiter(s) (50 mL/Hr) IV Continuous <Continuous>  dextrose 5%. 1000 milliLiter(s) (100 mL/Hr) IV Continuous <Continuous>  dextrose 50% Injectable 25 Gram(s) IV Push once  dextrose 50% Injectable 25 Gram(s) IV Push once  dextrose 50% Injectable 12.5 Gram(s) IV Push once  ergocalciferol 54640 Unit(s) Oral <User Schedule>  ferrous    sulfate 325 milliGRAM(s) Oral daily  glucagon  Injectable 1 milliGRAM(s) IntraMuscular once  insulin lispro (ADMELOG) corrective regimen sliding scale   SubCutaneous three times a day before meals  iron sucrose IVPB 200 milliGRAM(s) IV Intermittent every 24 hours  lactated ringers. 1000 milliLiter(s) (65 mL/Hr) IV Continuous <Continuous>  levETIRAcetam  Solution 500 milliGRAM(s) Oral two times a day    MEDICATIONS  (PRN):  acetaminophen     Tablet .. 650 milliGRAM(s) Oral every 6 hours PRN Temp greater or equal to 38C (100.4F), Mild Pain (1 - 3)  aluminum hydroxide/magnesium hydroxide/simethicone Suspension 30 milliLiter(s) Oral every 4 hours PRN Dyspepsia  dextrose Oral Gel 15 Gram(s) Oral once PRN Blood Glucose LESS THAN 70 milliGRAM(s)/deciliter  melatonin 3 milliGRAM(s) Oral at bedtime PRN Insomnia  ondansetron Injectable 4 milliGRAM(s) IV Push every 8 hours PRN Nausea and/or Vomiting      __________________________________________________  REVIEW OF SYSTEMS:  DONTAE minimally verbal at baseline        Vital Signs Last 24 Hrs  T(C): 36.6 (22 Oct 2024 05:16), Max: 37.3 (21 Oct 2024 20:54)  T(F): 97.9 (22 Oct 2024 05:16), Max: 99.1 (21 Oct 2024 20:54)  HR: 84 (22 Oct 2024 05:16) (84 - 91)  BP: 114/58 (22 Oct 2024 05:16) (114/58 - 151/69)  BP(mean): --  RR: 17 (22 Oct 2024 05:16) (17 - 18)  SpO2: 94% (22 Oct 2024 05:16) (94% - 95%)    Parameters below as of 22 Oct 2024 05:16  Patient On (Oxygen Delivery Method): room air        ________________________________________________  PHYSICAL EXAM:  GENERAL: NAD  HEENT: Normocephalic;  conjunctivae and sclerae clear; moist mucous membranes;   NECK : supple  CHEST/LUNG: Clear to auscultation bilaterally with good air entry   HEART: S1 S2  regular; no murmurs, gallops or rubs  ABDOMEN: Soft, Nontender, Nondistended; Bowel sounds present  EXTREMITIES: no cyanosis; no edema; no calf tenderness  SKIN: warm and dry; no rash  NERVOUS SYSTEM:  Awake and alert; Oriented  to place, person and time ; no new deficits    _________________________________________________  LABS:                        7.6    26.61 )-----------( 107      ( 21 Oct 2024 05:20 )             23.4     10-21    143  |  114[H]  |  51[H]  ----------------------------<  195[H]  3.6   |  21[L]  |  1.70[H]    Ca    8.6      21 Oct 2024 05:20        Urinalysis Basic - ( 21 Oct 2024 05:20 )    Color: x / Appearance: x / SG: x / pH: x  Gluc: 195 mg/dL / Ketone: x  / Bili: x / Urobili: x   Blood: x / Protein: x / Nitrite: x   Leuk Esterase: x / RBC: x / WBC x   Sq Epi: x / Non Sq Epi: x / Bacteria: x      CAPILLARY BLOOD GLUCOSE      POCT Blood Glucose.: 235 mg/dL (22 Oct 2024 11:43)  POCT Blood Glucose.: 218 mg/dL (22 Oct 2024 08:13)  POCT Blood Glucose.: 166 mg/dL (21 Oct 2024 21:20)  POCT Blood Glucose.: 96 mg/dL (21 Oct 2024 17:04)        RADIOLOGY & ADDITIONAL TESTS:    Imaging Personally Reviewed:  YES/NO    Consultant(s) Notes Reviewed:   YES/ No    Care Discussed with Consultants :     Plan of care was discussed with patient and /or primary care giver; all questions and concerns were addressed and care was aligned with patient's wishes.

## 2024-10-22 NOTE — PROGRESS NOTE ADULT - SUBJECTIVE AND OBJECTIVE BOX
INTERVAL HPI/OVERNIGHT EVENTS:  Patient seen,events noticed  VITAL SIGNS:  T(F): 97.9 (10-22-24 @ 05:16)  HR: 84 (10-22-24 @ 05:16)  BP: 114/58 (10-22-24 @ 05:16)  RR: 17 (10-22-24 @ 05:16)  SpO2: 94% (10-22-24 @ 05:16)  Wt(kg): --    PHYSICAL EXAM:  awake,poor historian  Constitutional:  Eyes:  ENMT:perrla  Neck:  Respiratory:clear  Cardiovascular:s1s2,m-none  Gastrointestinal:soft,bs pos  Extremities:  Vascular:  Neurological:no focal deficit  Musculoskeletal:    MEDICATIONS  (STANDING):  apixaban 2.5 milliGRAM(s) Oral every 12 hours  atorvastatin 20 milliGRAM(s) Oral at bedtime  cefTRIAXone   IVPB 1000 milliGRAM(s) IV Intermittent every 24 hours  dextrose 5%. 1000 milliLiter(s) (50 mL/Hr) IV Continuous <Continuous>  dextrose 5%. 1000 milliLiter(s) (100 mL/Hr) IV Continuous <Continuous>  dextrose 50% Injectable 25 Gram(s) IV Push once  dextrose 50% Injectable 25 Gram(s) IV Push once  dextrose 50% Injectable 12.5 Gram(s) IV Push once  ergocalciferol 54307 Unit(s) Oral <User Schedule>  ferrous    sulfate 325 milliGRAM(s) Oral daily  glucagon  Injectable 1 milliGRAM(s) IntraMuscular once  insulin lispro (ADMELOG) corrective regimen sliding scale   SubCutaneous three times a day before meals  iron sucrose IVPB 200 milliGRAM(s) IV Intermittent every 24 hours  lactated ringers. 1000 milliLiter(s) (65 mL/Hr) IV Continuous <Continuous>  levETIRAcetam  Solution 500 milliGRAM(s) Oral two times a day    MEDICATIONS  (PRN):  acetaminophen     Tablet .. 650 milliGRAM(s) Oral every 6 hours PRN Temp greater or equal to 38C (100.4F), Mild Pain (1 - 3)  aluminum hydroxide/magnesium hydroxide/simethicone Suspension 30 milliLiter(s) Oral every 4 hours PRN Dyspepsia  dextrose Oral Gel 15 Gram(s) Oral once PRN Blood Glucose LESS THAN 70 milliGRAM(s)/deciliter  melatonin 3 milliGRAM(s) Oral at bedtime PRN Insomnia  ondansetron Injectable 4 milliGRAM(s) IV Push every 8 hours PRN Nausea and/or Vomiting      Allergies    No Known Allergies    Intolerances        LABS:                        7.6    26.61 )-----------( 107      ( 21 Oct 2024 05:20 )             23.4     10-21    143  |  114[H]  |  51[H]  ----------------------------<  195[H]  3.6   |  21[L]  |  1.70[H]    Ca    8.6      21 Oct 2024 05:20        Urinalysis Basic - ( 21 Oct 2024 05:20 )    Color: x / Appearance: x / SG: x / pH: x  Gluc: 195 mg/dL / Ketone: x  / Bili: x / Urobili: x   Blood: x / Protein: x / Nitrite: x   Leuk Esterase: x / RBC: x / WBC x   Sq Epi: x / Non Sq Epi: x / Bacteria: x        RADIOLOGY & ADDITIONAL TESTS:      Assessment and Plan:   · Assessment	  89-year-old female bedbound , non  verbal  from [facility] and . PMH CVA w rt sided weakness, aphasia, bedbound, DM,  HTN, HLD, CAD, presented with fever, hypoxia and  hypotension.   In the ER: lactate > 4 , WBC 44k,  hypotensive to 91/ 54, code sepsis called   Received vanomycin and cefepime   Urinalysis positive, Hb 8.6, creatinine 3.15,     CTAP: Enlarged polycystic kidneys. Minimal bilateral hydroureter.   Enlarging 7.0 x 6.9 cm multicystic pancreatic mass concerning for   malignancy. Pancreatic duct distention redemonstrated.  Ill-defined uterus inseparable from the anterior rectum, the sigmoid   colon and pelvic small bowel loops. Cannot exclude uterine, sigmoid or   rectal malignancy. Probable proctitis, either infectious or inflammatory.    CXR-No active disease  Admitted with  severe sepsis likely 2/2 UTI and  proctocolitis.   Started on Cefepime.  Blood culture growing gram negative rods. ID consulted for further recommendations     Pt was seen, NAD, alert, awake, non verbal, son at bedside, updated on pt's condition and plan of care          Problem/Plan - 1:  ·  Problem: Sepsis.   ·  Plan: Meets SIRS Criteria  Source likely UTI, proctocolitis   -IV fluids: c/w LR   -c/w cefepime   -f/u urine culture and blood culture   -IDx Dr. White consulted.     Problem/Plan - 2:  ·  Problem: Bacteremia.   ·  Plan: Blood culture growing gram negative rods   bacteremia likely from uti   continue antibiotics  ID Dr Keating consulted   follow up repeat blood cultures.     Problem/Plan - 3:  ·  Problem: Acute UTI-improved clinically   - c/w cefepime   - follow up urine culture   -ID dr Keating consulted.     Problem/Plan - 4:  ·  Problem: Acute diarrhea.   ·  Plan: diarrhea on admission, CTAP: + Probable proctitis, either infectious or inflammatory.  - IVF, replace electrolytes   - stool Cx , C diff, GI PCR, stool count   - c/w  cefepime.     Problem/Plan - 5:  ·  Problem: Leucocytosis.   likely in setting of infection vs malignancy   - c/w antibiotics   - ID consulted.     Problem/Plan - 6:  ·  Problem: TRAVIS (acute kidney injury).   ·  Plan: TRAVIS  on CKD  creatinine 3.15,--> 2.76  continue IV fluids  monitor kidney function.     Problem/Plan - 7:  ·  Problem: Stroke.   ·  Plan: Hx of CVA with right side residual  C/W Atorvastatin and Eliquis.     Problem/Plan - 8:  ·  Problem: History of seizures.   ·  Plan: C/W Keppra solution   Seizure precautions.     Problem/Plan - 9:  ·  Problem: Anemia.   ·  Plan: Hb8.6   Trend Hb   Hx of chronic anemia   Baseline 8.9-9.2     c/w monitoring.     Problem/Plan - 10:  ·  Problem: HTN (hypertension).   ·  Plan; h/o HTN on  Monitor BP  c/w home meds -  Lower MAP 20-25% in next 2-4 hrs.     Problem/Plan - 11:  ·  Problem: HLD (hyperlipidemia).   ·  Plan: - pt takes rosuvastatin 20 mg at home  - c/w atorvastatin 40 mg   - f/u lipid profile  - Dash Diet.     Problem/Plan - 12:  ·  Problem: DM (diabetes mellitus).   ·  Plan: A1c 7.8  C/W ISSC   Monitor FS and adjust as needed  C/W Con Carb diet.  Adjust insulin as indicated.

## 2024-10-22 NOTE — PROGRESS NOTE ADULT - PROBLEM SELECTOR PLAN 10
h/o HTN on  Monitor BP  c/w home meds -  Lower MAP 20-25% in next 2-4 hrs pt takes rosuvastatin 20 mg at home  c/w atorvastatin 40 mg   Dash Diet

## 2024-10-22 NOTE — PROGRESS NOTE ADULT - PROBLEM SELECTOR PLAN 5
WBC 44K   likely in setting of infection vs malignancy   - c/w antibiotics   - ID consulted TRAVIS  on CKD  creatinine 3.15,--> 2.76  continue IV fluids  monitor kidney function

## 2024-10-22 NOTE — PROGRESS NOTE ADULT - PROBLEM SELECTOR PLAN 1
Meets SIRS Criteria  Source likely UTI, proctocolitis   -IV fluids: c/w LR   -c/w cefepime   -f/u urine culture and blood culture   -IDx Dr. White consulted Meets SIRS Criteria  Source likely UTI, proctocolitis   -c/w Ceftriaxone  -urine cx and blood cx growing E.Coli  -pending repeat blood cx  -IDx Dr. White following

## 2024-10-22 NOTE — PROGRESS NOTE ADULT - PROBLEM SELECTOR PLAN 11
- pt takes rosuvastatin 20 mg at home  - c/w atorvastatin 40 mg   - f/u lipid profile  - Dash Diet A1c 7.8  C/W [facility]   Monitor FS and adjust as needed  C/W Con Carb diet.  Adjust insulin as indicated

## 2024-10-22 NOTE — PROGRESS NOTE ADULT - PROBLEM SELECTOR PLAN 6
TRAVIS  on CKD  creatinine 3.15,--> 2.76  continue IV fluids  monitor kidney function Hx of CVA with right side residual  C/W Atorvastatin and Eliquis.

## 2024-10-23 VITALS
OXYGEN SATURATION: 94 % | RESPIRATION RATE: 18 BRPM | HEART RATE: 87 BPM | SYSTOLIC BLOOD PRESSURE: 148 MMHG | TEMPERATURE: 98 F | DIASTOLIC BLOOD PRESSURE: 63 MMHG

## 2024-10-23 LAB
ANION GAP SERPL CALC-SCNC: 7 MMOL/L — SIGNIFICANT CHANGE UP (ref 5–17)
BUN SERPL-MCNC: 32 MG/DL — HIGH (ref 7–18)
CALCIUM SERPL-MCNC: 8.5 MG/DL — SIGNIFICANT CHANGE UP (ref 8.4–10.5)
CHLORIDE SERPL-SCNC: 117 MMOL/L — HIGH (ref 96–108)
CO2 SERPL-SCNC: 21 MMOL/L — LOW (ref 22–31)
CREAT SERPL-MCNC: 1.3 MG/DL — SIGNIFICANT CHANGE UP (ref 0.5–1.3)
EGFR: 37 ML/MIN/1.73M2 — LOW
GLUCOSE BLDC GLUCOMTR-MCNC: 145 MG/DL — HIGH (ref 70–99)
GLUCOSE BLDC GLUCOMTR-MCNC: 194 MG/DL — HIGH (ref 70–99)
GLUCOSE BLDC GLUCOMTR-MCNC: 218 MG/DL — HIGH (ref 70–99)
GLUCOSE SERPL-MCNC: 169 MG/DL — HIGH (ref 70–99)
HCT VFR BLD CALC: 24 % — LOW (ref 34.5–45)
HGB BLD-MCNC: 7.7 G/DL — LOW (ref 11.5–15.5)
MCHC RBC-ENTMCNC: 28.4 PG — SIGNIFICANT CHANGE UP (ref 27–34)
MCHC RBC-ENTMCNC: 32.1 GM/DL — SIGNIFICANT CHANGE UP (ref 32–36)
MCV RBC AUTO: 88.6 FL — SIGNIFICANT CHANGE UP (ref 80–100)
NRBC # BLD: 0 /100 WBCS — SIGNIFICANT CHANGE UP (ref 0–0)
PLATELET # BLD AUTO: 140 K/UL — LOW (ref 150–400)
POTASSIUM SERPL-MCNC: 3.2 MMOL/L — LOW (ref 3.5–5.3)
POTASSIUM SERPL-SCNC: 3.2 MMOL/L — LOW (ref 3.5–5.3)
RBC # BLD: 2.71 M/UL — LOW (ref 3.8–5.2)
RBC # FLD: 17 % — HIGH (ref 10.3–14.5)
SODIUM SERPL-SCNC: 145 MMOL/L — SIGNIFICANT CHANGE UP (ref 135–145)
WBC # BLD: 22.2 K/UL — HIGH (ref 3.8–10.5)
WBC # FLD AUTO: 22.2 K/UL — HIGH (ref 3.8–10.5)

## 2024-10-23 PROCEDURE — 87040 BLOOD CULTURE FOR BACTERIA: CPT

## 2024-10-23 PROCEDURE — 83036 HEMOGLOBIN GLYCOSYLATED A1C: CPT

## 2024-10-23 PROCEDURE — 83735 ASSAY OF MAGNESIUM: CPT

## 2024-10-23 PROCEDURE — 71045 X-RAY EXAM CHEST 1 VIEW: CPT

## 2024-10-23 PROCEDURE — 83935 ASSAY OF URINE OSMOLALITY: CPT

## 2024-10-23 PROCEDURE — 74176 CT ABD & PELVIS W/O CONTRAST: CPT | Mod: MC

## 2024-10-23 PROCEDURE — 84133 ASSAY OF URINE POTASSIUM: CPT

## 2024-10-23 PROCEDURE — 87636 SARSCOV2 & INF A&B AMP PRB: CPT

## 2024-10-23 PROCEDURE — 80053 COMPREHEN METABOLIC PANEL: CPT

## 2024-10-23 PROCEDURE — 85730 THROMBOPLASTIN TIME PARTIAL: CPT

## 2024-10-23 PROCEDURE — 96368 THER/DIAG CONCURRENT INF: CPT

## 2024-10-23 PROCEDURE — 84100 ASSAY OF PHOSPHORUS: CPT

## 2024-10-23 PROCEDURE — 82728 ASSAY OF FERRITIN: CPT

## 2024-10-23 PROCEDURE — 82570 ASSAY OF URINE CREATININE: CPT

## 2024-10-23 PROCEDURE — 81001 URINALYSIS AUTO W/SCOPE: CPT

## 2024-10-23 PROCEDURE — 84300 ASSAY OF URINE SODIUM: CPT

## 2024-10-23 PROCEDURE — 87086 URINE CULTURE/COLONY COUNT: CPT

## 2024-10-23 PROCEDURE — 85025 COMPLETE CBC W/AUTO DIFF WBC: CPT

## 2024-10-23 PROCEDURE — 85027 COMPLETE CBC AUTOMATED: CPT

## 2024-10-23 PROCEDURE — 87150 DNA/RNA AMPLIFIED PROBE: CPT

## 2024-10-23 PROCEDURE — 87186 SC STD MICRODIL/AGAR DIL: CPT

## 2024-10-23 PROCEDURE — 82306 VITAMIN D 25 HYDROXY: CPT

## 2024-10-23 PROCEDURE — 96375 TX/PRO/DX INJ NEW DRUG ADDON: CPT

## 2024-10-23 PROCEDURE — 85610 PROTHROMBIN TIME: CPT

## 2024-10-23 PROCEDURE — 99285 EMERGENCY DEPT VISIT HI MDM: CPT | Mod: 25

## 2024-10-23 PROCEDURE — 71250 CT THORAX DX C-: CPT | Mod: MC

## 2024-10-23 PROCEDURE — 80048 BASIC METABOLIC PNL TOTAL CA: CPT

## 2024-10-23 PROCEDURE — 87077 CULTURE AEROBIC IDENTIFY: CPT

## 2024-10-23 PROCEDURE — 83605 ASSAY OF LACTIC ACID: CPT

## 2024-10-23 PROCEDURE — 82962 GLUCOSE BLOOD TEST: CPT

## 2024-10-23 PROCEDURE — 83550 IRON BINDING TEST: CPT

## 2024-10-23 PROCEDURE — 83540 ASSAY OF IRON: CPT

## 2024-10-23 PROCEDURE — 36415 COLL VENOUS BLD VENIPUNCTURE: CPT

## 2024-10-23 PROCEDURE — 93005 ELECTROCARDIOGRAM TRACING: CPT

## 2024-10-23 PROCEDURE — 96365 THER/PROPH/DIAG IV INF INIT: CPT

## 2024-10-23 RX ORDER — POTASSIUM CHLORIDE 10 MEQ
40 TABLET, EXTENDED RELEASE ORAL ONCE
Refills: 0 | Status: COMPLETED | OUTPATIENT
Start: 2024-10-23 | End: 2024-10-23

## 2024-10-23 RX ORDER — CEFUROXIME AXETIL 250 MG
1 TABLET ORAL
Qty: 16 | Refills: 0
Start: 2024-10-23 | End: 2024-10-30

## 2024-10-23 RX ORDER — LEVETIRACETAM 500 MG/1
5 TABLET, FILM COATED ORAL
Qty: 0 | Refills: 0 | DISCHARGE
Start: 2024-10-23

## 2024-10-23 RX ORDER — APIXABAN 5 MG/1
1 TABLET, FILM COATED ORAL
Qty: 0 | Refills: 0 | DISCHARGE
Start: 2024-10-23

## 2024-10-23 RX ADMIN — Medication 2: at 08:30

## 2024-10-23 RX ADMIN — IRON SUCROSE 110 MILLIGRAM(S): 20 INJECTION, SOLUTION INTRAVENOUS at 18:26

## 2024-10-23 RX ADMIN — Medication 100 MILLIGRAM(S): at 18:28

## 2024-10-23 RX ADMIN — LEVETIRACETAM 500 MILLIGRAM(S): 500 TABLET, FILM COATED ORAL at 18:27

## 2024-10-23 RX ADMIN — Medication 40 MILLIEQUIVALENT(S): at 18:27

## 2024-10-23 RX ADMIN — Medication 325 MILLIGRAM(S): at 17:41

## 2024-10-23 RX ADMIN — APIXABAN 2.5 MILLIGRAM(S): 5 TABLET, FILM COATED ORAL at 18:27

## 2024-10-23 RX ADMIN — LEVETIRACETAM 500 MILLIGRAM(S): 500 TABLET, FILM COATED ORAL at 06:22

## 2024-10-23 RX ADMIN — APIXABAN 2.5 MILLIGRAM(S): 5 TABLET, FILM COATED ORAL at 06:22

## 2024-10-23 NOTE — DISCHARGE NOTE PROVIDER - NSDCHOSPICE_GEN_A_CORE
No Severely impaired: cannot locate objects without hearing or touching them or patient nonresponsive.

## 2024-10-23 NOTE — DISCHARGE NOTE PROVIDER - NSDCMRMEDTOKEN_GEN_ALL_CORE_FT
acetaminophen 325 mg oral tablet: 2 tab(s) orally every 6 hours As needed Temp greater or equal to 38C (100.4F), Mild Pain (1 - 3)  Actos 15 mg oral tablet: 1 orally once a day  apixaban 2.5 mg oral tablet: 1 tab(s) orally every 12 hours  ascorbic acid 500 mg/5 mL oral liquid: 5 milliliter(s) orally once a day  atorvastatin 20 mg oral tablet: 1 tab(s) orally once a day (at bedtime)  Farxiga 10 mg oral tablet: 1 tab(s) orally once a day  ferrous sulfate 325 mg (65 mg elemental iron) oral tablet: 1 tab(s) orally once a day  HumaLOG KwikPen 100 units/mL injectable solution: subcutaneously 3 times a day (-250: 2 units; 251-300: 4 units; 301-350: 6 units; 351-400: 8 units)  Lasix 20 mg oral tablet: 1 tab(s) orally every other day  levETIRAcetam 100 mg/mL oral solution: 5 milliliter(s) orally 2 times a day  losartan 100 mg oral tablet: 1 tab(s) orally once a day  MiraLax oral powder for reconstitution: 17 gram(s) orally 2 times a day  Multiple Vitamins oral tablet: 1 tab(s) orally once a day  nitroglycerin 0.2 mg/hr transdermal film, extended release: 1 patch transdermally once a day  Norvasc 10 mg oral tablet: 1 orally once a day  nystatin 100,000 units/g topical powder: Apply topically to affected area 2 times a day as needed for  rash  senna (sennosides) 8.6 mg oral tablet: 2 tab(s) orally once a day (at bedtime)  simethicone 125 mg oral tablet, chewable: 1 tab(s) chewed once a day AFTER OTHER MEDICATIONS  spironolactone 25 mg oral tablet: 1 tablet orally once a week (Monday at 9am)  zinc oxide 20% topical ointment: Apply topically to affected area

## 2024-10-23 NOTE — DISCHARGE NOTE NURSING/CASE MANAGEMENT/SOCIAL WORK - FINANCIAL ASSISTANCE
[facility] provides services at a reduced cost to those who are determined to be eligible through [facility]’s financial assistance program. Information regarding [facility]’s financial assistance program can be found by going to [email or web address] or by calling [phone].

## 2024-10-23 NOTE — PROGRESS NOTE ADULT - PROVIDER SPECIALTY LIST ADULT
Internal Medicine
Nephrology
Infectious Disease
Internal Medicine
Nephrology
Nephrology
Infectious Disease
Internal Medicine

## 2024-10-23 NOTE — PROGRESS NOTE ADULT - ASSESSMENT
89 years old admitted for Urosepsis - EColi in blood and urine. Consult was called for TRAVIS .  Repeat BC 48 hours negative.  History of diabetes, UTI in the past.   CKD stage 3B  AS disease and diabetes.     TRAVIS due to sepsis , hypotension , dehydration , improved with IV fluids. Creatinine and urea trending down .  Dehydration due to medications and sepsis. Increased sodium , reinforce water intake between meals.   Anemia low iron sat .   Low vitamin D .     Avoid SGLT2 inhibitors at present and in the future due to recurrent UTI.  Hold diuretics , ACE I and ARB. Avoid NSAID and Radiocontrast while in TRAVIS.   Urine lytes and osmolality  Start Venofer 200 mg for 3 doses.   Vitamin D supplement was initiated.

## 2024-10-23 NOTE — PROGRESS NOTE ADULT - SUBJECTIVE AND OBJECTIVE BOX
89y Female    Meds:  cefTRIAXone   IVPB 1000 milliGRAM(s) IV Intermittent every 24 hours    Allergies    No Known Allergies    Intolerances        VITALS:  Vital Signs Last 24 Hrs  T(C): 37.2 (23 Oct 2024 14:47), Max: 37.4 (23 Oct 2024 04:57)  T(F): 99 (23 Oct 2024 14:47), Max: 99.4 (23 Oct 2024 04:57)  HR: 82 (23 Oct 2024 14:47) (82 - 100)  BP: 134/56 (23 Oct 2024 14:47) (134/56 - 145/67)  BP(mean): --  RR: 18 (23 Oct 2024 14:47) (18 - 18)  SpO2: 96% (23 Oct 2024 14:47) (94% - 96%)    Parameters below as of 23 Oct 2024 14:47  Patient On (Oxygen Delivery Method): room air        LABS/DIAGNOSTIC TESTS:                          7.7    22.20 )-----------( 140      ( 23 Oct 2024 10:00 )             24.0         10-23    145  |  117[H]  |  32[H]  ----------------------------<  169[H]  3.2[L]   |  21[L]  |  1.30    Ca    8.5      23 Oct 2024 10:00            CULTURES: .Blood BLOOD  10-21 @ 05:20   No growth at 48 Hours  --  --      Catheterized  10-17 @ 16:21   >100,000 CFU/ml Escherichia coli  --  Escherichia coli      .Blood BLOOD  10-17 @ 11:00   Growth in aerobic and anaerobic bottles: Escherichia coli  See previous culture [id number]  --    Growth in anaerobic bottle: Gram Negative Rods  Growth in aerobic bottle: Gram Negative Rods      .Blood BLOOD  10-17 @ 10:50   Growth in aerobic and anaerobic bottles: Escherichia coli  Direct identification is available within approximately 3-5  hours either by Blood Panel Multiplexed PCR or Direct  MALDI-TOF. Details: [email or web address]  --  Blood Culture PCR  Escherichia coli            RADIOLOGY:      ROS:  [  ] UNABLE TO ELICIT 89y Female who is doing well , she is non verbal but smiles when spoken to and acknowledges understanding at times. She is afebrile and her leukocytosis though high shows a continuing downward trend, her repeat blood cultures are negative to date. She is being discharged today. Her family member is at the bedside.    Meds:  cefTRIAXone   IVPB 1000 milliGRAM(s) IV Intermittent every 24 hours    Allergies    No Known Allergies    Intolerances        VITALS:  Vital Signs Last 24 Hrs  T(C): 37.2 (23 Oct 2024 14:47), Max: 37.4 (23 Oct 2024 04:57)  T(F): 99 (23 Oct 2024 14:47), Max: 99.4 (23 Oct 2024 04:57)  HR: 82 (23 Oct 2024 14:47) (82 - 100)  BP: 134/56 (23 Oct 2024 14:47) (134/56 - 145/67)  BP(mean): --  RR: 18 (23 Oct 2024 14:47) (18 - 18)  SpO2: 96% (23 Oct 2024 14:47) (94% - 96%)    Parameters below as of 23 Oct 2024 14:47  Patient On (Oxygen Delivery Method): room air        LABS/DIAGNOSTIC TESTS:                          7.7    22.20 )-----------( 140      ( 23 Oct 2024 10:00 )             24.0         10-23    145  |  117[H]  |  32[H]  ----------------------------<  169[H]  3.2[L]   |  21[L]  |  1.30    Ca    8.5      23 Oct 2024 10:00            CULTURES: .Blood BLOOD  10-21 @ 05:20   No growth at 48 Hours  --  --      Catheterized  10-17 @ 16:21   >100,000 CFU/ml Escherichia coli  --  Escherichia coli      .Blood BLOOD  10-17 @ 11:00   Growth in aerobic and anaerobic bottles: Escherichia coli  See previous culture [id number]  --    Growth in anaerobic bottle: Gram Negative Rods  Growth in aerobic bottle: Gram Negative Rods      .Blood BLOOD  10-17 @ 10:50   Growth in aerobic and anaerobic bottles: Escherichia coli  Direct identification is available within approximately 3-5  hours either by Blood Panel Multiplexed PCR or Direct  MALDI-TOF. Details: [email or web address]  --  Blood Culture PCR  Escherichia coli            RADIOLOGY:      ROS:  [ x ] UNABLE TO ELICIT

## 2024-10-23 NOTE — DISCHARGE NOTE PROVIDER - NSDCCPCAREPLAN_GEN_ALL_CORE_FT
PRINCIPAL DISCHARGE DIAGNOSIS  Diagnosis: Sepsis  Assessment and Plan of Treatment: You presented to the hospital with generalized weakness and amulatory dysdunction, you were found to have  urinary tract infection and bacteremia ( bacteria in the blood) and you were treated with antibiotics.  You will need to continue to take***      SECONDARY DISCHARGE DIAGNOSES  Diagnosis: Bacteremia  Assessment and Plan of Treatment: Plan as above    Diagnosis: History of seizures  Assessment and Plan of Treatment: continue to take home medication as prescribed    Diagnosis: Stroke  Assessment and Plan of Treatment: continue to take home medication for stroke prevention    Diagnosis: HLD (hyperlipidemia)  Assessment and Plan of Treatment: continue to take home medications as prescribed    Diagnosis: HTN (hypertension)  Assessment and Plan of Treatment: continue to take home medications as prescribed    Diagnosis: TRAVIS (acute kidney injury)  Assessment and Plan of Treatment: your kidney function was elevated more than your baseline likley due to UTI, you were given IV fluids   your kidney function improved and remained stable at your baseline  while your kidneys are healing you should avoid agents that can cause kidney injury.  Some of these agents include NSAIDs, Gadolinium contrast, and Phosphate containing enemas.      Diagnosis: Abnormal CT scan  Assessment and Plan of Treatment: you were noted to have some CT findings that included:   Enlarging 7.0 x 6.9 cm multicystic pancreatic mass concerning for   malignancy. Pancreatic duct distention redemonstrated.  Ill-defined uterus inseparable from the anteriorrectum, the sigmoid   colon and pelvic small bowel loops. Cannot exclude uterine, sigmoid or   rectal malignancy.  Findings were discussed with family who stated that they are aware of findings and family has decided to not pursue invasive workup     PRINCIPAL DISCHARGE DIAGNOSIS  Diagnosis: Sepsis  Assessment and Plan of Treatment: You presented to the hospital with generalized weakness and amulatory dysdunction, you were found to have  urinary tract infection and bacteremia ( bacteria in the blood) and you were treated with antibiotics.  You will need to continue to take Ceftin PO 250mg BID PO Daily to complete 14 days.      SECONDARY DISCHARGE DIAGNOSES  Diagnosis: Bacteremia  Assessment and Plan of Treatment: Plan as above    Diagnosis: History of seizures  Assessment and Plan of Treatment: continue to take home medication as prescribed    Diagnosis: Stroke  Assessment and Plan of Treatment: continue to take home medication for stroke prevention    Diagnosis: HLD (hyperlipidemia)  Assessment and Plan of Treatment: continue to take home medications as prescribed    Diagnosis: HTN (hypertension)  Assessment and Plan of Treatment: continue to take home medications as prescribed    Diagnosis: TRAVIS (acute kidney injury)  Assessment and Plan of Treatment: your kidney function was elevated more than your baseline likley due to UTI, you were given IV fluids   your kidney function improved and remained stable at your baseline  while your kidneys are healing you should avoid agents that can cause kidney injury.  Some of these agents include NSAIDs, Gadolinium contrast, and Phosphate containing enemas.      Diagnosis: Abnormal CT scan  Assessment and Plan of Treatment: you were noted to have some CT findings that included:   Enlarging 7.0 x 6.9 cm multicystic pancreatic mass concerning for   malignancy. Pancreatic duct distention redemonstrated.  Ill-defined uterus inseparable from the anteriorrectum, the sigmoid   colon and pelvic small bowel loops. Cannot exclude uterine, sigmoid or   rectal malignancy.  Findings were discussed with family who stated that they are aware of findings and family has decided to not pursue invasive workup

## 2024-10-23 NOTE — PROGRESS NOTE ADULT - SUBJECTIVE AND OBJECTIVE BOX
Problem List:  TRAVIS  Urosepsis E Coli.     PAST MEDICAL & SURGICAL HISTORY:  Stroke      HTN (hypertension)      HLD (hyperlipidemia)      DM (diabetes mellitus)      DVT (deep venous thrombosis)      CVA (cerebrovascular accident)      Aphasia      Dysphagia      Apraxia      Right-sided muscle weakness      Nonverbal      Seizure          No Known Allergies      MEDICATIONS  (STANDING):  apixaban 2.5 milliGRAM(s) Oral every 12 hours  atorvastatin 20 milliGRAM(s) Oral at bedtime  cefTRIAXone   IVPB 1000 milliGRAM(s) IV Intermittent every 24 hours  dextrose 5%. 1000 milliLiter(s) (100 mL/Hr) IV Continuous <Continuous>  dextrose 5%. 1000 milliLiter(s) (50 mL/Hr) IV Continuous <Continuous>  dextrose 50% Injectable 25 Gram(s) IV Push once  dextrose 50% Injectable 25 Gram(s) IV Push once  dextrose 50% Injectable 12.5 Gram(s) IV Push once  ergocalciferol 95923 Unit(s) Oral <User Schedule>  ferrous    sulfate 325 milliGRAM(s) Oral daily  glucagon  Injectable 1 milliGRAM(s) IntraMuscular once  insulin lispro (ADMELOG) corrective regimen sliding scale   SubCutaneous three times a day before meals  iron sucrose IVPB 200 milliGRAM(s) IV Intermittent every 24 hours  lactated ringers. 1000 milliLiter(s) (65 mL/Hr) IV Continuous <Continuous>  levETIRAcetam  Solution 500 milliGRAM(s) Oral two times a day  potassium chloride   Powder 40 milliEquivalent(s) Oral once    MEDICATIONS  (PRN):  acetaminophen     Tablet .. 650 milliGRAM(s) Oral every 6 hours PRN Temp greater or equal to 38C (100.4F), Mild Pain (1 - 3)  aluminum hydroxide/magnesium hydroxide/simethicone Suspension 30 milliLiter(s) Oral every 4 hours PRN Dyspepsia  dextrose Oral Gel 15 Gram(s) Oral once PRN Blood Glucose LESS THAN 70 milliGRAM(s)/deciliter  melatonin 3 milliGRAM(s) Oral at bedtime PRN Insomnia  ondansetron Injectable 4 milliGRAM(s) IV Push every 8 hours PRN Nausea and/or Vomiting                            7.7    22.20 )-----------( 140      ( 23 Oct 2024 10:00 )             24.0     10-23    145  |  117[H]  |  32[H]  ----------------------------<  169[H]  3.2[L]   |  21[L]  |  1.30    Ca    8.5      23 Oct 2024 10:00          Potassium, Random Urine: 25 mmol/L (10-19 @ 11:30)  Osmolality, Random Urine: 372 mosm/Kg (10-19 @ 11:30)  Sodium, Random Urine: 13 mmol/L (10-19 @ 11:30)  Creatinine, Random Urine: 112 mg/dL (10-19 @ 11:30)      REVIEW OF SYSTEMS:  dementia.        VITALS:  T(F): 99 (10-23-24 @ 14:47), Max: 99.4 (10-23-24 @ 04:57)  HR: 82 (10-23-24 @ 14:47)  BP: 134/56 (10-23-24 @ 14:47)  RR: 18 (10-23-24 @ 14:47)  SpO2: 96% (10-23-24 @ 14:47)  Wt(kg): --    10-22 @ 07:01  -  10-23 @ 07:00  --------------------------------------------------------  IN: 0 mL / OUT: 300 mL / NET: -300 mL        PHYSICAL EXAM:  Constitutional: well developed, no diaphoresis, no distress.  Neck: No JVD, no carotid bruit, supple, no adenopathy  Respiratory: Good air entrance B/L, no wheezes, rales or rhonchi  Cardiovascular: S1, S2, RRR, no pericardial rub, no murmur  Abdomen: BS+, soft, no tenderness, no bruit  Pelvis: bladder nondistended  Extremities: No cyanosis or clubbing. No peripheral edema.   Awake and alert but not communicative

## 2024-10-23 NOTE — DISCHARGE NOTE PROVIDER - CARE PROVIDER_API CALL
Geovany Dennis  Internal Medicine  [address] [facility], [address] [place] [id number]  Phone: [phone]  Fax: [phone]  Follow Up Time: 1 week

## 2024-10-23 NOTE — DISCHARGE NOTE PROVIDER - HOSPITAL COURSE
89-year-old female bedbound , non  verbal  from [facility] and . PMH CVA w rt sided weakness, aphasia, bedbound, DM,  HTN, HLD, CAD, presented with fever, hypoxia and  hypotension.   In the ER: lactate > 4 , WBC 44k,  hypotensive to 91/ 54, code sepsis called, received vancomycin and cefepime. Urinalysis positive, CTAP: Enlarging 7.0 x 6.9 cm multicystic pancreatic mass concerning for malignancy. Pancreatic duct distention re-demonstrated. Ill-defined uterus inseparable from the anterior rectum, the sigmoid colon and pelvic small bowel loops. Cannot exclude uterine, sigmoid or rectal malignancy. Probable proctitis, either infectious or inflammatory.--->son ( Hari) is aware of findings, per son they have known these findings for years and they do not wish to pursue invasive measures  Admitted with  severe sepsis likely 2/2 UTI and  proctocolitis, started on Cefepime. Blood culture also grew gram negative rods. ID Dr. White following, received IV Ceftriaxone , repeat blood cx negative.  patient will need to continue ***** on d/c  Patient is tolerating diet.    Medically optimized for discharge  Discharge discussed with attending  Note this is just a brief course for full course please refer to daily progress and consult notes. 89-year-old female bedbound , non  verbal  from [facility] and . PMH CVA w rt sided weakness, aphasia, bedbound, DM,  HTN, HLD, CAD, presented with fever, hypoxia and  hypotension.   In the ER: lactate > 4 , WBC 44k,  hypotensive to 91/ 54, code sepsis called, received vancomycin and cefepime. Urinalysis positive, CTAP: Enlarging 7.0 x 6.9 cm multicystic pancreatic mass concerning for malignancy. Pancreatic duct distention re-demonstrated. Ill-defined uterus inseparable from the anterior rectum, the sigmoid colon and pelvic small bowel loops. Cannot exclude uterine, sigmoid or rectal malignancy. Probable proctitis, either infectious or inflammatory.--->son ( Hari) is aware of findings, per son they have known these findings for years and they do not wish to pursue invasive measures  Admitted with  severe sepsis likely 2/2 UTI and  proctocolitis, started on Cefepime. Blood culture also grew gram negative rods. ID Dr. White following, received IV Ceftriaxone , repeat blood cx negative.  patient will need to continue Ceftin 250mg PO BID to complete 14 days total on d/c  Patient is tolerating diet.    Medically optimized for discharge  Discharge discussed with attending  Note this is just a brief course for full course please refer to daily progress and consult notes.

## 2024-10-23 NOTE — PROGRESS NOTE ADULT - REASON FOR ADMISSION
severe sepsis unknown source

## 2024-10-23 NOTE — DISCHARGE NOTE NURSING/CASE MANAGEMENT/SOCIAL WORK - PATIENT PORTAL LINK FT
You can access the FollowMyHealth Patient Portal offered by [facility] by registering at the following website: [email or web address]. By joining [facility]’s FollowMyHealth portal, you will also be able to view your health information using other applications (apps) compatible with our system.

## 2024-10-23 NOTE — PROGRESS NOTE ADULT - ASSESSMENT
Fevers - resolved  UTI  Bacteremia  Leukocytosis- decreasing slowly    Plan -   ·	Can DC on ceftin 250mgs po bid x 7 days more.  ·	reconsult prn.

## 2024-10-25 ENCOUNTER — EMERGENCY (EMERGENCY)
Facility: HOSPITAL | Age: 89
LOS: 1 days | Discharge: HOME | End: 2024-10-25
Attending: EMERGENCY MEDICINE
Payer: MEDICARE

## 2024-10-25 VITALS
OXYGEN SATURATION: 98 % | SYSTOLIC BLOOD PRESSURE: 138 MMHG | HEIGHT: 63 IN | TEMPERATURE: 98 F | WEIGHT: 126.99 LBS | RESPIRATION RATE: 19 BRPM | HEART RATE: 65 BPM | DIASTOLIC BLOOD PRESSURE: 70 MMHG

## 2024-10-25 VITALS
TEMPERATURE: 98 F | DIASTOLIC BLOOD PRESSURE: 75 MMHG | RESPIRATION RATE: 19 BRPM | HEART RATE: 97 BPM | SYSTOLIC BLOOD PRESSURE: 165 MMHG

## 2024-10-25 LAB
ALBUMIN SERPL ELPH-MCNC: 1.8 G/DL — LOW (ref 3.5–5)
ALP SERPL-CCNC: 118 U/L — SIGNIFICANT CHANGE UP (ref 40–120)
ALT FLD-CCNC: 19 U/L DA — SIGNIFICANT CHANGE UP (ref 10–60)
ANION GAP SERPL CALC-SCNC: 5 MMOL/L — SIGNIFICANT CHANGE UP (ref 5–17)
APTT BLD: 19.5 SEC — LOW (ref 24.5–35.6)
AST SERPL-CCNC: 20 U/L — SIGNIFICANT CHANGE UP (ref 10–40)
BASOPHILS # BLD AUTO: 0.05 K/UL — SIGNIFICANT CHANGE UP (ref 0–0.2)
BASOPHILS NFR BLD AUTO: 0.3 % — SIGNIFICANT CHANGE UP (ref 0–2)
BILIRUB SERPL-MCNC: 0.2 MG/DL — SIGNIFICANT CHANGE UP (ref 0.2–1.2)
BUN SERPL-MCNC: 25 MG/DL — HIGH (ref 7–18)
CALCIUM SERPL-MCNC: 8.3 MG/DL — LOW (ref 8.4–10.5)
CHLORIDE SERPL-SCNC: 117 MMOL/L — HIGH (ref 96–108)
CO2 SERPL-SCNC: 22 MMOL/L — SIGNIFICANT CHANGE UP (ref 22–31)
CREAT SERPL-MCNC: 1.29 MG/DL — SIGNIFICANT CHANGE UP (ref 0.5–1.3)
EGFR: 37 ML/MIN/1.73M2 — LOW
EOSINOPHIL # BLD AUTO: 0.2 K/UL — SIGNIFICANT CHANGE UP (ref 0–0.5)
EOSINOPHIL NFR BLD AUTO: 1.3 % — SIGNIFICANT CHANGE UP (ref 0–6)
GLUCOSE SERPL-MCNC: 154 MG/DL — HIGH (ref 70–99)
HCT VFR BLD CALC: 24.3 % — LOW (ref 34.5–45)
HGB BLD-MCNC: 7.7 G/DL — LOW (ref 11.5–15.5)
IMM GRANULOCYTES NFR BLD AUTO: 1.9 % — HIGH (ref 0–0.9)
INR BLD: 1.37 RATIO — HIGH (ref 0.85–1.16)
LYMPHOCYTES # BLD AUTO: 1.5 K/UL — SIGNIFICANT CHANGE UP (ref 1–3.3)
LYMPHOCYTES # BLD AUTO: 10 % — LOW (ref 13–44)
MCHC RBC-ENTMCNC: 28.6 PG — SIGNIFICANT CHANGE UP (ref 27–34)
MCHC RBC-ENTMCNC: 31.7 GM/DL — LOW (ref 32–36)
MCV RBC AUTO: 90.3 FL — SIGNIFICANT CHANGE UP (ref 80–100)
MONOCYTES # BLD AUTO: 0.67 K/UL — SIGNIFICANT CHANGE UP (ref 0–0.9)
MONOCYTES NFR BLD AUTO: 4.5 % — SIGNIFICANT CHANGE UP (ref 2–14)
NEUTROPHILS # BLD AUTO: 12.29 K/UL — HIGH (ref 1.8–7.4)
NEUTROPHILS NFR BLD AUTO: 82 % — HIGH (ref 43–77)
NRBC # BLD: 0 /100 WBCS — SIGNIFICANT CHANGE UP (ref 0–0)
PLATELET # BLD AUTO: 207 K/UL — SIGNIFICANT CHANGE UP (ref 150–400)
POTASSIUM SERPL-MCNC: 3.4 MMOL/L — LOW (ref 3.5–5.3)
POTASSIUM SERPL-SCNC: 3.4 MMOL/L — LOW (ref 3.5–5.3)
PROT SERPL-MCNC: 6.6 G/DL — SIGNIFICANT CHANGE UP (ref 6–8.3)
PROTHROM AB SERPL-ACNC: 15.8 SEC — HIGH (ref 9.9–13.4)
RBC # BLD: 2.69 M/UL — LOW (ref 3.8–5.2)
RBC # FLD: 17.2 % — HIGH (ref 10.3–14.5)
SODIUM SERPL-SCNC: 144 MMOL/L — SIGNIFICANT CHANGE UP (ref 135–145)
WBC # BLD: 15.25 K/UL — HIGH (ref 3.8–10.5)
WBC # FLD AUTO: 15.25 K/UL — HIGH (ref 3.8–10.5)

## 2024-10-25 PROCEDURE — 99285 EMERGENCY DEPT VISIT HI MDM: CPT

## 2024-10-25 RX ORDER — LEVETIRACETAM 1000 MG
250 TABLET ORAL ONCE
Refills: 0 | Status: COMPLETED | OUTPATIENT
Start: 2024-10-25 | End: 2024-10-25

## 2024-10-25 RX ADMIN — Medication 250 MILLIGRAM(S): at 20:16

## 2024-10-25 NOTE — ED PROVIDER NOTE - PATIENT PORTAL LINK FT
You can access the FollowMyHealth Patient Portal offered by [facility] by registering at the following website: [email or web address]. By joining [employer]’s FollowMyHealth portal, you will also be able to view your health information using other applications (apps) compatible with our system.

## 2024-10-25 NOTE — ED PROVIDER NOTE - OBJECTIVE STATEMENT
89-year-old female history of aphasia, CVA, diabetes, hypertension, hyperlipidemia, CAD, seizure disorder sent from nursing home for blood transfusion.  As per transfer papers patient had a hemoglobin of 6.5.  Patient son at bedside.  Patient has never had a blood transfusion before.  Son has consented for transfusion.  Son does not want anything invasive.  Family just wants transfusion and patient to be discharged back to facility.  Patient's PMD Dr. Dennis aware and agrees with plan

## 2024-10-25 NOTE — ED PROVIDER NOTE - NSFOLLOWUPINSTRUCTIONS_ED_ALL_ED_FT
You were seen in the Emergency Department for anemia    1) Advance activity as tolerated.   2) Continue all previously prescribed medications as directed.    3) Follow up with your primary care physician in 24-48 hours - take copies of your results.    4) Return to the Emergency Department for worsening or persistent symptoms, and/or ANY NEW OR CONCERNING SYMPTOMS.

## 2024-10-25 NOTE — ED PROVIDER NOTE - CLINICAL SUMMARY MEDICAL DECISION MAKING FREE TEXT BOX
89-year-old female sent to ED for blood transfusion.  Patient found to have a hemoglobin of 6.5.  Family at bedside does not want anything invasive.  Family consented for transfusion.  Will transfuse and DC back to facility

## 2024-10-25 NOTE — ED ADULT TRIAGE NOTE - BMI (KG/M2)
22.5
Quality 431: Preventive Care And Screening: Unhealthy Alcohol Use - Screening: Patient screened for unhealthy alcohol use using a single question and scores less than 2 times per year
Quality 226: Preventive Care And Screening: Tobacco Use: Screening And Cessation Intervention: Patient screened for tobacco use and is an ex/non-smoker
Quality 130: Documentation Of Current Medications In The Medical Record: Current Medications Documented
Detail Level: Detailed

## 2024-10-26 LAB
CULTURE RESULTS: SIGNIFICANT CHANGE UP
SPECIMEN SOURCE: SIGNIFICANT CHANGE UP

## 2024-10-28 ENCOUNTER — INPATIENT (INPATIENT)
Facility: HOSPITAL | Age: 89
LOS: 8 days | Discharge: NURSING, REHAB OR LONG-TERM CARE | DRG: 101 | End: 2024-11-06
Attending: INTERNAL MEDICINE | Admitting: INTERNAL MEDICINE
Payer: MEDICARE

## 2024-10-28 VITALS — HEIGHT: 63 IN | DIASTOLIC BLOOD PRESSURE: 50 MMHG | SYSTOLIC BLOOD PRESSURE: 90 MMHG | WEIGHT: 132.28 LBS

## 2024-10-28 DIAGNOSIS — G40.901 EPILEPSY, UNSPECIFIED, NOT INTRACTABLE, WITH STATUS EPILEPTICUS: ICD-10-CM

## 2024-10-28 LAB
ALBUMIN SERPL ELPH-MCNC: 1.8 G/DL — LOW (ref 3.5–5)
ALP SERPL-CCNC: 116 U/L — SIGNIFICANT CHANGE UP (ref 40–120)
ALT FLD-CCNC: 17 U/L DA — SIGNIFICANT CHANGE UP (ref 10–60)
ANION GAP SERPL CALC-SCNC: 12 MMOL/L — SIGNIFICANT CHANGE UP (ref 5–17)
ANISOCYTOSIS BLD QL: SLIGHT — SIGNIFICANT CHANGE UP
APPEARANCE UR: ABNORMAL
APTT BLD: 25.9 SEC — SIGNIFICANT CHANGE UP (ref 24.5–35.6)
AST SERPL-CCNC: 24 U/L — SIGNIFICANT CHANGE UP (ref 10–40)
BACTERIA # UR AUTO: ABNORMAL /HPF
BASE EXCESS BLDV CALC-SCNC: -7.4 MMOL/L — SIGNIFICANT CHANGE UP
BASOPHILS # BLD AUTO: 0 K/UL — SIGNIFICANT CHANGE UP (ref 0–0.2)
BASOPHILS NFR BLD AUTO: 0 % — SIGNIFICANT CHANGE UP (ref 0–2)
BILIRUB SERPL-MCNC: 0.4 MG/DL — SIGNIFICANT CHANGE UP (ref 0.2–1.2)
BILIRUB UR-MCNC: NEGATIVE — SIGNIFICANT CHANGE UP
BUN SERPL-MCNC: 29 MG/DL — HIGH (ref 7–18)
CALCIUM SERPL-MCNC: 8.3 MG/DL — LOW (ref 8.4–10.5)
CHLORIDE SERPL-SCNC: 117 MMOL/L — HIGH (ref 96–108)
CO2 SERPL-SCNC: 17 MMOL/L — LOW (ref 22–31)
COLOR SPEC: YELLOW — SIGNIFICANT CHANGE UP
COMMENT - URINE: SIGNIFICANT CHANGE UP
CREAT ?TM UR-MCNC: 28 MG/DL — SIGNIFICANT CHANGE UP
CREAT SERPL-MCNC: 2.05 MG/DL — HIGH (ref 0.5–1.3)
DACRYOCYTES BLD QL SMEAR: SLIGHT — SIGNIFICANT CHANGE UP
DIFF PNL FLD: ABNORMAL
EGFR: 21 ML/MIN/1.73M2 — LOW
ELLIPTOCYTES BLD QL SMEAR: SLIGHT — SIGNIFICANT CHANGE UP
EOSINOPHIL # BLD AUTO: 0 K/UL — SIGNIFICANT CHANGE UP (ref 0–0.5)
EOSINOPHIL NFR BLD AUTO: 0 % — SIGNIFICANT CHANGE UP (ref 0–6)
EPI CELLS # UR: PRESENT
FLUAV AG NPH QL: SIGNIFICANT CHANGE UP
FLUBV AG NPH QL: SIGNIFICANT CHANGE UP
GAS PNL BLDA: SIGNIFICANT CHANGE UP
GLUCOSE SERPL-MCNC: 205 MG/DL — HIGH (ref 70–99)
GLUCOSE UR QL: 500 MG/DL
HCO3 BLDV-SCNC: 18 MMOL/L — LOW (ref 22–29)
HCT VFR BLD CALC: 28.5 % — LOW (ref 34.5–45)
HGB BLD-MCNC: 9.3 G/DL — LOW (ref 11.5–15.5)
HYPOCHROMIA BLD QL: SLIGHT — SIGNIFICANT CHANGE UP
INR BLD: 1.7 RATIO — HIGH (ref 0.85–1.16)
KETONES UR-MCNC: NEGATIVE MG/DL — SIGNIFICANT CHANGE UP
LACTATE SERPL-SCNC: 1.6 MMOL/L — SIGNIFICANT CHANGE UP (ref 0.7–2)
LACTATE SERPL-SCNC: 6.2 MMOL/L — CRITICAL HIGH (ref 0.7–2)
LEUKOCYTE ESTERASE UR-ACNC: ABNORMAL
LYMPHOCYTES # BLD AUTO: 0.7 K/UL — LOW (ref 1–3.3)
LYMPHOCYTES # BLD AUTO: 3 % — LOW (ref 13–44)
MANUAL SMEAR VERIFICATION: SIGNIFICANT CHANGE UP
MCHC RBC-ENTMCNC: 30.2 PG — SIGNIFICANT CHANGE UP (ref 27–34)
MCHC RBC-ENTMCNC: 32.6 GM/DL — SIGNIFICANT CHANGE UP (ref 32–36)
MCV RBC AUTO: 92.5 FL — SIGNIFICANT CHANGE UP (ref 80–100)
MONOCYTES # BLD AUTO: 0.47 K/UL — SIGNIFICANT CHANGE UP (ref 0–0.9)
MONOCYTES NFR BLD AUTO: 2 % — SIGNIFICANT CHANGE UP (ref 2–14)
NEUTROPHILS # BLD AUTO: 22.13 K/UL — HIGH (ref 1.8–7.4)
NEUTROPHILS NFR BLD AUTO: 93 % — HIGH (ref 43–77)
NEUTS BAND # BLD: 2 % — SIGNIFICANT CHANGE UP (ref 0–8)
NITRITE UR-MCNC: NEGATIVE — SIGNIFICANT CHANGE UP
NRBC # BLD: 0 /100 WBCS — SIGNIFICANT CHANGE UP (ref 0–0)
PCO2 BLDV: 35 MMHG — LOW (ref 39–42)
PH BLDV: 7.32 — SIGNIFICANT CHANGE UP (ref 7.32–7.43)
PH UR: 6 — SIGNIFICANT CHANGE UP (ref 5–8)
PLAT MORPH BLD: NORMAL — SIGNIFICANT CHANGE UP
PLATELET # BLD AUTO: 265 K/UL — SIGNIFICANT CHANGE UP (ref 150–400)
PO2 BLDV: 55 MMHG — SIGNIFICANT CHANGE UP
POIKILOCYTOSIS BLD QL AUTO: SLIGHT — SIGNIFICANT CHANGE UP
POTASSIUM SERPL-MCNC: 3.9 MMOL/L — SIGNIFICANT CHANGE UP (ref 3.5–5.3)
POTASSIUM SERPL-SCNC: 3.9 MMOL/L — SIGNIFICANT CHANGE UP (ref 3.5–5.3)
PROT SERPL-MCNC: 6.9 G/DL — SIGNIFICANT CHANGE UP (ref 6–8.3)
PROT UR-MCNC: 100 MG/DL
PROTHROM AB SERPL-ACNC: 19.8 SEC — HIGH (ref 9.9–13.4)
RBC # BLD: 3.08 M/UL — LOW (ref 3.8–5.2)
RBC # FLD: 17.4 % — HIGH (ref 10.3–14.5)
RBC BLD AUTO: ABNORMAL
RBC CASTS # UR COMP ASSIST: ABNORMAL /HPF
RSV RNA NPH QL NAA+NON-PROBE: SIGNIFICANT CHANGE UP
SAO2 % BLDV: 85.8 % — SIGNIFICANT CHANGE UP
SARS-COV-2 RNA SPEC QL NAA+PROBE: SIGNIFICANT CHANGE UP
SCHISTOCYTES BLD QL AUTO: SLIGHT — SIGNIFICANT CHANGE UP
SODIUM SERPL-SCNC: 146 MMOL/L — HIGH (ref 135–145)
SODIUM UR-SCNC: 69 MMOL/L — SIGNIFICANT CHANGE UP
SP GR SPEC: 1.01 — SIGNIFICANT CHANGE UP (ref 1–1.03)
UROBILINOGEN FLD QL: 0.2 MG/DL — SIGNIFICANT CHANGE UP (ref 0.2–1)
WBC # BLD: 23.29 K/UL — HIGH (ref 3.8–10.5)
WBC # FLD AUTO: 23.29 K/UL — HIGH (ref 3.8–10.5)
WBC UR QL: >50 /HPF — HIGH (ref 0–5)

## 2024-10-28 PROCEDURE — 93010 ELECTROCARDIOGRAM REPORT: CPT

## 2024-10-28 PROCEDURE — 71045 X-RAY EXAM CHEST 1 VIEW: CPT | Mod: 26,77

## 2024-10-28 PROCEDURE — 71045 X-RAY EXAM CHEST 1 VIEW: CPT | Mod: 26

## 2024-10-28 PROCEDURE — 70450 CT HEAD/BRAIN W/O DYE: CPT | Mod: 26

## 2024-10-28 PROCEDURE — 99291 CRITICAL CARE FIRST HOUR: CPT

## 2024-10-28 RX ORDER — PROPOFOL 10 MG/ML
25 INJECTION, EMULSION INTRAVENOUS
Qty: 500 | Refills: 0 | Status: DISCONTINUED | OUTPATIENT
Start: 2024-10-28 | End: 2024-10-28

## 2024-10-28 RX ORDER — LEVETIRACETAM 500 MG/1
2000 TABLET, FILM COATED ORAL ONCE
Refills: 0 | Status: COMPLETED | OUTPATIENT
Start: 2024-10-28 | End: 2024-10-28

## 2024-10-28 RX ORDER — PROPOFOL 10 MG/ML
10 INJECTION, EMULSION INTRAVENOUS
Qty: 500 | Refills: 0 | Status: DISCONTINUED | OUTPATIENT
Start: 2024-10-28 | End: 2024-10-29

## 2024-10-28 RX ORDER — FERROUS SULFATE 325(65) MG
1 TABLET ORAL
Refills: 0 | DISCHARGE

## 2024-10-28 RX ORDER — PROPOFOL 10 MG/ML
120 INJECTION, EMULSION INTRAVENOUS ONCE
Refills: 0 | Status: COMPLETED | OUTPATIENT
Start: 2024-10-28 | End: 2024-10-28

## 2024-10-28 RX ORDER — ZINC OXIDE 100 MG/G
0 CREAM TOPICAL
Refills: 0 | DISCHARGE

## 2024-10-28 RX ORDER — INSULIN LISPRO 100/ML
0 VIAL (ML) SUBCUTANEOUS
Refills: 0 | DISCHARGE

## 2024-10-28 RX ORDER — CHLORHEXIDINE GLUCONATE 40 MG/ML
1 SOLUTION TOPICAL
Refills: 0 | Status: DISCONTINUED | OUTPATIENT
Start: 2024-10-28 | End: 2024-11-02

## 2024-10-28 RX ORDER — INSULIN LISPRO 100/ML
VIAL (ML) SUBCUTANEOUS EVERY 6 HOURS
Refills: 0 | Status: DISCONTINUED | OUTPATIENT
Start: 2024-10-28 | End: 2024-11-04

## 2024-10-28 RX ORDER — LEVETIRACETAM 500 MG/1
750 TABLET, FILM COATED ORAL EVERY 12 HOURS
Refills: 0 | Status: DISCONTINUED | OUTPATIENT
Start: 2024-10-28 | End: 2024-10-28

## 2024-10-28 RX ORDER — CEFTRIAXONE SODIUM 10 G
1000 VIAL (EA) INJECTION ONCE
Refills: 0 | Status: COMPLETED | OUTPATIENT
Start: 2024-10-28 | End: 2024-10-28

## 2024-10-28 RX ORDER — POLYETHYLENE GLYCOL 3350 17 G/17G
17 POWDER, FOR SOLUTION ORAL
Refills: 0 | DISCHARGE

## 2024-10-28 RX ORDER — SIMETHICONE 80 MG/1
1 TABLET, CHEWABLE ORAL
Refills: 0 | DISCHARGE

## 2024-10-28 RX ORDER — LEVETIRACETAM 500 MG/1
1500 TABLET, FILM COATED ORAL ONCE
Refills: 0 | Status: COMPLETED | OUTPATIENT
Start: 2024-10-28 | End: 2024-10-28

## 2024-10-28 RX ORDER — CHLORHEXIDINE GLUCONATE 40 MG/ML
15 SOLUTION TOPICAL EVERY 12 HOURS
Refills: 0 | Status: DISCONTINUED | OUTPATIENT
Start: 2024-10-28 | End: 2024-10-31

## 2024-10-28 RX ORDER — APIXABAN 5 MG/1
2.5 TABLET, FILM COATED ORAL EVERY 12 HOURS
Refills: 0 | Status: DISCONTINUED | OUTPATIENT
Start: 2024-10-28 | End: 2024-11-06

## 2024-10-28 RX ORDER — VANCOMYCIN HYDROCHLORIDE 50 MG/ML
1000 KIT ORAL ONCE
Refills: 0 | Status: COMPLETED | OUTPATIENT
Start: 2024-10-28 | End: 2024-10-28

## 2024-10-28 RX ORDER — SODIUM CHLORIDE 9 MG/ML
1000 INJECTION, SOLUTION INTRAMUSCULAR; INTRAVENOUS; SUBCUTANEOUS ONCE
Refills: 0 | Status: COMPLETED | OUTPATIENT
Start: 2024-10-28 | End: 2024-10-28

## 2024-10-28 RX ORDER — NOREPINEPHRINE BITARTRATE 1 MG/ML
0.05 INJECTION, SOLUTION, CONCENTRATE INTRAVENOUS
Qty: 8 | Refills: 0 | Status: DISCONTINUED | OUTPATIENT
Start: 2024-10-28 | End: 2024-10-28

## 2024-10-28 RX ORDER — B-COMPLEX WITH VITAMIN C
1 VIAL (ML) INJECTION
Refills: 0 | DISCHARGE

## 2024-10-28 RX ORDER — SENNA 187 MG
2 TABLET ORAL
Refills: 0 | DISCHARGE

## 2024-10-28 RX ORDER — LEVETIRACETAM 500 MG/1
1600 TABLET, FILM COATED ORAL ONCE
Refills: 0 | Status: DISCONTINUED | OUTPATIENT
Start: 2024-10-28 | End: 2024-10-28

## 2024-10-28 RX ORDER — PIPERACILLIN AND TAZOBACTAM .5; 4 G/20ML; G/20ML
3.38 INJECTION, POWDER, LYOPHILIZED, FOR SOLUTION INTRAVENOUS ONCE
Refills: 0 | Status: COMPLETED | OUTPATIENT
Start: 2024-10-28 | End: 2024-10-28

## 2024-10-28 RX ORDER — LEVETIRACETAM 500 MG/1
750 TABLET, FILM COATED ORAL
Refills: 0 | Status: DISCONTINUED | OUTPATIENT
Start: 2024-10-28 | End: 2024-11-06

## 2024-10-28 RX ADMIN — Medication 20 MILLIGRAM(S): at 22:07

## 2024-10-28 RX ADMIN — LEVETIRACETAM 2000 MILLIGRAM(S): 500 TABLET, FILM COATED ORAL at 15:58

## 2024-10-28 RX ADMIN — Medication 100 MILLIGRAM(S): at 15:45

## 2024-10-28 RX ADMIN — CHLORHEXIDINE GLUCONATE 1 APPLICATION(S): 40 SOLUTION TOPICAL at 22:12

## 2024-10-28 RX ADMIN — SODIUM CHLORIDE 1000 MILLILITER(S): 9 INJECTION, SOLUTION INTRAMUSCULAR; INTRAVENOUS; SUBCUTANEOUS at 18:05

## 2024-10-28 RX ADMIN — PIPERACILLIN AND TAZOBACTAM 200 GRAM(S): .5; 4 INJECTION, POWDER, LYOPHILIZED, FOR SOLUTION INTRAVENOUS at 15:45

## 2024-10-28 RX ADMIN — APIXABAN 2.5 MILLIGRAM(S): 5 TABLET, FILM COATED ORAL at 22:06

## 2024-10-28 RX ADMIN — Medication 100 MILLIGRAM(S): at 18:41

## 2024-10-28 RX ADMIN — LEVETIRACETAM 1500 MILLIGRAM(S): 500 TABLET, FILM COATED ORAL at 17:58

## 2024-10-28 RX ADMIN — SODIUM CHLORIDE 1000 MILLILITER(S): 9 INJECTION, SOLUTION INTRAMUSCULAR; INTRAVENOUS; SUBCUTANEOUS at 16:40

## 2024-10-28 RX ADMIN — SODIUM CHLORIDE 1000 MILLILITER(S): 9 INJECTION, SOLUTION INTRAMUSCULAR; INTRAVENOUS; SUBCUTANEOUS at 19:05

## 2024-10-28 RX ADMIN — PROPOFOL 120 MILLIGRAM(S): 10 INJECTION, EMULSION INTRAVENOUS at 15:30

## 2024-10-28 RX ADMIN — SODIUM CHLORIDE 1000 MILLILITER(S): 9 INJECTION, SOLUTION INTRAMUSCULAR; INTRAVENOUS; SUBCUTANEOUS at 15:40

## 2024-10-28 RX ADMIN — LEVETIRACETAM 400 MILLIGRAM(S): 500 TABLET, FILM COATED ORAL at 15:43

## 2024-10-28 RX ADMIN — LEVETIRACETAM 750 MILLIGRAM(S): 500 TABLET, FILM COATED ORAL at 22:07

## 2024-10-28 RX ADMIN — PROPOFOL 3.6 MICROGRAM(S)/KG/MIN: 10 INJECTION, EMULSION INTRAVENOUS at 15:51

## 2024-10-28 RX ADMIN — NOREPINEPHRINE BITARTRATE 5.63 MICROGRAM(S)/KG/MIN: 1 INJECTION, SOLUTION, CONCENTRATE INTRAVENOUS at 15:43

## 2024-10-28 RX ADMIN — PIPERACILLIN AND TAZOBACTAM 3.38 GRAM(S): .5; 4 INJECTION, POWDER, LYOPHILIZED, FOR SOLUTION INTRAVENOUS at 16:15

## 2024-10-28 RX ADMIN — LEVETIRACETAM 400 MILLIGRAM(S): 500 TABLET, FILM COATED ORAL at 17:43

## 2024-10-28 RX ADMIN — Medication 2: at 22:34

## 2024-10-28 NOTE — ED ADULT NURSE NOTE - NSFALLHARMRISKINTERV_ED_ALL_ED
Assistance OOB with selected safe patient handling equipment if applicable/Assistance with ambulation/Communicate risk of Fall with Harm to all staff, patient, and family/Monitor gait and stability/Provide visual cue: red socks, yellow wristband, yellow gown, etc/Reinforce activity limits and safety measures with patient and family/Bed in lowest position, wheels locked, appropriate side rails in place/Call bell, personal items and telephone in reach/Instruct patient to call for assistance before getting out of bed/chair/stretcher/Non-slip footwear applied when patient is off stretcher/[place] to call system/Physically safe environment - no spills, clutter or unnecessary equipment/Purposeful Proactive Rounding/Room/bathroom lighting operational, light cord in reach

## 2024-10-28 NOTE — ED PROVIDER NOTE - PHYSICAL EXAMINATION
CONSTITUTIONAL: actively seizing  SKIN: no rash, no petechiae.  EYES: pink conjunctiva, anicteric  ENT: ETT in place  NECK: Supple; no meningismus, no JVD  CARD: RRR, no murmurs, equal radial pulses bilaterally 2+  RESP: Rhonchi bilaterally, mechanically ventilated  ABD: Soft, non-tender, non-distended, no peritoneal signs  EXT: Normal ROM x4. No edema.   NEURO: Alert, oriented. Neuro exam nonfocal  PSYCH: Cooperative, appropriate.

## 2024-10-28 NOTE — PATIENT PROFILE ADULT - FALL HARM RISK - HARM RISK INTERVENTIONS
Assistance with ambulation/Assistance OOB with selected safe patient handling equipment/Communicate Risk of Fall with Harm to all staff/Discuss with provider need for PT consult/Monitor gait and stability/Reinforce activity limits and safety measures with patient and family/Tailored Fall Risk Interventions/Visual Cue: Yellow wristband and red socks/Bed in lowest position, wheels locked, appropriate side rails in place/Call bell, personal items and telephone in reach/Instruct patient to call for assistance before getting out of bed or chair/Non-slip footwear when patient is out of bed/[place] to call system/Physically safe environment - no spills, clutter or unnecessary equipment/Purposeful Proactive Rounding/Room/bathroom lighting operational, light cord in reach

## 2024-10-28 NOTE — H&P ADULT - NSHPPHYSICALEXAM_GEN_ALL_CORE
General - NAD, sitting up in bed, well groomed  Eyes - PERRLA, EOM intact  ENT - Nonicteric sclerae, PERRLA, EOMI. Oropharynx clear. Moist mucous membranes. Conjunctivae appear well perfused.   Neck - No noticeable or palpable swelling, redness or rash around throat or on face  Lymph Nodes - No lymphadenopathy  Cardiovascular - RRR no m/r/g, no JVD, no carotid bruits  Lungs - Clear to auscultation, no use of accessory muscles, no crackles or wheezes.  Skin - No rashes, skin warm and dry, no erythematous areas  Abdomen - Normal bowel sounds, abdomen soft and nontender  Rectal – Rectal exam not performed since no symptoms indicated blood loss.  Extremities - No edema, cyanosis or clubbing  Musculoskeletal - 5/5 strength, normal range of motion, no swollen or erythematous joints.  Neuro– Alert and oriented x 3, CN 2-12 grossly intact. General - NAD, lying in bed, intubated and sedated.   Eyes - PERRLA, EOM intact  ENT - Nonicteric sclerae, PERRLA, EOMI. Oropharynx clear. Dry mucous membranes. Conjunctivae appear well perfused.   Neck - No noticeable or palpable swelling, redness or rash around throat or on face  Lymph Nodes - No lymphadenopathy  Cardiovascular - RRR no m/r/g, no JVD, no carotid bruits  Lungs - (+) rhonchi heard in b/l lung fields.    Skin - No rashes, skin warm and dry, no erythematous areas  Abdomen - Normal bowel sounds, abdomen soft and nontender  Extremities - No edema, cyanosis or clubbing  Musculoskeletal - Unable to assess strength, normal range of motion, no swollen or erythematous joints.  Neuro– Alert and oriented x 0, intubated and sedated, pupils pinpoint and fixed

## 2024-10-28 NOTE — ED PROVIDER NOTE - OBJECTIVE STATEMENT
Unable to obtain history in patient, history obtained from chart review and collateral.    89-year-old female with past medical history of CVA, nonverbal, with past medical history aphasia, diabetes, hypertension, hyperlipidemia, CAD, recent admission for sepsis secondary to UTI and proctocolitis, seizure disorder on Keppra presents with seizures.  Per EMS, patient found be febrile to 101.4 F, noted to have generalized tonic-clonic seizures for 30 minutes prior to arrival.  Per EMS, IV access obtained, given 3 doses of diazepam IV (5 mg, 5 mg, 10 mg) without resolution of seizures or improvement in mental status.  Per discussion with family, patient full code.  Patient intubated by EMS prior to arrival without sedation.  Unable to obtain further history

## 2024-10-28 NOTE — ED PROVIDER NOTE - CLINICAL SUMMARY MEDICAL DECISION MAKING FREE TEXT BOX
Ye: 89-year-old female with past medical history of CVA, nonverbal, with past medical history aphasia, diabetes, hypertension, hyperlipidemia, CAD, recent admission for sepsis secondary to UTI and proctocolitis, seizure disorder on Keppra presents with seizures.  Per EMS, patient found be febrile to 101.4 F, noted to have generalized tonic-clonic seizures for 30 minutes prior to arrival.  Per EMS, IV access obtained, given 3 doses of diazepam IV (5 mg, 5 mg, 10 mg) without resolution of seizures or improvement in mental status.  Per discussion with family, patient full code.  Patient intubated by EMS prior to arrival without sedation.  Physical exam per above. History and exam consistent with status epilepticus, likely infectious trigger.  Patient intubated for airway protection prior to arrival.  Patient hypotensive and actively seizing on arrival, IV ketamine given with temporary resolution of seizure.  Upon reassessment, patient noted to have continued seizures, patient loaded with levetiracetam, empiric antibiotics, started on Levophed and propofol with resolution of seizures.  Will obtain labs rule out infectious/metabolic trigger, imaging rule out ICH, discussed with ICU regarding consult, anticipate admission to ICU.

## 2024-10-28 NOTE — H&P ADULT - ATTENDING COMMENTS
Patient seen and examined with resident upon consultation request at 6PM. Data reviewed. Case and plan of care discussed with resident.  This is 89year old  woman with PMH as listed above including prior CVA/Seizures sent from NH to the ED with breakthrough seizures s/p intubation in the field. She received multiple doses of IV Diazepam and loaded with IV Keppra. She was also started on IV antibiotics for UTI.   .Accepted to ICU for further management.     Vital Signs reviewed and stable. Elderly woman orally intubated and sedated, HEENT- dry oral mucous membranes, Heart- normal heart sounds, no murmurs, Lungs- bilateral air entry,  Abdomen- full., soft,  no palpable organomegaly, Ext- no edema    Labs/Imagings reviewed.  Leukocytosis WBC 23.29K, Hb 9.3, Na 146, HCO3 of 17, lactate 6.2 and repeat 3.4, BUN/Cr 29/2.05 and prior 25/1.29,   UA positive for uti, CXR reviewed.    Assessment.  Acute respiratory failure from seizures  s/p intubation.  Status Epilepticus  Type A Lactic acidosis  TRAVIS, likely prerenal  Hypernatremia, mild  UTI  H/o Seizures  H/o CVA with right sided deficit/aphasia,   H/o HTN  H/o DM,  H/o Hyperlipidemia    Plan  Accepted to ICU.  Continue mechanical ventilation, monitor ABG and adjust vent settings as needed.  IV Keppra 750mg q12h  EEG to r/o nonconvulsive seizures  Neurology consult  Empiric IV Ceftriaxone for UTI  Urine and blood cultures  IV fluid boluses with LR to at least 3-4liters  Repeat lactate and trend to target <2.  Monitor urine output and renal function.   Insert OG tube for feeding and medications  DVT prophylaxis with Lovenox.   GI prophylaxis with Pepcid.    Condition: Critical, Prognosis: Good.

## 2024-10-28 NOTE — H&P ADULT - ASSESSMENT
=================== Neuro============================      ================= Cardiovascular==========================        ================- Pulm=================================      ==================ID===================================      ================= Nephro================================      =================GI====================================      ================ Heme==================================      =================Endocrine===============================      ================= Skin/Catheters============================  Peripheral IV lines   Lyle catheter   Patient consented for A line and CVC     =================Prophylaxis =============================  DVT prophylaxis   GI prophylaxis     ==================GOC==================================  FULL CODE   Disposition      89F from PMH CVA with right sided deficits and aphasia, DM, HTN , HLD, seizure disorder presenting from [facility] after witnessed tonic clonic seizure.    #status epilepticus  #CVA  #DM  #lactic acidosis       =================== Neuro============================  #status epilepticus   presenting from [facility] with witnessed tonic clonic seizure for > 30  minutes prior to EMS arrival, intubated in the field  received keppra 2g load, gave an additional 1.5 for 60mg/kg dose  started keppra 750 BID   currently sedated with prop 10 in ED after intubation   Neurology Dr. Jennings consulted  ordered 24 hr EEG  f/u keppra levels    #h/o CVA  with right sided hemiplegia and aphasia (prior notes stating bedbound status, son at bedside stating pt ambulates with wheelchair)  on atorvastatin 20 and eliquis 2.5mg BID at home  continue with home meds via NGT    ================= Cardiovascular==========================  #HTN  takes norvasc 10 daily  /62 upon evaluation in ED  was reported to be hypotensive, propofol weaned down and was placed on peripheral levophed  peripheal levophed weaned off in ED  received 2L NS bolus  monitor BP      ================- Pulm=================================  #intubated and sedated  CXR without any infiltrates   rhonchi heard b/l (possible aspiration during status epilepticus)   started on rocephin  mechanical vent settings 350/15/5/40%  ABG 7.39/30/255/18    ==================ID===================================  #UTI  recently discharged on 10/23 for UTI with E.coli bacteremia   frost placed in ED with purulent output noted  UA positive  received zosyn in ED  will continue with rocephin 1g qd  f/u blood cultures and urine cultures    #lactic acidosis   lactate 6.2 > 3.4  received 2L NS bolus in ED  continue to trend lactate    ================= Nephro================================  #TRAVIS   SCr 2.05, baseline 1.3  f/u urine lytes  received 2L IVF in ED  trend SCr    =================GI====================================  no active issues  start TF via NGT    ================ Heme==================================  #anemia  no active signs of bleeding   Hb 9.3, around baseline    =================Endocrine===============================  #DM  on humalog sliding scale at home    will start mISS q6 while NPO on TF    ================= Skin/Catheters============================  Peripheral IV lines   Frost catheter     =================Prophylaxis =============================  eliquis DVT prophylaxis   protonix GI prophylaxis    ==================GOC==================================  FULL CODE   Disposition ICU     89F from PMH CVA with right sided deficits and aphasia, DM, HTN , HLD, seizure disorder presenting from [facility] after witnessed tonic clonic seizure.    #status epilepticus  #CVA  #DM  #lactic acidosis       =================== Neuro============================  #status epilepticus   presenting from [facility] with witnessed tonic clonic seizure for > 30  minutes prior to EMS arrival, intubated in the field  received keppra 2g load, gave an additional 1.5 for 60mg/kg dose  started keppra 750 BID   currently sedated with prop 10 in ED after intubation   Neurology Dr. Jennings consulted  ordered 24 hr EEG  f/u keppra levels    #h/o CVA  with right sided hemiplegia and aphasia (prior notes stating bedbound status, son at bedside stating pt ambulates with wheelchair)  on atorvastatin 20 and eliquis 2.5mg BID at home  continue with home meds via NGT    ================= Cardiovascular==========================  #HTN  takes norvasc 10 daily  /62 upon evaluation in ED  was reported to be hypotensive, propofol weaned down and was placed on peripheral levophed  peripheal levophed weaned off in ED  received 2L NS bolus  monitor BP      ================- Pulm=================================  #intubated and sedated  CXR without any infiltrates   rhonchi heard b/l (possible aspiration during status epilepticus)   started on rocephin  mechanical vent settings 350/15/5/40%  ABG 7.39/30/255/18    ==================ID===================================  #UTI  recently discharged on 10/23 for UTI with E.coli bacteremia   frost placed in ED with purulent output noted  UA positive  received zosyn in ED  will continue with rocephin 1g qd  f/u blood cultures and urine cultures    #lactic acidosis   lactate 6.2 > 3.4  received 2L NS bolus in ED  continue to trend lactate    ================= Nephro================================  #TRAVIS   SCr 2.05, baseline 1.3  f/u urine lytes  received 2L IVF in ED  trend SCr    =================GI====================================  no active issues  start TF via NGT    ================ Heme==================================  #anemia  no active signs of bleeding   Hb 9.3, around baseline    =================Endocrine===============================  #DM  on humalog sliding scale and farxiga at home    will start mISS q6 while NPO on TF  monitor FS    ================= Skin/Catheters============================  Peripheral IV lines   Frost catheter     =================Prophylaxis =============================  eliquis DVT prophylaxis   protonix GI prophylaxis    ==================GOC==================================  FULL CODE   Disposition ICU

## 2024-10-28 NOTE — H&P ADULT - HISTORY OF PRESENT ILLNESS
89F from [facility] CVA with right sided deficits and aphasia, DM, HTN , HLD, seizure disorder presenting from [facility] after witnessed tonic clonic seizure. Son at bedside stated he visited his mother this morning and she was at her baseline mental status, with no active complaints. He then received the call from the facility about the seizures around 1pm. As per ED, EMS was called and the seizures lasted 30 minutes prior to EMS arrival. Valium 5mg x 2 and 10mg x 1 was given, however pt continued having seizures. The patient was then intubated in the field for airway protection. Son states that his mother was recently admitted for UTI with E.coli bacteremia and discharged on ceftin for 14 day course. He reports that she has been having decreased appetite for the past few days.

## 2024-10-28 NOTE — H&P ADULT - CONVERSATION DETAILS
Had an extensive conversation with son, Hari (HCP) about his mother's current clinical status, diagnosis and prognosis. Discussed that his mother had a generalized seizure for over 30 minutes despite his mother already being on keppra. Due to her age and co-morbidities along with her current hospitalization due to status epilepticus requiring intubation and being placed on life support, discussed GOC with the son and he re-iterated that he believes in god and would want everything done for his mother in the event that she suffers a cardiac arrest and would want the medical team to do CPR. Son confirmed the patient's code status as FULL CODE. Prior MOLST is placed in chart

## 2024-10-28 NOTE — ED PROVIDER NOTE - CRITICAL CARE ATTENDING CONTRIBUTION TO CARE
I have personally provided the amount of critical care time documented below, excluding time spent on separate procedures.    The patient's condition is critical. Management options were put in place to ensure further deterioration does not occur. In addition to the usual care provided, I have spent additional time with this patient through but not limited to the following: additional documentation  reviewing test results,  discussing with consultants,  discussing with patient / patient's family prognosis and course of care,  reassessment of patient's status and response to interventions.

## 2024-10-29 DIAGNOSIS — R53.81 OTHER MALAISE: ICD-10-CM

## 2024-10-29 DIAGNOSIS — E43 UNSPECIFIED SEVERE PROTEIN-CALORIE MALNUTRITION: ICD-10-CM

## 2024-10-29 DIAGNOSIS — Z51.5 ENCOUNTER FOR PALLIATIVE CARE: ICD-10-CM

## 2024-10-29 DIAGNOSIS — R56.9 UNSPECIFIED CONVULSIONS: ICD-10-CM

## 2024-10-29 DIAGNOSIS — F03.C0 UNSPECIFIED DEMENTIA, SEVERE, WITHOUT BEHAVIORAL DISTURBANCE, PSYCHOTIC DISTURBANCE, MOOD DISTURBANCE, AND ANXIETY: ICD-10-CM

## 2024-10-29 LAB
ALBUMIN SERPL ELPH-MCNC: 1.5 G/DL — LOW (ref 3.5–5)
ALP SERPL-CCNC: 93 U/L — SIGNIFICANT CHANGE UP (ref 40–120)
ALT FLD-CCNC: 11 U/L DA — SIGNIFICANT CHANGE UP (ref 10–60)
ANION GAP SERPL CALC-SCNC: 6 MMOL/L — SIGNIFICANT CHANGE UP (ref 5–17)
AST SERPL-CCNC: 18 U/L — SIGNIFICANT CHANGE UP (ref 10–40)
BASOPHILS # BLD AUTO: 0.06 K/UL — SIGNIFICANT CHANGE UP (ref 0–0.2)
BASOPHILS NFR BLD AUTO: 0.4 % — SIGNIFICANT CHANGE UP (ref 0–2)
BILIRUB SERPL-MCNC: 0.2 MG/DL — SIGNIFICANT CHANGE UP (ref 0.2–1.2)
BUN SERPL-MCNC: 24 MG/DL — HIGH (ref 7–18)
CALCIUM SERPL-MCNC: 7.5 MG/DL — LOW (ref 8.4–10.5)
CHLORIDE SERPL-SCNC: 120 MMOL/L — HIGH (ref 96–108)
CO2 SERPL-SCNC: 21 MMOL/L — LOW (ref 22–31)
CREAT SERPL-MCNC: 1.5 MG/DL — HIGH (ref 0.5–1.3)
CULTURE RESULTS: ABNORMAL
EGFR: 31 ML/MIN/1.73M2 — LOW
EOSINOPHIL # BLD AUTO: 0.07 K/UL — SIGNIFICANT CHANGE UP (ref 0–0.5)
EOSINOPHIL NFR BLD AUTO: 0.4 % — SIGNIFICANT CHANGE UP (ref 0–6)
GLUCOSE SERPL-MCNC: 135 MG/DL — HIGH (ref 70–99)
HCT VFR BLD CALC: 24.7 % — LOW (ref 34.5–45)
HGB BLD-MCNC: 8.2 G/DL — LOW (ref 11.5–15.5)
IMM GRANULOCYTES NFR BLD AUTO: 0.9 % — SIGNIFICANT CHANGE UP (ref 0–0.9)
LYMPHOCYTES # BLD AUTO: 1.22 K/UL — SIGNIFICANT CHANGE UP (ref 1–3.3)
LYMPHOCYTES # BLD AUTO: 7.2 % — LOW (ref 13–44)
MAGNESIUM SERPL-MCNC: 1.6 MG/DL — SIGNIFICANT CHANGE UP (ref 1.6–2.6)
MCHC RBC-ENTMCNC: 30.4 PG — SIGNIFICANT CHANGE UP (ref 27–34)
MCHC RBC-ENTMCNC: 33.2 GM/DL — SIGNIFICANT CHANGE UP (ref 32–36)
MCV RBC AUTO: 91.5 FL — SIGNIFICANT CHANGE UP (ref 80–100)
MONOCYTES # BLD AUTO: 1.27 K/UL — HIGH (ref 0–0.9)
MONOCYTES NFR BLD AUTO: 7.5 % — SIGNIFICANT CHANGE UP (ref 2–14)
MRSA PCR RESULT.: SIGNIFICANT CHANGE UP
NEUTROPHILS # BLD AUTO: 14.26 K/UL — HIGH (ref 1.8–7.4)
NEUTROPHILS NFR BLD AUTO: 83.6 % — HIGH (ref 43–77)
NRBC # BLD: 0 /100 WBCS — SIGNIFICANT CHANGE UP (ref 0–0)
OSMOLALITY UR: 328 MOSM/KG — SIGNIFICANT CHANGE UP (ref 50–1200)
PHOSPHATE SERPL-MCNC: 1.8 MG/DL — LOW (ref 2.5–4.5)
PLATELET # BLD AUTO: 207 K/UL — SIGNIFICANT CHANGE UP (ref 150–400)
POTASSIUM SERPL-MCNC: 3.4 MMOL/L — LOW (ref 3.5–5.3)
POTASSIUM SERPL-SCNC: 3.4 MMOL/L — LOW (ref 3.5–5.3)
PROT SERPL-MCNC: 5.9 G/DL — LOW (ref 6–8.3)
RBC # BLD: 2.7 M/UL — LOW (ref 3.8–5.2)
RBC # FLD: 17.3 % — HIGH (ref 10.3–14.5)
S AUREUS DNA NOSE QL NAA+PROBE: SIGNIFICANT CHANGE UP
SODIUM SERPL-SCNC: 147 MMOL/L — HIGH (ref 135–145)
SPECIMEN SOURCE: SIGNIFICANT CHANGE UP
WBC # BLD: 17.04 K/UL — HIGH (ref 3.8–10.5)
WBC # FLD AUTO: 17.04 K/UL — HIGH (ref 3.8–10.5)

## 2024-10-29 PROCEDURE — 99223 1ST HOSP IP/OBS HIGH 75: CPT

## 2024-10-29 PROCEDURE — 99291 CRITICAL CARE FIRST HOUR: CPT

## 2024-10-29 PROCEDURE — 95816 EEG AWAKE AND DROWSY: CPT | Mod: 26

## 2024-10-29 PROCEDURE — G0316 PROLONG INPT EVAL ADD15 M: CPT

## 2024-10-29 PROCEDURE — 99497 ADVNCD CARE PLAN 30 MIN: CPT | Mod: 25

## 2024-10-29 RX ORDER — PANTOPRAZOLE SODIUM 40 MG/1
40 TABLET, DELAYED RELEASE ORAL DAILY
Refills: 0 | Status: DISCONTINUED | OUTPATIENT
Start: 2024-10-29 | End: 2024-10-30

## 2024-10-29 RX ORDER — PROPOFOL 10 MG/ML
14.05 INJECTION, EMULSION INTRAVENOUS
Qty: 1000 | Refills: 0 | Status: DISCONTINUED | OUTPATIENT
Start: 2024-10-29 | End: 2024-10-30

## 2024-10-29 RX ORDER — CEFTRIAXONE SODIUM 10 G
1000 VIAL (EA) INJECTION EVERY 24 HOURS
Refills: 0 | Status: DISCONTINUED | OUTPATIENT
Start: 2024-10-29 | End: 2024-10-31

## 2024-10-29 RX ORDER — POTASSIUM PHOSPHATE 236; 224 MG/ML; MG/ML
30 INJECTION, SOLUTION INTRAVENOUS ONCE
Refills: 0 | Status: COMPLETED | OUTPATIENT
Start: 2024-10-29 | End: 2024-10-29

## 2024-10-29 RX ADMIN — Medication 20 MILLIGRAM(S): at 21:06

## 2024-10-29 RX ADMIN — LEVETIRACETAM 750 MILLIGRAM(S): 500 TABLET, FILM COATED ORAL at 07:00

## 2024-10-29 RX ADMIN — Medication 100 MILLIGRAM(S): at 13:39

## 2024-10-29 RX ADMIN — Medication 2: at 17:28

## 2024-10-29 RX ADMIN — CHLORHEXIDINE GLUCONATE 15 MILLILITER(S): 40 SOLUTION TOPICAL at 06:03

## 2024-10-29 RX ADMIN — Medication 2: at 23:57

## 2024-10-29 RX ADMIN — Medication 2: at 06:04

## 2024-10-29 RX ADMIN — PANTOPRAZOLE SODIUM 40 MILLIGRAM(S): 40 TABLET, DELAYED RELEASE ORAL at 11:59

## 2024-10-29 RX ADMIN — CHLORHEXIDINE GLUCONATE 1 APPLICATION(S): 40 SOLUTION TOPICAL at 05:35

## 2024-10-29 RX ADMIN — PROPOFOL 8.9 MICROGRAM(S)/KG/MIN: 10 INJECTION, EMULSION INTRAVENOUS at 00:34

## 2024-10-29 RX ADMIN — APIXABAN 2.5 MILLIGRAM(S): 5 TABLET, FILM COATED ORAL at 17:16

## 2024-10-29 RX ADMIN — LEVETIRACETAM 750 MILLIGRAM(S): 500 TABLET, FILM COATED ORAL at 17:20

## 2024-10-29 RX ADMIN — CHLORHEXIDINE GLUCONATE 15 MILLILITER(S): 40 SOLUTION TOPICAL at 17:19

## 2024-10-29 RX ADMIN — POTASSIUM PHOSPHATE 83.33 MILLIMOLE(S): 236; 224 INJECTION, SOLUTION INTRAVENOUS at 05:35

## 2024-10-29 RX ADMIN — APIXABAN 2.5 MILLIGRAM(S): 5 TABLET, FILM COATED ORAL at 05:35

## 2024-10-29 NOTE — DIETITIAN INITIAL EVALUATION ADULT - NSFNSGIIOFT_GEN_A_CORE
10-28-24 @ 07:01  -  10-29-24 @ 07:00  --------------------------------------------------------  OUT:  Total OUT: 0 mL    Total NET: 80 mL       Ht=5' 3" ( per EMR history)  ZRZ=003 lb   Weight hx in EMR: 145.3 lbs (7/4/23); 127 lbs (1/14/24); 118.8 lbs (5/30/24); 130.9 lb 10/17/24; 130.1 lb 10/28/24; 137.1 lb 10/29/24

## 2024-10-29 NOTE — DIETITIAN INITIAL EVALUATION ADULT - NS FNS DIET ORDER
Diet, NPO with Tube Feed:   Tube Feeding Modality: Nasogastric  Glucerna 1.5 Dieudonne  Total Volume for 24 Hours (mL): 720  Intermittent  Starting Tube Feed Rate {mL per Hour}: 10  Increase Tube Feed Rate by (mL): 10    Every 4 hours  Until Goal Tube Feed Rate (mL per Hour): 40  Tube Feeding Hours ON: 18  Tube Feeding OFF (Hours): 6  Tube Feed Start Time: 12:00  Free Water Flush  Bolus   Total Volume per Flush (mL): 250   Frequency: Every 4 Hours (10-28-24 @ 21:10)

## 2024-10-29 NOTE — PROGRESS NOTE ADULT - SUBJECTIVE AND OBJECTIVE BOX
Patient is a 89y old  Female who presents with a chief complaint of status epilepticus (28 Oct 2024 18:37)      OVERNIGHT EVENTS:   No overnight events   Afebrile, hemodynamically stable     SUBJECTIVE/INTERVAL HPI: Patient seen and examined at bedside. Denies headache, vision changes, chest pain, shortness of breath, abdominal pain, nausea, vomiting, diarrhea, constipation, dysuria, swelling, and rash.      PRESSORS: [ ] YES [ ] NO  WHICH:    ICU Vital Signs Last 24 Hrs  T(C): 36.5 (29 Oct 2024 07:00), Max: 36.9 (28 Oct 2024 16:46)  T(F): 97.7 (29 Oct 2024 07:00), Max: 98.5 (28 Oct 2024 16:46)  HR: 78 (29 Oct 2024 07:00) (64 - 94)  BP: 103/59 (29 Oct 2024 07:00) (84/50 - 146/59)  BP(mean): 72 (29 Oct 2024 07:00) (62 - 80)  ABP: --  ABP(mean): --  RR: 21 (29 Oct 2024 07:00) (12 - 21)  SpO2: 100% (29 Oct 2024 07:00) (98% - 100%)    O2 Parameters below as of 28 Oct 2024 21:00  Patient On (Oxygen Delivery Method): ventilator          I&O's Summary    28 Oct 2024 07:01  -  29 Oct 2024 07:00  --------------------------------------------------------  IN: 141.9 mL / OUT: 675 mL / NET: -533.1 mL      Mode: AC/ CMV (Assist Control/ Continuous Mandatory Ventilation)  RR (machine): 15  TV (machine): 350  FiO2: 40  PEEP: 5  ITime: 1  MAP: 9  PIP: 25      PHYSICAL EXAM:  GENERAL: No acute distress   HEAD:  Atraumatic, Normocephalic  EYES: EOMI, PERRLA, conjunctiva and sclera clear  ENMT: No tonsillar erythema, exudates, or enlargement; Moist mucous membranes  NECK: Supple, No JVD, Normal thyroid  HEART: Regular rate and rhythm; No murmurs, rubs, or gallops  RESPIRATORY: CTA B/L, No W/R/R  ABDOMEN: Soft, Nontender, Nondistended; Bowel sounds present  NEUROLOGY: A&Ox3, nonfocal, moving all extremities  EXTREMITIES:  2+ Peripheral Pulses, No clubbing, cyanosis, or edema  SKIN: warm, dry, normal color, no rash or abnormal lesions    LABS:                        8.2    17.04 )-----------( 207      ( 29 Oct 2024 03:40 )             24.7     10-29    147[H]  |  120[H]  |  24[H]  ----------------------------<  135[H]  3.4[L]   |  21[L]  |  1.50[H]    Ca    7.5[L]      29 Oct 2024 03:40  Phos  1.8     10-29  Mg     1.6     10-29    TPro  5.9[L]  /  Alb  1.5[L]  /  TBili  0.2  /  DBili  x   /  AST  18  /  ALT  11  /  AlkPhos  93  10-29    PT/INR - ( 28 Oct 2024 15:15 )   PT: 19.8 sec;   INR: 1.70 ratio         PTT - ( 28 Oct 2024 15:15 )  PTT:25.9 sec  Urinalysis Basic - ( 29 Oct 2024 03:40 )    Color: x / Appearance: x / SG: x / pH: x  Gluc: 135 mg/dL / Ketone: x  / Bili: x / Urobili: x   Blood: x / Protein: x / Nitrite: x   Leuk Esterase: x / RBC: x / WBC x   Sq Epi: x / Non Sq Epi: x / Bacteria: x      CAPILLARY BLOOD GLUCOSE      POCT Blood Glucose.: 166 mg/dL (29 Oct 2024 05:44)  POCT Blood Glucose.: 170 mg/dL (28 Oct 2024 22:22)  POCT Blood Glucose.: 212 mg/dL (28 Oct 2024 15:19)    ABG - ( 28 Oct 2024 16:31 )  pH, Arterial: 7.39  pH, Blood: x     /  pCO2: 30    /  pO2: 255   / HCO3: 18    / Base Excess: -6.0  /  SaO2: 99                  Consultant(s) Notes Reviewed:  [x ] YES  [ ] NO    MEDICATIONS  (STANDING):  apixaban 2.5 milliGRAM(s) Oral every 12 hours  atorvastatin 20 milliGRAM(s) Oral at bedtime  chlorhexidine 0.12% Liquid 15 milliLiter(s) Oral Mucosa every 12 hours  chlorhexidine 2% Cloths 1 Application(s) Topical <User Schedule>  insulin lispro (ADMELOG) corrective regimen sliding scale   SubCutaneous every 6 hours  levETIRAcetam  Solution 750 milliGRAM(s) Oral two times a day  propofol Infusion. 14.053 MICROgram(s)/kG/Min (5 mL/Hr) IV Continuous <Continuous>    MEDICATIONS  (PRN):      Care Discussed with Consultants/Other Providers [ x] YES  [ ] NO    RADIOLOGY & ADDITIONAL TESTS: Patient is a 89y old  Female who presents with a chief complaint of status epilepticus (28 Oct 2024 18:37)      OVERNIGHT EVENTS: No overnight events. Afebrile, hemodynamically stable.     SUBJECTIVE/INTERVAL HPI: Patient seen and examined at bedside. Patient awake with moderate eye tracking. Not following commands.      PRESSORS: [ ] YES [x] NO  WHICH:    ICU Vital Signs Last 24 Hrs  T(C): 36.5 (29 Oct 2024 07:00), Max: 36.9 (28 Oct 2024 16:46)  T(F): 97.7 (29 Oct 2024 07:00), Max: 98.5 (28 Oct 2024 16:46)  HR: 78 (29 Oct 2024 07:00) (64 - 94)  BP: 103/59 (29 Oct 2024 07:00) (84/50 - 146/59)  BP(mean): 72 (29 Oct 2024 07:00) (62 - 80)  ABP: --  ABP(mean): --  RR: 21 (29 Oct 2024 07:00) (12 - 21)  SpO2: 100% (29 Oct 2024 07:00) (98% - 100%)    O2 Parameters below as of 28 Oct 2024 21:00  Patient On (Oxygen Delivery Method): ventilator          I&O's Summary    28 Oct 2024 07:01  -  29 Oct 2024 07:00  --------------------------------------------------------  IN: 141.9 mL / OUT: 675 mL / NET: -533.1 mL      Mode: AC/ CMV (Assist Control/ Continuous Mandatory Ventilation)  RR (machine): 15  TV (machine): 350  FiO2: 40  PEEP: 5  ITime: 1  MAP: 9  PIP: 25      PHYSICAL EXAM:  GENERAL: No acute distress. Intubated   HEAD:  Atraumatic, Normocephalic  EYES: EOMI, PERRLA, conjunctiva and sclera clear  ENMT: No tonsillar erythema, exudates, or enlargement; Moist mucous membranes. Drooling   NECK: Supple, No JVD  HEART: Regular rate and rhythm; No murmurs, rubs, or gallops  RESPIRATORY: CTA B/L, No W/R/R  ABDOMEN: +distended, gassy; Soft, Nontender; Bowel sounds present  NEUROLOGY: Awake not following commands. Unable to move R. leg/hand. Left hand movement - not on command   EXTREMITIES:  1+ Peripheral Pulses, 1+ B/L LE edema; No clubbing, cyanosis  SKIN: warm, dry, normal color, no rash or abnormal lesions    LABS:                        8.2    17.04 )-----------( 207      ( 29 Oct 2024 03:40 )             24.7     10-29    147[H]  |  120[H]  |  24[H]  ----------------------------<  135[H]  3.4[L]   |  21[L]  |  1.50[H]    Ca    7.5[L]      29 Oct 2024 03:40  Phos  1.8     10-29  Mg     1.6     10-29    TPro  5.9[L]  /  Alb  1.5[L]  /  TBili  0.2  /  DBili  x   /  AST  18  /  ALT  11  /  AlkPhos  93  10-29    PT/INR - ( 28 Oct 2024 15:15 )   PT: 19.8 sec;   INR: 1.70 ratio         PTT - ( 28 Oct 2024 15:15 )  PTT:25.9 sec  Urinalysis Basic - ( 29 Oct 2024 03:40 )    Color: x / Appearance: x / SG: x / pH: x  Gluc: 135 mg/dL / Ketone: x  / Bili: x / Urobili: x   Blood: x / Protein: x / Nitrite: x   Leuk Esterase: x / RBC: x / WBC x   Sq Epi: x / Non Sq Epi: x / Bacteria: x      CAPILLARY BLOOD GLUCOSE      POCT Blood Glucose.: 166 mg/dL (29 Oct 2024 05:44)  POCT Blood Glucose.: 170 mg/dL (28 Oct 2024 22:22)  POCT Blood Glucose.: 212 mg/dL (28 Oct 2024 15:19)    ABG - ( 28 Oct 2024 16:31 )  pH, Arterial: 7.39  pH, Blood: x     /  pCO2: 30    /  pO2: 255   / HCO3: 18    / Base Excess: -6.0  /  SaO2: 99                  Consultant(s) Notes Reviewed:  [x ] YES  [ ] NO    MEDICATIONS  (STANDING):  apixaban 2.5 milliGRAM(s) Oral every 12 hours  atorvastatin 20 milliGRAM(s) Oral at bedtime  chlorhexidine 0.12% Liquid 15 milliLiter(s) Oral Mucosa every 12 hours  chlorhexidine 2% Cloths 1 Application(s) Topical <User Schedule>  insulin lispro (ADMELOG) corrective regimen sliding scale   SubCutaneous every 6 hours  levETIRAcetam  Solution 750 milliGRAM(s) Oral two times a day  propofol Infusion. 14.053 MICROgram(s)/kG/Min (5 mL/Hr) IV Continuous <Continuous>    MEDICATIONS  (PRN):      Care Discussed with Consultants/Other Providers [ x] YES  [ ] NO    RADIOLOGY & ADDITIONAL TESTS:

## 2024-10-29 NOTE — CONSULT NOTE ADULT - CONVERSATION DETAILS
Met with the pt's son Hari at the bedside, introduced service and  Palliative medicines  team's role in assisting w complex decision making, communication and support.  Discussed her current clinical condition and goals of care.  Hari expressed the pt understands when being spoken to and nods her head yes or no appropriately; pt chose not to walk anymore.   Discussed pt being frail, bedbound with total care needs indicates progressive neurological  disease.  Pt has chronic seizures which is contributing to further cognitive and functional impairment. Pt remains a high risk for decline and mortality.  Pt is appropriate for hospice.   Discussed risks/benefits of LST such as CPR/ intubation, artificial nutrition and PEG in the context of advanced dementia and debility. Family aware these invasive measures will not optimize the pt's life but may cause more stress and pain. Met with the pt's son Hari at the bedside, introduced service and  Palliative medicines  team's role in assisting w complex decision making, communication and support.  Discussed her current clinical condition and goals of care.  Hari expressed the pt understands when being spoken to and nods her head yes or no appropriately; pt chose not to walk anymore.   Discussed pt being frail, bedbound with total care needs indicates progressive neurological  disease.  Pt has chronic seizures which is contributing to further cognitive and functional impairment. Pt remains a high risk for decline and mortality.  Pt is appropriate for hospice.   Discussed risks/benefits of LST such as CPR/ intubation, artificial nutrition and PEG in the context of advanced dementia and debility. Family aware these invasive measures will not optimize the pt's life but may cause more stress and pain.  Hari stated for now he and his siblings are in agreement to maintain FULL CODE.  However, given better understanding of the pt's disease trajectory he will re-address goals of care with his siblings. Palliative care will continue to follow.  Chaplaincy offered and accepted.  All questions answered.  Support provided.    d/w ICU team    Collateral information provided by [facility] nurse.

## 2024-10-29 NOTE — DIETITIAN INITIAL EVALUATION ADULT - OTHER INFO
Pt from skilled nursing facility, recent admission with RD Assessment 10/18/24 note; Decreased intake x a few day PTA per MD, In ICU, sedated on Vent support, Propofol on hold (received 8.5 ml x last 24h), for weaning trial per team, NGT tube in place, tolerated feeding, to be restarted at 12noon per protocol if continued; Unknown food allergies per Chart; HnfK6H=5.2 10/18/24, finger stick mpmbc=723 to 212

## 2024-10-29 NOTE — CONSULT NOTE ADULT - PROBLEM SELECTOR RECOMMENDATION 3
Hx of CVA w right sided weakness. Bedbound. Total care.  High risk for skin failure  Supportive Care  Freq positioning    Pt eval Clinical evidence indicates that the patient has Severe protein calorie malnutrition/ 3rd degree. Albumin 1.5 poor po intake prior to admission     In context of   Chronic Illness (>1 month)    Energy/Food intake <50% of estimated energy requirement >5 days  Weight loss: Moderate - severe (lbs lost recently)  Body Fat loss: Severe   Cachexia, temporal wasting,  muscle atrophy  Muscle mass loss: Severe  (Skin failure/pressure ulcers)   Strength: weakened severe (bedbound)    Recommend:   pleasure feeds as tolerated - aspiration precautions, careful hand-feeding, teaching to caregivers  nutritional supplements as tolerated, nutrition consult    Currently on NGT feeds

## 2024-10-29 NOTE — DIETITIAN INITIAL EVALUATION ADULT - PERTINENT LABORATORY DATA
10-29    147[H]  |  120[H]  |  24[H]  ----------------------------<  135[H]  3.4[L]   |  21[L]  |  1.50[H]    Ca    7.5[L]      29 Oct 2024 03:40  Phos  1.8     10-29  Mg     1.6     10-29    TPro  5.9[L]  /  Alb  1.5[L]  /  TBili  0.2  /  DBili  x   /  AST  18  /  ALT  11  /  AlkPhos  93  10-29  POCT Blood Glucose.: 166 mg/dL (10-29-24 @ 05:44)  A1C with Estimated Average Glucose Result: 7.9 % (10-18-24 @ 09:30)  A1C with Estimated Average Glucose Result: 7.8 % (05-31-24 @ 04:00)

## 2024-10-29 NOTE — DIETITIAN INITIAL EVALUATION ADULT - PERTINENT MEDS FT
MEDICATIONS  (STANDING):  apixaban 2.5 milliGRAM(s) Oral every 12 hours  atorvastatin 20 milliGRAM(s) Oral at bedtime  cefTRIAXone   IVPB 1000 milliGRAM(s) IV Intermittent every 24 hours  chlorhexidine 0.12% Liquid 15 milliLiter(s) Oral Mucosa every 12 hours  chlorhexidine 2% Cloths 1 Application(s) Topical <User Schedule>  insulin lispro (ADMELOG) corrective regimen sliding scale   SubCutaneous every 6 hours  levETIRAcetam  Solution 750 milliGRAM(s) Oral two times a day  pantoprazole  Injectable 40 milliGRAM(s) IV Push daily  propofol Infusion. 14.053 MICROgram(s)/kG/Min (5 mL/Hr) IV Continuous <Continuous>    MEDICATIONS  (PRN):

## 2024-10-29 NOTE — CONSULT NOTE ADULT - PROBLEM SELECTOR RECOMMENDATION 4
Hx of CVA w right sided weakness. Bedbound. Total care.  High risk for skin failure  Supportive Care  Freq positioning    Pt eval

## 2024-10-29 NOTE — CONSULT NOTE ADULT - PROBLEM SELECTOR RECOMMENDATION 2
Clinical evidence indicates that the patient has Severe protein calorie malnutrition/ 3rd degree. Albumin 1.5 poor po intake prior to admission     In context of   Chronic Illness (>1 month)    Energy/Food intake <50% of estimated energy requirement >5 days  Weight loss: Moderate - severe (lbs lost recently)  Body Fat loss: Severe   Cachexia, temporal wasting,  muscle atrophy  Muscle mass loss: Severe  (Skin failure/pressure ulcers)   Strength: weakened severe (bedbound)    Recommend:   pleasure feeds as tolerated - aspiration precautions, careful hand-feeding, teaching to caregivers  nutritional supplements as tolerated, nutrition consult    Currently on NGT feeds Hx of CVA w seizure disorder. Bedbound. Non verbal. Total care. No behavior issues.  FAST 7d.  Appropriate for hospice  Discussed dementia trajectory and provided anticipatory guidance  Educated pt's son Hari about hospice philosophy and service provided.      Family needs time to discuss treatment options.  Pt remains a FULL CODE

## 2024-10-29 NOTE — DIETITIAN INITIAL EVALUATION ADULT - ENTERAL
If TF continued, Goal: Glucerna 1.5 @ 45 ml/h x 18 hr (810 ml, 1210 kcal, 65 g protein, 612 ml water daily)   MD to adjust free water as needed

## 2024-10-29 NOTE — EEG REPORT - NS EEG TEXT BOX
MICHAEL BARFIELD [id number]     Study Date: 10-29-24  Duration: 29 min  --------------------------------------------------------------------------------------------------  History:  CC/ HPI Patient is a 89y old  Female who presents with a chief complaint of Nonintractable epilepsy with status epilepticus     (29 Oct 2024 08:46)    MEDICATIONS  (STANDING):  apixaban 2.5 milliGRAM(s) Oral every 12 hours  atorvastatin 20 milliGRAM(s) Oral at bedtime  cefTRIAXone   IVPB 1000 milliGRAM(s) IV Intermittent every 24 hours  chlorhexidine 0.12% Liquid 15 milliLiter(s) Oral Mucosa every 12 hours  chlorhexidine 2% Cloths 1 Application(s) Topical <User Schedule>  insulin lispro (ADMELOG) corrective regimen sliding scale   SubCutaneous every 6 hours  levETIRAcetam  Solution 750 milliGRAM(s) Oral two times a day  pantoprazole  Injectable 40 milliGRAM(s) IV Push daily  propofol Infusion. 14.053 MICROgram(s)/kG/Min (5 mL/Hr) IV Continuous <Continuous>    --------------------------------------------------------------------------------------------------  Study Interpretation:    [[[Abbreviation Key:  PDR=alpha rhythm/posterior dominant rhythm. A-P=anterior posterior.  Amplitude: ‘very low’:<20; ‘low’:20-49; ‘medium’:; ‘high’:>150uV.  Persistence for periodic/rhythmic patterns (% of epoch) ‘rare’:<1%; ‘occasional’:1-10%; ‘frequent’:10-50%; ‘abundant’:50-90%; ‘continuous’:>90%.  Persistence for sporadic discharges: ‘rare’:<1/hr; ‘occasional’:1/min-1/hr; ‘frequent’:>1/min; ‘abundant’:>1/10 sec.  RPP=rhythmic and periodic patterns; GRDA=generalized rhythmic delta activity; FIRDA=frontal intermittent GRDA; LRDA=lateralized rhythmic delta activity; TIRDA=temporal intermittent rhythmic delta activity;  LPD=PLED=lateralized periodic discharges; GPD=generalized periodic discharges; BIPDs =bilateral independent periodic discharges; Mf=multifocal; SIRPDs=stimulus induced rhythmic, periodic, or ictal appearing discharges; BIRDs=brief potentially ictal rhythmic discharges >4 Hz, lasting .5-10s; PFA (paroxysmal bursts >13 Hz or =8 Hz <10s).  Modifiers: +F=with fast component; +S=with spike component; +R=with rhythmic component.  S-B=burst suppression pattern.  Max=maximal. N1-drowsy; N2-stage II sleep; N3-slow wave sleep. SSS/BETS=small sharp spikes/benign epileptiform transients of sleep. HV=hyperventilation; PS=photic stimulation]]]    Daily EEG Visual Analysis    FINDINGS:      Background:  The background in the most wakeful state is continuous and symmetric, consisting of diffuse polymorphic delta and theta slowing with no posterior dominant rhythm. There is reactivity to external stimulation.    Background Slowing:  Generalized slowing: As above  Focal slowing: None    State Changes:   Most of the EEG is recorded in a less wakeful state, characterized by slowing and attenuation of the background with intermittent spindle-like waveforms.  No clear normal stage 2 sleep.    Interictal Findings:  None    Electrographic and Electroclinical seizures:  None    Other Clinical Events:  None    Activation Procedures:   Hyperventilation is not performed.    Photic stimulation is performed and does not elicit any abnormalities.      Artifacts:  Intermittent myogenic and movement artifacts are present. 60-Hz artifact partly limits interpretation.    Single-lead EKG: Regular rhythm    EEG Classification / Summary:  Abnormal EEG in a lethargic patient.  Moderate-severe diffuse slowing.  No focal or epileptiform abnormalities are captured.   No seizures.    Clinical Impression:  Moderate-severe diffuse cerebral dysfunction is nonspecific in etiology.   No epileptiform abnormalities or seizures.          -------------------------------------------------------------------------------------------------------    Opal Parker MD  Attending Physician, [facility]    -------------------------------------------------------------------------------------------------------    To reach EEG technologist:  Please use the pager number for the appropriate hospital or contact the .  At [facility] - Pager #: [phone]    To reach EEG-reading physician:  EEG Reading Room Phone #: [phone]  Epilepsy Answering Service after 5PM and before 8:30AM: Phone #: [phone]

## 2024-10-29 NOTE — PROGRESS NOTE ADULT - ASSESSMENT
89F from [facility] CVA with right sided deficits and aphasia, DM, HTN , HLD, seizure disorder presenting from [facility] after witnessed tonic clonic seizure.    #status epilepticus  #CVA  #DM  #lactic acidosis       =================== Neuro============================  #status epilepticus   presenting from [facility] with witnessed tonic clonic seizure for > 30  minutes prior to EMS arrival, intubated in the field  received keppra 2g load, gave an additional 1.5 for 60mg/kg dose  started keppra 750 BID   currently sedated with prop 10 in ED after intubation   Neurology Dr. Jennings consulted  ordered 24 hr EEG  f/u keppra levels    #h/o CVA  with right sided hemiplegia and aphasia (prior notes stating bedbound status, son at bedside stating pt ambulates with wheelchair)  on atorvastatin 20 and eliquis 2.5mg BID at home  continue with home meds via NGT    ================= Cardiovascular==========================  #HTN  takes norvasc 10 daily  /62 upon evaluation in ED  was reported to be hypotensive, propofol weaned down and was placed on peripheral levophed  peripheal levophed weaned off in ED  received 2L NS bolus  monitor BP      ================- Pulm=================================  #intubated and sedated  CXR without any infiltrates   rhonchi heard b/l (possible aspiration during status epilepticus)   started on rocephin  mechanical vent settings 350/15/5/40%  ABG 7.39/30/255/18    ==================ID===================================  #UTI  recently discharged on 10/23 for UTI with E.coli bacteremia   frost placed in ED with purulent output noted  UA positive  received zosyn in ED  will continue with rocephin 1g qd  f/u blood cultures and urine cultures    #lactic acidosis   lactate 6.2 > 3.4  received 2L NS bolus in ED  continue to trend lactate    ================= Nephro================================  #TRAVIS   SCr 2.05, baseline 1.3  f/u urine lytes  received 2L IVF in ED  trend SCr    =================GI====================================  no active issues  start TF via NGT    ================ Heme==================================  #anemia  no active signs of bleeding   Hb 9.3, around baseline    =================Endocrine===============================  #DM  on humalog sliding scale and farxiga at home    will start mISS q6 while NPO on TF  monitor FS    ================= Skin/Catheters============================  Peripheral IV lines   Frost catheter     =================Prophylaxis =============================  eliquis DVT prophylaxis   protonix GI prophylaxis    ==================GOC==================================  FULL CODE   Disposition ICU   89F from PMHx CVA with right sided deficits and aphasia, (bedbound/wheelchair bound), IDT2DM, HTN, HLD, seizure disorder (on Keppra) presenting from [facility] after witnessed tonic clonic seizure lasting >30min. Intubated in the field for status epilepticus.    #status epilepticus  #CVA  #DM  #lactic acidosis       =================== Neuro============================  #status epilepticus   #Hx of seizures (on Keppra 500mg bid)   p/w [facility] with witnessed tonic clonic seizure for > 30  minutes prior to EMS arrival, intubated in the field  s/p keppra 2g load + additional 1.5g for 60mg/kg dose  c/w keppra 750 BID  Neurology Dr. Jennings/Rodrigo consulted  10/29 vEEG not working, spot EEG: Moderate-severe diffuse cerebral dysfunction is nonspecific in etiology.  No epileptiform abnormalities or seizures.  f/u 24h EEG   f/u keppra levels    #h/o CVA  with right sided hemiplegia and aphasia (prior notes stating bedbound status, son at bedside stating pt ambulates with wheelchair and eats with assistance and says few words)   on atorvastatin 20 and eliquis 2.5mg BID at home  c/w home meds via NGT    ================= Cardiovascular==========================  #HTN  takes norvasc 10 daily  /62 upon evaluation in ED  was reported to be hypotensive, propofol weaned down and was placed on peripheral levophed  peripheral levophed weaned off in ED  s/p 2L NS bolus  monitor BP  Holding home meds, currently soft BP       ================- Pulm=================================  #intubated and sedated  CXR without any infiltrates   rhonchi heard b/l (possible aspiration during status epilepticus)   started on rocephin  mechanical vent settings 350/15/5/40%  ABG 7.39/30/255/18  10/29 passed SAT, SBT     ==================ID===================================  #UTI  recently discharged on 10/23 for UTI with E.coli bacteremia   frost placed in ED with purulent output noted  UA +  received zosyn in ED  will continue with rocephin 1g qd  f/u blood cultures and urine cultures    #lactic acidosis   lactate 6.2 > 3.4  received 2L NS bolus in ED  continue to trend lactate    ================= Nephro================================  #TRAVIS   SCr 2.05, baseline 1.3  f/u urine lytes  received 2L IVF in ED  trend SCr    =================GI====================================  no active issues  start TF via NGT    ================ Heme==================================  #anemia  no active signs of bleeding   Hb 9.3, around baseline    =================Endocrine===============================  #DM  on humalog sliding scale and farxiga at home    will start mISS q6 while NPO on TF  monitor FS    ================= Skin/Catheters============================  Peripheral IV lines   Frost catheter     =================Prophylaxis =============================  eliquis DVT prophylaxis   protonix GI prophylaxis    ==================GOC==================================  FULL CODE   Disposition ICU   89F from PMHx CVA with right sided deficits and aphasia, (bedbound/wheelchair bound), IDT2DM, HTN, HLD, seizure disorder (on Keppra) presenting from [facility] after witnessed tonic clonic seizure lasting >30min. Intubated in the field for status epilepticus.    #status epilepticus  #CVA  #DM  #lactic acidosis       =================== Neuro============================  #status epilepticus   #Hx of seizures (on Keppra 500mg bid)   p/w [facility] with witnessed tonic clonic seizure for > 30  minutes prior to EMS arrival, intubated in the field  s/p keppra 2g load + additional 1.5g for 60mg/kg dose  c/w keppra 750 BID  Neurology Dr. Jennings/Rodrigo consulted  10/29 vEEG not working, spot EEG: Moderate-severe diffuse cerebral dysfunction is nonspecific in etiology.  No epileptiform abnormalities or seizures.  f/u 24h EEG   f/u keppra levels    #h/o CVA  with right sided hemiplegia and aphasia (prior notes stating bedbound status, son at bedside stating pt ambulates with wheelchair and eats with assistance and says few words)   on atorvastatin 20 and eliquis 2.5mg BID at home  c/w home meds via NGT    ================= Cardiovascular==========================  #HTN  takes norvasc 10 daily  /62 upon evaluation in ED  was reported to be hypotensive, propofol weaned down and was placed on peripheral levophed  peripheral levophed weaned off in ED  s/p 2L NS bolus  monitor BP  Holding home meds, currently soft BP       ================- Pulm=================================  #intubated and sedated  CXR without any infiltrates   rhonchi heard b/l (possible aspiration during status epilepticus)   started on rocephin  mechanical vent settings 350/15/5/40%  ABG 7.39/30/255/18  10/29 passed SAT, SBT     ==================ID===================================  #UTI  recently discharged on 10/23 for UTI with E.coli bacteremia   frost placed in ED with purulent output noted  UA +  s/p zosyn in ED  c/w ceftriaxone 1g qd   f/u UCx, BCx     #lactic acidosis   lactate 6.2 > 3.4 >1.6  s/p 2L NS bolus in ED  RESOLVED       ================= Nephro================================  #TRAVIS  BUN/Cr 29/2.05 (Baseline Scr 1.29)  s/p 2L IVF in ED   BUN/Cr 24/1.50   FeNa 3.5%   Improving    #Hypernatremia   Na 147, FWD 1.3L  c/w free water 250cc q4h    =================GI====================================  no active issues  start TF via NGT    ================ Heme==================================  #anemia  Hgb 9.3 > 8.2 (Baseline Hgb 8)  no active signs of bleeding       =================Endocrine===============================  #DM  on Humalog sliding scale and farxiga at home  will start mISS q6 while NPO on TF  monitor FS    ================= Skin/Catheters============================  Peripheral IV lines   Frost catheter     =================Prophylaxis =============================  eliquis DVT prophylaxis   protonix GI prophylaxis    ==================GOC==================================  FULL CODE   Disposition ICU

## 2024-10-29 NOTE — CONSULT NOTE ADULT - PROBLEM SELECTOR RECOMMENDATION 5
89F from [facility] CVA with right sided deficits and aphasia, DM, HTN , HLD, seizure disorder presenting from [facility] after witnessed tonic clonic seizure. Son at bedside stated he visited his mother this morning and she was at her baseline mental status, with no active complaints. He then received the call from the facility about the seizures around 1pm. As per ED, EMS was called and the seizures lasted 30 minutes prior to EMS arrival. Valium 5mg x 2 and 10mg x 1 was given, however pt continued having seizures. The patient was then intubated in the field for airway protection.  Admitted to the ICU.  Pt is appropriate for hospice given her clinical condition.  Pt remains a FULL CODE.  Please see discussion noted above in GOC conversation.

## 2024-10-29 NOTE — CONSULT NOTE ADULT - PROBLEM SELECTOR RECOMMENDATION 9
Hx of CVA. Bedbound. Total care. No behavior issues.  FAST 7d.  Appropriate for hospice  Discussed dementia trajectory and provided anticipatory guidance  Educated pt's son Hari about hospice philosophy and service provided.      Family needs time to discuss treatment options.  Pt remains a FULL CODE Hx of seizure disorder on Keppra p/w witnessed tonic clonic seizure for > 30  minutes intubated for airway protection.   Neurology consulted    Pt is appropriate for hospice, family needs more time to discuss further goals of care   Pt remains a FULL CODE

## 2024-10-29 NOTE — CONSULT NOTE ADULT - SUBJECTIVE AND OBJECTIVE BOX
[facility] Geriatric and Palliative Consult Service:  Genesis Castillo DO: cell ([phone])  Austen Pierre MD: cell ([phone])  Rosalino Wilhelm NP: cell ([phone])   Jessica Fleischer-Black MD: cell ([phone])  Ruiz Kidd LMSW: cell ([phone])       Can contact any Palliative Team member via Microsoft Teams for consults and questions      HPI:  89F from [facility] CVA with right sided deficits and aphasia, DM, HTN , HLD, seizure disorder presenting from [facility] after witnessed tonic clonic seizure. Son at bedside stated he visited his mother this morning and she was at her baseline mental status, with no active complaints. He then received the call from the facility about the seizures around 1pm. As per ED, EMS was called and the seizures lasted 30 minutes prior to EMS arrival. Valium 5mg x 2 and 10mg x 1 was given, however pt continued having seizures. The patient was then intubated in the field for airway protection. Son states that his mother was recently admitted for UTI with E.coli bacteremia and discharged on ceftin for 14 day course. He reports that she has been having decreased appetite for the past few days.  (28 Oct 2024 18:37)    Interval hx:       PAST MEDICAL & SURGICAL HISTORY:  Stroke      HTN (hypertension)      HLD (hyperlipidemia)      DM (diabetes mellitus)      DVT (deep venous thrombosis)      CVA (cerebrovascular accident)      Aphasia      Dysphagia      Apraxia      Right-sided muscle weakness      Nonverbal      Seizure          SOCIAL HISTORY:    Admitted from:  home  (with HHA)           assisted living          [facility]       LT   [ none ] Substance abuse, [ none ] Tobacco hx, [ none ] Alcohol hx, [ none ] Home Opioid Hx    FAMILY HISTORY:   unable to obtain from patient due to poor mentation, family unable to give information, see H&P for history  Baseline ADLs (prior to admission):    Allergies    No Known Allergies    Intolerances      Present Symptoms: Mild, Moderate, Severe  Pain:             Location -                               Aggravating factors -             Quality -             Radiation -             Timing-             Severity (0-10 scale):             Minimal acceptable level (0-10 scale):  Fatigue:  Nausea:  Lack of Appetite:   SOB:  Depression:  Anxiety:  Review of Systems: [All others negative or Unable to obtain due to poor mentation]    CPOT:    https://www.sccm.org/getattachment/[email or web address]/Critical-Care-Pain-Observation-Tool-(CPOT)  PAIN AD Score:   http://geriatrictoolkit.[facility]/cog/painad.pdf (press ctrl +  left click to view)      MEDICATIONS  (STANDING):  apixaban 2.5 milliGRAM(s) Oral every 12 hours  atorvastatin 20 milliGRAM(s) Oral at bedtime  cefTRIAXone   IVPB 1000 milliGRAM(s) IV Intermittent every 24 hours  chlorhexidine 0.12% Liquid 15 milliLiter(s) Oral Mucosa every 12 hours  chlorhexidine 2% Cloths 1 Application(s) Topical <User Schedule>  insulin lispro (ADMELOG) corrective regimen sliding scale   SubCutaneous every 6 hours  levETIRAcetam  Solution 750 milliGRAM(s) Oral two times a day  pantoprazole  Injectable 40 milliGRAM(s) IV Push daily  propofol Infusion. 14.053 MICROgram(s)/kG/Min (5 mL/Hr) IV Continuous <Continuous>    MEDICATIONS  (PRN):      PHYSICAL EXAM:  Vital Signs Last 24 Hrs  T(C): 36.5 (29 Oct 2024 07:00), Max: 36.9 (28 Oct 2024 16:46)  T(F): 97.7 (29 Oct 2024 07:00), Max: 98.5 (28 Oct 2024 16:46)  HR: 79 (29 Oct 2024 08:25) (64 - 94)  BP: 103/59 (29 Oct 2024 07:00) (84/50 - 146/59)  BP(mean): 72 (29 Oct 2024 07:00) (62 - 80)  RR: 21 (29 Oct 2024 07:00) (12 - 21)  SpO2: 100% (29 Oct 2024 08:25) (98% - 100%)    Parameters below as of 28 Oct 2024 21:00  Patient On (Oxygen Delivery Method): ventilator        General: alert  oriented x ____    lethargic distressed cachexia  nonverbal  unarousable verbal    Palliative Performance Scale/Karnofsky Score:  http://npcrc.org/files/news/palliative_performance_scale_ppsv2.pdf    HEENT: no abnormal lesion, dry mouth  ET tube/trach oral lesions:  Lungs: tachypnea/labored breathing, audible excessive secretions  CV: RRR, S1S2, tachycardia  GI: soft non distended non tender  incontinent               PEG/NG/OG tube  constipation  last BM:   : incontinent  oliguria/anuria  frost  Musculoskeletal: weakness x4 edema x4    ambulatory with assistance   mostly/fully bedbound/wheelchair bound  Skin: no abnormal skin lesions, poor skin turgor, pressure ulcer stage:   Neuro: no deficits, mild cognitive impairment dsyphagia/dysarthria paresis  Oral intake ability: unable/only mouth care, minimal moderate full capability    LABS:                        8.2    17.04 )-----------( 207      ( 29 Oct 2024 03:40 )             24.7     10-29    147[H]  |  120[H]  |  24[H]  ----------------------------<  135[H]  3.4[L]   |  21[L]  |  1.50[H]    Ca    7.5[L]      29 Oct 2024 03:40  Phos  1.8     10-29  Mg     1.6     10-29    TPro  5.9[L]  /  Alb  1.5[L]  /  TBili  0.2  /  DBili  x   /  AST  18  /  ALT  11  /  AlkPhos  93  10-29    Urinalysis Basic - ( 29 Oct 2024 03:40 )    Color: x / Appearance: x / SG: x / pH: x  Gluc: 135 mg/dL / Ketone: x  / Bili: x / Urobili: x   Blood: x / Protein: x / Nitrite: x   Leuk Esterase: x / RBC: x / WBC x   Sq Epi: x / Non Sq Epi: x / Bacteria: x        RADIOLOGY & ADDITIONAL STUDIES:         [facility] Geriatric and Palliative Consult Service:  Gneesis Castillo DO: cell ([phone])  Austen Pierre MD: cell ([phone])  Rosalino Wilhelm NP: cell ([phone])   Jessica Fleischer-Black MD: cell ([phone])  Ruiz Kidd LMSW: cell ([phone])       Can contact any Palliative Team member via Microsoft Teams for consults and questions      HPI:  89F from [facility] CVA with right sided deficits and aphasia, DM, HTN , HLD, seizure disorder presenting from [facility] after witnessed tonic clonic seizure. Son at bedside stated he visited his mother this morning and she was at her baseline mental status, with no active complaints. He then received the call from the facility about the seizures around 1pm. As per ED, EMS was called and the seizures lasted 30 minutes prior to EMS arrival. Valium 5mg x 2 and 10mg x 1 was given, however pt continued having seizures. The patient was then intubated in the field for airway protection. Son states that his mother was recently admitted for UTI with E.coli bacteremia and discharged on ceftin for 14 day course. He reports that she has been having decreased appetite for the past few days.  (28 Oct 2024 18:37)    Interval hx: Pt Intubated/sedated, eyes open able to track.  Son Hari at the bedside.       PAST MEDICAL & SURGICAL HISTORY:  Stroke      HTN (hypertension)      HLD (hyperlipidemia)      DM (diabetes mellitus)      DVT (deep venous thrombosis)      CVA (cerebrovascular accident)      Aphasia      Dysphagia      Apraxia      Right-sided muscle weakness      Nonverbal      Seizure          SOCIAL HISTORY:    Admitted from:  [facility]  Pt has 7 children, her son Hari is the HCP  [ none ] Substance abuse, [ none ] Tobacco hx, [ none ] Alcohol hx, [ none ] Home Opioid Hx    Ep[employer]opalian: [employer]    Hari Begum (son/HCP)   Phone# [phone]    FAMILY HISTORY:   unable to obtain from patient due to poor mentation, family unable to give information, see H&P for history  Baseline ADLs (prior to admission): total care    Allergies    No Known Allergies    Intolerances      Present Symptoms: Mild, Moderate, Severe   Unable to obtain due to clinical condition    CPOT:    https://www.[facility].org/getattachment/[email or web address]/Critical-Care-Pain-Observation-Tool-(CPOT) 0  PAIN AD Score:   http://geriatrictoolkit.[facility]/cog/painad.pdf (press ctrl +  left click to view)      MEDICATIONS  (STANDING):  apixaban 2.5 milliGRAM(s) Oral every 12 hours  atorvastatin 20 milliGRAM(s) Oral at bedtime  cefTRIAXone   IVPB 1000 milliGRAM(s) IV Intermittent every 24 hours  chlorhexidine 0.12% Liquid 15 milliLiter(s) Oral Mucosa every 12 hours  chlorhexidine 2% Cloths 1 Application(s) Topical <User Schedule>  insulin lispro (ADMELOG) corrective regimen sliding scale   SubCutaneous every 6 hours  levETIRAcetam  Solution 750 milliGRAM(s) Oral two times a day  pantoprazole  Injectable 40 milliGRAM(s) IV Push daily  propofol Infusion. 14.053 MICROgram(s)/kG/Min (5 mL/Hr) IV Continuous <Continuous>    MEDICATIONS  (PRN):      PHYSICAL EXAM:  Vital Signs Last 24 Hrs  T(C): 36.5 (29 Oct 2024 07:00), Max: 36.9 (28 Oct 2024 16:46)  T(F): 97.7 (29 Oct 2024 07:00), Max: 98.5 (28 Oct 2024 16:46)  HR: 79 (29 Oct 2024 08:25) (64 - 94)  BP: 103/59 (29 Oct 2024 07:00) (84/50 - 146/59)  BP(mean): 72 (29 Oct 2024 07:00) (62 - 80)  RR: 21 (29 Oct 2024 07:00) (12 - 21)  SpO2: 100% (29 Oct 2024 08:25) (98% - 100%)    Parameters below as of 28 Oct 2024 21:00  Patient On (Oxygen Delivery Method): ventilator        General: Critically ill appearing elderly woman, intubated/sedated. NAD    Palliative Performance Scale/Karnofsky Score: 20%  http://[facility].org/files/news/palliative_performance_scale_ppsv2.pdf    HEENT: bitemporal wasting, +ETT, +NGT  Lungs: intubated  CV: RRR, S1S2  GI: soft non distended non tender  incontinent              last BM: non documented  : incontinent / frost  Musculoskeletal: weakness x4, bedbound, no edema  Skin: no abnormal skin lesions, poor skin turgor  Neuro: no deficits, unable ot follow commands  Oral intake ability: unable/only mouth care  Diet: NGT feeds    LABS:                        8.2    17.04 )-----------( 207      ( 29 Oct 2024 03:40 )             24.7     10-29    147[H]  |  120[H]  |  24[H]  ----------------------------<  135[H]  3.4[L]   |  21[L]  |  1.50[H]    Ca    7.5[L]      29 Oct 2024 03:40  Phos  1.8     10-29  Mg     1.6     10-29    TPro  5.9[L]  /  Alb  1.5[L]  /  TBili  0.2  /  DBili  x   /  AST  18  /  ALT  11  /  AlkPhos  93  10-29    Urinalysis Basic - ( 29 Oct 2024 03:40 )    Color: x / Appearance: x / SG: x / pH: x  Gluc: 135 mg/dL / Ketone: x  / Bili: x / Urobili: x   Blood: x / Protein: x / Nitrite: x   Leuk Esterase: x / RBC: x / WBC x   Sq Epi: x / Non Sq Epi: x / Bacteria: x    < from: CT Head No Cont (10.28.24 @ 20:31) >    ACC: [id number] EXAM:  CT BRAIN   ORDERED BY: GEORGIE SEVILLA     PROCEDURE DATE:  10/28/2024          INTERPRETATION:  CLINICAL INFORMATION: Status epilepticus.    COMPARISON: 01/14/2024    CONTRAST:  IV Contrast: NONE  Complications: None reported at time of study completion    TECHNIQUE:  Serial axial images were obtained from the skull base to the   vertex using multi-slice helical technique. Sagittal and coronal   reformats were obtained.    FINDINGS:    VENTRICLES AND SULCI: Mild-to-moderate age-related involutional changes.  INTRA-AXIAL: No mass effect, acute hemorrhage, or midline shift.  Large   chronic infarct in the left middle cerebral artery vascular territory,   involving left frontal parietal convexity extension into the left   temporal-parietal junction, superior aspect the left temporal lobe, left   insula and left inferior frontal gyrus/left frontal operculum is stable.    Curvilinear areas of mildly increased attenuation along the periphery of   this infarct, unchanged from 01/14/2024 is compatible with areas of   calcification/mineralization and/or laminar necrosis.  No new loss of   gray matter-white matter differentiation is appreciated.  Periventricular   white matter hypodensities are consistent with mild microvascular-type   changes.  EXTRA-AXIAL: No acute subarachnoid or subdural hemorrhage.    VISUALIZED SINUSES:  An air-fluid level partially opacifies left sphenoid   sinus.  TYMPANOMASTOID CAVITIES:  Clear.  VISUALIZED ORBITS: Normal.  CALVARIUM: Intact.    MISCELLANEOUS: None.      IMPRESSION:  No CT evidence of acute intracranial pathology.    Large chronic infarct in the left middle cerebral artery vascular   territory, which may represent a seizure focus.    An air-fluid level partially opacifies left sphenoid sinus.    < end of copied text >      RADIOLOGY & ADDITIONAL STUDIES: reviewed  ADVANCED DIRECTIVES: FULL CODE         [facility] Geriatric and Palliative Consult Service:  Genesis Castillo DO: cell ([phone])  Austen Pierre MD: cell ([phone])  Rosalino Wilhelm NP: cell ([phone])   Jessica Fleischer-Black MD: cell ([phone])  Ruiz Kidd LMSW: cell ([phone])       Can contact any Palliative Team member via Microsoft Teams for consults and questions      HPI:  89F from [facility] CVA with right sided deficits and aphasia, DM, HTN , HLD, seizure disorder presenting from [facility] after witnessed tonic clonic seizure. Son at bedside stated he visited his mother this morning and she was at her baseline mental status, with no active complaints. He then received the call from the facility about the seizures around 1pm. As per ED, EMS was called and the seizures lasted 30 minutes prior to EMS arrival. Valium 5mg x 2 and 10mg x 1 was given, however pt continued having seizures. The patient was then intubated in the field for airway protection. Son states that his mother was recently admitted for UTI with E.coli bacteremia and discharged on ceftin for 14 day course. He reports that she has been having decreased appetite for the past few days.  (28 Oct 2024 18:37)    Interval hx: Pt Intubated/sedated, eyes open able to track.  Son Hari at the bedside.       PAST MEDICAL & SURGICAL HISTORY:  Stroke      HTN (hypertension)      HLD (hyperlipidemia)      DM (diabetes mellitus)      DVT (deep venous thrombosis)      CVA (cerebrovascular accident)      Aphasia      Dysphagia      Apraxia      Right-sided muscle weakness      Nonverbal      Seizure          SOCIAL HISTORY:    Admitted from:  [facility]  Pt has 7 children, her son Hari is the HCP  [ none ] Substance abuse, [ none ] Tobacco hx, [ none ] Alcohol hx, [ none ] Home Opioid Hx    Confucianism: Mandaeism    Hari Begum (son/HCP)   Phone# [phone]    FAMILY HISTORY:   unable to obtain from patient due to poor mentation, family unable to give information, see H&P for history  Baseline ADLs (prior to admission): total care    Allergies    No Known Allergies    Intolerances      Present Symptoms: Mild, Moderate, Severe   Unable to obtain due to clinical condition    CPOT:    https://www.[facility].org/getattachment/[email or web address]/Critical-Care-Pain-Observation-Tool-(CPOT) 0  PAIN AD Score:   http://geriatrictoolkit.[facility]/cog/painad.pdf (press ctrl +  left click to view)      MEDICATIONS  (STANDING):  apixaban 2.5 milliGRAM(s) Oral every 12 hours  atorvastatin 20 milliGRAM(s) Oral at bedtime  cefTRIAXone   IVPB 1000 milliGRAM(s) IV Intermittent every 24 hours  chlorhexidine 0.12% Liquid 15 milliLiter(s) Oral Mucosa every 12 hours  chlorhexidine 2% Cloths 1 Application(s) Topical <User Schedule>  insulin lispro (ADMELOG) corrective regimen sliding scale   SubCutaneous every 6 hours  levETIRAcetam  Solution 750 milliGRAM(s) Oral two times a day  pantoprazole  Injectable 40 milliGRAM(s) IV Push daily  propofol Infusion. 14.053 MICROgram(s)/kG/Min (5 mL/Hr) IV Continuous <Continuous>    MEDICATIONS  (PRN):      PHYSICAL EXAM:  Vital Signs Last 24 Hrs  T(C): 36.5 (29 Oct 2024 07:00), Max: 36.9 (28 Oct 2024 16:46)  T(F): 97.7 (29 Oct 2024 07:00), Max: 98.5 (28 Oct 2024 16:46)  HR: 79 (29 Oct 2024 08:25) (64 - 94)  BP: 103/59 (29 Oct 2024 07:00) (84/50 - 146/59)  BP(mean): 72 (29 Oct 2024 07:00) (62 - 80)  RR: 21 (29 Oct 2024 07:00) (12 - 21)  SpO2: 100% (29 Oct 2024 08:25) (98% - 100%)    Parameters below as of 28 Oct 2024 21:00  Patient On (Oxygen Delivery Method): ventilator        General: Critically ill appearing elderly woman, intubated/sedated. NAD    Palliative Performance Scale/Karnofsky Score: 20%  http://[facility].org/files/news/palliative_performance_scale_ppsv2.pdf    HEENT: bitemporal wasting, +ETT, +NGT  Lungs: intubated  CV: RRR, S1S2  GI: soft non distended non tender  incontinent              last BM: non documented  : incontinent / frost  Musculoskeletal: weakness x4, bedbound, no edema  Skin: no abnormal skin lesions, poor skin turgor  Neuro: no deficits, unable to follow commands  Oral intake ability: unable/only mouth care  Diet: NGT feeds    LABS:                        8.2    17.04 )-----------( 207      ( 29 Oct 2024 03:40 )             24.7     10-29    147[H]  |  120[H]  |  24[H]  ----------------------------<  135[H]  3.4[L]   |  21[L]  |  1.50[H]    Ca    7.5[L]      29 Oct 2024 03:40  Phos  1.8     10-29  Mg     1.6     10-29    TPro  5.9[L]  /  Alb  1.5[L]  /  TBili  0.2  /  DBili  x   /  AST  18  /  ALT  11  /  AlkPhos  93  10-29    Urinalysis Basic - ( 29 Oct 2024 03:40 )    Color: x / Appearance: x / SG: x / pH: x  Gluc: 135 mg/dL / Ketone: x  / Bili: x / Urobili: x   Blood: x / Protein: x / Nitrite: x   Leuk Esterase: x / RBC: x / WBC x   Sq Epi: x / Non Sq Epi: x / Bacteria: x    < from: CT Head No Cont (10.28.24 @ 20:31) >    ACC: [id number] EXAM:  CT BRAIN   ORDERED BY: GEORGIE SEVILLA     PROCEDURE DATE:  10/28/2024          INTERPRETATION:  CLINICAL INFORMATION: Status epilepticus.    COMPARISON: 01/14/2024    CONTRAST:  IV Contrast: NONE  Complications: None reported at time of study completion    TECHNIQUE:  Serial axial images were obtained from the skull base to the   vertex using multi-slice helical technique. Sagittal and coronal   reformats were obtained.    FINDINGS:    VENTRICLES AND SULCI: Mild-to-moderate age-related involutional changes.  INTRA-AXIAL: No mass effect, acute hemorrhage, or midline shift.  Large   chronic infarct in the left middle cerebral artery vascular territory,   involving left frontal parietal convexity extension into the left   temporal-parietal junction, superior aspect the left temporal lobe, left   insula and left inferior frontal gyrus/left frontal operculum is stable.    Curvilinear areas of mildly increased attenuation along the periphery of   this infarct, unchanged from 01/14/2024 is compatible with areas of   calcification/mineralization and/or laminar necrosis.  No new loss of   gray matter-white matter differentiation is appreciated.  Periventricular   white matter hypodensities are consistent with mild microvascular-type   changes.  EXTRA-AXIAL: No acute subarachnoid or subdural hemorrhage.    VISUALIZED SINUSES:  An air-fluid level partially opacifies left sphenoid   sinus.  TYMPANOMASTOID CAVITIES:  Clear.  VISUALIZED ORBITS: Normal.  CALVARIUM: Intact.    MISCELLANEOUS: None.      IMPRESSION:  No CT evidence of acute intracranial pathology.    Large chronic infarct in the left middle cerebral artery vascular   territory, which may represent a seizure focus.    An air-fluid level partially opacifies left sphenoid sinus.    < end of copied text >      RADIOLOGY & ADDITIONAL STUDIES: reviewed  ADVANCED DIRECTIVES: FULL CODE         [facility] Geriatric and Palliative Consult Service:  Genesis Castillo DO: cell ([phone])  Austen Pierre MD: cell ([phone])  Rosalino Wilhelm NP: cell ([phone])   Jessica Fleischer-Black MD: cell ([phone])  Ruiz Kidd LMSW: cell ([phone])       Can contact any Palliative Team member via Microsoft Teams for consults and questions      HPI:  89F from [facility] CVA with right sided deficits and aphasia, DM, HTN , HLD, seizure disorder presenting from [facility] after witnessed tonic clonic seizure. Son at bedside stated he visited his mother this morning and she was at her baseline mental status, with no active complaints. He then received the call from the facility about the seizures around 1pm. As per ED, EMS was called and the seizures lasted 30 minutes prior to EMS arrival. Valium 5mg x 2 and 10mg x 1 was given, however pt continued having seizures. The patient was then intubated in the field for airway protection. Son states that his mother was recently admitted for UTI with E.coli bacteremia and discharged on ceftin for 14 day course. He reports that she has been having decreased appetite for the past few days.  (28 Oct 2024 18:37)    Interval hx: Pt Intubated/sedated, eyes open able to track.  Son Hari at the bedside.       PAST MEDICAL & SURGICAL HISTORY:  Stroke      HTN (hypertension)      HLD (hyperlipidemia)      DM (diabetes mellitus)      DVT (deep venous thrombosis)      CVA (cerebrovascular accident)      Aphasia      Dysphagia      Apraxia      Right-sided muscle weakness      Nonverbal      Seizure          SOCIAL HISTORY:    Admitted from:  [facility]  Pt has 7 children, her son Hari is the HCP  [ none ] Substance abuse, [ none ] Tobacco hx, [ none ] Alcohol hx, [ none ] Home Opioid Hx    Alevism: Yazidism    Hari Begum (son/HCP)   Phone# [phone]    FAMILY HISTORY:   unable to obtain from patient due to poor mentation, family unable to give information, see H&P for history  Baseline ADLs (prior to admission): total care    Allergies    No Known Allergies    Intolerances      Present Symptoms: Mild, Moderate, Severe   Unable to obtain due to clinical condition    CPOT:    https://www.[facility].org/getattachment/[email or web address]/Critical-Care-Pain-Observation-Tool-(CPOT) 0  PAIN AD Score:   http://geriatrictoolkit.[facility]/cog/painad.pdf (press ctrl +  left click to view)      MEDICATIONS  (STANDING):  apixaban 2.5 milliGRAM(s) Oral every 12 hours  atorvastatin 20 milliGRAM(s) Oral at bedtime  cefTRIAXone   IVPB 1000 milliGRAM(s) IV Intermittent every 24 hours  chlorhexidine 0.12% Liquid 15 milliLiter(s) Oral Mucosa every 12 hours  chlorhexidine 2% Cloths 1 Application(s) Topical <User Schedule>  insulin lispro (ADMELOG) corrective regimen sliding scale   SubCutaneous every 6 hours  levETIRAcetam  Solution 750 milliGRAM(s) Oral two times a day  pantoprazole  Injectable 40 milliGRAM(s) IV Push daily  propofol Infusion. 14.053 MICROgram(s)/kG/Min (5 mL/Hr) IV Continuous <Continuous>    MEDICATIONS  (PRN):      PHYSICAL EXAM:  Vital Signs Last 24 Hrs  T(C): 36.5 (29 Oct 2024 07:00), Max: 36.9 (28 Oct 2024 16:46)  T(F): 97.7 (29 Oct 2024 07:00), Max: 98.5 (28 Oct 2024 16:46)  HR: 79 (29 Oct 2024 08:25) (64 - 94)  BP: 103/59 (29 Oct 2024 07:00) (84/50 - 146/59)  BP(mean): 72 (29 Oct 2024 07:00) (62 - 80)  RR: 21 (29 Oct 2024 07:00) (12 - 21)  SpO2: 100% (29 Oct 2024 08:25) (98% - 100%)    Parameters below as of 28 Oct 2024 21:00  Patient On (Oxygen Delivery Method): ventilator        General: Critically ill appearing elderly woman, intubated/sedated. NAD    Palliative Performance Scale/Karnofsky Score: 20%  http://[facility].org/files/news/palliative_performance_scale_ppsv2.pdf    HEENT: bitemporal wasting, +ETT, +NGT  Lungs: intubated  CV: RRR, S1S2  GI: soft non distended non tender  incontinent              last BM: non documented  : incontinent / frost  Musculoskeletal: weakness x4, bedbound, no edema  Skin: no abnormal skin lesions, poor skin turgor  Neuro: no deficits, unable to follow commands, aphasia  Oral intake ability: unable/only mouth care  Diet: NGT feeds    LABS:                        8.2    17.04 )-----------( 207      ( 29 Oct 2024 03:40 )             24.7     10-29    147[H]  |  120[H]  |  24[H]  ----------------------------<  135[H]  3.4[L]   |  21[L]  |  1.50[H]    Ca    7.5[L]      29 Oct 2024 03:40  Phos  1.8     10-29  Mg     1.6     10-29    TPro  5.9[L]  /  Alb  1.5[L]  /  TBili  0.2  /  DBili  x   /  AST  18  /  ALT  11  /  AlkPhos  93  10-29    Urinalysis Basic - ( 29 Oct 2024 03:40 )    Color: x / Appearance: x / SG: x / pH: x  Gluc: 135 mg/dL / Ketone: x  / Bili: x / Urobili: x   Blood: x / Protein: x / Nitrite: x   Leuk Esterase: x / RBC: x / WBC x   Sq Epi: x / Non Sq Epi: x / Bacteria: x    < from: CT Head No Cont (10.28.24 @ 20:31) >    ACC: [id number] EXAM:  CT BRAIN   ORDERED BY: GEORGIE SEVILLA     PROCEDURE DATE:  10/28/2024          INTERPRETATION:  CLINICAL INFORMATION: Status epilepticus.    COMPARISON: 01/14/2024    CONTRAST:  IV Contrast: NONE  Complications: None reported at time of study completion    TECHNIQUE:  Serial axial images were obtained from the skull base to the   vertex using multi-slice helical technique. Sagittal and coronal   reformats were obtained.    FINDINGS:    VENTRICLES AND SULCI: Mild-to-moderate age-related involutional changes.  INTRA-AXIAL: No mass effect, acute hemorrhage, or midline shift.  Large   chronic infarct in the left middle cerebral artery vascular territory,   involving left frontal parietal convexity extension into the left   temporal-parietal junction, superior aspect the left temporal lobe, left   insula and left inferior frontal gyrus/left frontal operculum is stable.    Curvilinear areas of mildly increased attenuation along the periphery of   this infarct, unchanged from 01/14/2024 is compatible with areas of   calcification/mineralization and/or laminar necrosis.  No new loss of   gray matter-white matter differentiation is appreciated.  Periventricular   white matter hypodensities are consistent with mild microvascular-type   changes.  EXTRA-AXIAL: No acute subarachnoid or subdural hemorrhage.    VISUALIZED SINUSES:  An air-fluid level partially opacifies left sphenoid   sinus.  TYMPANOMASTOID CAVITIES:  Clear.  VISUALIZED ORBITS: Normal.  CALVARIUM: Intact.    MISCELLANEOUS: None.      IMPRESSION:  No CT evidence of acute intracranial pathology.    Large chronic infarct in the left middle cerebral artery vascular   territory, which may represent a seizure focus.    An air-fluid level partially opacifies left sphenoid sinus.    < end of copied text >      RADIOLOGY & ADDITIONAL STUDIES: reviewed  ADVANCED DIRECTIVES: FULL CODE

## 2024-10-29 NOTE — DIETITIAN INITIAL EVALUATION ADULT - ORAL INTAKE PTA/DIET HISTORY
Diet Rx at skilled nursing facility PTA: No concentrated sweets, Thin fluid, Puree; LPS 30 ml x tid

## 2024-10-30 LAB
-  CANDIDA ALBICANS: SIGNIFICANT CHANGE UP
ALBUMIN SERPL ELPH-MCNC: 1.6 G/DL — LOW (ref 3.5–5)
ALP SERPL-CCNC: 117 U/L — SIGNIFICANT CHANGE UP (ref 40–120)
ALT FLD-CCNC: 12 U/L DA — SIGNIFICANT CHANGE UP (ref 10–60)
ANION GAP SERPL CALC-SCNC: 7 MMOL/L — SIGNIFICANT CHANGE UP (ref 5–17)
AST SERPL-CCNC: 15 U/L — SIGNIFICANT CHANGE UP (ref 10–40)
BASE EXCESS BLDV CALC-SCNC: -5.4 MMOL/L — SIGNIFICANT CHANGE UP
BASOPHILS # BLD AUTO: 0.05 K/UL — SIGNIFICANT CHANGE UP (ref 0–0.2)
BASOPHILS NFR BLD AUTO: 0.4 % — SIGNIFICANT CHANGE UP (ref 0–2)
BILIRUB SERPL-MCNC: 0.1 MG/DL — LOW (ref 0.2–1.2)
BUN SERPL-MCNC: 27 MG/DL — HIGH (ref 7–18)
CA-I SERPL-SCNC: SIGNIFICANT CHANGE UP MMOL/L (ref 1.15–1.33)
CALCIUM SERPL-MCNC: 7.3 MG/DL — LOW (ref 8.4–10.5)
CHLORIDE SERPL-SCNC: 119 MMOL/L — HIGH (ref 96–108)
CO2 SERPL-SCNC: 20 MMOL/L — LOW (ref 22–31)
CREAT SERPL-MCNC: 1.43 MG/DL — HIGH (ref 0.5–1.3)
EGFR: 33 ML/MIN/1.73M2 — LOW
EOSINOPHIL # BLD AUTO: 0.29 K/UL — SIGNIFICANT CHANGE UP (ref 0–0.5)
EOSINOPHIL NFR BLD AUTO: 2.2 % — SIGNIFICANT CHANGE UP (ref 0–6)
GAS PNL BLDV: 138 MMOL/L — SIGNIFICANT CHANGE UP (ref 136–145)
GAS PNL BLDV: SIGNIFICANT CHANGE UP
GLUCOSE SERPL-MCNC: 241 MG/DL — HIGH (ref 70–99)
GRAM STN FLD: ABNORMAL
HCO3 BLDV-SCNC: 20 MMOL/L — LOW (ref 22–29)
HCT VFR BLD CALC: 24.7 % — LOW (ref 34.5–45)
HGB BLD-MCNC: 8 G/DL — LOW (ref 11.5–15.5)
IMM GRANULOCYTES NFR BLD AUTO: 0.8 % — SIGNIFICANT CHANGE UP (ref 0–0.9)
LACTATE BLDV-MCNC: 1 MMOL/L — SIGNIFICANT CHANGE UP (ref 0.5–2)
LYMPHOCYTES # BLD AUTO: 0.97 K/UL — LOW (ref 1–3.3)
LYMPHOCYTES # BLD AUTO: 7.3 % — LOW (ref 13–44)
MAGNESIUM SERPL-MCNC: 1.8 MG/DL — SIGNIFICANT CHANGE UP (ref 1.6–2.6)
MCHC RBC-ENTMCNC: 29.6 PG — SIGNIFICANT CHANGE UP (ref 27–34)
MCHC RBC-ENTMCNC: 32.4 G/DL — SIGNIFICANT CHANGE UP (ref 32–36)
MCV RBC AUTO: 91.5 FL — SIGNIFICANT CHANGE UP (ref 80–100)
METHOD TYPE: SIGNIFICANT CHANGE UP
MONOCYTES # BLD AUTO: 1.15 K/UL — HIGH (ref 0–0.9)
MONOCYTES NFR BLD AUTO: 8.7 % — SIGNIFICANT CHANGE UP (ref 2–14)
NEUTROPHILS # BLD AUTO: 10.71 K/UL — HIGH (ref 1.8–7.4)
NEUTROPHILS NFR BLD AUTO: 80.6 % — HIGH (ref 43–77)
NRBC # BLD: 0 /100 WBCS — SIGNIFICANT CHANGE UP (ref 0–0)
PCO2 BLDV: 38 MMHG — LOW (ref 39–42)
PH BLDV: 7.33 — SIGNIFICANT CHANGE UP (ref 7.32–7.43)
PHOSPHATE SERPL-MCNC: 3.1 MG/DL — SIGNIFICANT CHANGE UP (ref 2.5–4.5)
PLATELET # BLD AUTO: 235 K/UL — SIGNIFICANT CHANGE UP (ref 150–400)
PO2 BLDV: 49 MMHG — SIGNIFICANT CHANGE UP
POTASSIUM BLDV-SCNC: 3.5 MMOL/L — SIGNIFICANT CHANGE UP (ref 3.5–5.1)
POTASSIUM SERPL-MCNC: 3.5 MMOL/L — SIGNIFICANT CHANGE UP (ref 3.5–5.3)
POTASSIUM SERPL-SCNC: 3.5 MMOL/L — SIGNIFICANT CHANGE UP (ref 3.5–5.3)
PROT SERPL-MCNC: 6.1 G/DL — SIGNIFICANT CHANGE UP (ref 6–8.3)
RBC # BLD: 2.7 M/UL — LOW (ref 3.8–5.2)
RBC # FLD: 17.8 % — HIGH (ref 10.3–14.5)
SAO2 % BLDV: 82.2 % — SIGNIFICANT CHANGE UP
SODIUM SERPL-SCNC: 146 MMOL/L — HIGH (ref 135–145)
WBC # BLD: 13.27 K/UL — HIGH (ref 3.8–10.5)
WBC # FLD AUTO: 13.27 K/UL — HIGH (ref 3.8–10.5)

## 2024-10-30 PROCEDURE — 99223 1ST HOSP IP/OBS HIGH 75: CPT

## 2024-10-30 PROCEDURE — 99233 SBSQ HOSP IP/OBS HIGH 50: CPT

## 2024-10-30 PROCEDURE — 74018 RADEX ABDOMEN 1 VIEW: CPT | Mod: 26

## 2024-10-30 PROCEDURE — 99497 ADVNCD CARE PLAN 30 MIN: CPT | Mod: 25

## 2024-10-30 PROCEDURE — 95720 EEG PHY/QHP EA INCR W/VEEG: CPT

## 2024-10-30 RX ORDER — SENNA 187 MG
2 TABLET ORAL AT BEDTIME
Refills: 0 | Status: DISCONTINUED | OUTPATIENT
Start: 2024-10-30 | End: 2024-11-06

## 2024-10-30 RX ORDER — FENTANYL CITRAT/DEXTROSE 5%/PF 1250MCG/50
50 PATIENT CONTROLLED ANALGESIA SYRINGE INTRAVENOUS EVERY 4 HOURS
Refills: 0 | Status: DISCONTINUED | OUTPATIENT
Start: 2024-10-30 | End: 2024-10-31

## 2024-10-30 RX ORDER — CASPOFUNGIN ACETATE 70 MG/10.8ML
50 INJECTION, POWDER, LYOPHILIZED, FOR SOLUTION INTRAVENOUS EVERY 24 HOURS
Refills: 0 | Status: DISCONTINUED | OUTPATIENT
Start: 2024-10-31 | End: 2024-10-31

## 2024-10-30 RX ORDER — CASPOFUNGIN ACETATE 70 MG/10.8ML
INJECTION, POWDER, LYOPHILIZED, FOR SOLUTION INTRAVENOUS
Refills: 0 | Status: DISCONTINUED | OUTPATIENT
Start: 2024-10-30 | End: 2024-10-31

## 2024-10-30 RX ORDER — POLYETHYLENE GLYCOL 3350 17 G/17G
17 POWDER, FOR SOLUTION ORAL DAILY
Refills: 0 | Status: DISCONTINUED | OUTPATIENT
Start: 2024-10-30 | End: 2024-11-06

## 2024-10-30 RX ORDER — CASPOFUNGIN ACETATE 70 MG/10.8ML
70 INJECTION, POWDER, LYOPHILIZED, FOR SOLUTION INTRAVENOUS ONCE
Refills: 0 | Status: COMPLETED | OUTPATIENT
Start: 2024-10-30 | End: 2024-10-30

## 2024-10-30 RX ORDER — PANTOPRAZOLE SODIUM 40 MG/1
40 TABLET, DELAYED RELEASE ORAL DAILY
Refills: 0 | Status: DISCONTINUED | OUTPATIENT
Start: 2024-10-31 | End: 2024-11-02

## 2024-10-30 RX ADMIN — Medication 50 MICROGRAM(S): at 21:50

## 2024-10-30 RX ADMIN — Medication 6: at 06:17

## 2024-10-30 RX ADMIN — PANTOPRAZOLE SODIUM 40 MILLIGRAM(S): 40 TABLET, DELAYED RELEASE ORAL at 12:17

## 2024-10-30 RX ADMIN — CHLORHEXIDINE GLUCONATE 15 MILLILITER(S): 40 SOLUTION TOPICAL at 05:28

## 2024-10-30 RX ADMIN — CHLORHEXIDINE GLUCONATE 15 MILLILITER(S): 40 SOLUTION TOPICAL at 18:07

## 2024-10-30 RX ADMIN — Medication 100 MILLIGRAM(S): at 13:00

## 2024-10-30 RX ADMIN — LEVETIRACETAM 750 MILLIGRAM(S): 500 TABLET, FILM COATED ORAL at 18:08

## 2024-10-30 RX ADMIN — LEVETIRACETAM 750 MILLIGRAM(S): 500 TABLET, FILM COATED ORAL at 05:28

## 2024-10-30 RX ADMIN — Medication 50 MICROGRAM(S): at 21:20

## 2024-10-30 RX ADMIN — CASPOFUNGIN ACETATE 70 MILLIGRAM(S): 70 INJECTION, POWDER, LYOPHILIZED, FOR SOLUTION INTRAVENOUS at 19:01

## 2024-10-30 RX ADMIN — Medication 20 MILLIGRAM(S): at 21:21

## 2024-10-30 RX ADMIN — APIXABAN 2.5 MILLIGRAM(S): 5 TABLET, FILM COATED ORAL at 18:09

## 2024-10-30 RX ADMIN — APIXABAN 2.5 MILLIGRAM(S): 5 TABLET, FILM COATED ORAL at 05:28

## 2024-10-30 RX ADMIN — CHLORHEXIDINE GLUCONATE 1 APPLICATION(S): 40 SOLUTION TOPICAL at 05:28

## 2024-10-30 RX ADMIN — Medication 2: at 23:24

## 2024-10-30 NOTE — EEG REPORT - NS EEG TEXT BOX
[facility]   COMPREHENSIVE EPILEPSY CENTER   REPORT OF CONTINUOUS VIDEO EEG     [facility]: [address], [place] [address], Ph#: [phone]  [facility]: [id number], [place] [address], Ph#: [phone]  Office: [address] [place] [address], [facility] [address], [place] [address] Ph#: [phone]    Patient Name: MICHAEL BARFIELD  Age and : 89y ([birth year])  MRN #: [id number]  Location: [facility] IC06 W2  Referring Physician: Abram Miranda    Study Date: 10-29-24 at 17:12 - 10-30-24 at 08:00  _____________________________________________________________  STUDY INFORMATION    EEG Recording Technique:  The patient underwent continuous Video-EEG monitoring, using Telemetry System hardware on the XLTek Digital System. EEG and video data were stored on a computer hard drive with important events saved in digital archive files. The material was reviewed by a physician (electroencephalographer / epileptologist) on a daily basis. Clarence and seizure detection algorithms were utilized and reviewed. An EEG Technician attended to the patient, and was available throughout daytime work hours.  The epilepsy center neurologist was available in person or on call 24-hours per day.    EEG Placement and Labeling of Electrodes:  The EEG was performed utilizing 20 channel referential EEG connections (coronal over temporal over parasagittal montage) using all standard 10-20 electrode placements with EKG, with additional electrodes placed in the inferior temporal region using the modified 10-10 montage electrode placements for elective admissions, or if deemed necessary. Recording was at a sampling rate of 256 samples per second per channel. Time synchronized digital video recording was done simultaneously with EEG recording. A low light infrared camera was used for low light recording.     _____________________________________________________________  HISTORY    Patient is a 89y old  Female who presents with a chief complaint of status epilepticus (30 Oct 2024 07:32)      PERTINENT MEDICATION:  MEDICATIONS  (STANDING):  apixaban 2.5 milliGRAM(s) Oral every 12 hours  atorvastatin 20 milliGRAM(s) Oral at bedtime  cefTRIAXone   IVPB 1000 milliGRAM(s) IV Intermittent every 24 hours  chlorhexidine 0.12% Liquid 15 milliLiter(s) Oral Mucosa every 12 hours  chlorhexidine 2% Cloths 1 Application(s) Topical <User Schedule>  insulin lispro (ADMELOG) corrective regimen sliding scale   SubCutaneous every 6 hours  levETIRAcetam  Solution 750 milliGRAM(s) Oral two times a day  pantoprazole  Injectable 40 milliGRAM(s) IV Push daily  propofol Infusion. 14.053 MICROgram(s)/kG/Min (5 mL/Hr) IV Continuous <Continuous>    _____________________________________________________________  INTERPRETATION    Findings: The background was continuous, spontaneously variable and reactive. During wakefulness, the posterior dominant rhythm consisted of asymmetric (lower voltage in left posterior quadrant), poorly-modulated 6-7 Hz activity, with amplitude to 30 uV, that attenuated to eye opening.  Low amplitude frontal beta was noted in wakefulness.    Background Slowing:  -Continuous diffuse theta and polymorphic delta slowing.  -Slowing of PDR    Focal Slowing:   -Continuous polymorphic delta activity and voltage attenuation in the left hemisphere    Sleep Background:  Drowsiness was characterized by fragmentation, attenuation, and slowing of the background activity.    Sleep was characterized by the presence of vertex waves, spindles, lateralized to right hemisphere.    Other Non-Epileptiform Findings:  None were present.    Interictal Epileptiform Activity:   None were present.    Events:  Clinical events: None recorded.  Seizures: None recorded.    Artifacts:  Intermittent myogenic and movement artifacts were noted.    ECG:  The heart rate on single channel ECG was predominantly between 60-80 BPM.    _____________________________________________________________  EEG SUMMARY/CLASSIFICATION    Abnormal EEG in the awake, drowsy and asleep states.  -Continuous slowing, focal, left hemisphere  -Voltage attenuation in the left hemisphere  -Continuous slowing, generalized, moderate  _____________________________________________________________  EEG IMPRESSION/CLINICAL CORRELATE    Abnormal EEG study.  1. Structural abnormality in the left hemisphere.   2. Moderate nonspecific diffuse or multifocal cerebral dysfunction.   3. No epileptiform abnormalities were recorded.    Sage Holley MD  Neurology Attending Physician

## 2024-10-30 NOTE — PROGRESS NOTE ADULT - PROBLEM SELECTOR PLAN 5
Pt passed SBT for medical extubation today.  Pt's son Hari at the bedside expressed being optimistic about her recovery. CHARLOTTE drafted:  DNR/trial of intubation  Please see discussion noted above in GOC conversation

## 2024-10-30 NOTE — PROGRESS NOTE ADULT - SUBJECTIVE AND OBJECTIVE BOX
INTERVAL HPI/OVERNIGHT EVENTS: No acute events overnight.  Patient examined at bedside this AM.  Patient denies acute complaints     PRESSORS: [ ] YES [ ] NO  WHICH:    Antimicrobial:  cefTRIAXone   IVPB 1000 milliGRAM(s) IV Intermittent every 24 hours    Cardiovascular:    Pulmonary:    Hematalogic:  apixaban 2.5 milliGRAM(s) Oral every 12 hours    Other:  atorvastatin 20 milliGRAM(s) Oral at bedtime  chlorhexidine 0.12% Liquid 15 milliLiter(s) Oral Mucosa every 12 hours  chlorhexidine 2% Cloths 1 Application(s) Topical <User Schedule>  insulin lispro (ADMELOG) corrective regimen sliding scale   SubCutaneous every 6 hours  levETIRAcetam  Solution 750 milliGRAM(s) Oral two times a day  pantoprazole  Injectable 40 milliGRAM(s) IV Push daily  polyethylene glycol 3350 17 Gram(s) Oral daily  propofol Infusion. 14.053 MICROgram(s)/kG/Min IV Continuous <Continuous>  senna 2 Tablet(s) Oral at bedtime    apixaban 2.5 milliGRAM(s) Oral every 12 hours  atorvastatin 20 milliGRAM(s) Oral at bedtime  cefTRIAXone   IVPB 1000 milliGRAM(s) IV Intermittent every 24 hours  chlorhexidine 0.12% Liquid 15 milliLiter(s) Oral Mucosa every 12 hours  chlorhexidine 2% Cloths 1 Application(s) Topical <User Schedule>  insulin lispro (ADMELOG) corrective regimen sliding scale   SubCutaneous every 6 hours  levETIRAcetam  Solution 750 milliGRAM(s) Oral two times a day  pantoprazole  Injectable 40 milliGRAM(s) IV Push daily  polyethylene glycol 3350 17 Gram(s) Oral daily  propofol Infusion. 14.053 MICROgram(s)/kG/Min IV Continuous <Continuous>  senna 2 Tablet(s) Oral at bedtime    Drug Dosing Weight  Height (cm): 160 (28 Oct 2024 15:20)  Weight (kg): 59.3 (28 Oct 2024 21:00)  BMI (kg/m2): 23.2 (28 Oct 2024 21:00)  BSA (m2): 1.61 (28 Oct 2024 21:00)    CENTRAL LINE: [ ] YES [ ] NO  LOCATION:   DATE INSERTED:  REMOVE: [ ] YES [ ] NO  EXPLAIN:    HUYNH: [ ] YES [ ] NO    DATE INSERTED:  REMOVE:  [ ] YES [ ] NO  EXPLAIN:    A-LINE:  [ ] YES [ ] NO  LOCATION:   DATE INSERTED:  REMOVE:  [ ] YES [ ] NO  EXPLAIN:    PMH -reviewed admission note, no change since admission  PAST MEDICAL & SURGICAL HISTORY:  Stroke      HTN (hypertension)      HLD (hyperlipidemia)      DM (diabetes mellitus)      DVT (deep venous thrombosis)      CVA (cerebrovascular accident)      Aphasia      Dysphagia      Apraxia      Right-sided muscle weakness      Nonverbal      Seizure          ICU Vital Signs Last 24 Hrs  T(C): 37.5 (30 Oct 2024 15:00), Max: 37.5 (30 Oct 2024 12:00)  T(F): 99.5 (30 Oct 2024 15:00), Max: 99.5 (30 Oct 2024 12:00)  HR: 76 (30 Oct 2024 15:00) (76 - 92)  BP: 116/47 (30 Oct 2024 15:00) (96/45 - 127/62)  BP(mean): 68 (30 Oct 2024 15:00) (60 - 83)  ABP: --  ABP(mean): --  RR: 15 (30 Oct 2024 15:00) (9 - 20)  SpO2: 100% (30 Oct 2024 15:00) (94% - 100%)    O2 Parameters below as of 30 Oct 2024 12:00  Patient On (Oxygen Delivery Method): ventilator    O2 Concentration (%): 35        ABG - ( 28 Oct 2024 16:31 )  pH, Arterial: 7.39  pH, Blood: x     /  pCO2: 30    /  pO2: 255   / HCO3: 18    / Base Excess: -6.0  /  SaO2: 99                    10-29 @ 07:01  -  10-30 @ 07:00  --------------------------------------------------------  IN: 690 mL / OUT: 315 mL / NET: 375 mL        Mode: PS (Pressure Support)/ Spontaneous  FiO2: 35  PEEP: 5  PS: 5  ITime: 1  MAP: 7  PIP: 17      PHYSICAL EXAM:    GENERAL: NAD, well-groomed, well-developed  HEAD:  Atraumatic, Normocephalic  EYES: EOMI, PERRLA, conjunctiva and sclera clear  ENMT: No tonsillar erythema, exudates, or enlargement; Moist mucous membranes, Good dentition, No lesions  NECK: Supple, normal appearance, No JVD; Normal thyroid; Trachea midline  NERVOUS SYSTEM:  Alert & Oriented X3,  Motor Strength 5/5 B/L upper and lower extremities; DTRs 2+ intact and symmetric  CHEST/LUNG: No chest deformity; Normal percussion bilaterally; No rales, rhonchi, wheezing   HEART: Regular rate and rhythm; No murmurs, rubs, or gallops  ABDOMEN: Soft, Nontender, Nondistended; Bowel sounds present  EXTREMITIES:  2+ Peripheral Pulses, No clubbing, cyanosis, or edema  LYMPH: No lymphadenopathy noted  SKIN: No rashes or lesions;  Good capillary refill      LABS:  CBC Full  -  ( 30 Oct 2024 05:07 )  WBC Count : 13.27 K/uL  RBC Count : 2.70 M/uL  Hemoglobin : 8.0 g/dL  Hematocrit : 24.7 %  Platelet Count - Automated : 235 K/uL  Mean Cell Volume : 91.5 fl  Mean Cell Hemoglobin : 29.6 pg  Mean Cell Hemoglobin Concentration : 32.4 g/dL  Auto Neutrophil # : 10.71 K/uL  Auto Lymphocyte # : 0.97 K/uL  Auto Monocyte # : 1.15 K/uL  Auto Eosinophil # : 0.29 K/uL  Auto Basophil # : 0.05 K/uL  Auto Neutrophil % : 80.6 %  Auto Lymphocyte % : 7.3 %  Auto Monocyte % : 8.7 %  Auto Eosinophil % : 2.2 %  Auto Basophil % : 0.4 %    10-30    146[H]  |  119[H]  |  27[H]  ----------------------------<  241[H]  3.5   |  20[L]  |  1.43[H]    Ca    7.3[L]      30 Oct 2024 05:07  Phos  3.1     10-30  Mg     1.8     10-30    TPro  6.1  /  Alb  1.6[L]  /  TBili  0.1[L]  /  DBili  x   /  AST  15  /  ALT  12  /  AlkPhos  117  10-30      Urinalysis Basic - ( 30 Oct 2024 05:07 )    Color: x / Appearance: x / SG: x / pH: x  Gluc: 241 mg/dL / Ketone: x  / Bili: x / Urobili: x   Blood: x / Protein: x / Nitrite: x   Leuk Esterase: x / RBC: x / WBC x   Sq Epi: x / Non Sq Epi: x / Bacteria: x      Culture Results:   50,000 - 99,000 CFU/mL Candida albicans  "Susceptibilities not performed" (10-28 @ 16:44)  Culture Results:   No growth at 24 hours (10-28 @ 15:25)  Culture Results:   No growth at 24 hours (10-28 @ 15:15)      RADIOLOGY & ADDITIONAL STUDIES REVIEWED:  ***    [ ]GOALS OF CARE DISCUSSION WITH PATIENT/FAMILY/PROXY:    CRITICAL CARE TIME SPENT: 35 minutes INTERVAL HPI/OVERNIGHT EVENTS: No acute events overnight. Off propofol.  Patient examined at bedside this AM. Patient responsive to some commands including moving leg/toes slightly, fingers. Nods head.     PRESSORS: [ ] YES [X] NO  WHICH:    Antimicrobial:  cefTRIAXone   IVPB 1000 milliGRAM(s) IV Intermittent every 24 hours    Cardiovascular:    Pulmonary:    Hematalogic:  apixaban 2.5 milliGRAM(s) Oral every 12 hours    Other:  atorvastatin 20 milliGRAM(s) Oral at bedtime  chlorhexidine 0.12% Liquid 15 milliLiter(s) Oral Mucosa every 12 hours  chlorhexidine 2% Cloths 1 Application(s) Topical <User Schedule>  insulin lispro (ADMELOG) corrective regimen sliding scale   SubCutaneous every 6 hours  levETIRAcetam  Solution 750 milliGRAM(s) Oral two times a day  pantoprazole  Injectable 40 milliGRAM(s) IV Push daily  polyethylene glycol 3350 17 Gram(s) Oral daily  propofol Infusion. 14.053 MICROgram(s)/kG/Min IV Continuous <Continuous>  senna 2 Tablet(s) Oral at bedtime    apixaban 2.5 milliGRAM(s) Oral every 12 hours  atorvastatin 20 milliGRAM(s) Oral at bedtime  cefTRIAXone   IVPB 1000 milliGRAM(s) IV Intermittent every 24 hours  chlorhexidine 0.12% Liquid 15 milliLiter(s) Oral Mucosa every 12 hours  chlorhexidine 2% Cloths 1 Application(s) Topical <User Schedule>  insulin lispro (ADMELOG) corrective regimen sliding scale   SubCutaneous every 6 hours  levETIRAcetam  Solution 750 milliGRAM(s) Oral two times a day  pantoprazole  Injectable 40 milliGRAM(s) IV Push daily  polyethylene glycol 3350 17 Gram(s) Oral daily  propofol Infusion. 14.053 MICROgram(s)/kG/Min IV Continuous <Continuous>  senna 2 Tablet(s) Oral at bedtime    Drug Dosing Weight  Height (cm): 160 (28 Oct 2024 15:20)  Weight (kg): 59.3 (28 Oct 2024 21:00)  BMI (kg/m2): 23.2 (28 Oct 2024 21:00)  BSA (m2): 1.61 (28 Oct 2024 21:00)    CENTRAL LINE: [ ] YES [X] NO  LOCATION:   DATE INSERTED:  REMOVE: [ ] YES [ ] NO  EXPLAIN:    HUYNH: [X] YES [ ] NO    DATE INSERTED:  REMOVE:  [ ] YES [ ] NO  EXPLAIN:    A-LINE:  [ ] YES [X] NO  LOCATION:   DATE INSERTED:  REMOVE:  [ ] YES [ ] NO  EXPLAIN:    PMH -reviewed admission note, no change since admission  PAST MEDICAL & SURGICAL HISTORY:  Stroke      HTN (hypertension)      HLD (hyperlipidemia)      DM (diabetes mellitus)      DVT (deep venous thrombosis)      CVA (cerebrovascular accident)      Aphasia      Dysphagia      Apraxia      Right-sided muscle weakness      Nonverbal      Seizure          ICU Vital Signs Last 24 Hrs  T(C): 37.5 (30 Oct 2024 15:00), Max: 37.5 (30 Oct 2024 12:00)  T(F): 99.5 (30 Oct 2024 15:00), Max: 99.5 (30 Oct 2024 12:00)  HR: 76 (30 Oct 2024 15:00) (76 - 92)  BP: 116/47 (30 Oct 2024 15:00) (96/45 - 127/62)  BP(mean): 68 (30 Oct 2024 15:00) (60 - 83)  ABP: --  ABP(mean): --  RR: 15 (30 Oct 2024 15:00) (9 - 20)  SpO2: 100% (30 Oct 2024 15:00) (94% - 100%)    O2 Parameters below as of 30 Oct 2024 12:00  Patient On (Oxygen Delivery Method): ventilator    O2 Concentration (%): 35        ABG - ( 28 Oct 2024 16:31 )  pH, Arterial: 7.39  pH, Blood: x     /  pCO2: 30    /  pO2: 255   / HCO3: 18    / Base Excess: -6.0  /  SaO2: 99                    10-29 @ 07:01  -  10-30 @ 07:00  --------------------------------------------------------  IN: 690 mL / OUT: 315 mL / NET: 375 mL        Mode: PS (Pressure Support)/ Spontaneous  FiO2: 35  PEEP: 5  PS: 5  ITime: 1  MAP: 7  PIP: 17      PHYSICAL EXAM:    GENERAL: NAD, intubated, NG tube  EYES: EOMI, right pupil sluggish, conjunctiva and sclera clear  NERVOUS SYSTEM:  Alert.  Motor Strength minimal in B/L upper and lower extremities;  CHEST/LUNG: No chest deformity; Normal percussion bilaterally; No rales, rhonchi, wheezing   HEART: Regular rate and rhythm; No murmurs, rubs, or gallops  ABDOMEN: Soft, Nontender, Distended; Bowel sounds present   EXTREMITIES:  2+ Peripheral Pulses, No clubbing, cyanosis, or edema  LYMPH: No lymphadenopathy noted  SKIN: No rashes or lesions;  Good capillary refill      LABS:  CBC Full  -  ( 30 Oct 2024 05:07 )  WBC Count : 13.27 K/uL  RBC Count : 2.70 M/uL  Hemoglobin : 8.0 g/dL  Hematocrit : 24.7 %  Platelet Count - Automated : 235 K/uL  Mean Cell Volume : 91.5 fl  Mean Cell Hemoglobin : 29.6 pg  Mean Cell Hemoglobin Concentration : 32.4 g/dL  Auto Neutrophil # : 10.71 K/uL  Auto Lymphocyte # : 0.97 K/uL  Auto Monocyte # : 1.15 K/uL  Auto Eosinophil # : 0.29 K/uL  Auto Basophil # : 0.05 K/uL  Auto Neutrophil % : 80.6 %  Auto Lymphocyte % : 7.3 %  Auto Monocyte % : 8.7 %  Auto Eosinophil % : 2.2 %  Auto Basophil % : 0.4 %    10-30    146[H]  |  119[H]  |  27[H]  ----------------------------<  241[H]  3.5   |  20[L]  |  1.43[H]    Ca    7.3[L]      30 Oct 2024 05:07  Phos  3.1     10-30  Mg     1.8     10-30    TPro  6.1  /  Alb  1.6[L]  /  TBili  0.1[L]  /  DBili  x   /  AST  15  /  ALT  12  /  AlkPhos  117  10-30      Urinalysis Basic - ( 30 Oct 2024 05:07 )    Color: x / Appearance: x / SG: x / pH: x  Gluc: 241 mg/dL / Ketone: x  / Bili: x / Urobili: x   Blood: x / Protein: x / Nitrite: x   Leuk Esterase: x / RBC: x / WBC x   Sq Epi: x / Non Sq Epi: x / Bacteria: x      Culture Results:   50,000 - 99,000 CFU/mL Candida albicans  "Susceptibilities not performed" (10-28 @ 16:44)  Culture Results:   No growth at 24 hours (10-28 @ 15:25)  Culture Results:   No growth at 24 hours (10-28 @ 15:15)      RADIOLOGY & ADDITIONAL STUDIES REVIEWED:  ***    [ ]GOALS OF CARE DISCUSSION WITH PATIENT/FAMILY/PROXY:    CRITICAL CARE TIME SPENT: 35 minutes

## 2024-10-30 NOTE — CONSULT NOTE ADULT - SUBJECTIVE AND OBJECTIVE BOX
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 Son at bedside stated he visited his mother this morning and she was at her baseline mental status, with no active complaints. He then received the call from the facility about the seizures around 1pm. As per ED, EMS was called and the seizures lasted 30 minutes prior to EMS arrival. Valium 5mg x 2 and 10mg x 1 was given, however pt continued having seizures. The patient was then intubated in the field for airway protection. Son states that his mother was recently admitted for UTI with E.coli bacteremia and discharged on ceftin for 14 day course. He reports that she has been having decreased appetite for the past few days.  (28 Oct 2024 18:37)   Patient seen and examined.     PAST MEDICAL & SURGICAL HISTORY:  Stroke      HTN (hypertension)      HLD (hyperlipidemia)      DM (diabetes mellitus)      DVT (deep venous thrombosis)      CVA (cerebrovascular accident)      Aphasia      Dysphagia      Apraxia      Right-sided muscle weakness      Nonverbal      Seizure          Allergies    No Known Allergies    Intolerances        MEDICATIONS  (STANDING):  apixaban 2.5 milliGRAM(s) Oral every 12 hours  atorvastatin 20 milliGRAM(s) Oral at bedtime  caspofungin IVPB      cefTRIAXone   IVPB 1000 milliGRAM(s) IV Intermittent every 24 hours  chlorhexidine 0.12% Liquid 15 milliLiter(s) Oral Mucosa every 12 hours  chlorhexidine 2% Cloths 1 Application(s) Topical <User Schedule>  insulin lispro (ADMELOG) corrective regimen sliding scale   SubCutaneous every 6 hours  levETIRAcetam  Solution 750 milliGRAM(s) Oral two times a day  polyethylene glycol 3350 17 Gram(s) Oral daily  senna 2 Tablet(s) Oral at bedtime      MEDICATIONS  (PRN):   Medications up to date at time of exam.    Medications up to date at time of exam.    FAMILY HISTORY:      SOCIAL HISTORY  Smoking History: [   ] smoking/smoke exposure, [   ] former smoker  Living Condition: [   ] apartment, [   ] private house  Work History:   Travel History: denies recent travel  Illicit Substance Use: denies  Alcohol Use: denies    REVIEW OF SYSTEMS:    CONSTITUTIONAL:  denies fevers, chills, sweats, weight loss    HEENT:  denies diplopia or blurred vision, sore throat or runny nose.    CARDIOVASCULAR:  denies pressure, squeezing, tightness, or heaviness about the chest; no palpitations.    RESPIRATORY:  denies SOB, cough, GUTHRIE, wheezing.    GASTROINTESTINAL:  denies abdominal pain, nausea, vomiting or diarrhea.    GENITOURINARY: denies dysuria, frequency or urgency.    NEUROLOGIC:  denies numbness, tingling, seizures or weakness.    PSYCHIATRIC:  denies disorder of thought or mood.    MSK: denies swelling, redness      PHYSICAL EXAMINATION:    GENERAL: The patient  in no apparent distress.     Vital Signs Last 24 Hrs  T(C): 37.5 (30 Oct 2024 19:00), Max: 37.7 (30 Oct 2024 17:00)  T(F): 99.5 (30 Oct 2024 19:00), Max: 99.9 (30 Oct 2024 17:00)  HR: 91 (30 Oct 2024 19:00) (74 - 99)  BP: 127/57 (30 Oct 2024 19:00) (96/45 - 135/59)  BP(mean): 78 (30 Oct 2024 19:00) (60 - 83)  RR: 14 (30 Oct 2024 19:00) (9 - 20)  SpO2: 100% (30 Oct 2024 19:00) (94% - 100%)    Parameters below as of 30 Oct 2024 16:00  Patient On (Oxygen Delivery Method): ventilator    O2 Concentration (%): 35  Mode: AC/ CMV (Assist Control/ Continuous Mandatory Ventilation)  RR (machine): 15  TV (machine): 350  FiO2: 35  PEEP: 5  ITime: 1  MAP: 14  PIP: 27   (if applicable)    Chest Tube (if applicable)    HEENT: Head is normocephalic and atraumatic. Extraocular muscles are intact. Mucous membranes are moist.     NECK: Supple, no palpable adenopathy.    LUNGS: Fair b/l breath sounds, no wheezing, rales, or rhonchi.    HEART: Regular rate and rhythm without murmur.    ABDOMEN: Soft, nontender, and nondistended.  No hepatosplenomegaly is noted.    RENAL: No difficulty voiding, no pelvic pain    EXTREMITIES: Without any cyanosis, clubbing, rash, lesions or edema.    NEUROLOGIC: Awake, alert, oriented, grossly intact    SKIN: Warm, dry, good turgor.      LABS:                        8.0    13.27 )-----------( 235      ( 30 Oct 2024 05:07 )             24.7     10-30    146[H]  |  119[H]  |  27[H]  ----------------------------<  241[H]  3.5   |  20[L]  |  1.43[H]    Ca    7.3[L]      30 Oct 2024 05:07  Phos  3.1     10-30  Mg     1.8     10-30    TPro  6.1  /  Alb  1.6[L]  /  TBili  0.1[L]  /  DBili  x   /  AST  15  /  ALT  12  /  AlkPhos  117  10-30      Urinalysis Basic - ( 30 Oct 2024 05:07 )    Color: x / Appearance: x / SG: x / pH: x  Gluc: 241 mg/dL / Ketone: x  / Bili: x / Urobili: x   Blood: x / Protein: x / Nitrite: x   Leuk Esterase: x / RBC: x / WBC x   Sq Epi: x / Non Sq Epi: x / Bacteria: x                      MICROBIOLOGY: (if applicable)    RADIOLOGY & ADDITIONAL STUDIES:  EKG:   CXR:  ECHO:    IMPRESSION: 89y Female PAST MEDICAL & SURGICAL HISTORY:  Stroke      HTN (hypertension)      HLD (hyperlipidemia)      DM (diabetes mellitus)      DVT (deep venous thrombosis)      CVA (cerebrovascular accident)      Aphasia      Dysphagia      Apraxia      Right-sided muscle weakness      Nonverbal      Seizure       p/w                  Time of visit:    CHIEF COMPLAINT: Patient is a 89y old  Female who presents with a chief complaint of status epilepticus (30 Oct 2024 12:33)      HPI: This is a 89 yr old woman with CVA with right sided deficits and aphasia, DM, HTN , HLD, seizure disorder presenting from [facility] after witnessed tonic clonic seizure. Son at bedside stated he visited his mother this morning and she was at her baseline mental status, with no active complaints. He then received the call from the facility about the seizures around 1pm. As per ED, EMS was called and the seizures lasted 30 minutes prior to EMS arrival. Valium 5mg x 2 and 10mg x 1 was given, however pt continued having seizures. The patient was then intubated in the field for airway protection. Son states that his mother was recently admitted for UTI with E.coli bacteremia and discharged on ceftin for 14 day course. He reports that she has been having decreased appetite for the past few days.  (28 Oct 2024 18:37)   Patient seen and examined.     PAST MEDICAL & SURGICAL HISTORY:  Stroke      HTN (hypertension)      HLD (hyperlipidemia)      DM (diabetes mellitus)      DVT (deep venous thrombosis)      CVA (cerebrovascular accident)      Aphasia      Dysphagia      Apraxia      Right-sided muscle weakness      Nonverbal      Seizure          Allergies    No Known Allergies    Intolerances        MEDICATIONS  (STANDING):  apixaban 2.5 milliGRAM(s) Oral every 12 hours  atorvastatin 20 milliGRAM(s) Oral at bedtime  caspofungin IVPB      cefTRIAXone   IVPB 1000 milliGRAM(s) IV Intermittent every 24 hours  chlorhexidine 0.12% Liquid 15 milliLiter(s) Oral Mucosa every 12 hours  chlorhexidine 2% Cloths 1 Application(s) Topical <User Schedule>  insulin lispro (ADMELOG) corrective regimen sliding scale   SubCutaneous every 6 hours  levETIRAcetam  Solution 750 milliGRAM(s) Oral two times a day  polyethylene glycol 3350 17 Gram(s) Oral daily  senna 2 Tablet(s) Oral at bedtime      MEDICATIONS  (PRN):   Medications up to date at time of exam.    Medications up to date at time of exam.    FAMILY HISTORY:      SOCIAL HISTORY unable to obtain . pat is sedated and intubated on vent   Smoking History: [   ] smoking/smoke exposure, [   ] former smoker  Living Condition: [   ] apartment, [   ] private house  Work History:   Travel History: denies recent travel  Illicit Substance Use: denies  Alcohol Use: denies    REVIEW OF SYSTEMS: unable to obtain . pat is sedated and intubated on vent     CONSTITUTIONAL:  denies fevers, chills, sweats, weight loss    HEENT:  denies diplopia or blurred vision, sore throat or runny nose.    CARDIOVASCULAR:  denies pressure, squeezing, tightness, or heaviness about the chest; no palpitations.    RESPIRATORY:  denies SOB, cough, GUTHRIE, wheezing.    GASTROINTESTINAL:  denies abdominal pain, nausea, vomiting or diarrhea.    GENITOURINARY: denies dysuria, frequency or urgency.    NEUROLOGIC:  denies numbness, tingling, seizures or weakness.    PSYCHIATRIC:  denies disorder of thought or mood.    MSK: denies swelling, redness      PHYSICAL EXAMINATION:    GENERAL: The patient  in no apparent distress.     Vital Signs Last 24 Hrs  T(C): 37.5 (30 Oct 2024 19:00), Max: 37.7 (30 Oct 2024 17:00)  T(F): 99.5 (30 Oct 2024 19:00), Max: 99.9 (30 Oct 2024 17:00)  HR: 91 (30 Oct 2024 19:00) (74 - 99)  BP: 127/57 (30 Oct 2024 19:00) (96/45 - 135/59)  BP(mean): 78 (30 Oct 2024 19:00) (60 - 83)  RR: 14 (30 Oct 2024 19:00) (9 - 20)  SpO2: 100% (30 Oct 2024 19:00) (94% - 100%)    Parameters below as of 30 Oct 2024 16:00  Patient On (Oxygen Delivery Method): ventilator    O2 Concentration (%): 35  Mode: AC/ CMV (Assist Control/ Continuous Mandatory Ventilation)  RR (machine): 15  TV (machine): 350  FiO2: 35  PEEP: 5  ITime: 1  MAP: 14  PIP: 27   (if applicable)    Chest Tube (if applicable)    HEENT: Head is normocephalic and atraumatic. Extraocular muscles are intact. Mucous membranes are moist.  +ETT    NECK: Supple, no palpable adenopathy.    LUNGS: Fair b/l breath sounds, no wheezing, rales, or rhonchi.    HEART: Regular rate and rhythm without murmur.    ABDOMEN: Soft, nontender, and nondistended.  No hepatosplenomegaly is noted.    RENAL: No difficulty voiding, no pelvic pain    EXTREMITIES: Without any cyanosis, clubbing, rash, lesions or edema.    NEUROLOGIC: sedated     SKIN: Warm, dry, good turgor.      LABS:                        8.0    13.27 )-----------( 235      ( 30 Oct 2024 05:07 )             24.7     10-30    146[H]  |  119[H]  |  27[H]  ----------------------------<  241[H]  3.5   |  20[L]  |  1.43[H]    Ca    7.3[L]      30 Oct 2024 05:07  Phos  3.1     10-30  Mg     1.8     10-30    TPro  6.1  /  Alb  1.6[L]  /  TBili  0.1[L]  /  DBili  x   /  AST  15  /  ALT  12  /  AlkPhos  117  10-30      Urinalysis Basic - ( 30 Oct 2024 05:07 )    Color: x / Appearance: x / SG: x / pH: x  Gluc: 241 mg/dL / Ketone: x  / Bili: x / Urobili: x   Blood: x / Protein: x / Nitrite: x   Leuk Esterase: x / RBC: x / WBC x   Sq Epi: x / Non Sq Epi: x / Bacteria: x      MICROBIOLOGY: (if applicable)    RADIOLOGY & ADDITIONAL STUDIES:  EKG:   CXR:< from: Xray Chest 1 View- PORTABLE-Urgent (Xray Chest 1 View- PORTABLE-Urgent .) (10.28.24 @ 16:08) >  IMPRESSION: No acute cardiopulmonary disease process. Cardiomegaly.      < end of copied text >    ECHO:    IMPRESSION: 89y Female PAST MEDICAL & SURGICAL HISTORY:  Stroke      HTN (hypertension)      HLD (hyperlipidemia)      DM (diabetes mellitus)      DVT (deep venous thrombosis)      CVA (cerebrovascular accident)      Aphasia      Dysphagia      Apraxia      Right-sided muscle weakness      Nonverbal      Seizure       p/w         IMP: This is a 89 yr old woman with  CVA w/ residual R deficits and aphasia, seizure disorder, HTN, DM and recent UTI on oral antibiotics who was sent from NH to the ED with breakthrough seizures/status epilepticus s/p intubation in the field. She received multiple doses of IV Diazepam and loaded with IV Keppra. She was also started on IV antibiotics for possible UTI; admitted to ICU for further management of seizures and mechanical ventilation    ASSESSMENT  - Acute hypoxemic respiratory failure from seizures - s/p intubation  - Status Epilepticus  - Type A Lactic acidosis  - TRAVIS, likely prerenal  - Hypernatremia, mild  - Sepsis /UTI  - Seizures  - CVA with right sided deficit/aphasia,   - HTN HLD  - DM, uncontrol       Sugg:    - Continue vent support   - Adjust sedation for SAT SBT   - Hemodynamic monitoring   - EEG  - KEPPRA  - Antibx for UTI   - F/U cultures   - Tube feed   - DVT GI prophy     discussed with ICU attend   spent 37 min

## 2024-10-30 NOTE — PROGRESS NOTE ADULT - ASSESSMENT
· Assessment	  89F from [facility] CVA with right sided deficits and aphasia, (bedbound/wheelchair bound), IDT2DM, HTN, HLD, seizure disorder (on Keppra) presenting from [facility] after witnessed tonic clonic seizure lasting >30min. Intubated in the field for status epilepticus.    #status epilepticus  #CVA  #DM  #lactic acidosis       =================== Neuro============================  #status epilepticus   #Hx of seizures (on Keppra 500mg bid)   p/w [facility] with witnessed tonic clonic seizure for > 30  minutes prior to EMS arrival, intubated in the field  s/p keppra 2g load + additional 1.5g for 60mg/kg dose  c/w keppra 750 BID  Neurology Dr. Jennings/Rodrigo consulted  10/29 vEEG not working, spot EEG: Moderate-severe diffuse cerebral dysfunction is nonspecific in etiology.  No epileptiform abnormalities or seizures.  continue with 24h EEG   f/u keppra levels    #h/o CVA  with right sided hemiplegia and aphasia (prior notes stating bedbound status, son at bedside stating pt ambulates with wheelchair and eats with assistance and says few words)   on atorvastatin 20 and eliquis 2.5mg BID at home  c/w home meds via NGT    ================= Cardiovascular==========================  #HTN  takes norvasc 10 daily  /62 upon evaluation in ED  was reported to be hypotensive, propofol weaned down and was placed on peripheral levophed  peripheral levophed weaned off in ED  s/p 2L NS bolus  monitor BP  Holding home meds, currently soft BP       ================- Pulm=================================  #intubated and sedated  CXR without any infiltrates   rhonchi heard b/l (possible aspiration during status epilepticus)   started on rocephin  mechanical vent settings 350/15/5/40%  ABG 7.39/30/255/18  10/29 passed SAT, SBT  10/30 trial SAT, SBT     ==================ID===================================  #UTI  recently discharged on 10/23 for UTI with E.coli bacteremia   frost placed in ED with purulent output noted  UA +  s/p zosyn in ED  c/w ceftriaxone 1g qd   UCx: candida  F/u BCx     #lactic acidosis   lactate 6.2 > 3.4 >1.6  s/p 2L NS bolus in ED  RESOLVED       ================= Nephro================================  #TRAVIS  BUN/Cr 29/2.05 (Baseline Scr 1.29)  s/p 2L IVF in ED   BUN/Cr 24/1.50   FeNa 3.5%   Improving    #Hypernatremia   Na 147, FWD 1.3L  c/w free water 250cc q4h    =================GI====================================  no active issues  distended abdomen  f/u abdominal xray  start TF via NGT    ================ Heme==================================  #anemia  Hgb 9.3 > 8.2 (Baseline Hgb 8)  no active signs of bleeding       =================Endocrine===============================  #DM  on Humalog sliding scale and farxiga at home  will start mISS q6 while NPO on TF  monitor FS    ================= Skin/Catheters============================  Peripheral IV lines   Frost catheter     =================Prophylaxis =============================  eliquis DVT prophylaxis   protonix GI prophylaxis    ==================GOC==================================  FULL CODE   Disposition ICU

## 2024-10-30 NOTE — PROGRESS NOTE ADULT - SUBJECTIVE AND OBJECTIVE BOX
follow up on:  complex medical decision making related to goals of care    OVERNIGHT EVENTS: No events overnight.  Pt intubated, awake eyes open.  Son Hari at the bedside.     Present Symptoms:    Unable to obtain due to poor mentation and clinical status    MEDICATIONS  (STANDING):  apixaban 2.5 milliGRAM(s) Oral every 12 hours  atorvastatin 20 milliGRAM(s) Oral at bedtime  cefTRIAXone   IVPB 1000 milliGRAM(s) IV Intermittent every 24 hours  chlorhexidine 0.12% Liquid 15 milliLiter(s) Oral Mucosa every 12 hours  chlorhexidine 2% Cloths 1 Application(s) Topical <User Schedule>  insulin lispro (ADMELOG) corrective regimen sliding scale   SubCutaneous every 6 hours  levETIRAcetam  Solution 750 milliGRAM(s) Oral two times a day  pantoprazole  Injectable 40 milliGRAM(s) IV Push daily  polyethylene glycol 3350 17 Gram(s) Oral daily  propofol Infusion. 14.053 MICROgram(s)/kG/Min (5 mL/Hr) IV Continuous <Continuous>  senna 2 Tablet(s) Oral at bedtime    MEDICATIONS  (PRN):      PHYSICAL EXAM:  Vital Signs Last 24 Hrs  T(C): 37.5 (30 Oct 2024 12:00), Max: 37.5 (30 Oct 2024 12:00)  T(F): 99.5 (30 Oct 2024 12:00), Max: 99.5 (30 Oct 2024 12:00)  HR: 92 (30 Oct 2024 12:00) (72 - 92)  BP: 121/54 (30 Oct 2024 12:00) (92/52 - 127/62)  BP(mean): 75 (30 Oct 2024 12:00) (60 - 83)  RR: 12 (30 Oct 2024 12:00) (9 - 17)  SpO2: 100% (30 Oct 2024 12:00) (94% - 100%)    Parameters below as of 30 Oct 2024 08:00  Patient On (Oxygen Delivery Method): ventilator    O2 Concentration (%): 35  General: Critically ill appearing elderly woman, intubated/sedated. NAD    Palliative Performance Scale/Karnofsky Score: 20%  [email or web address]    HEENT: bitemporal wasting, +ETT, +NGT  Lungs: intubated  CV: RRR, S1S2  GI: soft non distended non tender  incontinent              last BM: non documented  : incontinent / frost  Musculoskeletal: weakness x4, bedbound, no edema  Skin: no abnormal skin lesions, poor skin turgor  Neuro: no deficits, unable to follow commands, aphasia  Oral intake ability: unable/only mouth care  Diet: NGT feeds    LABS:                          8.0    13.27 )-----------( 235      ( 30 Oct 2024 05:07 )             24.7     10-30    146[H]  |  119[H]  |  27[H]  ----------------------------<  241[H]  3.5   |  20[L]  |  1.43[H]    Ca    7.3[L]      30 Oct 2024 05:07  Phos  3.1     10-30  Mg     1.8     10-30    TPro  6.1  /  Alb  1.6[L]  /  TBili  0.1[L]  /  DBili  x   /  AST  15  /  ALT  12  /  AlkPhos  117  10-30    Urinalysis Basic - ( 30 Oct 2024 05:07 )    Color: x / Appearance: x / SG: x / pH: x  Gluc: 241 mg/dL / Ketone: x  / Bili: x / Urobili: x   Blood: x / Protein: x / Nitrite: x   Leuk Esterase: x / RBC: x / WBC x   Sq Epi: x / Non Sq Epi: x / Bacteria: x        · EEG Report	  [facility] EPILEPSY [place]   REPORT OF CONTINUOUS VIDEO EEG       Patient Name: MICHAEL BARFIELD  Age and : 89y ([birth year])  MRN #: [id number]  Location: [facility] ICUX IC06 W2  Referring Physician: Abram Miranda    Study Date: 10-29-24 at 17:12 - 10-30-24 at 08:00  _____________________________________________________________  INTERPRETATION    Findings: The background was continuous, spontaneously variable and reactive. During wakefulness, the posterior dominant rhythm consisted of asymmetric (lower voltage in left posterior quadrant), poorly-modulated 6-7 Hz activity, with amplitude to 30 uV, that attenuated to eye opening.  Low amplitude frontal beta was noted in wakefulness.    Background Slowing:  -Continuous diffuse theta and polymorphic delta slowing.  -Slowing of PDR    Focal Slowing:   -Continuous polymorphic delta activity and voltage attenuation in the left hemisphere    Sleep Background:  Drowsiness was characterized by fragmentation, attenuation, and slowing of the background activity.    Sleep was characterized by the presence of vertex waves, spindles, lateralized to right hemisphere.    Other Non-Epileptiform Findings:  None were present.    Interictal Epileptiform Activity:   None were present.    Events:  Clinical events: None recorded.  Seizures: None recorded.    Artifacts:  Intermittent myogenic and movement artifacts were noted.    ECG:  The heart rate on single channel ECG was predominantly between 60-80 BPM.    _____________________________________________________________  EEG SUMMARY/CLASSIFICATION    Abnormal EEG in the awake, drowsy and asleep states.  -Continuous slowing, focal, left hemisphere  -Voltage attenuation in the left hemisphere  -Continuous slowing, generalized, moderate  _____________________________________________________________  EEG IMPRESSION/CLINICAL CORRELATE    Abnormal EEG study.  1. Structural abnormality in the left hemisphere.   2. Moderate nonspecific diffuse or multifocal cerebral dysfunction.   3. No epileptiform abnormalities were recorded.    RADIOLOGY & ADDITIONAL STUDIES: reviewed    ADVANCE DIRECTIVES: CHARLOTTE drafted: DNR/intubate/feeding tube if needed

## 2024-10-30 NOTE — CONSULT NOTE ADULT - SUBJECTIVE AND OBJECTIVE BOX
NEUROLOGY CONSULT NOTE    NAME:  MICHAEL BARFIELD      CHIEF COMPLAINT:  Patient is a 89y old  Female who presents with a chief complaint of status epilepticus (30 Oct 2024 12:33)      HPI:  89F from [facility] CVA with right sided deficits and aphasia, DM, HTN , HLD, seizure disorder presenting from [facility] after witnessed tonic clonic seizure. Son at bedside stated he visited his mother this morning and she was at her baseline mental status, with no active complaints. He then received the call from the facility about the seizures around 1pm. As per ED, EMS was called and the seizures lasted 30 minutes prior to EMS arrival. Valium 5mg x 2 and 10mg x 1 was given, however pt continued having seizures. The patient was then intubated in the field for airway protection. Son states that his mother was recently admitted for UTI with E.coli bacteremia and discharged on ceftin for 14 day course. He reports that she has been having decreased appetite for the past few days.  (28 Oct 2024 18:37)      NEURO HPI:  89 F, from [facility], PMx CVA [w/ Rt side deficits and aphasia], DM, HTN, HLD and seizure disorder p/w a witnessed tonic clonic seizure. Neuro consulted for further management of status epilepticus. Chart reviewed and collateral obtained from son, Hari, at bedside. As per son, patient saw the patient before leaving the nursing home and about 30 mins later he received a call around 1PM that patient had tonic clonic seizure for about 30 mins. Patient was intubated prior to arrival to ED. Patient is on keppra 500 BID. Patient was recently hospitalized at [facility] [10/17/24-10/23/24] for UTI and E. coli bacteremia and was discharged on PO ceftin 250 BID for a total of 14 days. Patient is minimally verbal at baseline and is wheelchair bound for 3 years since stroke in 2021. As per chart, patient had      PAST MEDICAL & SURGICAL HISTORY:  Stroke      HTN (hypertension)      HLD (hyperlipidemia)      DM (diabetes mellitus)      DVT (deep venous thrombosis)      CVA (cerebrovascular accident)      Aphasia      Dysphagia      Apraxia      Right-sided muscle weakness      Nonverbal      Seizure          MEDICATIONS:  apixaban 2.5 milliGRAM(s) Oral every 12 hours  atorvastatin 20 milliGRAM(s) Oral at bedtime  cefTRIAXone   IVPB 1000 milliGRAM(s) IV Intermittent every 24 hours  chlorhexidine 0.12% Liquid 15 milliLiter(s) Oral Mucosa every 12 hours  chlorhexidine 2% Cloths 1 Application(s) Topical <User Schedule>  insulin lispro (ADMELOG) corrective regimen sliding scale   SubCutaneous every 6 hours  levETIRAcetam  Solution 750 milliGRAM(s) Oral two times a day  pantoprazole  Injectable 40 milliGRAM(s) IV Push daily  polyethylene glycol 3350 17 Gram(s) Oral daily  propofol Infusion. 14.053 MICROgram(s)/kG/Min IV Continuous <Continuous>  senna 2 Tablet(s) Oral at bedtime      ALLERGIES:  No Known Allergies      FAMILY HISTORY:        SOCIAL HISTORY:  Denies alcohol, tobacco, or illicit drug use      REVIEW OF SYSTEMS:  non-verbal 2/2 intubation       OBJECTIVE:    Vital Signs Last 24 Hrs  T(C): 37.5 (30 Oct 2024 13:00), Max: 37.5 (30 Oct 2024 12:00)  T(F): 99.5 (30 Oct 2024 13:00), Max: 99.5 (30 Oct 2024 12:00)  HR: 86 (30 Oct 2024 13:00) (79 - 92)  BP: 110/52 (30 Oct 2024 13:00) (96/45 - 127/62)  BP(mean): 70 (30 Oct 2024 13:00) (60 - 83)  RR: 12 (30 Oct 2024 13:00) (9 - 16)  SpO2: 100% (30 Oct 2024 13:00) (94% - 100%)    Parameters below as of 30 Oct 2024 12:00  Patient On (Oxygen Delivery Method): ventilator    O2 Concentration (%): 35    General Examination:  General: No acute distress  HEENT: Atraumatic, Normocephalic  Cardiovascular: Normal b/l radial and pedal pulses.     Neurological Examination:  General / Mental Status: AAO x 3.  No aphasia or dysarthria.  Naming and repetition intact.  Cranial Nerves: VFF x 4.  PERRL.  EOMI x 2, No nystagmus or diplopia.  B/l V1-V3 equal and intact to light touch and pinprick.  Symmetric facial movement and palate elevation.  B/l hearing equal to finger rub.  5/5 strength with b/l sternocleidomastoid and trapezius.  Midline tongue protrusion, with no atrophy or fasciculations.  Motor: Normal bulk & tone in all four extremities.  5/5 strength throughout all four extremities.  No downward drift, rigidity, spasticity, or tremors in any of the four extremities.  Sensory: Intact to light touch and pinprick in all four extremities.  Negative Romberg.  Reflex: 2+ and symmetric at b/l biceps, triceps, brachioradialis, patellae, and ankles.  Downgoing toes b/l.  Coordination: No dysmetria with b/l finger-to-nose and heel raise tests.  Symmetric rapid alternating movements b/l.  Gait: Normal, narrow-based gait.  No difficulty with tiptoe, heel, and tandem gaits.        LABORATORY VALUES:                        8.0    13.27 )-----------( 235      ( 30 Oct 2024 05:07 )             24.7       10-30    146[H]  |  119[H]  |  27[H]  ----------------------------<  241[H]  3.5   |  20[L]  |  1.43[H]    Ca    7.3[L]      30 Oct 2024 05:07  Phos  3.1     10-30  Mg     1.8     10-30    TPro  6.1  /  Alb  1.6[L]  /  TBili  0.1[L]  /  DBili  x   /  AST  15  /  ALT  12  /  AlkPhos  117  10-30    LIVER FUNCTIONS - ( 30 Oct 2024 05:07 )  Alb: 1.6 g/dL / Pro: 6.1 g/dL / ALK PHOS: 117 U/L / ALT: 12 U/L DA / AST: 15 U/L / GGT: x                                 NEUROIMAGING:          Please contact the Neurology consult service with any neurological questions.    Jose Ramon Lara MD   of Neurology  [facility] [employer] [facility] NEUROLOGY CONSULT NOTE    NAME:  MICHAEL BARFIELD      CHIEF COMPLAINT:  Patient is a 89y old  Female who presents with a chief complaint of status epilepticus (30 Oct 2024 12:33)      HPI:  89F from [facility] CVA with right sided deficits and aphasia, DM, HTN , HLD, seizure disorder presenting from [facility] after witnessed tonic clonic seizure. Son at bedside stated he visited his mother this morning and she was at her baseline mental status, with no active complaints. He then received the call from the facility about the seizures around 1pm. As per ED, EMS was called and the seizures lasted 30 minutes prior to EMS arrival. Valium 5mg x 2 and 10mg x 1 was given, however pt continued having seizures. The patient was then intubated in the field for airway protection. Son states that his mother was recently admitted for UTI with E.coli bacteremia and discharged on ceftin for 14 day course. He reports that she has been having decreased appetite for the past few days.  (28 Oct 2024 18:37)      NEURO HPI:  89 F, from [facility], PMx CVA [w/ Rt side deficits and aphasia], DM, HTN, HLD and seizure disorder p/w a witnessed tonic clonic seizure. Neuro consulted for further management of status epilepticus. Chart reviewed and collateral obtained from son, Hari, at bedside. As per son, patient saw the patient before leaving the nursing home and about 30 mins later he received a call around 1PM that patient had tonic clonic seizure for about 30 mins. Patient was intubated prior to arrival to ED. Patient is on keppra 500 BID. Patient was recently hospitalized at [facility] [10/17/24-10/23/24] for UTI and E. coli bacteremia and was discharged on PO ceftin 250 BID for a total of 14 days. Patient is minimally verbal at baseline and is wheelchair bound for 3 years since stroke in 2021. As per chart, patient had      PAST MEDICAL & SURGICAL HISTORY:  Stroke      HTN (hypertension)      HLD (hyperlipidemia)      DM (diabetes mellitus)      DVT (deep venous thrombosis)      CVA (cerebrovascular accident)      Aphasia      Dysphagia      Apraxia      Right-sided muscle weakness      Nonverbal      Seizure      MEDICATIONS:  apixaban 2.5 milliGRAM(s) Oral every 12 hours  atorvastatin 20 milliGRAM(s) Oral at bedtime  cefTRIAXone   IVPB 1000 milliGRAM(s) IV Intermittent every 24 hours  chlorhexidine 0.12% Liquid 15 milliLiter(s) Oral Mucosa every 12 hours  chlorhexidine 2% Cloths 1 Application(s) Topical <User Schedule>  insulin lispro (ADMELOG) corrective regimen sliding scale   SubCutaneous every 6 hours  levETIRAcetam  Solution 750 milliGRAM(s) Oral two times a day  pantoprazole  Injectable 40 milliGRAM(s) IV Push daily  polyethylene glycol 3350 17 Gram(s) Oral daily  propofol Infusion. 14.053 MICROgram(s)/kG/Min IV Continuous <Continuous>  senna 2 Tablet(s) Oral at bedtime      ALLERGIES:  No Known Allergies      FAMILY HISTORY:        SOCIAL HISTORY:  Denies alcohol, tobacco, or illicit drug use      REVIEW OF SYSTEMS:  non-verbal 2/2 intubation       OBJECTIVE:    Vital Signs Last 24 Hrs  T(C): 37.5 (30 Oct 2024 13:00), Max: 37.5 (30 Oct 2024 12:00)  T(F): 99.5 (30 Oct 2024 13:00), Max: 99.5 (30 Oct 2024 12:00)  HR: 86 (30 Oct 2024 13:00) (79 - 92)  BP: 110/52 (30 Oct 2024 13:00) (96/45 - 127/62)  BP(mean): 70 (30 Oct 2024 13:00) (60 - 83)  RR: 12 (30 Oct 2024 13:00) (9 - 16)  SpO2: 100% (30 Oct 2024 13:00) (94% - 100%)    Parameters below as of 30 Oct 2024 12:00  Patient On (Oxygen Delivery Method): ventilator    O2 Concentration (%): 35    General Examination:  General: No acute distress  HEENT: Atraumatic, Normocephalic  Cardiovascular: Normal b/l radial and pedal pulses.     Neurological Examination:  General / Mental Status: AAO x 2. Nonverbal 2/2 intubation. Follows simple commands.   Cranial Nerves:  PERRL.  EOMI, Unable to assess facial symmetry 2/2 intubation.   Motor:  Decreased tone in RUE and RLE. Normal bulk & tone LUE and LLE. LUE minimal distal movement.  Sensory: Intact to light touch in all four extremities.   Reflex: 1+ and symmetric biceps, triceps, brachioradialis. 1+ Lt patellar and ankle. Unable to elicit Rt patellar due to increased tone.   Coordination: Intention tremor of LUE.   Gait: Unable to assess.         LABORATORY VALUES:                        8.0    13.27 )-----------( 235      ( 30 Oct 2024 05:07 )             24.7       10-30    146[H]  |  119[H]  |  27[H]  ----------------------------<  241[H]  3.5   |  20[L]  |  1.43[H]    Ca    7.3[L]      30 Oct 2024 05:07  Phos  3.1     10-30  Mg     1.8     10-30    TPro  6.1  /  Alb  1.6[L]  /  TBili  0.1[L]  /  DBili  x   /  AST  15  /  ALT  12  /  AlkPhos  117  10-30    LIVER FUNCTIONS - ( 30 Oct 2024 05:07 )  Alb: 1.6 g/dL / Pro: 6.1 g/dL / ALK PHOS: 117 U/L / ALT: 12 U/L DA / AST: 15 U/L / GGT: x             NEUROIMAGING:  < from: CT Head No Cont (10.28.24 @ 20:31) >  No CT evidence of acute intracranial pathology.    Large chronic infarct in the left middle cerebral artery vascular   territory, which may represent a seizure focus.    An air-fluid level partially opacifies left sphenoid sinus.      Abnormal EEG study (10.28.24)  1. Structural abnormality in the left hemisphere.   2. Moderate nonspecific diffuse or multifocal cerebral dysfunction.   3. No epileptiform abnormalities were recorded.      *****************************************************************************************    ASSESSMENT:   89 F, from [facility], PMHx CVA [w/ Rt side deficits and aphasia], DM, HTN, HLD and seizure disorder p/w a witnessed tonic clonic seizure. Neuro consulted for further management of status epilepticus.       PLAN:  - CTH noted for chronic infarct in Lt MCA which may represent a seizure focus  - rEEG noted   - f/u vEEG   - c/w IV keppra 750 BID  - f/u keppra levels  - rest of care as per ICU team NEUROLOGY CONSULT NOTE    NAME:  MICHAEL BARFIELD      CHIEF COMPLAINT:  Patient is a 89y old  Female who presents with a chief complaint of status epilepticus (30 Oct 2024 12:33)      HPI:  89F from [facility] CVA with right sided deficits and aphasia, DM, HTN , HLD, seizure disorder presenting from [facility] after witnessed tonic clonic seizure. Son at bedside stated he visited his mother this morning and she was at her baseline mental status, with no active complaints. He then received the call from the facility about the seizures around 1pm. As per ED, EMS was called and the seizures lasted 30 minutes prior to EMS arrival. Valium 5mg x 2 and 10mg x 1 was given, however pt continued having seizures. The patient was then intubated in the field for airway protection. Son states that his mother was recently admitted for UTI with E.coli bacteremia and discharged on ceftin for 14 day course. He reports that she has been having decreased appetite for the past few days.  (28 Oct 2024 18:37)      NEURO HPI:  89 F, from [facility], PMx CVA [w/ Rt side deficits and aphasia], DM, HTN, HLD and seizure disorder p/w a witnessed tonic clonic seizure. Neuro consulted for further management of status epilepticus. Chart reviewed and collateral obtained from son, Hari, at bedside. As per son, patient saw the patient before leaving the nursing home and about 30 mins later he received a call around 1PM that patient had tonic clonic seizure for about 30 mins. Patient was intubated prior to arrival to ED. Patient is on keppra 500 BID. Patient was recently hospitalized at [facility] [10/17/24-10/23/24] for UTI and E. coli bacteremia and was discharged on PO ceftin 250 BID for a total of 14 days. Patient is minimally verbal at baseline and is wheelchair bound for 3 years since stroke in 2021. As per chart, patient had      PAST MEDICAL & SURGICAL HISTORY:  Stroke      HTN (hypertension)      HLD (hyperlipidemia)      DM (diabetes mellitus)      DVT (deep venous thrombosis)      CVA (cerebrovascular accident)      Aphasia      Dysphagia      Apraxia      Right-sided muscle weakness      Nonverbal      Seizure      MEDICATIONS:  apixaban 2.5 milliGRAM(s) Oral every 12 hours  atorvastatin 20 milliGRAM(s) Oral at bedtime  cefTRIAXone   IVPB 1000 milliGRAM(s) IV Intermittent every 24 hours  chlorhexidine 0.12% Liquid 15 milliLiter(s) Oral Mucosa every 12 hours  chlorhexidine 2% Cloths 1 Application(s) Topical <User Schedule>  insulin lispro (ADMELOG) corrective regimen sliding scale   SubCutaneous every 6 hours  levETIRAcetam  Solution 750 milliGRAM(s) Oral two times a day  pantoprazole  Injectable 40 milliGRAM(s) IV Push daily  polyethylene glycol 3350 17 Gram(s) Oral daily  propofol Infusion. 14.053 MICROgram(s)/kG/Min IV Continuous <Continuous>  senna 2 Tablet(s) Oral at bedtime      ALLERGIES:  No Known Allergies      FAMILY HISTORY:        SOCIAL HISTORY:  Denies alcohol, tobacco, or illicit drug use      REVIEW OF SYSTEMS:  non-verbal 2/2 intubation       OBJECTIVE:    Vital Signs Last 24 Hrs  T(C): 37.5 (30 Oct 2024 13:00), Max: 37.5 (30 Oct 2024 12:00)  T(F): 99.5 (30 Oct 2024 13:00), Max: 99.5 (30 Oct 2024 12:00)  HR: 86 (30 Oct 2024 13:00) (79 - 92)  BP: 110/52 (30 Oct 2024 13:00) (96/45 - 127/62)  BP(mean): 70 (30 Oct 2024 13:00) (60 - 83)  RR: 12 (30 Oct 2024 13:00) (9 - 16)  SpO2: 100% (30 Oct 2024 13:00) (94% - 100%)    Parameters below as of 30 Oct 2024 12:00  Patient On (Oxygen Delivery Method): ventilator    O2 Concentration (%): 35    General Examination:  General: No acute distress  HEENT: Atraumatic, Normocephalic  Cardiovascular: Normal b/l radial and pedal pulses.     Neurological Examination:  General / Mental Status: AAO x 2. Nonverbal 2/2 intubation. Follows simple commands.   Cranial Nerves:  PERRL.  EOMI, Unable to assess facial symmetry 2/2 intubation.   Motor:  Decreased tone in RUE and RLE. Normal bulk & tone LUE and LLE. LUE minimal distal movement.  Sensory: Intact to light touch in all four extremities.   Reflex: 1+ and symmetric biceps, triceps, brachioradialis. 1+ Lt patellar and ankle. Unable to elicit Rt patellar due to increased tone.   Coordination: Intention tremor of LUE.   Gait: Unable to assess.         LABORATORY VALUES:                        8.0    13.27 )-----------( 235      ( 30 Oct 2024 05:07 )             24.7       10-30    146[H]  |  119[H]  |  27[H]  ----------------------------<  241[H]  3.5   |  20[L]  |  1.43[H]    Ca    7.3[L]      30 Oct 2024 05:07  Phos  3.1     10-30  Mg     1.8     10-30    TPro  6.1  /  Alb  1.6[L]  /  TBili  0.1[L]  /  DBili  x   /  AST  15  /  ALT  12  /  AlkPhos  117  10-30    LIVER FUNCTIONS - ( 30 Oct 2024 05:07 )  Alb: 1.6 g/dL / Pro: 6.1 g/dL / ALK PHOS: 117 U/L / ALT: 12 U/L DA / AST: 15 U/L / GGT: x             NEUROIMAGING:  < from: CT Head No Cont (10.28.24 @ 20:31) >  No CT evidence of acute intracranial pathology.    Large chronic infarct in the left middle cerebral artery vascular   territory, which may represent a seizure focus.    An air-fluid level partially opacifies left sphenoid sinus.      Abnormal EEG study (10.28.24)  1. Structural abnormality in the left hemisphere.   2. Moderate nonspecific diffuse or multifocal cerebral dysfunction.   3. No epileptiform abnormalities were recorded.      *****************************************************************************************    ASSESSMENT:   89 F, from [facility], PMHx CVA [w/ Rt side deficits and aphasia], DM, HTN, HLD and seizure disorder p/w a witnessed tonic clonic seizure. Neuro consulted for further management of status epilepticus.       PLAN:  - CTH noted for chronic infarct in Lt MCA which may represent a seizure focus  - rEEG noted   - f/u vEEG   - c/w IV keppra 750 BID, max tolerated dose given low GFR 30s  - f/u keppra levels  - rest of care as per ICU team NEUROLOGY CONSULT NOTE    NAME:  MICHAEL BARFIELD      CHIEF COMPLAINT:  Patient is a 89y old  Female who presents with a chief complaint of status epilepticus (30 Oct 2024 12:33)      HPI:  89F from [facility] CVA with right sided deficits and aphasia, DM, HTN , HLD, seizure disorder presenting from [facility] after witnessed tonic clonic seizure. Son at bedside stated he visited his mother this morning and she was at her baseline mental status, with no active complaints. He then received the call from the facility about the seizures around 1pm. As per ED, EMS was called and the seizures lasted 30 minutes prior to EMS arrival. Valium 5mg x 2 and 10mg x 1 was given, however pt continued having seizures. The patient was then intubated in the field for airway protection. Son states that his mother was recently admitted for UTI with E.coli bacteremia and discharged on ceftin for 14 day course. He reports that she has been having decreased appetite for the past few days.  (28 Oct 2024 18:37)      NEURO HPI:  89 F, from [facility], PMx CVA [w/ Rt side deficits and aphasia], DM, HTN, HLD and seizure disorder p/w a witnessed tonic clonic seizure. Neuro consulted for further management of status epilepticus. Chart reviewed and collateral obtained from son, Hari, at bedside. As per son, patient saw the patient before leaving the nursing home and about 30 mins later he received a call around 1PM that patient had tonic clonic seizure for about 30 mins. Patient was intubated prior to arrival to ED. Patient is on keppra 500 BID. Patient was recently hospitalized at [facility] [10/17/24-10/23/24] for UTI and E. coli bacteremia and was discharged on PO ceftin 250 BID for a total of 14 days. Patient is minimally verbal at baseline and is wheelchair bound for 3 years since stroke in 2021.       PAST MEDICAL & SURGICAL HISTORY:  Stroke      HTN (hypertension)      HLD (hyperlipidemia)      DM (diabetes mellitus)      DVT (deep venous thrombosis)      CVA (cerebrovascular accident)      Aphasia      Dysphagia      Apraxia      Right-sided muscle weakness      Nonverbal      Seizure      MEDICATIONS:  apixaban 2.5 milliGRAM(s) Oral every 12 hours  atorvastatin 20 milliGRAM(s) Oral at bedtime  cefTRIAXone   IVPB 1000 milliGRAM(s) IV Intermittent every 24 hours  chlorhexidine 0.12% Liquid 15 milliLiter(s) Oral Mucosa every 12 hours  chlorhexidine 2% Cloths 1 Application(s) Topical <User Schedule>  insulin lispro (ADMELOG) corrective regimen sliding scale   SubCutaneous every 6 hours  levETIRAcetam  Solution 750 milliGRAM(s) Oral two times a day  pantoprazole  Injectable 40 milliGRAM(s) IV Push daily  polyethylene glycol 3350 17 Gram(s) Oral daily  propofol Infusion. 14.053 MICROgram(s)/kG/Min IV Continuous <Continuous>  senna 2 Tablet(s) Oral at bedtime      ALLERGIES:  No Known Allergies      FAMILY HISTORY:      SOCIAL HISTORY:  Denies alcohol, tobacco, or illicit drug use      REVIEW OF SYSTEMS:  non-verbal 2/2 intubation       OBJECTIVE:    Vital Signs Last 24 Hrs  T(C): 37.5 (30 Oct 2024 13:00), Max: 37.5 (30 Oct 2024 12:00)  T(F): 99.5 (30 Oct 2024 13:00), Max: 99.5 (30 Oct 2024 12:00)  HR: 86 (30 Oct 2024 13:00) (79 - 92)  BP: 110/52 (30 Oct 2024 13:00) (96/45 - 127/62)  BP(mean): 70 (30 Oct 2024 13:00) (60 - 83)  RR: 12 (30 Oct 2024 13:00) (9 - 16)  SpO2: 100% (30 Oct 2024 13:00) (94% - 100%)    Parameters below as of 30 Oct 2024 12:00  Patient On (Oxygen Delivery Method): ventilator    O2 Concentration (%): 35    General Examination:  General: No acute distress  HEENT: Atraumatic, Normocephalic  Cardiovascular: Normal b/l radial and pedal pulses.     Neurological Examination:  General / Mental Status: AAO x 2. Nonverbal 2/2 intubation. Follows simple commands.   Cranial Nerves:  PERRL.  EOMI, Unable to assess facial symmetry 2/2 intubation.   Motor:  Decreased tone in RUE and RLE. Normal bulk & tone LUE and LLE. LUE minimal distal movement.  Sensory: Intact to light touch in all four extremities.   Reflex: 1+ and symmetric biceps, triceps, brachioradialis. 1+ Lt patellar and ankle. Unable to elicit Rt patellar due to increased tone.   Coordination: Intention tremor of LUE.   Gait: Unable to assess.       LABORATORY VALUES:                        8.0    13.27 )-----------( 235      ( 30 Oct 2024 05:07 )             24.7       10-30    146[H]  |  119[H]  |  27[H]  ----------------------------<  241[H]  3.5   |  20[L]  |  1.43[H]    Ca    7.3[L]      30 Oct 2024 05:07  Phos  3.1     10-30  Mg     1.8     10-30    TPro  6.1  /  Alb  1.6[L]  /  TBili  0.1[L]  /  DBili  x   /  AST  15  /  ALT  12  /  AlkPhos  117  10-30    LIVER FUNCTIONS - ( 30 Oct 2024 05:07 )  Alb: 1.6 g/dL / Pro: 6.1 g/dL / ALK PHOS: 117 U/L / ALT: 12 U/L DA / AST: 15 U/L / GGT: x             NEUROIMAGING:  < from: CT Head No Cont (10.28.24 @ 20:31) >  No CT evidence of acute intracranial pathology.    Large chronic infarct in the left middle cerebral artery vascular   territory, which may represent a seizure focus.    An air-fluid level partially opacifies left sphenoid sinus.      Abnormal EEG study (10.28.24)  1. Structural abnormality in the left hemisphere.   2. Moderate nonspecific diffuse or multifocal cerebral dysfunction.   3. No epileptiform abnormalities were recorded.      *****************************************************************************************    ASSESSMENT:   89 F, from [facility], PMHx CVA [w/ Rt side deficits and aphasia], DM, HTN, HLD and seizure disorder p/w a witnessed tonic clonic seizure. Neuro consulted for further management of status epilepticus.       PLAN:  - CTH noted for chronic infarct in Lt MCA which may represent a seizure focus  - rEEG noted   - f/u vEEG   - c/w IV keppra 750 BID, max tolerated dose given low GFR 30s  - f/u keppra levels  - rest of care as per ICU team

## 2024-10-30 NOTE — PROGRESS NOTE ADULT - SUBJECTIVE AND OBJECTIVE BOX
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 Son at bedside stated he visited his mother this morning and she was at her baseline mental status, with no active complaints. He then received the call from the facility about the seizures around 1pm. As per ED, EMS was called and the seizures lasted 30 minutes prior to EMS arrival. Valium 5mg x 2 and 10mg x 1 was given, however pt continued having seizures. The patient was then intubated in the field for airway protection. Son states that his mother was recently admitted for UTI with E.coli bacteremia and discharged on ceftin for 14 day course. He reports that she has been having decreased appetite for the past few days.        Review of Systems:  Review of Systems: Unable to assess, intubated and sedated    Goals of Care:    GOALS OF CARE:  · Participants	Family  · Child(johnnie)	Hari Begum (son)     Conversation Discussion:  · Conversation	Diagnosis; Prognosis  · Conversation Details	Had an extensive conversation with son, Hari (HCP) about his mother's current clinical status, diagnosis and prognosis. Discussed that his mother had a generalized seizure for over 30 minutes despite his mother already being on keppra. Due to her age and co-morbidities along with her current hospitalization due to status epilepticus requiring intubation and being placed on life support, discussed GOC with the son and he re-iterated that he believes in god and would want everything done for his mother in the event that she suffers a cardiac arrest and would want the medical team to do CPR. Son confirmed the patient's code status as FULL CODE. Prior MOLST is placed in chart      Allergies and Intolerances:        Allergies:  	No Known Allergies:     Home Medications:   * Patient Currently Takes Medications as of 23-Oct-2024 12:32 documented in Structured Notes  · 	cefuroxime 250 mg oral tablet: 1 tab(s) orally 2 times a day until 10/31/24  · 	atorvastatin 20 mg oral tablet: 1 tab(s) orally once a day (at bedtime)  · 	levETIRAcetam 100 mg/mL oral solution: 5 milliliter(s) orally 2 times a day  · 	apixaban 2.5 mg oral tablet: 1 tab(s) orally every 12 hours  · 	acetaminophen 325 mg oral tablet: 2 tab(s) orally every 6 hours As needed Temp greater or equal to 38C (100.4F), Mild Pain (1 - 3)  · 	Farxiga 10 mg oral tablet: 1 tab(s) orally once a day  · 	MiraLax oral powder for reconstitution: 17 gram(s) orally 2 times a day  · 	Actos 15 mg oral tablet: 1 orally once a day  · 	nystatin 100,000 units/g topical powder: Apply topically to affected area 2 times a day as needed for  rash  · 	HumaLOG KwikPen 100 units/mL injectable solution: subcutaneously 3 times a day (-250: 2 units, 251-300: 4 units, 301-350: 6 units, 351-400: 8 units)  · 	ferrous sulfate 325 mg (65 mg elemental iron) oral tablet: 1 tab(s) orally once a day  · 	zinc oxide 20% topical ointment: Apply topically to affected area  · 	Multiple Vitamins oral tablet: 1 tab(s) orally once a day  · 	simethicone 125 mg oral tablet, chewable: 1 tab(s) chewed once a day AFTER OTHER MEDICATIONS  · 	senna (sennosides) 8.6 mg oral tablet: 2 tab(s) orally once a day (at bedtime)  · 	Norvasc 10 mg oral tablet: 1 orally once a day  · 	ascorbic acid 500 mg/5 mL oral liquid: 5 milliliter(s) orally once a day  · 	nitroglycerin 0.2 mg/hr transdermal film, extended release: 1 patch transdermally once a day    Patient History:    Social History:  · Substance use	No     Tobacco Screening:  · Core Measure Site	Yes  · Has the patient used tobacco in the past 30 days?	No    Risk Assessment:    Present on Admission:  Deep Venous Thrombosis	no  Pulmonary Embolus	no     HIV Screening:  · In accordance with [place] State law, we offer every patient who comes to our ED an HIV test. Would you like to be tested today?	Opt out     Hepatitis C Test Questions:  · In accordance with [place] State Law, we offer every patient a Hepatitis C test. Would you like to be tested today?	Opt out    Physical Exam:   Physical Exam: General - NAD, lying in bed, intubated and sedated.   Eyes - PERRLA, EOM intact  ENT - Nonicteric sclerae, PERRLA, EOMI. Oropharynx clear. Dry mucous membranes. Conjunctivae appear well perfused.   Neck - No noticeable or palpable swelling, redness or rash around throat or on face  Lymph Nodes - No lymphadenopathy  Cardiovascular - RRR no m/r/g, no JVD, no carotid bruits  Lungs - (+) rhonchi heard in b/l lung fields.    Skin - No rashes, skin warm and dry, no erythematous areas  Abdomen - Normal bowel sounds, abdomen soft and nontender  Extremities - No edema, cyanosis or clubbing  Musculoskeletal - Unable to assess strength, normal range of motion, no swollen or erythematous joints.  Neuro– Alert and oriented x 0, intubated and sedated, pupils pinpoint and fixed      18-Oct-2024 09:30, Comprehensive Metabolic Panel  Sodium: 137, [135 - 145 mmol/L]  Potassium: 4.8, [3.5 - 5.3 mmol/L], Specimen slightly hemolyzed  Chloride: 108, [96 - 108 mmol/L]  Carbon Dioxide:   17, [22 - 31 mmol/L]  Anion Gap: 12, [5 - 17 mmol/L]  Blood Urea Nitrogen:   56, [7 - 18 mg/dL]  Creatinine:   2.76, [0.50 - 1.30 mg/dL]  Glucose:   131, [70 - 99 mg/dL]  Calcium:   8.3, [8.4 - 10.5 mg/dL]  Protein Total: 6.5, [6.0 - 8.3 g/dL]  Albumin:   1.7, [3.5 - 5.0 g/dL]  Bilirubin Total: 0.4, [0.2 - 1.2 mg/dL]  Alkaline Phosphatase: 120, [40 - 120 U/L]  Aspartate Aminotransferase (AST/SGOT): 40, [10 - 40 U/L]  Alanine Aminotransferase (ALT/SGPT): 19, [10 - 60 U/L DA]  eGFR:   15, [>=60 mL/min/1.73m2], The estimated glomerular filtration rate (eGFR) calculation is based on  	the 2021 CKD-EPI creatinine equation, which is validated in male and  	female population 18 years of age and older (N Engl J Med 2021;  	385:8920-9522).    18-Oct-2024 09:30, Magnesium  Magnesium: 2.1, [1.6 - 2.6 mg/dL]    18-Oct-2024 09:30, Phosphorus  Phosphorus: 3.3, [2.5 - 4.5 mg/dL]    19-Oct-2024 06:55, Basic Metabolic Panel  Sodium: 140, [135 - 145 mmol/L]  Potassium: 4.0, [3.5 - 5.3 mmol/L]  Chloride:   112, [96 - 108 mmol/L]  Carbon Dioxide:   18, [22 - 31 mmol/L]  Anion Gap: 10, [5 - 17 mmol/L]  Blood Urea Nitrogen:   59, [7 - 18 mg/dL]  Creatinine:   2.45, [0.50 - 1.30 mg/dL]  Glucose:   202, [70 - 99 mg/dL]  Calcium:   8.2, [8.4 - 10.5 mg/dL]  eGFR:   17, [>=60 mL/min/1.73m2], The estimated glomerular filtration rate (eGFR) calculation is based on  	the 2021 CKD-EPI creatinine equation, which is validated in male and  	female population 18 years of age and older (N Engl J Med 2021;  	385:1367-3487).    19-Oct-2024 10:40, Vitamin D, 25-Hydroxy  Vitamin D, 25-Hydroxy:   18.2, [30.0 - 80.0 ng/mL], Deficiency: Less than 20 ng/mL  	Insufficiency: 20-29 ng/mL  	Optimum Level: 30-80 ng/mL  	Possible Toxicity: Greater than 150 ng/mL  	The optimal level for 25-hydroxyvitamin D is based on the 2011 Endocrine  	Society Clinical Practice Guideline (The Journal of Clinical  	Endocrinology and Metabolism, Volume 96, Issue 7, Pages 7639-7163,  	https://doi.org/10.1210/raymond.8262-4948 )  	Note: This assay quantifies the sum of both 25-hydroxyvitamin D2 and  	25-hydroxyvitamin D3. Result variation may be observed with other methods  	due to lack of standardization.    20-Oct-2024 06:36, Basic Metabolic Panel  Sodium: 140, [135 - 145 mmol/L]  Potassium: 3.7, [3.5 - 5.3 mmol/L]  Chloride:   113, [96 - 108 mmol/L]  Carbon Dioxide:   20, [22 - 31 mmol/L]  Anion Gap: 7, [5 - 17 mmol/L]  Blood Urea Nitrogen:   58, [7 - 18 mg/dL]  Creatinine:   1.99, [0.50 - 1.30 mg/dL]  Glucose:   166, [70 - 99 mg/dL]  Calcium:   8.2, [8.4 - 10.5 mg/dL]  eGFR:   22, [>=60 mL/min/1.73m2], The estimated glomerular filtration rate (eGFR) calculation is based on  	the 2021 CKD-EPI creatinine equation, which is validated in male and  	female population 18 years of age and older (N Engl J Med 2021;  	385:9635-1801).    20-Oct-2024 06:36, Ferritin  Ferritin:   438, [13 - 330 ng/mL]    20-Oct-2024 06:36, Iron with Total Binding Capacity  Iron Total:   18, [40 - 150 ug/dL]  Unsaturated Iron Binding Capacity: 130, [110 - 370 ug/dL]  Iron - Total Binding Capacity.:   148, [250 - 450 ug/dL]  % Saturation, Iron:   12, [15 - 50 %]    21-Oct-2024 05:20, Basic Metabolic Panel  Sodium: 143, [135 - 145 mmol/L]  Potassium: 3.6, [3.5 - 5.3 mmol/L]  Chloride:   114, [96 - 108 mmol/L]  Carbon Dioxide:   21, [22 - 31 mmol/L]  Anion Gap: 8, [5 - 17 mmol/L]  Blood Urea Nitrogen:   51, [7 - 18 mg/dL]  Creatinine:   1.70, [0.50 - 1.30 mg/dL]  Glucose:   195, [70 - 99 mg/dL]  Calcium: 8.6, [8.4 - 10.5 mg/dL]  eGFR:   27, [>=60 mL/min/1.73m2], The estimated glomerular filtration rate (eGFR) calculation is based on  	the 2021 CKD-EPI creatinine equation, which is validated in male and  	female population 18 years of age and older (N Engl J Med 2021;  	385:6110-3965).    23-Oct-2024 10:00, Basic Metabolic Panel  Sodium: 145, [135 - 145 mmol/L]  Potassium:   3.2, [3.5 - 5.3 mmol/L]  Chloride:   117, [96 - 108 mmol/L]  Carbon Dioxide:   21, [22 - 31 mmol/L]  Anion Gap: 7, [5 - 17 mmol/L]  Blood Urea Nitrogen:   32, [7 - 18 mg/dL]  Creatinine: 1.30, [0.50 - 1.30 mg/dL]  Glucose:   169, [70 - 99 mg/dL]  Calcium: 8.5, [8.4 - 10.5 mg/dL]  eGFR:   37, [>=60 mL/min/1.73m2], The estimated glomerular filtration rate (eGFR) calculation is based on  	the 2021 CKD-EPI creatinine equation, which is validated in male and  	female population 18 years of age and older (N Engl J Med 2021;  	385:6667-3422).    25-Oct-2024 14:40, Comprehensive Metabolic Panel  Sodium: 144, [135 - 145 mmol/L]  Potassium:   3.4, [3.5 - 5.3 mmol/L]  Chloride:   117, [96 - 108 mmol/L]  Carbon Dioxide: 22, [22 - 31 mmol/L]  Anion Gap: 5, [5 - 17 mmol/L]  Blood Urea Nitrogen:   25, [7 - 18 mg/dL]  Creatinine: 1.29, [0.50 - 1.30 mg/dL]  Glucose:   154, [70 - 99 mg/dL]  Calcium:   8.3, [8.4 - 10.5 mg/dL]  Protein Total: 6.6, [6.0 - 8.3 g/dL]  Albumin:   1.8, [3.5 - 5.0 g/dL]  Bilirubin Total: 0.2, [0.2 - 1.2 mg/dL]  Alkaline Phosphatase: 118, [40 - 120 U/L]  Aspartate Aminotransferase (AST/SGOT): 20, [10 - 40 U/L]  Alanine Aminotransferase (ALT/SGPT): 19, [10 - 60 U/L DA]  eGFR:   37, [>=60 mL/min/1.73m2], The estimated glomerular filtration rate (eGFR) calculation is based on  	the 2021 CKD-EPI creatinine equation, which is validated in male and  	female population 18 years of age and older (N Engl J Med 2021;  	385:4275-9521).    28-Oct-2024 15:15, Comprehensive Metabolic Panel  Sodium:   146, [135 - 145 mmol/L]  Potassium: 3.9, [3.5 - 5.3 mmol/L]  Chloride:   117, [96 - 108 mmol/L]  Carbon Dioxide:   17, [22 - 31 mmol/L]  Anion Gap: 12, [5 - 17 mmol/L]  Blood Urea Nitrogen:   29, [7 - 18 mg/dL]  Creatinine:   2.05, [0.50 - 1.30 mg/dL]  Glucose:   205, [70 - 99 mg/dL]  Calcium:   8.3, [8.4 - 10.5 mg/dL]  Protein Total: 6.9, [6.0 - 8.3 g/dL]  Albumin:   1.8, [3.5 - 5.0 g/dL]  Bilirubin Total: 0.4, [0.2 - 1.2 mg/dL]  Alkaline Phosphatase: 116, [40 - 120 U/L]  Aspartate Aminotransferase (AST/SGOT): 24, [10 - 40 U/L]  Alanine Aminotransferase (ALT/SGPT): 17, [10 - 60 U/L DA]  eGFR:   21, [>=60 mL/min/1.73m2], The estimated glomerular filtration rate (eGFR) calculation is based on  	the 2021 CKD-EPI creatinine equation, which is validated in male and  	female population 18 years of age and older (N Engl J Med 2021;  	385:6414-0214).    28-Oct-2024 15:15, Lactate, Blood  Lactate, Blood:   6.2, [0.7 - 2.0 mmol/L], TYPE:(C=Critical, N=Notification, A=Abnormal) C  	TESTS: LA  	DATE/TIME CALLED: 10/28/2024 16:20:07 EDT  	CALLED TO: SHERRI SEVILLA  	READ BACK (2 Patient Identifiers)(Y/N): Y  	READ BACK VALUES (Y/N): Y  	CALLED BY: DEVON 10/28/2024 16:20:16 EDT  Coagulation:    17-Oct-2024 12:15, Activated Partial Thromboplastin Time  Activated Partial Thromboplastin Time: 29.8, [24.5 - 35.6 sec], The recommended therapeutic heparin range (full dose) is 58-99 seconds.  	Argatroban range is 1.5 to 3.0 times of the baseline APTT value, not to  	exceed 100 seconds.  	Routine coagulation results should be interpreted with caution when  	taking Factor Xa inhibitors or direct thrombin inhibitors; blood sampling  	prior to drug intake is recommended.    17-Oct-2024 12:15, Prothrombin Time and INR, Plasma  Prothrombin Time, Plasma:   20.6, [9.9 - 13.4 sec], Effective September 24, 2024 the reference range has changed.  INR:   1.79, [0.85 - 1.16 ratio], Recommended targets/ranges for therapeutic INR:  	2.0-3.0 Deep vein thrombosis, pulmonary embolism, atrial fibrillation  	2.0-3.0 Mechanical aortic valve, antiphospholipid syndrome with previous  	arterial or venous thromboembolism  	2.5-3.5 Mechanical mitral valve, double mechanical valve (aortic and  	mitral positions, high risk valves)  	Note: Chest 2012 Feb;141(2 Suppl):7S-47S  	Routine coagulation results should be interpreted with caution when  	taking Factor Xa inhibitors or direct thrombin inhibitors; blood sampling  	prior to drug intake is recommended.    25-Oct-2024 14:40, Activated Partial Thromboplastin Time  Activated Partial Thromboplastin Time:   19.5, [24.5 - 35.6 sec], The recommended therapeutic heparin range (full dose) is 58-99 seconds.  	Argatroban range is 1.5 to 3.0 times of the baseline APTT value, not to  	exceed 100 seconds.  	Routine coagulation results should be interpreted with caution when  	taking Factor Xa inhibitors or direct thrombin inhibitors; blood sampling  	prior to drug intake is recommended.    25-Oct-2024 14:40, Prothrombin Time and INR, Plasma  Prothrombin Time, Plasma:   15.8, [9.9 - 13.4 sec], Effective September 24, 2024 the reference range has changed.  INR:   1.37, [0.85 - 1.16 ratio], Recommended targets/ranges for therapeutic INR:  	2.0-3.0 Deep vein thrombosis, pulmonary embolism, atrial fibrillation  	2.0-3.0 Mechanical aortic valve, antiphospholipid syndrome with previous  	arterial or venous thromboembolism  	2.5-3.5 Mechanical mitral valve, double mechanical valve (aortic and  	mitral positions, high risk valves)  	Note: Chest 2012 Feb;141(2 Suppl):7S-47S  	Routine coagulation results should be interpreted with caution when  	taking Factor Xa inhibitors or direct thrombin inhibitors; blood sampling  	prior to drug intake is recommended.    28-Oct-2024 15:15, Activated Partial Thromboplastin Time  Activated Partial Thromboplastin Time: 25.9, [24.5 - 35.6 sec], Specimen integrity verified.  	Test repeated.  	The recommended therapeutic heparin range (full dose) is 58-99 seconds.  	Argatroban range is 1.5 to 3.0 times of the baseline APTT value, not to  	exceed 100 seconds.  	Routine coagulation results should beinterpreted with caution when  	taking Factor Xa inhibitors or direct thrombin inhibitors; blood sampling  	prior to drug intake is recommended.    28-Oct-2024 15:15, Prothrombin Time and INR, Plasma  Prothrombin Time, Plasma:   19.8, [9.9 - 13.4 sec], Effective September 24, 2024 the reference range has changed.  INR:   1.70, [0.85 - 1.16 ratio], Recommended targets/ranges for therapeutic INR:  	2.0-3.0 Deep vein thrombosis, pulmonary embolism, atrial fibrillation  	2.0-3.0 Mechanical aortic valve, antiphospholipid syndrome with previous  	arterial or venous thromboembolism  	2.5-3.5 Mechanical mitral valve, double mechanical valve (aortic and  	mitral positions, high risk valves)  	Note: Chest 2012 Feb;141(2 Suppl):7S-47S  	Routine coagulation results should be interpreted with caution when  	taking Factor Xa inhibitors or direct thrombin inhibitors; blood sampling  	prior to drug intake is recommended.  Hematology:    17-Oct-2024 12:15, Complete Blood Count + Automated Diff  WBC Count:   44.18, [3.80 - 10.50 K/uL], TYPE:(C=Critical, N=Notification, A=Abnormal) c  	TESTS: wbc.  	DATE/TIME CALLED: 10/17/2024 12:38:15 EDT  	CALLED TO: md. PHYLICIA Hardin  	READ BACK (2 Patient Identifiers)(Y/N): _y  	READ BACK VALUES (Y/N): _y  	CALLED BY: _haresh  RBC Count:   3.12, [3.80 - 5.20 M/uL]  Hemoglobin:   9.1, [11.5 - 15.5 g/dL]  Hematocrit:   28.7, [34.5 - 45.0 %]  Mean Cell Volume: 92.0, [80.0 - 100.0 fl]  Mean Cell Hemoglobin: 29.2, [27.0 - 34.0 pg]  Mean Cell Hemoglobin Conc:   31.7, [32.0 - 36.0 gm/dL]  Red Cell Distrib Width:   16.9, [10.3 - 14.5 %]  Platelet Count - Automated: 177, [150 - 400 K/uL]  Auto Neutrophil #:   41.53, [1.80 - 7.40 K/uL]  Auto Lymphocyte #:   0.88, [1.00 - 3.30 K/uL]  Auto Monocyte #:   1.77, [0.00 - 0.90 K/uL]  Auto Eosinophil #: 0.00, [0.00 - 0.50 K/uL]  Auto Basophil #: 0.00, [0.00 - 0.20 K/uL]  Auto Neutrophil %:   94.0, [43.0 - 77.0 %], Differential percentages must be correlated with absolute numbers for  	clinical significance.  Auto Lymphocyte %:   2.0, [13.0 - 44.0 %]  Auto Monocyte %: 4.0, [2.0 - 14.0 %]  Auto Eosinophil %: 0.0, [0.0 - 6.0 %]  Auto Basophil %: 0.0, [0.0 - 2.0 %]    17-Oct-2024 12:15, Manual Differential  Platelet Morphology: Normal, [Normal]  Manual Smear Verification: Performed  Nucleated RBC: 0, [0 - 0 /100 WBCs]  Red Cell Morphology:   Abnormal, [Normal]  Anisocytosis: Slight  Macrocytosis: Slight  Polychromasia: Slight    17-Oct-2024 17:40, Complete Blood Count  WBC Count:   44.39, [3.80 - 10.50 K/uL], TYPE:(C=Critical, N=Notification, A=Abnormal) C  	TESTS: WBC  	DATE/TIME CALLED: 10/17/2024 18:31:59 EDT  	CALLED TO: PATSY SIMMS L  	READ BACK (2 Patient Identifiers)(Y/N): Y  	READ BACK VALUES (Y/N): Y  	CALLED BY: CHEMO DUMONT  RBC Count:   2.90, [3.80 - 5.20 M/uL]  Hemoglobin:   8.6, [11.5 - 15.5 g/dL]  Hematocrit:   26.3, [34.5 - 45.0 %]  Mean Cell Volume: 90.7, [80.0 - 100.0 fl]  Mean Cell Hemoglobin: 29.7, [27.0 - 34.0 pg]  Mean Cell Hemoglobin Conc: 32.7, [32.0 - 36.0 gm/dL]  Red Cell Distrib Width:   17.1, [10.3 - 14.5 %]  Platelet Count - Automated: 158, [150 - 400 K/uL]  Nucleated RBC: 0, [0 - 0 /100 WBCs]    18-Oct-2024 09:30, Complete Blood Count + Automated Diff  WBC Count:   43.73, [3.80 - 10.50 K/uL], TYPE:(C=Critical, N=Notification, A=Abnormal) C  	TESTS: _WBC  	DATE/TIME CALLED: _10/18/2024 10:40:01 EDT  	CALLED TO: _DEMI OBRIEN  	READ BACK (2 Patient Identifiers)(Y/N): _Y  	READ BACK VALUES (Y/N): _Y  	CALLED BY: RADHA  Assessment:  · Assessment	  89F from [facility] 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    #status epilepticus  #CVA  #DM  #lactic acidosis       =================== Neuro============================  #status epilepticus   presenting from [facility] with witnessed tonic clonic seizure for > 30  minutes prior to EMS arrival, intubated in the field  received keppra 2g load, gave an additional 1.5 for 60mg/kg dose  started keppra 750 BID   currently sedated with prop 10 in ED after intubation   Neurology Dr. Jennings consulted  ordered 24 hr EEG  f/u keppra levels    #h/o CVA  with right sided hemiplegia and aphasia (prior notes stating bedbound status, son at bedside stating pt ambulates with wheelchair)  on atorvastatin 20 and eliquis 2.5mg BID at home  continue with home meds via NGT    ================= Cardiovascular==========================  #HTN  takes norvasc 10 daily  /62 upon evaluation in ED  was reported to be hypotensive, propofol weaned down and was placed on peripheral levophed  peripheal levophed weaned off in ED  received 2L NS bolus  monitor BP      ================- Pulm=================================  #intubated and sedated  CXR without any infiltrates   rhonchi heard b/l (possible aspiration during status epilepticus)   started on rocephin  mechanical vent settings 350/15/5/40%  ABG 7.39/30/255/18    ==================ID===================================  #UTI  recently discharged on 10/23 for UTI with E.coli bacteremia   frost placed in ED with purulent output noted  UA positive  received zosyn in ED  will continue with rocephin 1g qd  f/u blood cultures and urine cultures    #lactic acidosis   lactate 6.2 > 3.4  received 2L NS bolus in ED  continue to trend lactate    ================= Nephro================================  #TRAVIS   SCr 2.05, baseline 1.3  f/u urine lytes  received 2L IVF in ED  trend SCr    =================GI====================================  no active issues  start TF via NGT    ================ Heme==================================  #anemia  no active signs of bleeding   Hb 9.3, around baseline    =================Endocrine===============================  #DM  on humalog sliding scale and farxiga at home    will start mISS q6 while NPO on TF  monitor FS    ================= Skin/Catheters============================  Peripheral IV lines   Frost catheter     =================Prophylaxis =============================  eliquis DVT prophylaxis   protonix GI prophylaxis    ==================GOC==================================  FULL CODE   Disposition ICU

## 2024-10-30 NOTE — PROGRESS NOTE ADULT - CONVERSATION DETAILS
Met with the pt's son Hari at the bedside to discuss her current clinical status.  Reviewed her condition, including plans for extubation following a successful spontaneous breathing trial. Hari expressed being optimistic about the report he received from the doctor this morning.  Discussed pt remains high risk for decline and mortality.  Readdressed LST including CPR/intubation, and artificial nutrition in context of advanced age with multiple comorbidities.   Hari stated he spoke with his sisters and they have all agreed no CPR, however, they would want her to be reintubated if necessary and artificial nutrition to prolong her life.  Family is not ready to consider comfort measures or hospice at this time.  CHARLOTTE drafted: DNR/trial of intubation/long term feeding tube if necessary.  Palliative care will continue to communicate with the family regarding further goals of care, pending clinical status.   All questions answered.  Support provided.  d/w ICU

## 2024-10-31 DIAGNOSIS — B37.7 CANDIDAL SEPSIS: ICD-10-CM

## 2024-10-31 DIAGNOSIS — E11.9 TYPE 2 DIABETES MELLITUS WITHOUT COMPLICATIONS: ICD-10-CM

## 2024-10-31 DIAGNOSIS — N39.0 URINARY TRACT INFECTION, SITE NOT SPECIFIED: ICD-10-CM

## 2024-10-31 DIAGNOSIS — A41.9 SEPSIS, UNSPECIFIED ORGANISM: ICD-10-CM

## 2024-10-31 DIAGNOSIS — B37.49 OTHER UROGENITAL CANDIDIASIS: ICD-10-CM

## 2024-10-31 LAB
ALBUMIN SERPL ELPH-MCNC: 1.5 G/DL — LOW (ref 3.5–5)
ALP SERPL-CCNC: 98 U/L — SIGNIFICANT CHANGE UP (ref 40–120)
ALT FLD-CCNC: 13 U/L DA — SIGNIFICANT CHANGE UP (ref 10–60)
ANION GAP SERPL CALC-SCNC: 6 MMOL/L — SIGNIFICANT CHANGE UP (ref 5–17)
AST SERPL-CCNC: 13 U/L — SIGNIFICANT CHANGE UP (ref 10–40)
BILIRUB SERPL-MCNC: 0.2 MG/DL — SIGNIFICANT CHANGE UP (ref 0.2–1.2)
BUN SERPL-MCNC: 28 MG/DL — HIGH (ref 7–18)
CALCIUM SERPL-MCNC: 7.6 MG/DL — LOW (ref 8.4–10.5)
CHLORIDE SERPL-SCNC: 122 MMOL/L — HIGH (ref 96–108)
CO2 SERPL-SCNC: 20 MMOL/L — LOW (ref 22–31)
CREAT SERPL-MCNC: 1.42 MG/DL — HIGH (ref 0.5–1.3)
EGFR: 33 ML/MIN/1.73M2 — LOW
GLUCOSE SERPL-MCNC: 185 MG/DL — HIGH (ref 70–99)
GRAM STN FLD: ABNORMAL
HCT VFR BLD CALC: 24.9 % — LOW (ref 34.5–45)
HGB BLD-MCNC: 7.9 G/DL — LOW (ref 11.5–15.5)
LEVETIRACETAM SERPL-MCNC: 123.8 UG/ML — HIGH (ref 10–40)
MAGNESIUM SERPL-MCNC: 1.7 MG/DL — SIGNIFICANT CHANGE UP (ref 1.6–2.6)
MCHC RBC-ENTMCNC: 29.3 PG — SIGNIFICANT CHANGE UP (ref 27–34)
MCHC RBC-ENTMCNC: 31.7 G/DL — LOW (ref 32–36)
MCV RBC AUTO: 92.2 FL — SIGNIFICANT CHANGE UP (ref 80–100)
NRBC # BLD: 0 /100 WBCS — SIGNIFICANT CHANGE UP (ref 0–0)
PHOSPHATE SERPL-MCNC: 2.7 MG/DL — SIGNIFICANT CHANGE UP (ref 2.5–4.5)
PLATELET # BLD AUTO: 197 K/UL — SIGNIFICANT CHANGE UP (ref 150–400)
POTASSIUM SERPL-MCNC: 3.6 MMOL/L — SIGNIFICANT CHANGE UP (ref 3.5–5.3)
POTASSIUM SERPL-SCNC: 3.6 MMOL/L — SIGNIFICANT CHANGE UP (ref 3.5–5.3)
PROT SERPL-MCNC: 5.8 G/DL — LOW (ref 6–8.3)
RBC # BLD: 2.7 M/UL — LOW (ref 3.8–5.2)
RBC # FLD: 18 % — HIGH (ref 10.3–14.5)
SODIUM SERPL-SCNC: 148 MMOL/L — HIGH (ref 135–145)
WBC # BLD: 9.2 K/UL — SIGNIFICANT CHANGE UP (ref 3.8–10.5)
WBC # FLD AUTO: 9.2 K/UL — SIGNIFICANT CHANGE UP (ref 3.8–10.5)

## 2024-10-31 PROCEDURE — 71260 CT THORAX DX C+: CPT | Mod: 26

## 2024-10-31 PROCEDURE — 95718 EEG PHYS/QHP 2-12 HR W/VEEG: CPT

## 2024-10-31 PROCEDURE — 74177 CT ABD & PELVIS W/CONTRAST: CPT | Mod: 26

## 2024-10-31 PROCEDURE — 93306 TTE W/DOPPLER COMPLETE: CPT | Mod: 26

## 2024-10-31 PROCEDURE — 99223 1ST HOSP IP/OBS HIGH 75: CPT

## 2024-10-31 RX ORDER — FLUCONAZOLE, SODIUM CHLORIDE 2 MG/ML
200 INJECTION INTRAVENOUS EVERY 24 HOURS
Refills: 0 | Status: DISCONTINUED | OUTPATIENT
Start: 2024-11-01 | End: 2024-11-06

## 2024-10-31 RX ORDER — ACETAMINOPHEN 500 MG
650 TABLET ORAL EVERY 6 HOURS
Refills: 0 | Status: DISCONTINUED | OUTPATIENT
Start: 2024-10-31 | End: 2024-11-06

## 2024-10-31 RX ORDER — NYSTATIN 100000 U/G
1 POWDER TOPICAL
Refills: 0 | Status: DISCONTINUED | OUTPATIENT
Start: 2024-10-31 | End: 2024-11-06

## 2024-10-31 RX ORDER — FLUCONAZOLE, SODIUM CHLORIDE 2 MG/ML
800 INJECTION INTRAVENOUS ONCE
Refills: 0 | Status: COMPLETED | OUTPATIENT
Start: 2024-10-31 | End: 2024-10-31

## 2024-10-31 RX ADMIN — FLUCONAZOLE, SODIUM CHLORIDE 400 MILLIGRAM(S): 2 INJECTION INTRAVENOUS at 17:43

## 2024-10-31 RX ADMIN — Medication 650 MILLIGRAM(S): at 17:53

## 2024-10-31 RX ADMIN — PANTOPRAZOLE SODIUM 40 MILLIGRAM(S): 40 TABLET, DELAYED RELEASE ORAL at 12:42

## 2024-10-31 RX ADMIN — APIXABAN 2.5 MILLIGRAM(S): 5 TABLET, FILM COATED ORAL at 17:53

## 2024-10-31 RX ADMIN — APIXABAN 2.5 MILLIGRAM(S): 5 TABLET, FILM COATED ORAL at 05:16

## 2024-10-31 RX ADMIN — NYSTATIN 1 APPLICATION(S): 100000 POWDER TOPICAL at 17:43

## 2024-10-31 RX ADMIN — LEVETIRACETAM 750 MILLIGRAM(S): 500 TABLET, FILM COATED ORAL at 17:43

## 2024-10-31 RX ADMIN — CHLORHEXIDINE GLUCONATE 15 MILLILITER(S): 40 SOLUTION TOPICAL at 05:16

## 2024-10-31 RX ADMIN — CHLORHEXIDINE GLUCONATE 1 APPLICATION(S): 40 SOLUTION TOPICAL at 05:18

## 2024-10-31 RX ADMIN — Medication 50 MICROGRAM(S): at 05:20

## 2024-10-31 RX ADMIN — Medication 50 MICROGRAM(S): at 04:50

## 2024-10-31 RX ADMIN — LEVETIRACETAM 750 MILLIGRAM(S): 500 TABLET, FILM COATED ORAL at 05:18

## 2024-10-31 RX ADMIN — Medication 650 MILLIGRAM(S): at 18:30

## 2024-10-31 RX ADMIN — Medication 20 MILLIGRAM(S): at 22:22

## 2024-10-31 RX ADMIN — Medication 2: at 06:18

## 2024-10-31 RX ADMIN — Medication 500 MILLILITER(S): at 17:29

## 2024-10-31 NOTE — PROGRESS NOTE ADULT - SUBJECTIVE AND OBJECTIVE BOX
INTERVAL HPI/OVERNIGHT EVENTS: No acute events overnight.  Patient examined at bedside this AM.  Patient offers no acute complaints. Shakes head no if asked any pain.      PRESSORS: [ ] YES [X] NO  WHICH:    Antimicrobial:  fluconAZOLE IVPB 800 milliGRAM(s) IV Intermittent once    Cardiovascular:    Pulmonary:    Hematalogic:  apixaban 2.5 milliGRAM(s) Oral every 12 hours    Other:  atorvastatin 20 milliGRAM(s) Oral at bedtime  chlorhexidine 2% Cloths 1 Application(s) Topical <User Schedule>  insulin lispro (ADMELOG) corrective regimen sliding scale   SubCutaneous every 6 hours  lactated ringers Bolus 500 milliLiter(s) IV Bolus once  levETIRAcetam  Solution 750 milliGRAM(s) Oral two times a day  pantoprazole   Suspension 40 milliGRAM(s) Enteral Tube daily  polyethylene glycol 3350 17 Gram(s) Oral daily  senna 2 Tablet(s) Oral at bedtime    apixaban 2.5 milliGRAM(s) Oral every 12 hours  atorvastatin 20 milliGRAM(s) Oral at bedtime  chlorhexidine 2% Cloths 1 Application(s) Topical <User Schedule>  fluconAZOLE IVPB 800 milliGRAM(s) IV Intermittent once  insulin lispro (ADMELOG) corrective regimen sliding scale   SubCutaneous every 6 hours  lactated ringers Bolus 500 milliLiter(s) IV Bolus once  levETIRAcetam  Solution 750 milliGRAM(s) Oral two times a day  pantoprazole   Suspension 40 milliGRAM(s) Enteral Tube daily  polyethylene glycol 3350 17 Gram(s) Oral daily  senna 2 Tablet(s) Oral at bedtime    Drug Dosing Weight  Height (cm): 160 (28 Oct 2024 15:20)  Weight (kg): 59.3 (28 Oct 2024 21:00)  BMI (kg/m2): 23.2 (28 Oct 2024 21:00)  BSA (m2): 1.61 (28 Oct 2024 21:00)    CENTRAL LINE: [ ] YES [ ] NO  LOCATION:   DATE INSERTED:  REMOVE: [ ] YES [ ] NO  EXPLAIN:    HUYNH: [ ] YES [ ] NO    DATE INSERTED:  REMOVE:  [ ] YES [ ] NO  EXPLAIN:    A-LINE:  [ ] YES [ ] NO  LOCATION:   DATE INSERTED:  REMOVE:  [ ] YES [ ] NO  EXPLAIN:    PMH -reviewed admission note, no change since admission  PAST MEDICAL & SURGICAL HISTORY:  Stroke      HTN (hypertension)      HLD (hyperlipidemia)      DM (diabetes mellitus)      DVT (deep venous thrombosis)      CVA (cerebrovascular accident)      Aphasia      Dysphagia      Apraxia      Right-sided muscle weakness      Nonverbal      Seizure          ICU Vital Signs Last 24 Hrs  T(C): 37.7 (31 Oct 2024 16:00), Max: 37.8 (31 Oct 2024 05:00)  T(F): 99.9 (31 Oct 2024 16:00), Max: 100 (31 Oct 2024 05:00)  HR: 90 (31 Oct 2024 16:00) (78 - 99)  BP: 141/64 (31 Oct 2024 16:00) (100/45 - 155/69)  BP(mean): 87 (31 Oct 2024 16:00) (62 - 95)  ABP: --  ABP(mean): --  RR: 11 (31 Oct 2024 16:00) (11 - 17)  SpO2: 99% (31 Oct 2024 16:00) (99% - 100%)    O2 Parameters below as of 31 Oct 2024 16:00  Patient On (Oxygen Delivery Method): room air                  10-30 @ 07:01  -  10-31 @ 07:00  --------------------------------------------------------  IN: 570 mL / OUT: 1220 mL / NET: -650 mL        Mode: standby      PHYSICAL EXAM:    GENERAL: NAD, tracking eye movements, follows comannds.   NERVOUS SYSTEM:  Alert. Able to wiggle fingers and toes.   CHEST/LUNG: No chest deformity; No rales, rhonchi, wheezing   HEART: Regular rate and rhythm; No murmurs, rubs, or gallops  ABDOMEN: Soft, Nontender, mildly distended; Bowel sounds present  EXTREMITIES: Continued edema in hands. 2+ Peripheral Pulses, No clubbing, cyanosis  LYMPH: No lymphadenopathy noted  SKIN: No rashes or lesions;  Good capillary refill      LABS:  CBC Full  -  ( 31 Oct 2024 03:44 )  WBC Count : 9.20 K/uL  RBC Count : 2.70 M/uL  Hemoglobin : 7.9 g/dL  Hematocrit : 24.9 %  Platelet Count - Automated : 197 K/uL  Mean Cell Volume : 92.2 fl  Mean Cell Hemoglobin : 29.3 pg  Mean Cell Hemoglobin Concentration : 31.7 g/dL  Auto Neutrophil # : x  Auto Lymphocyte # : x  Auto Monocyte # : x  Auto Eosinophil # : x  Auto Basophil # : x  Auto Neutrophil % : x  Auto Lymphocyte % : x  Auto Monocyte % : x  Auto Eosinophil % : x  Auto Basophil % : x    10-31    148[H]  |  122[H]  |  28[H]  ----------------------------<  185[H]  3.6   |  20[L]  |  1.42[H]    Ca    7.6[L]      31 Oct 2024 03:44  Phos  2.7     10-31  Mg     1.7     10-31    TPro  5.8[L]  /  Alb  1.5[L]  /  TBili  0.2  /  DBili  x   /  AST  13  /  ALT  13  /  AlkPhos  98  10-31      Urinalysis Basic - ( 31 Oct 2024 03:44 )    Color: x / Appearance: x / SG: x / pH: x  Gluc: 185 mg/dL / Ketone: x  / Bili: x / Urobili: x   Blood: x / Protein: x / Nitrite: x   Leuk Esterase: x / RBC: x / WBC x   Sq Epi: x / Non Sq Epi: x / Bacteria: x      Culture Results:   50,000 - 99,000 CFU/mL Candida albicans  "Susceptibilities not performed" (10-28 @ 16:44)      RADIOLOGY & ADDITIONAL STUDIES REVIEWED: < from: CT Chest w/ IV Cont (10.31.24 @ 13:49) >    ACC: [id number] EXAM:  CT CHEST IC   ORDERED BY: BRITTNEY HO     ACC: [id number] EXAM:  CT ABDOMEN AND PELVIS IC   ORDERED BY: PEGGY CHAKRABORTY     PROCEDURE DATE:  10/31/2024          INTERPRETATION:  CLINICAL INFORMATION: 89 years  Female with Candidemia.    COMPARISON: Noncontrast CT chest abdomen and pelvis 10/17/2024    CONTRAST/COMPLICATIONS:  IV Contrast: Omnipaque 350 (accession [id number]), IV contrast documented   in unlinked concurrent exam (accession [id number])  90 cc administered   10   cc discarded  Oral Contrast: NONE  Complications: None reported at time of study completion    PROCEDURE:  CT of the Chest, Abdomen and Pelvis was performed.  Sagittal and coronal reformats were performed.    FINDINGS:  CHEST:  LUNGS AND LARGE AIRWAYS:Patent central airways. No pulmonary nodules.   Bilateral lower lobe passive atelectasis.  PLEURA: Small bilateral pleural effusions increased from prior.  VESSELS: Atherosclerotic aorta. Coronary atherosclerosis.  HEART: Cardiomegaly. No pericardialeffusion.  MEDIASTINUM AND WALTER: No lymphadenopathy.  CHEST WALL AND LOWER NECK: Subcentimeter thyroid nodules.    ABDOMEN AND PELVIS:  LIVER: Within normal limits.  BILE DUCTS: Normal caliber.  GALLBLADDER: Within normal limits.  SPLEEN: 3.4 cm cystwith partially calcified wall. Other scattered   hypodensities too small to characterize.  PANCREAS: 6.8 x 6.8 cm multiseptated pancreatic head cystic mass. 6 mm   and 8 mm internal calcifications. Distal pancreatic duct distention   measuring up to 1.1 cm.  ADRENALS: Within normal limits.  KIDNEYS/URETERS: Multicystic kidneys. Dominant 7.7 cm right upper pole   cyst. 3.9 x 3.0 cm hypoattenuating cortical focus in the mid right kidney   may represent pyelonephritis or malignancy. Multiple hypodensities too   small to characterize.  No hydroureteronephrosis. Left renal pelvic urothelial thickening   suggestive of infection.  6 mm right renal pelvic calculus. Right renal pelvic and ureteral   urothelial enhancement.    BLADDER: Within normal limits.  REPRODUCTIVE ORGANS: Abnormally distended endometrial cavity measuring up   to 1.5 cm with amorphous internal soft tissue. Several calcified myomata.    BOWEL: No bowel obstruction. Appendix is not visualized and cannot be   assessed.. Feeding tube terminates in the stomach. Rectal tube in situ.   Marked perirectal edema and fluid appear  PERITONEUM/RETROPERITONEUM: Within normal limits.  VESSELS: Advanced atherosclerotic changes.  LYMPH NODES: 8 mm right perirectal lymph node (2:240). 1.2 x 0.6 cm right   pelvic sidewall lymph node (2:242).  ABDOMINAL WALL: Anasarca.  BONES: Degenerative changes of the spine and hips.    IMPRESSION:  Minimal bilateral pleural effusions and underlying passive atelectasis.   Cardiomegaly.    Marked perirectal edema and fluid concerning for infectious or   inflammatory proctitis. Underlying malignancy is not excluded. Prominent   perirectal and right pelvic sidewall lymph nodes.    3.9 x 3.0 cm right renal cortical hypoattenuating focus suspicious for  infection. Bilateral urothelial enhancement also suspicious for infection.    Multiple indeterminate renal lesions. Polycystic kidneys. Nonobstructing   right renal calculus.    Redemonstrated multicystic pancreatic mass with distal pancreatic duct  distention.    Abnormally distended endometrial cavity with internal amorphous soft   tissue concerning for malignancy. Infection is not excluded.        --- End of Report ---            VALERIA SARMIENTO MD; Attending Radiologist  This document has been electronically signed. Oct 31 2024  2:42PM    < end of copied text >  < from: CT Head No Cont (10.28.24 @ 20:31) >    ACC: [id number] EXAM:  CT BRAIN   ORDERED BY: GEORGIE SEVILLA     PROCEDURE DATE:  10/28/2024          INTERPRETATION:  CLINICAL INFORMATION: Status epilepticus.    COMPARISON: 01/14/2024    CONTRAST:  IV Contrast: NONE  Complications: None reported at time of study completion    TECHNIQUE:  Serial axial images were obtained from the skull base to the   vertex using multi-slice helical technique. Sagittal and coronal   reformats were obtained.    FINDINGS:    VENTRICLES AND SULCI: Mild-to-moderate age-related involutional changes.  INTRA-AXIAL: No mass effect, acute hemorrhage, or midline shift.  Large   chronic infarct in the left middle cerebral artery vascular territory,   involving left frontal parietal convexity extension into the left   temporal-parietal junction, superior aspect the left temporal lobe, left   insula and left inferior frontal gyrus/left frontal operculum is stable.    Curvilinear areas of mildly increased attenuation along the periphery of   this infarct, unchanged from 01/14/2024 is compatible with areas of   calcification/mineralization and/or laminar necrosis.  No new loss of   gray matter-white matter differentiation is appreciated.  Periventricular   white matter hypodensities are consistent with mild microvascular-type   changes.  EXTRA-AXIAL: No acute subarachnoid or subdural hemorrhage.    VISUALIZED SINUSES:  An air-fluid level partially opacifies left sphenoid   sinus.  TYMPANOMASTOID CAVITIES:  Clear.  VISUALIZED ORBITS: Normal.  CALVARIUM: Intact.    MISCELLANEOUS: None.      IMPRESSION:  No CT evidence of acute intracranial pathology.    Large chronic infarct in the left middle cerebral artery vascular   territory, which may represent a seizure focus.    An air-fluid level partially opacifies left sphenoid sinus.        --- End of Report ---            TIFFANY PORTILLO MD; Attending Radiologist  This document has been electronically signed. Oct 28 2024  9:02PM    < end of copied text >      [X]GOALS OF CARE DISCUSSION WITH PATIENT/FAMILY/PROXY: DNR/trial of intubation/long term feeding tube if necessary.      CRITICAL CARE TIME SPENT: 35 minutes

## 2024-10-31 NOTE — PROGRESS NOTE ADULT - SUBJECTIVE AND OBJECTIVE BOX
Time of Visit:  Patient seen and examined.     MEDICATIONS  (STANDING):  apixaban 2.5 milliGRAM(s) Oral every 12 hours  atorvastatin 20 milliGRAM(s) Oral at bedtime  chlorhexidine 2% Cloths 1 Application(s) Topical <User Schedule>  insulin lispro (ADMELOG) corrective regimen sliding scale   SubCutaneous every 6 hours  levETIRAcetam  Solution 750 milliGRAM(s) Oral two times a day  nystatin Powder 1 Application(s) Topical two times a day  pantoprazole   Suspension 40 milliGRAM(s) Enteral Tube daily  polyethylene glycol 3350 17 Gram(s) Oral daily  senna 2 Tablet(s) Oral at bedtime      MEDICATIONS  (PRN):  acetaminophen   Oral Liquid .. 650 milliGRAM(s) Oral every 6 hours PRN Mild Pain (1 - 3)       Medications up to date at time of exam.      PHYSICAL EXAMINATION:  Patient has no new complaints.  GENERAL: The patient  in no apparent distress.     Vital Signs Last 24 Hrs  T(C): 37.7 (31 Oct 2024 18:00), Max: 37.8 (31 Oct 2024 05:00)  T(F): 99.9 (31 Oct 2024 18:00), Max: 100 (31 Oct 2024 05:00)  HR: 96 (31 Oct 2024 18:00) (78 - 96)  BP: 144/64 (31 Oct 2024 18:00) (100/45 - 155/69)  BP(mean): 86 (31 Oct 2024 18:00) (62 - 95)  RR: 13 (31 Oct 2024 18:00) (11 - 17)  SpO2: 100% (31 Oct 2024 18:00) (97% - 100%)    Parameters below as of 31 Oct 2024 16:00  Patient On (Oxygen Delivery Method): room air      Mode: standby   (if applicable)    Chest Tube (if applicable)    HEENT: Head is normocephalic and atraumatic. Extraocular muscles are intact. Mucous membranes are moist.     NECK: Supple, no palpable adenopathy.    LUNGS: Fair bilateral air entry   no wheezing, rales, or rhonchi.    HEART: Regular rate and rhythm without murmur.    ABDOMEN: Soft, nontender, and nondistended.  No hepatosplenomegaly is noted.    : No painful voiding, no pelvic pain    EXTREMITIES: Without any cyanosis, clubbing, rash, lesions or edema.    NEUROLOGIC: Awake, alert, oriented, grossly intact    SKIN: Warm, dry, good turgor.      LABS:                        7.9    9.20  )-----------( 197      ( 31 Oct 2024 03:44 )             24.9     10-31    148[H]  |  122[H]  |  28[H]  ----------------------------<  185[H]  3.6   |  20[L]  |  1.42[H]    Ca    7.6[L]      31 Oct 2024 03:44  Phos  2.7     10-31  Mg     1.7     10-31    TPro  5.8[L]  /  Alb  1.5[L]  /  TBili  0.2  /  DBili  x   /  AST  13  /  ALT  13  /  AlkPhos  98  10-31      Urinalysis Basic - ( 31 Oct 2024 03:44 )    Color: x / Appearance: x / SG: x / pH: x  Gluc: 185 mg/dL / Ketone: x  / Bili: x / Urobili: x   Blood: x / Protein: x / Nitrite: x   Leuk Esterase: x / RBC: x / WBC x   Sq Epi: x / Non Sq Epi: x / Bacteria: x                      MICROBIOLOGY: (if applicable)    RADIOLOGY & ADDITIONAL STUDIES:  EKG:   CXR:  ECHO:    IMPRESSION: 89y Female PAST MEDICAL & SURGICAL HISTORY:  Stroke      HTN (hypertension)      HLD (hyperlipidemia)      DM (diabetes mellitus)      DVT (deep venous thrombosis)      CVA (cerebrovascular accident)      Aphasia      Dysphagia      Apraxia      Right-sided muscle weakness      Nonverbal      Seizure       p/w            Time of Visit:  Patient seen and examined. extubated earlier today . Jacob Noriega at bedside     MEDICATIONS  (STANDING):  apixaban 2.5 milliGRAM(s) Oral every 12 hours  atorvastatin 20 milliGRAM(s) Oral at bedtime  chlorhexidine 2% Cloths 1 Application(s) Topical <User Schedule>  insulin lispro (ADMELOG) corrective regimen sliding scale   SubCutaneous every 6 hours  levETIRAcetam  Solution 750 milliGRAM(s) Oral two times a day  nystatin Powder 1 Application(s) Topical two times a day  pantoprazole   Suspension 40 milliGRAM(s) Enteral Tube daily  polyethylene glycol 3350 17 Gram(s) Oral daily  senna 2 Tablet(s) Oral at bedtime      MEDICATIONS  (PRN):  acetaminophen   Oral Liquid .. 650 milliGRAM(s) Oral every 6 hours PRN Mild Pain (1 - 3)       Medications up to date at time of exam.      PHYSICAL EXAMINATION:  Patient has no new complaints.  GENERAL: The patient  in no apparent distress.     Vital Signs Last 24 Hrs  T(C): 37.7 (31 Oct 2024 18:00), Max: 37.8 (31 Oct 2024 05:00)  T(F): 99.9 (31 Oct 2024 18:00), Max: 100 (31 Oct 2024 05:00)  HR: 96 (31 Oct 2024 18:00) (78 - 96)  BP: 144/64 (31 Oct 2024 18:00) (100/45 - 155/69)  BP(mean): 86 (31 Oct 2024 18:00) (62 - 95)  RR: 13 (31 Oct 2024 18:00) (11 - 17)  SpO2: 100% (31 Oct 2024 18:00) (97% - 100%)    Parameters below as of 31 Oct 2024 16:00  Patient On (Oxygen Delivery Method): room air      Mode: standby   (if applicable)    Chest Tube (if applicable)    HEENT: Head is normocephalic and atraumatic. Extraocular muscles are intact. Mucous membranes are moist.     NECK: Supple, no palpable adenopathy.    LUNGS: Fair bilateral air entry   no wheezing, rales, or rhonchi.    HEART: Regular rate and rhythm without murmur.    ABDOMEN: Soft, nontender, and nondistended.  No hepatosplenomegaly is noted.    : No painful voiding, no pelvic pain    EXTREMITIES: Without any cyanosis, clubbing, rash, lesions or edema.    NEUROLOGIC: Awake,    SKIN: Warm, dry, good turgor.      LABS:                        7.9    9.20  )-----------( 197      ( 31 Oct 2024 03:44 )             24.9     10-31    148[H]  |  122[H]  |  28[H]  ----------------------------<  185[H]  3.6   |  20[L]  |  1.42[H]    Ca    7.6[L]      31 Oct 2024 03:44  Phos  2.7     10-31  Mg     1.7     10-31    TPro  5.8[L]  /  Alb  1.5[L]  /  TBili  0.2  /  DBili  x   /  AST  13  /  ALT  13  /  AlkPhos  98  10-31      Urinalysis Basic - ( 31 Oct 2024 03:44 )    Color: x / Appearance: x / SG: x / pH: x  Gluc: 185 mg/dL / Ketone: x  / Bili: x / Urobili: x   Blood: x / Protein: x / Nitrite: x   Leuk Esterase: x / RBC: x / WBC x   Sq Epi: x / Non Sq Epi: x / Bacteria: x      MICROBIOLOGY: (if applicable)    RADIOLOGY & ADDITIONAL STUDIES:  EKG:   CXR:  ECHO:    IMPRESSION: 89y Female PAST MEDICAL & SURGICAL HISTORY:  Stroke      HTN (hypertension)      HLD (hyperlipidemia)      DM (diabetes mellitus)      DVT (deep venous thrombosis)      CVA (cerebrovascular accident)      Aphasia      Dysphagia      Apraxia      Right-sided muscle weakness      Nonverbal      Seizure       p/w         IMP: This is a 89 yr old woman with  CVA w/ residual R deficits and aphasia, seizure disorder, HTN, DM and recent UTI on oral antibiotics who was sent from NH to the ED with breakthrough seizures/status epilepticus s/p intubation in the field. She received multiple doses of IV Diazepam and loaded with IV Keppra. She was also started on IV antibiotics for possible UTI; admitted to ICU for further management of seizures and mechanical ventilation    ASSESSMENT  - Acute hypoxemic respiratory failure from seizures - s/p intubation  - Status Epilepticus  - Type A Lactic acidosis  - TRAVIS, likely prerenal  - Hypernatremia, mild  - Sepsis /UTI  - Seizures  - CVA with right sided deficit/aphasia,   - HTN HLD  - DM, uncontrol       Sugg:    - Pat extubated today   - O2 supp as needed   - Asp precaution   - Hemodynamic monitoring   - EEG  - KEPPRA  - Antibx for UTI   - F/U cultures   - Tube feed   - DVT GI prophy     discussed with ICU attend   time spent 37 min

## 2024-10-31 NOTE — EEG REPORT - NS EEG TEXT BOX
[facility]   COMPREHENSIVE EPILEPSY CENTER   REPORT OF CONTINUOUS VIDEO EEG     [facility]: [address], [place] [address], Ph#: [phone]  [facility]: [id number], [place] [address], Ph#: [phone]  Office: [address] [place] [address], [facility] [address], [place] [address] Ph#: [phone]    Patient Name: MICHAEL BARFIELD  Age and : 89y ([birth year])  MRN #: [id number]  Location: [facility] IC06 W2  Referring Physician: Abram Miranda    Study Date: 10-30-24 at 08:00 - 10-31-24 at 08:00  _____________________________________________________________  STUDY INFORMATION    EEG Recording Technique:  The patient underwent continuous Video-EEG monitoring, using Telemetry System hardware on the XLTek Digital System. EEG and video data were stored on a computer hard drive with important events saved in digital archive files. The material was reviewed by a physician (electroencephalographer / epileptologist) on a daily basis. Clarence and seizure detection algorithms were utilized and reviewed. An EEG Technician attended to the patient, and was available throughout daytime work hours.  The epilepsy center neurologist was available in person or on call 24-hours per day.    EEG Placement and Labeling of Electrodes:  The EEG was performed utilizing 20 channel referential EEG connections (coronal over temporal over parasagittal montage) using all standard 10-20 electrode placements with EKG, with additional electrodes placed in the inferior temporal region using the modified 10-10 montage electrode placements for elective admissions, or if deemed necessary. Recording was at a sampling rate of 256 samples per second per channel. Time synchronized digital video recording was done simultaneously with EEG recording. A low light infrared camera was used for low light recording.     _____________________________________________________________  HISTORY    Patient is a 89y old  Female who presents with a chief complaint of status epilepticus (30 Oct 2024 07:32)      PERTINENT MEDICATION:  MEDICATIONS  (STANDING):  apixaban 2.5 milliGRAM(s) Oral every 12 hours  atorvastatin 20 milliGRAM(s) Oral at bedtime  cefTRIAXone   IVPB 1000 milliGRAM(s) IV Intermittent every 24 hours  chlorhexidine 0.12% Liquid 15 milliLiter(s) Oral Mucosa every 12 hours  chlorhexidine 2% Cloths 1 Application(s) Topical <User Schedule>  insulin lispro (ADMELOG) corrective regimen sliding scale   SubCutaneous every 6 hours  levETIRAcetam  Solution 750 milliGRAM(s) Oral two times a day  pantoprazole  Injectable 40 milliGRAM(s) IV Push daily  propofol Infusion. 14.053 MICROgram(s)/kG/Min (5 mL/Hr) IV Continuous <Continuous>    _____________________________________________________________  INTERPRETATION    Findings: The background was continuous, spontaneously variable and reactive. During wakefulness, the posterior dominant rhythm consisted of asymmetric (lower voltage in left posterior quadrant), poorly-modulated 6-7 Hz activity, with amplitude to 30 uV, that attenuated to eye opening.  Low amplitude frontal beta was noted in wakefulness.    Background Slowing:  -Continuous diffuse theta and polymorphic delta slowing.  -Slowing of PDR    Focal Slowing:   -Continuous polymorphic delta activity and voltage attenuation in the left hemisphere    Sleep Background:  Drowsiness was characterized by fragmentation, attenuation, and slowing of the background activity.    Sleep was characterized by the presence of vertex waves, spindles, lateralized to right hemisphere.    Other Non-Epileptiform Findings:  None were present.    Interictal Epileptiform Activity:   None were present.    Events:  Clinical events: None recorded.  Seizures: None recorded.    Artifacts:  Intermittent myogenic and movement artifacts were noted.    ECG:  The heart rate on single channel ECG was predominantly between 60-80 BPM.    _____________________________________________________________  EEG SUMMARY/CLASSIFICATION    Abnormal EEG in the awake, drowsy and asleep states.  -Continuous slowing, focal, left hemisphere  -Voltage attenuation in the left hemisphere  -Continuous slowing, generalized, moderate  _____________________________________________________________  EEG IMPRESSION/CLINICAL CORRELATE    Abnormal EEG study.  1. Structural abnormality in the left hemisphere.   2. Moderate nonspecific diffuse or multifocal cerebral dysfunction.   3. No epileptiform abnormalities were recorded.    Sage Holley MD  Neurology Attending Physician         [facility]   COMPREHENSIVE EPILEPSY CENTER   REPORT OF CONTINUOUS VIDEO EEG     [facility]: [address], [place] [address], Ph#: [phone]  [facility]: [id number], [place] [address], Ph#: [phone]  Office: [address] [place] [address], [facility] [address], [place] [address] Ph#: [phone]    Patient Name: MICHAEL BARFIELD  Age and : 89y (25)  MRN #: [id number]  Location: [facility] IC06 W2  Referring Physician: Abram Miranda    Study Date: 10-30-24 at 08:00 - 10-31-24 at 10:33  Duration: 26 hr 27 min  _____________________________________________________________  STUDY INFORMATION    EEG Recording Technique:  The patient underwent continuous Video-EEG monitoring, using Telemetry System hardware on the XLTek Digital System. EEG and video data were stored on a computer hard drive with important events saved in digital archive files. The material was reviewed by a physician (electroencephalographer / epileptologist) on a daily basis. Clarence and seizure detection algorithms were utilized and reviewed. An EEG Technician attended to the patient, and was available throughout daytime work hours.  The epilepsy center neurologist was available in person or on call 24-hours per day.    EEG Placement and Labeling of Electrodes:  The EEG was performed utilizing 20 channel referential EEG connections (coronal over temporal over parasagittal montage) using all standard 10-20 electrode placements with EKG, with additional electrodes placed in the inferior temporal region using the modified 10-10 montage electrode placements for elective admissions, or if deemed necessary. Recording was at a sampling rate of 256 samples per second per channel. Time synchronized digital video recording was done simultaneously with EEG recording. A low light infrared camera was used for low light recording.     _____________________________________________________________  HISTORY    Patient is a 89y old  Female who presents with a chief complaint of status epilepticus (30 Oct 2024 07:32)      PERTINENT MEDICATION:  MEDICATIONS  (STANDING):  apixaban 2.5 milliGRAM(s) Oral every 12 hours  atorvastatin 20 milliGRAM(s) Oral at bedtime  cefTRIAXone   IVPB 1000 milliGRAM(s) IV Intermittent every 24 hours  chlorhexidine 0.12% Liquid 15 milliLiter(s) Oral Mucosa every 12 hours  chlorhexidine 2% Cloths 1 Application(s) Topical <User Schedule>  insulin lispro (ADMELOG) corrective regimen sliding scale   SubCutaneous every 6 hours  levETIRAcetam  Solution 750 milliGRAM(s) Oral two times a day  pantoprazole  Injectable 40 milliGRAM(s) IV Push daily  propofol Infusion. 14.053 MICROgram(s)/kG/Min (5 mL/Hr) IV Continuous <Continuous>    _____________________________________________________________  INTERPRETATION    Findings: The background was continuous, spontaneously variable and reactive. During wakefulness, the posterior dominant rhythm consisted of asymmetric (lower voltage in left posterior quadrant), poorly-modulated 6-7 Hz activity, with amplitude to 30 uV, that attenuated to eye opening. Low amplitude frontal beta was noted in wakefulness.    Background Slowing:  -Continuous diffuse theta and polymorphic delta slowing.  -Slowing of PDR    Focal Slowing:   -Continuous polymorphic delta activity and voltage attenuation in the left hemisphere    Sleep Background:  Drowsiness was characterized by fragmentation, attenuation, and slowing of the background activity.    Sleep was characterized by the presence of vertex waves, spindles, lateralized to right hemisphere.    Other Non-Epileptiform Findings:  None were present.    Interictal Epileptiform Activity:   None were present.    Events:  Clinical events: None recorded.  Seizures: None recorded.    Artifacts:  Intermittent myogenic and movement artifacts were noted.    ECG:  The heart rate on single channel ECG was predominantly between 60-80 BPM.    _____________________________________________________________  EEG SUMMARY/CLASSIFICATION    Abnormal EEG in the awake, drowsy and asleep states.  -Continuous slowing, focal, left hemisphere  -Voltage attenuation in the left hemisphere  -Continuous slowing, generalized, moderate  _____________________________________________________________  EEG IMPRESSION/CLINICAL CORRELATE    Abnormal EEG study.  1. Structural abnormality in the left hemisphere.   2. Moderate nonspecific diffuse or multifocal cerebral dysfunction.   3. No epileptiform abnormalities were recorded.    Sage Holley MD  Neurology Attending Physician

## 2024-10-31 NOTE — PROGRESS NOTE ADULT - ASSESSMENT
Assessment	  89F from PMHx CVA with right sided deficits and aphasia, (bedbound/wheelchair bound), IDT2DM, HTN, HLD, seizure disorder (on Keppra) presenting from [facility] after witnessed tonic clonic seizure lasting >30min. Patient admitted to ICU after intubation in the field for status epilepticus, with course complicated by fungemia. S/p intubation on room air. 24 hour EEG revealed no captured seizures.     #status epilepticus  #CVA  #DM  #lactic acidosis       =================== Neuro============================  #status epilepticus   #Hx of seizures (on Keppra 500mg bid)   p/w [facility] with witnessed tonic clonic seizure for > 30  minutes prior to EMS arrival, intubated in the field  s/p keppra 2g load + additional 1.5g for 60mg/kg dose  10/29 vEEG not working, spot EEG: Moderate-severe diffuse cerebral dysfunction is nonspecific in etiology.  No epileptiform abnormalities or seizures.  discontinued  24h EEG   c/w keppra 750 BID  Neurology Dr. Jennings/Rodrigo consulted  f/u keppra levels    #h/o CVA  with right sided hemiplegia and aphasia (prior notes stating bedbound status, son at bedside stating pt ambulates with wheelchair and eats with assistance and says few words)   on atorvastatin 20 and eliquis 2.5mg BID at home  c/w home meds via NGT    ================= Cardiovascular==========================  #HTN  takes norvasc 10 daily  /62 upon evaluation in ED  was reported to be hypotensive, propofol weaned down and was placed on peripheral levophed  peripheral levophed weaned off in ED  s/p 2L NS bolus  monitor BP  Holding home meds, currently soft BP       ================- Pulm=================================  #intubated and sedated  CXR without any infiltrates   rhonchi heard b/l (possible aspiration during status epilepticus)   started on rocephin  mechanical vent settings 350/15/5/40%  ABG 7.39/30/255/18  10/29 passed SAT, SBT  10/30 trial SAT, SBT   10/31 s/p extubation, on room air    ==================ID===================================  #UTI  recently discharged on 10/23 for UTI with E.coli bacteremia   frost placed in ED with purulent output noted  UA +  s/p zosyn in ED  c/w ceftriaxone 1g qd   UCx: candida  F/u BCx     #lactic acidosis   lactate 6.2 > 3.4 >1.6  s/p 2L NS bolus in ED  RESOLVED     #Fungemia   - BCx positive for candida albicans  - Discontinued caspofungin, started fluconazole  - ID Dr. Burnett following     ================= Nephro================================  #TRAVIS  BUN/Cr 29/2.05 (Baseline Scr 1.29)  s/p 2L IVF in ED   BUN/Cr 24/1.50   FeNa 3.5%   Improving    #Hypernatremia   Na 147, FWD 1.3L  c/w free water 250cc q4h    =================GI====================================  no active issues  distended abdomen  Abdominal xray - f/u  CT Abd Pelvis: Minimal bilateral pleural effusions and underlying passive atelectasis. Cardiomegaly. Marked perirectal edema and fluid concerning for infectious or inflammatory proctitis. Underlying malignancy is not excluded. Prominent perirectal and right pelvic sidewall lymph nodes. 3.9 x 3.0 cm right renal cortical hypoattenuating focus suspicious for infection. Bilateral urothelial enhancement also suspicious for infection. Multiple indeterminate renal lesions. Polycystic kidneys. Nonobstructing right renal calculus. 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 Abnormally distended endometrial cavity with internal amorphous soft tissue concerning for malignancy. Infection is not excluded.  start TF via NGT    ================ Heme==================================  #anemia  Hgb 9.3 > 8.2 (Baseline Hgb 8)  no active signs of bleeding       =================Endocrine===============================  #DM  on Humalog sliding scale and farxiga at home  will start mISS q6 while NPO on TF  monitor FS    ================= Skin/Catheters============================  Peripheral IV lines   Frost catheter     =================Prophylaxis =============================  eliquis DVT prophylaxis   protonix GI prophylaxis    ==================GOC==================================  FULL CODE   Disposition ICU

## 2024-10-31 NOTE — PROGRESS NOTE ADULT - SUBJECTIVE AND OBJECTIVE BOX
INTERVAL HPI/OVERNIGHT EVENTS:  Patient seen,events noticed for overnight  VITAL SIGNS:  T(F): 99.3 (10-31-24 @ 07:00)  HR: 85 (10-31-24 @ 07:00)  BP: 115/58 (10-31-24 @ 07:00)  RR: 15 (10-31-24 @ 07:00)  SpO2: 100% (10-31-24 @ 07:00)  Wt(kg): --    PHYSICAL EXAM:  awake,poor historian  Constitutional:  Eyes:  ENMT:perrla  Neck:  Respiratory:clear  Cardiovascular:s1s2,m-none  Gastrointestinal:soft,bs pos  Extremities:  Vascular:  Neurological:  Musculoskeletal:    MEDICATIONS  (STANDING):  apixaban 2.5 milliGRAM(s) Oral every 12 hours  atorvastatin 20 milliGRAM(s) Oral at bedtime  caspofungin IVPB      caspofungin IVPB 50 milliGRAM(s) IV Intermittent every 24 hours  cefTRIAXone   IVPB 1000 milliGRAM(s) IV Intermittent every 24 hours  chlorhexidine 0.12% Liquid 15 milliLiter(s) Oral Mucosa every 12 hours  chlorhexidine 2% Cloths 1 Application(s) Topical <User Schedule>  insulin lispro (ADMELOG) corrective regimen sliding scale   SubCutaneous every 6 hours  levETIRAcetam  Solution 750 milliGRAM(s) Oral two times a day  pantoprazole   Suspension 40 milliGRAM(s) Enteral Tube daily  polyethylene glycol 3350 17 Gram(s) Oral daily  senna 2 Tablet(s) Oral at bedtime    MEDICATIONS  (PRN):  fentaNYL    Injectable 50 MICROGram(s) IV Push every 4 hours PRN Severe Pain (7 - 10)      Allergies    No Known Allergies    Intolerances        LABS:                        7.9    9.20  )-----------( 197      ( 31 Oct 2024 03:44 )             24.9     10-31    148[H]  |  122[H]  |  28[H]  ----------------------------<  185[H]  3.6   |  20[L]  |  1.42[H]    Ca    7.6[L]      31 Oct 2024 03:44  Phos  2.7     10-31  Mg     1.7     10-31    TPro  5.8[L]  /  Alb  1.5[L]  /  TBili  0.2  /  DBili  x   /  AST  13  /  ALT  13  /  AlkPhos  98  10-31      Urinalysis Basic - ( 31 Oct 2024 03:44 )    Color: x / Appearance: x / SG: x / pH: x  Gluc: 185 mg/dL / Ketone: x  / Bili: x / Urobili: x   Blood: x / Protein: x / Nitrite: x   Leuk Esterase: x / RBC: x / WBC x   Sq Epi: x / Non Sq Epi: x / Bacteria: x        RADIOLOGY & ADDITIONAL TESTS:    :  · Assessment	  89F from PMH CVA with right sided deficits and aphasia, DM, HTN , HLD, seizure disorder presenting from [facility] after witnessed tonic clonic seizure.    #status epilepticus  #CVA  #DM  #lactic acidosis       =================== Neuro============================  #status epilepticus   presenting from [facility] with witnessed tonic clonic seizure for > 30  minutes prior to EMS arrival, intubated in the field  received keppra 2g load, gave an additional 1.5 for 60mg/kg dose  started keppra 750 BID   Neurology Dr. Jennings consulted  ordered 24 hr EEG  f/u keppra levels    #h/o CVA  with right sided hemiplegia and aphasia (prior notes stating bedbound status, son at bedside stating pt ambulates with wheelchair)  on atorvastatin 20 and eliquis 2.5mg BID at home  continue with home meds via NGT    ================= Cardiovascular==========================  #HTN  takes norvasc 10 daily  was reported to be hypotensive, propofol weaned down and was placed on peripheral levophed  received 2L NS bolus  monitor BP      ================- Pulm=================================  #intubated and sedated  CXR without any infiltrates   rhonchi heard b/l (possible aspiration during status epilepticus)   started on rocephin  mechanical vent settings 350/15/5/40%  ABG 7.39/30/255/18    ==================ID===================================  #UTI  recently discharged on 10/23 for UTI with E.coli bacteremia   frost placed in ED with purulent output noted  UA positive  received zosyn in ED  will continue with rocephin 1g qd  f/u blood cultures and urine cultures    #lactic acidosis   lactate 6.2 > 3.4  received 2L NS bolus in ED  continue to trend lactate    ================= Nephro================================  #TRAVIS   SCr 2.05, baseline 1.3  f/u urine lytes  received 2L IVF in ED  trend SCr    =================GI====================================  no active issues  start TF via NGT    ================ Heme==================================  #anemia  no active signs of bleeding   Hb 9.3, around baseline    =================Endocrine===============================  #DM  on humalog sliding scale and farxiga at home    will start mISS q6 while NPO on TF  monitor FS    ================= Skin/Catheters============================  Peripheral IV lines   Frost catheter     =================Prophylaxis =============================  eliquis DVT prophylaxis   protonix GI prophylaxis    ==================GOC==================================  FULL CODE   Disposition ICU

## 2024-10-31 NOTE — CONSULT NOTE ADULT - ASSESSMENT
HPI:  89F from [facility] CVA with right sided deficits and aphasia, DM, HTN , HLD, seizure disorder presenting from [facility] after witnessed tonic clonic seizure. Son at bedside stated he visited his mother on admission day morning and she was at her baseline mental status, with no active complaints. He then received the call from the facility about the seizures around 1pm. As per ED, EMS was called and the seizures lasted 30 minutes prior to EMS arrival. Valium 5mg x 2 and 10mg x 1 was given, however pt continued having seizures. The patient was then intubated in the field for airway protection. Son states that his mother was recently admitted for UTI with E.coli bacteremia and discharged on ceftin for 14 day course. He reports that she has been having decreased appetite for the past few days.  (28 Oct 2024 18:37)    Allergies: No Known Allergies    PAST MEDICAL & SURGICAL HISTORY:  Stroke  HTN (hypertension)  HLD (hyperlipidemia)  DM (diabetes mellitus)  DVT (deep venous thrombosis)  CVA (cerebrovascular accident)  Aphasia  Dysphagia  Apraxia  Right-sided muscle weakness  Nonverbal  Seizure    SOCIAL HISTORY: No smoking ;no alcohol abuse, no IVDU  FAMILY HISTORY: no pertinent related medical history    REVIEW OF SYSTEMS: Unable to obtain    PHYSICAL EXAM:  VITALS: Vital Signs Last 24 Hrs  T(C): 37.3 (31 Oct 2024 13:00), Max: 37.8 (31 Oct 2024 05:00)  T(F): 99.1 (31 Oct 2024 13:00), Max: 100 (31 Oct 2024 05:00)  HR: 87 (31 Oct 2024 14:00) (78 - 99)  BP: 136/61 (31 Oct 2024 12:00) (100/45 - 155/69)  BP(mean): 85 (31 Oct 2024 12:00) (62 - 95)  RR: 12 (31 Oct 2024 14:00) (11 - 17)  SpO2: 100% (31 Oct 2024 14:00) (99% - 100%)    Parameters below as of 31 Oct 2024 11:00  Patient On (Oxygen Delivery Method): nasal cannula  O2 Flow (L/min): 2    Gen: AOx0, extubated, chronically ill appearing   HEAD:  Atraumatic, Normocephalic  EYES: PERRLA, conjunctiva and sclera clear  ENT: Moist mucous membranes  NECK: Supple, No JVD  CV: S1+S2+ no murmur  Resp: CTA   Abd: Soft, nontender, +BS  Ext: No LE edema, no cyanosis, LE pulses present  : no dysuria, + frost + with yellow clear urine   IV/Skin: no skin rash, pIV site OK  Msk: no joint swelling  Neuro: AAOx0, aphasic, non verbal, bed bound, R sided weakness, no new focal deficit     LABS/DIAGNOSTIC TESTS:                        7.9    9.20  )-----------( 197      ( 31 Oct 2024 03:44 )             24.9     WBC Count: 9.20 K/uL (10-31 @ 03:44)  WBC Count: 13.27 K/uL (10-30 @ 05:07)  WBC Count: 17.04 K/uL (10-29 @ 03:40)    10-31    148[H]  |  122[H]  |  28[H]  ----------------------------<  185[H]  3.6   |  20[L]  |  1.42[H]    Ca    7.6[L]      31 Oct 2024 03:44  Phos  2.7     10-31  Mg     1.7     10-31    TPro  5.8[L]  /  Alb  1.5[L]  /  TBili  0.2  /  DBili  x   /  AST  13  /  ALT  13  /  AlkPhos  98  10-31    Urine Microscopic-Add On (NC) (10.28.24 @ 16:44)    Bacteria: Moderate /HPF   Comment - Urine: many pseudohayphae/yeasts and few fat globules present   Squamous Epithelial Cells: Present: few   Red Blood Cell - Urine: Too Numerous to count /HPF   White Blood Cell - Urine: >50 /HPF    CULTURES:   Culture - Urine (collected 10-28-24 @ 16:44)  Source: Clean Catch  Final Report (10-29-24 @ 22:44):    50,000 - 99,000 CFU/mL Candida albicans    "Susceptibilities not performed"    Culture - Blood (collected 10-28-24 @ 15:25)  Source: .Blood BLOOD  Gram Stain (10-30-24 @ 17:40):    Growth in aerobic bottle: Yeast like cells  Preliminary Report (10-30-24 @ 17:40):    Growth in aerobic bottle: Yeast like cells    Direct identification is available within approximately 3-5    hours either by Blood Panel Multiplexed PCR or Direct    MALDI-TOF. Details: [email or web address]  Organism: Blood Culture PCR (10-30-24 @ 20:04)  Organism: Blood Culture PCR (10-30-24 @ 20:04)      Method Type: PCR      -  Candida albicans: Detec    Culture - Blood (collected 10-28-24 @ 15:15)  Source: .Blood BLOOD  Gram Stain (10-31-24 @ 00:08):    Growth in aerobic bottle: Yeast like cells  Preliminary Report (10-31-24 @ 00:09):    Growth in aerobic bottle: Yeast like cells    Culture - Blood (collected 10-21-24 @ 05:20)  Source: .Blood BLOOD  Final Report (10-26-24 @ 12:00):    No growth at 5 days    Culture - Urine (collected 10-17-24 @ 16:21)  Source: Catheterized  Final Report (10-21-24 @ 07:27):    >100,000 CFU/ml Escherichia coli  Organism: Escherichia coli (10-21-24 @ 07:27)  Organism: Escherichia coli (10-21-24 @ 07:27)      Method Type: AGA      -  Amoxicillin/Clavulanic Acid: S <=8/4      -  Ampicillin: R >16 These ampicillin results predict results for amoxicillin      -  Ampicillin/Sulbactam: S 8/4      -  Aztreonam: S <=4      -  Cefazolin: S <=2 For uncomplicated UTI with K. pneumoniae, E. coli, or P. mirablis: AGA <=16 is sensitive and AGA >=32 is resistant. This also predicts results for oral agents cefaclor, cefdinir, cefpodoxime, cefprozil, cefuroxime axetil, cephalexin and locarbef for uncomplicated UTI. Note that some isolates may be susceptible to these agents while testing resistant to cefazolin.      -  Cefepime: S <=2      -  Cefoxitin: S <=8      -  Ceftriaxone: S <=1      -  Cefuroxime: S <=4      -  Ciprofloxacin: S <=0.25      -  Ertapenem: S <=0.5      -  Gentamicin: S <=2      -  Imipenem: S <=1      -  Levofloxacin: S <=0.5      -  Meropenem: S <=1      -  Nitrofurantoin: S <=32 Should not be used to treat pyelonephritis      -  Piperacillin/Tazobactam: S <=8      -  Tobramycin: S <=2      -  Trimethoprim/Sulfamethoxazole: R >2/38    Culture - Blood (collected 10-17-24 @ 11:00)  Source: .Blood BLOOD  Gram Stain (10-18-24 @ 05:12):    Growth in anaerobic bottle: Gram Negative Rods    Growth in aerobic bottle: Gram Negative Rods  Final Report (10-19-24 @ 15:39):    Growth in aerobic and anaerobic bottles: Escherichia coli    See previous culture [id number]    Culture - Blood (collected 10-17-24 @ 10:50)  Source: .Blood BLOOD  Gram Stain (10-18-24 @ 05:24):    Growth in anaerobic bottle: Gram Negative Rods    Growth in aerobic bottle: Gram Negative Rods  Final Report (10-19-24 @ 15:30):    Growth in aerobic and anaerobic bottles: Escherichia coli    Direct identification is available within approximately 3-5    hours either by Blood Panel Multiplexed PCR or Direct    MALDI-TOF. Details: [email or web address]  Organism: Blood Culture PCR  Escherichia coli (10-19-24 @ 15:30)  Organism: Escherichia coli (10-19-24 @ 15:30)      Method Type: AGA      -  Ampicillin: R >16 These ampicillin results predict results for amoxicillin      -  Ampicillin/Sulbactam: S 8/4      -  Aztreonam: S <=4      -  Cefazolin: S <=2      -  Cefepime: S <=2      -  Cefoxitin: S <=8      -  Ceftriaxone: S <=1      -  Ciprofloxacin: S <=0.25      -  Ertapenem: S <=0.5      -  Gentamicin: S <=2      -  Imipenem: S <=1      -  Levofloxacin: S <=0.5      -  Meropenem: S <=1      -  Piperacillin/Tazobactam: S <=8      -  Tobramycin: S <=2      -  Trimethoprim/Sulfamethoxazole: R >2/38  Organism: Blood Culture PCR (10-19-24 @ 15:30)      Method Type: PCR      -  Escherichia coli: Detec    RADIOLOGY: All pertinent available imaging reviewed  < from: CT Abdomen and Pelvis w/ IV Cont (10.31.24 @ 13:49) >  PROCEDURE:  CT of the Chest, Abdomen and Pelvis was performed.  Sagittal and coronal reformats were performed.    FINDINGS:  CHEST:  LUNGS AND LARGE AIRWAYS:Patent central airways. No pulmonary nodules.   Bilateral lower lobe passive atelectasis.  PLEURA: Small bilateral pleural effusions increased from prior.  VESSELS: Atherosclerotic aorta. Coronary atherosclerosis.  HEART: Cardiomegaly. No pericardialeffusion.  MEDIASTINUM AND WALTER: No lymphadenopathy.  CHEST WALL AND LOWER NECK: Subcentimeter thyroid nodules.    ABDOMEN AND PELVIS:  LIVER: Within normal limits.  BILE DUCTS: Normal caliber.  GALLBLADDER: Within normal limits.  SPLEEN: 3.4 cm cystwith partially calcified wall. Other scattered   hypodensities too small to characterize.  PANCREAS: 6.8 x 6.8 cm multiseptated pancreatic head cystic mass. 6 mm   and 8 mm internal calcifications. Distal pancreatic duct distention   measuring up to 1.1 cm.  ADRENALS: Within normal limits.  KIDNEYS/URETERS: Multicystic kidneys. Dominant 7.7 cm right upper pole   cyst. 3.9 x 3.0 cm hypoattenuating cortical focus in the mid right kidney   may represent pyelonephritis or malignancy. Multiple hypodensities too   small to characterize.  No hydroureteronephrosis. Left renal pelvic urothelial thickening   suggestive of infection.  6 mm right renal pelvic calculus. Right renal pelvic and ureteral   urothelial enhancement.    BLADDER: Within normal limits.  REPRODUCTIVE ORGANS: Abnormally distended endometrial cavity measuring up   to 1.5 cm with amorphous internal soft tissue. Several calcified myomata.    BOWEL: No bowel obstruction. Appendix is not visualized and cannot be   assessed.. Feeding tube terminates in the stomach. Rectal tube in situ.   Marked perirectal edema and fluid appear  PERITONEUM/RETROPERITONEUM: Within normal limits.  VESSELS: Advanced atherosclerotic changes.  LYMPH NODES: 8 mm right perirectal lymph node (2:240). 1.2 x 0.6 cm right   pelvic sidewall lymph node (2:242).  ABDOMINAL WALL: Anasarca.  BONES: Degenerative changes of the spine and hips.    IMPRESSION:  Minimal bilateral pleural effusions and underlying passive atelectasis.   Cardiomegaly.    Marked perirectal edema and fluid concerning for infectious or   inflammatory proctitis. Underlying malignancy is not excluded. Prominent   perirectal and right pelvic sidewall lymph nodes.    3.9 x 3.0 cm right renal cortical hypoattenuating focus suspicious for  infection. Bilateral urothelial enhancement also suspicious for infection.    Multiple indeterminate renal lesions. Polycystic kidneys. Nonobstructing   right renal calculus.    Redemonstrated multicystic pancreatic mass with distal pancreatic duct  distention.    Abnormally distended endometrial cavity with internal amorphous soft   tissue concerning for malignancy. Infection is not excluded.    ANTIBIOTICS:  fluconAZOLE IVPB 800 once    IMPRESSION:  89F from [facility] CVA with right sided deficits and aphasia, DM, HTN , HLD, seizure disorder presenting from [facility] after witnessed tonic clonic seizure, recently admitted to [facility] and tx for UTI.   Per EMS and ED records pt was febrile 101.4F PTA. Intubated in the ED for airway protection/extubated 10/31. BC + candida started on caspofungin for which ID is consulted.     -Sepsis  -AHRF extubated 10/31  -Candidemia- GI source ?  -Proctitis  -Candiduria- UA and urine cx with presence of candida, unclear if true cause of uti however recent use of atb is definitely increase risk for fungal infections  -Status epilepticus  -CVA w/ residual R sided weakness and aphasia   -DM     PLAN:  change caspofungin to Fluconazole load 800 mg once then 200 mg q24 hrs IV  follow up cx  Repeat cx 2 sets, fungal cx  ESR/CRP  CT A/P (reviewed)  ECHO  Opthalmologic exam  Seizure precautions, aspiration precautions, chest PT, hailey care  Rest per MICU  Discussed with Dr. Ferro    Please reach ID with any questions or concerns  Dr. Melida Garcia  Available in Teams

## 2024-11-01 LAB
ALBUMIN SERPL ELPH-MCNC: 1.7 G/DL — LOW (ref 3.5–5)
ALP SERPL-CCNC: 118 U/L — SIGNIFICANT CHANGE UP (ref 40–120)
ALT FLD-CCNC: 15 U/L DA — SIGNIFICANT CHANGE UP (ref 10–60)
ANION GAP SERPL CALC-SCNC: 5 MMOL/L — SIGNIFICANT CHANGE UP (ref 5–17)
AST SERPL-CCNC: 17 U/L — SIGNIFICANT CHANGE UP (ref 10–40)
BILIRUB SERPL-MCNC: 0.2 MG/DL — SIGNIFICANT CHANGE UP (ref 0.2–1.2)
BUN SERPL-MCNC: 26 MG/DL — HIGH (ref 7–18)
CALCIUM SERPL-MCNC: 8.3 MG/DL — LOW (ref 8.4–10.5)
CHLORIDE SERPL-SCNC: 120 MMOL/L — HIGH (ref 96–108)
CO2 SERPL-SCNC: 21 MMOL/L — LOW (ref 22–31)
CREAT SERPL-MCNC: 1.29 MG/DL — SIGNIFICANT CHANGE UP (ref 0.5–1.3)
CRP SERPL-MCNC: 180 MG/L — HIGH (ref 0–5)
EGFR: 37 ML/MIN/1.73M2 — LOW
ERYTHROCYTE [SEDIMENTATION RATE] IN BLOOD: 115 MM/HR — HIGH (ref 0–20)
GLUCOSE SERPL-MCNC: 178 MG/DL — HIGH (ref 70–99)
HCT VFR BLD CALC: 27.4 % — LOW (ref 34.5–45)
HGB BLD-MCNC: 9 G/DL — LOW (ref 11.5–15.5)
MAGNESIUM SERPL-MCNC: 1.8 MG/DL — SIGNIFICANT CHANGE UP (ref 1.6–2.6)
MCHC RBC-ENTMCNC: 30.3 PG — SIGNIFICANT CHANGE UP (ref 27–34)
MCHC RBC-ENTMCNC: 32.8 G/DL — SIGNIFICANT CHANGE UP (ref 32–36)
MCV RBC AUTO: 92.3 FL — SIGNIFICANT CHANGE UP (ref 80–100)
NRBC # BLD: 0 /100 WBCS — SIGNIFICANT CHANGE UP (ref 0–0)
PHOSPHATE SERPL-MCNC: 2 MG/DL — LOW (ref 2.5–4.5)
PLATELET # BLD AUTO: 257 K/UL — SIGNIFICANT CHANGE UP (ref 150–400)
POTASSIUM SERPL-MCNC: 3.7 MMOL/L — SIGNIFICANT CHANGE UP (ref 3.5–5.3)
POTASSIUM SERPL-SCNC: 3.7 MMOL/L — SIGNIFICANT CHANGE UP (ref 3.5–5.3)
PROT SERPL-MCNC: 7.1 G/DL — SIGNIFICANT CHANGE UP (ref 6–8.3)
RBC # BLD: 2.97 M/UL — LOW (ref 3.8–5.2)
RBC # FLD: 18 % — HIGH (ref 10.3–14.5)
SODIUM SERPL-SCNC: 146 MMOL/L — HIGH (ref 135–145)
WBC # BLD: 12.54 K/UL — HIGH (ref 3.8–10.5)
WBC # FLD AUTO: 12.54 K/UL — HIGH (ref 3.8–10.5)

## 2024-11-01 PROCEDURE — 99233 SBSQ HOSP IP/OBS HIGH 50: CPT

## 2024-11-01 PROCEDURE — 99231 SBSQ HOSP IP/OBS SF/LOW 25: CPT

## 2024-11-01 RX ORDER — ACETAMINOPHEN 500 MG
1000 TABLET ORAL ONCE
Refills: 0 | Status: COMPLETED | OUTPATIENT
Start: 2024-11-01 | End: 2024-11-01

## 2024-11-01 RX ORDER — FUROSEMIDE 40 MG
20 TABLET ORAL ONCE
Refills: 0 | Status: COMPLETED | OUTPATIENT
Start: 2024-11-01 | End: 2024-11-01

## 2024-11-01 RX ORDER — POTASSIUM PHOSPHATE 236; 224 MG/ML; MG/ML
30 INJECTION, SOLUTION INTRAVENOUS ONCE
Refills: 0 | Status: COMPLETED | OUTPATIENT
Start: 2024-11-01 | End: 2024-11-01

## 2024-11-01 RX ADMIN — LEVETIRACETAM 750 MILLIGRAM(S): 500 TABLET, FILM COATED ORAL at 05:46

## 2024-11-01 RX ADMIN — LEVETIRACETAM 750 MILLIGRAM(S): 500 TABLET, FILM COATED ORAL at 17:39

## 2024-11-01 RX ADMIN — NYSTATIN 1 APPLICATION(S): 100000 POWDER TOPICAL at 05:46

## 2024-11-01 RX ADMIN — PANTOPRAZOLE SODIUM 40 MILLIGRAM(S): 40 TABLET, DELAYED RELEASE ORAL at 17:29

## 2024-11-01 RX ADMIN — APIXABAN 2.5 MILLIGRAM(S): 5 TABLET, FILM COATED ORAL at 17:39

## 2024-11-01 RX ADMIN — NYSTATIN 1 APPLICATION(S): 100000 POWDER TOPICAL at 18:43

## 2024-11-01 RX ADMIN — Medication 20 MILLIGRAM(S): at 10:30

## 2024-11-01 RX ADMIN — CHLORHEXIDINE GLUCONATE 1 APPLICATION(S): 40 SOLUTION TOPICAL at 05:45

## 2024-11-01 RX ADMIN — Medication 4: at 00:48

## 2024-11-01 RX ADMIN — POLYETHYLENE GLYCOL 3350 17 GRAM(S): 17 POWDER, FOR SOLUTION ORAL at 17:29

## 2024-11-01 RX ADMIN — POTASSIUM PHOSPHATE 83.33 MILLIMOLE(S): 236; 224 INJECTION, SOLUTION INTRAVENOUS at 08:00

## 2024-11-01 RX ADMIN — Medication 2: at 05:44

## 2024-11-01 RX ADMIN — APIXABAN 2.5 MILLIGRAM(S): 5 TABLET, FILM COATED ORAL at 05:45

## 2024-11-01 RX ADMIN — Medication 2 TABLET(S): at 21:13

## 2024-11-01 RX ADMIN — Medication 20 MILLIGRAM(S): at 21:13

## 2024-11-01 RX ADMIN — Medication 400 MILLIGRAM(S): at 17:39

## 2024-11-01 RX ADMIN — Medication 2: at 18:48

## 2024-11-01 RX ADMIN — FLUCONAZOLE, SODIUM CHLORIDE 100 MILLIGRAM(S): 2 INJECTION INTRAVENOUS at 17:39

## 2024-11-01 RX ADMIN — Medication 1000 MILLIGRAM(S): at 18:42

## 2024-11-01 NOTE — PROGRESS NOTE ADULT - PROBLEM SELECTOR PLAN 5
Jacob Noriega at bedside  DNR/trial of intubation Jacob Noriega at bedside  DNR/trial of intubation  No further discussion of hospice today

## 2024-11-01 NOTE — SWALLOW BEDSIDE ASSESSMENT ADULT - MUCOSAL QUALITY
Pt p/w dry mucosa/oral cavity, possibility of oral thrush

## 2024-11-01 NOTE — PROGRESS NOTE ADULT - SUBJECTIVE AND OBJECTIVE BOX
follow up on:  complex medical decision making related to goals of care    [facility] Geriatric and Palliative Consult Service:  Genesis Castillo DO: cell ([phone])  Austen Pierre MD: cell ([phone])  Rosalino Wilhelm NP: cell [phone])   Ruiz Kidd LMSW: cell [phone])   Jessica Fleischer-Black, MD: cell: [phone])    Can contact any Palliative Team member via Microsoft Teams for consults and questions    OVERNIGHT EVENTS: Extubated. Weaned to RA. For Speech and Swallow eval today.     Present Symptoms: Mild    Review of Systems: All others negative  Present Symptoms:   Pain: denies             Location -                               Aggravating factors -             Quality -             Radiation -             Timing-             Severity (0-10 scale):             Minimal acceptable level (0-10 scale):  Fatigue:  Nausea:  Lack of Appetite:   SOB: denies  Depression:  Anxiety:  Constipation:     MEDICATIONS  (STANDING):  apixaban 2.5 milliGRAM(s) Oral every 12 hours  atorvastatin 20 milliGRAM(s) Oral at bedtime  chlorhexidine 2% Cloths 1 Application(s) Topical <User Schedule>  fluconAZOLE IVPB 200 milliGRAM(s) IV Intermittent every 24 hours  furosemide   Injectable 20 milliGRAM(s) IV Push once  insulin lispro (ADMELOG) corrective regimen sliding scale   SubCutaneous every 6 hours  levETIRAcetam  Solution 750 milliGRAM(s) Oral two times a day  nystatin Powder 1 Application(s) Topical two times a day  pantoprazole   Suspension 40 milliGRAM(s) Enteral Tube daily  polyethylene glycol 3350 17 Gram(s) Oral daily  potassium phosphate IVPB 30 milliMole(s) IV Intermittent once  senna 2 Tablet(s) Oral at bedtime    MEDICATIONS  (PRN):  acetaminophen   Oral Liquid .. 650 milliGRAM(s) Oral every 6 hours PRN Mild Pain (1 - 3)      PHYSICAL EXAM:  Vital Signs Last 24 Hrs  T(C): 36.9 (01 Nov 2024 07:00), Max: 37.8 (31 Oct 2024 17:00)  T(F): 98.4 (01 Nov 2024 07:00), Max: 100 (31 Oct 2024 17:00)  HR: 83 (01 Nov 2024 07:00) (74 - 96)  BP: 144/63 (01 Nov 2024 07:00) (126/53 - 169/81)  BP(mean): 88 (01 Nov 2024 07:00) (76 - 104)  RR: 13 (01 Nov 2024 07:00) (11 - 16)  SpO2: 96% (01 Nov 2024 07:00) (96% - 100%)    Parameters below as of 31 Oct 2024 16:00  Patient On (Oxygen Delivery Method): room air      General: Frail-appearing elderly woman, NGT    Palliative Performance Scale/Karnofsky Score: 20%  http://npcrc.org/files/news/palliative_performance_scale_ppsv2.pdf    HEENT: bitemporal wasting,+NGT  Lungs: CTA b/l  CV: RRR, S1S2  GI: soft non distended non tender  incontinent              last BM: non documented  : incontinent / frost  Musculoskeletal: weakness x4, bedbound, no edema  Skin: no abnormal skin lesions, poor skin turgor  Neuro: no deficits, unable to follow commands, aphasia  Oral intake ability: unable/only mouth care  Diet: NGT feeds    LABS:                          9.0    12.54 )-----------( 257      ( 01 Nov 2024 04:53 )             27.4     11-01    146[H]  |  120[H]  |  26[H]  ----------------------------<  178[H]  3.7   |  21[L]  |  1.29    Ca    8.3[L]      01 Nov 2024 04:53  Phos  2.0     11-01  Mg     1.8     11-01    TPro  7.1  /  Alb  1.7[L]  /  TBili  0.2  /  DBili  x   /  AST  17  /  ALT  15  /  AlkPhos  118  11-01    Urinalysis Basic - ( 01 Nov 2024 04:53 )    Color: x / Appearance: x / SG: x / pH: x  Gluc: 178 mg/dL / Ketone: x  / Bili: x / Urobili: x   Blood: x / Protein: x / Nitrite: x   Leuk Esterase: x / RBC: x / WBC x   Sq Epi: x / Non Sq Epi: x / Bacteria: x        RADIOLOGY & ADDITIONAL STUDIES: Reviewed follow up on:  complex medical decision making related to goals of care    [facility] Geriatric and Palliative Consult Service:  Genesis Castillo DO: cell [phone])  Austen Pierre MD: cell ([phone])  Rosalino Wilhelm NP: cell ([phone])   Ruiz Kidd LMSW: cell ([phone])   Jessica Fleischer-Black, MD: cell: ([phone])    Can contact any Palliative Team member via Microsoft Teams for consults and questions    OVERNIGHT EVENTS: Extubated. Weaned to RA. Passed Speech and Swallow eval today.      Present Symptoms: Mild    Review of Systems: All others negative  Present Symptoms:   Pain: denies             Location -                               Aggravating factors -             Quality -             Radiation -             Timing-             Severity (0-10 scale):             Minimal acceptable level (0-10 scale):  Fatigue:  Nausea:  Lack of Appetite:   SOB: denies  Depression:  Anxiety:  Constipation:     MEDICATIONS  (STANDING):  apixaban 2.5 milliGRAM(s) Oral every 12 hours  atorvastatin 20 milliGRAM(s) Oral at bedtime  chlorhexidine 2% Cloths 1 Application(s) Topical <User Schedule>  fluconAZOLE IVPB 200 milliGRAM(s) IV Intermittent every 24 hours  furosemide   Injectable 20 milliGRAM(s) IV Push once  insulin lispro (ADMELOG) corrective regimen sliding scale   SubCutaneous every 6 hours  levETIRAcetam  Solution 750 milliGRAM(s) Oral two times a day  nystatin Powder 1 Application(s) Topical two times a day  pantoprazole   Suspension 40 milliGRAM(s) Enteral Tube daily  polyethylene glycol 3350 17 Gram(s) Oral daily  potassium phosphate IVPB 30 milliMole(s) IV Intermittent once  senna 2 Tablet(s) Oral at bedtime    MEDICATIONS  (PRN):  acetaminophen   Oral Liquid .. 650 milliGRAM(s) Oral every 6 hours PRN Mild Pain (1 - 3)      PHYSICAL EXAM:  Vital Signs Last 24 Hrs  T(C): 36.9 (01 Nov 2024 07:00), Max: 37.8 (31 Oct 2024 17:00)  T(F): 98.4 (01 Nov 2024 07:00), Max: 100 (31 Oct 2024 17:00)  HR: 83 (01 Nov 2024 07:00) (74 - 96)  BP: 144/63 (01 Nov 2024 07:00) (126/53 - 169/81)  BP(mean): 88 (01 Nov 2024 07:00) (76 - 104)  RR: 13 (01 Nov 2024 07:00) (11 - 16)  SpO2: 96% (01 Nov 2024 07:00) (96% - 100%)    Parameters below as of 31 Oct 2024 16:00  Patient On (Oxygen Delivery Method): room air      General: Frail-appearing elderly woman, NGT    Palliative Performance Scale/Karnofsky Score: 20%  http://npcrc.org/files/news/palliative_performance_scale_ppsv2.pdf    HEENT: bitemporal wasting,+NGT  Lungs: CTA b/l  CV: RRR, S1S2  GI: soft non distended non tender  incontinent              last BM: non documented  : incontinent / frost  Musculoskeletal: weakness x4, bedbound, no edema  Skin: no abnormal skin lesions, poor skin turgor  Neuro: no deficits, unable to follow commands, aphasia  Oral intake ability: unable/only mouth care  Diet: NGT feeds    LABS:                          9.0    12.54 )-----------( 257      ( 01 Nov 2024 04:53 )             27.4     11-01    146[H]  |  120[H]  |  26[H]  ----------------------------<  178[H]  3.7   |  21[L]  |  1.29    Ca    8.3[L]      01 Nov 2024 04:53  Phos  2.0     11-01  Mg     1.8     11-01    TPro  7.1  /  Alb  1.7[L]  /  TBili  0.2  /  DBili  x   /  AST  17  /  ALT  15  /  AlkPhos  118  11-01    Urinalysis Basic - ( 01 Nov 2024 04:53 )    Color: x / Appearance: x / SG: x / pH: x  Gluc: 178 mg/dL / Ketone: x  / Bili: x / Urobili: x   Blood: x / Protein: x / Nitrite: x   Leuk Esterase: x / RBC: x / WBC x   Sq Epi: x / Non Sq Epi: x / Bacteria: x        RADIOLOGY & ADDITIONAL STUDIES: Reviewed

## 2024-11-01 NOTE — SWALLOW BEDSIDE ASSESSMENT ADULT - SWALLOW EVAL: SECRETION MANAGEMENT
Oral suction provided prior to evaluation Oral suction provided prior to evaluation; poor saliva management

## 2024-11-01 NOTE — PROGRESS NOTE ADULT - ASSESSMENT
Assessment	  89F from [facility] CVA with right sided deficits and aphasia, (bedbound/wheelchair bound), IDT2DM, HTN, HLD, seizure disorder (on Keppra) presenting from [facility] after witnessed tonic clonic seizure lasting >30min. Patient admitted to ICU after intubation in the field for status epilepticus, with course complicated by fungemia being treated with fluconazole. S/p intubation on room air. 24 hour EEG revealed no captured seizures.     #status epilepticus  #CVA  #DM  #lactic acidosis       =================== Neuro============================  #status epilepticus   #Hx of seizures (on Keppra 500mg bid)   p/w [facility] with witnessed tonic clonic seizure for > 30  minutes prior to EMS arrival, intubated in the field  s/p keppra 2g load + additional 1.5g for 60mg/kg dose  10/29 vEEG not working, spot EEG: Moderate-severe diffuse cerebral dysfunction is nonspecific in etiology.  No epileptiform abnormalities or seizures.  discontinued  24h EEG   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 (prior notes stating bedbound status, son at bedside stating pt ambulates with wheelchair and eats with assistance and says few words)   on atorvastatin 20 and eliquis 2.5mg BID at home  c/w home meds via NGT    ================= Cardiovascular==========================  #HTN  takes norvasc 10 daily  /62 upon evaluation in ED  was reported to be hypotensive, propofol weaned down and was placed on peripheral levophed  peripheral levophed weaned off in ED  s/p 2L NS bolus  monitor BP  Start lasix due to increased BP and hand edema      ================- Pulm=================================  #intubated and sedated  CXR without any infiltrates   rhonchi heard b/l (possible aspiration during status epilepticus)   started on rocephin  mechanical vent settings 350/15/5/40%  ABG 7.39/30/255/18  10/29 passed SAT, SBT  10/30 trial SAT, SBT   10/31 s/p extubation, on room air    ==================ID===================================  #UTI  recently discharged on 10/23 for UTI with E.coli bacteremia   frost placed in ED with purulent output noted  UA +  s/p zosyn in ED  c/w ceftriaxone 1g qd   UCx: candida    #lactic acidosis   lactate 6.2 > 3.4 >1.6  s/p 2L NS bolus in ED  RESOLVED     #Fungemia   - BCx positive for candida albicans  - ESR/CRP elevated  - Ophtho exam?   - Discontinued caspofungin, started fluconazole  - ID Dr. Burnett following  - F/u fungal culture, repeat BCx     ================= Nephro================================  #TRAVIS  BUN/Cr 29/2.05 (Baseline Scr 1.29)  s/p 2L IVF in ED  500 LR bolus after IV contrast from   BUN/Cr 24/1.50   FeNa 3.5%   Improving    #Hypernatremia   Na 147, FWD 1.3L  c/w free water 250cc q4h    =================GI====================================  no active issues  distended abdomen  Abdominal xray - f/u  CT Abd Pelvis: Minimal bilateral pleural effusions and underlying passive atelectasis. Cardiomegaly. Marked perirectal edema and fluid concerning for infectious or inflammatory proctitis. Underlying malignancy is not excluded. Prominent perirectal and right pelvic sidewall lymph nodes. 3.9 x 3.0 cm right renal cortical hypoattenuating focus suspicious for infection. Bilateral urothelial enhancement also suspicious for infection. Multiple indeterminate renal lesions. Polycystic kidneys. Nonobstructing right renal calculus. 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 Abnormally distended endometrial cavity with internal amorphous soft tissue concerning for malignancy. Infection is not excluded.    Passed dysphagia - started puree diet  Continue NGT for now    ================ Heme==================================  #anemia  Hgb 9.3 > 8.2 (Baseline Hgb 8)  no active signs of bleeding       =================Endocrine===============================  #DM  on Humalog sliding scale and farxiga at home  will start mISS q6 while NPO on TF  monitor FS    ================= Skin/Catheters============================  Peripheral IV lines  Ex dwell right upper   Frost catheter     =================Prophylaxis =============================  eliquis DVT prophylaxis   protonix GI prophylaxis    ==================GOC==================================  FULL CODE   Disposition ICU

## 2024-11-01 NOTE — CONSULT NOTE ADULT - SUBJECTIVE AND OBJECTIVE BOX
HPI  89-year-old female bedbound , non  verbal  from [facility] and . PMH CVA w rt sided weakness, aphasia, bedbound, DM. HTN, HLD, CAD.Sz.  presents with fever since last night and low blood pressures.  As per the patient's son she did have an episode of vomiting yesterday but not today.  History is limited secondary to the patient's history of aphasia. Patients daughter bedside explains pt was found to be hypoxic to 80s on RA prior t o arrival.   Family denies an y recent changes when they saw the patient yesterday. Endorse she appeared fatigued and was not eating well.       In the ER pt met SIRS with lactate > 4 , hypotensive to 91/ 54, code sepsis called   s/p Vanc , cefepime   patient was found to have loose watery stool on exam, + UA   Labs s/o WBC 44K , Hb 8.6, BUn / Cr 61/ 3.15, covid , flu negative     PAST MEDICAL & SURGICAL HISTORY:  Stroke      HTN (hypertension)      HLD (hyperlipidemia)      DM (diabetes mellitus)      DVT (deep venous thrombosis)      CVA (cerebrovascular accident)      Aphasia      Dysphagia      Apraxia      Right-sided muscle weakness      Nonverbal      Seizure          MEDICATIONS  (STANDING):  apixaban 2.5 milliGRAM(s) Oral every 12 hours  atorvastatin 20 milliGRAM(s) Oral at bedtime  chlorhexidine 2% Cloths 1 Application(s) Topical <User Schedule>  fluconAZOLE IVPB 200 milliGRAM(s) IV Intermittent every 24 hours  insulin lispro (ADMELOG) corrective regimen sliding scale   SubCutaneous every 6 hours  levETIRAcetam  Solution 750 milliGRAM(s) Oral two times a day  nystatin Powder 1 Application(s) Topical two times a day  pantoprazole   Suspension 40 milliGRAM(s) Enteral Tube daily  polyethylene glycol 3350 17 Gram(s) Oral daily  potassium phosphate IVPB 30 milliMole(s) IV Intermittent once  senna 2 Tablet(s) Oral at bedtime    MEDICATIONS  (PRN):  acetaminophen   Oral Liquid .. 650 milliGRAM(s) Oral every 6 hours PRN Mild Pain (1 - 3)      FAMILY HISTORY:      Allergies    No Known Allergies    Intolerances        SOCIAL HISTORY:    REVIEW OF SYSTEMS: Otherwise negative as stated in HPI    Physical Exam  Vital signs  T(C): 36.9 (11-01-24 @ 07:00), Max: 37.8 (10-31-24 @ 17:00)  HR: 83 (11-01-24 @ 07:00)  BP: 144/63 (11-01-24 @ 07:00)  SpO2: 96% (11-01-24 @ 07:00)  Wt(kg): --    Output    OUT:    Indwelling Catheter - Urethral (mL): 700 mL    Voided (mL): 1250 mL  Total OUT: 1950 mL    Total NET: -1950 mL          Gen: NAD  Pulm: No respiratory distress	  CV: RRR  GI: S/ND/NT  : Voiding  MSK: moves all 4 limbs spontaneously    LABS:      11-01 @ 04:53    WBC 12.54 / Hct 27.4  / SCr 1.29     10-31 @ 03:44    WBC 9.20  / Hct 24.9  / SCr 1.42     11-01    146[H]  |  120[H]  |  26[H]  ----------------------------<  178[H]  3.7   |  21[L]  |  1.29    Ca    8.3[L]      01 Nov 2024 04:53  Phos  2.0     11-01  Mg     1.8     11-01    TPro  7.1  /  Alb  1.7[L]  /  TBili  0.2  /  DBili  x   /  AST  17  /  ALT  15  /  AlkPhos  118  11-01      Urinalysis Basic - ( 01 Nov 2024 04:53 )    Color: x / Appearance: x / SG: x / pH: x  Gluc: 178 mg/dL / Ketone: x  / Bili: x / Urobili: x   Blood: x / Protein: x / Nitrite: x   Leuk Esterase: x / RBC: x / WBC x   Sq Epi: x / Non Sq Epi: x / Bacteria: x              RADIOLOGY:        < from: CT Abdomen and Pelvis w/ IV Cont (10.31.24 @ 13:49) >    ACC: [id number] EXAM:  CT CHEST IC   ORDERED BY: BRITTNEY HO     ACC: [id number] EXAM:  CT ABDOMEN AND PELVIS IC   ORDERED BY: PEGGY CHAKRABORTY     PROCEDURE DATE:  10/31/2024          INTERPRETATION:  CLINICAL INFORMATION: 89 years  Female with Candidemia.    COMPARISON: Noncontrast CT chest abdomen and pelvis 10/17/2024    CONTRAST/COMPLICATIONS:  IV Contrast: Omnipaque 350 (accession [id number]), IV contrast documented   in unlinked concurrent exam (accession [id number])  90 cc administered   10   cc discarded  Oral Contrast: NONE  Complications: None reported at time of study completion    PROCEDURE:  CT of the Chest, Abdomen and Pelvis was performed.  Sagittal and coronal reformats were performed.    FINDINGS:  CHEST:  LUNGS AND LARGE AIRWAYS:Patent central airways. No pulmonary nodules.   Bilateral lower lobe passive atelectasis.  PLEURA: Small bilateral pleural effusions increased from prior.  VESSELS: Atherosclerotic aorta. Coronary atherosclerosis.  HEART: Cardiomegaly. No pericardialeffusion.  MEDIASTINUM AND WALTER: No lymphadenopathy.  CHEST WALL AND LOWER NECK: Subcentimeter thyroid nodules.    ABDOMEN AND PELVIS:  LIVER: Within normal limits.  BILE DUCTS: Normal caliber.  GALLBLADDER: Within normal limits.  SPLEEN: 3.4 cm cystwith partially calcified wall. Other scattered   hypodensities too small to characterize.  PANCREAS: 6.8 x 6.8 cm multiseptated pancreatic head cystic mass. 6 mm   and 8 mm internal calcifications. Distal pancreatic duct distention   measuring up to 1.1 cm.  ADRENALS: Within normal limits.  KIDNEYS/URETERS: Multicystic kidneys. Dominant 7.7 cm right upper pole   cyst. 3.9 x 3.0 cm hypoattenuating cortical focus in the mid right kidney   may represent pyelonephritis or malignancy. Multiple hypodensities too   small to characterize.  No hydroureteronephrosis. Left renal pelvic urothelial thickening   suggestive of infection.  6 mm right renal pelvic calculus. Right renal pelvic and ureteral   urothelial enhancement.    BLADDER: Within normal limits.  REPRODUCTIVE ORGANS: Abnormally distended endometrial cavity measuring up   to 1.5 cm with amorphous internal soft tissue. Several calcified myomata.    BOWEL: No bowel obstruction. Appendix is not visualized and cannot be   assessed.. Feeding tube terminates in the stomach. Rectal tube in situ.   Marked perirectal edema and fluid appear  PERITONEUM/RETROPERITONEUM: Within normal limits.  VESSELS: Advanced atherosclerotic changes.  LYMPH NODES: 8 mm right perirectal lymph node (2:240). 1.2 x 0.6 cm right   pelvic sidewall lymph node (2:242).  ABDOMINAL WALL: Anasarca.  BONES: Degenerative changes of the spine and hips.    IMPRESSION:  Minimal bilateral pleural effusions and underlying passive atelectasis.   Cardiomegaly.    Marked perirectal edema and fluid concerning for infectious or   inflammatory proctitis. Underlying malignancy is not excluded. Prominent   perirectal and right pelvic sidewall lymph nodes.    3.9 x 3.0 cm right renal cortical hypoattenuating focus suspicious for  infection. Bilateral urothelial enhancement also suspicious for infection.    Multiple indeterminate renal lesions. Polycystic kidneys. Nonobstructing   right renal calculus.    Redemonstrated multicystic pancreatic mass with distal pancreatic duct  distention.    Abnormally distended endometrial cavity with internal amorphous soft   tissue concerning for malignancy. Infection is not excluded.        --- End of Report ---            VALERIA SARMIENTO MD; Attending Radiologist  This document has been electronically signed. Oct 31 2024  2:42PM    < end of copied text >

## 2024-11-01 NOTE — SWALLOW BEDSIDE ASSESSMENT ADULT - SLP PERTINENT HISTORY OF CURRENT PROBLEM
89F from [facility] CVA with right sided deficits and aphasia, DM, HTN , HLD, seizure disorder presenting from [facility] after witnessed tonic clonic seizure. Son at bedside stated he visited his mother on admission day morning and she was at her baseline mental status, with no active complaints. As per ED, EMS was called and the seizures lasted 30 minutes prior to EMS arrival; Valium 5mg x 2 and 10mg x 1 was given, however pt continued having seizures. The patient was then intubated in the field for airway protection (10/28); pt extubated (10/31) + NGT. Son reports that she has been having decreased appetite for the past few days (28 Oct 2024).

## 2024-11-01 NOTE — CONSULT NOTE ADULT - ASSESSMENT
89-year-old female admitted to ICU for seizure requiring intubation c/b fungemia.   Blood and urine with candida.  Urology consulted for CT findings.       Recommendations:   - No acute urological intervention indicated.   - CT imaging reviewed. Read with 3.9 x 3.0 cm right renal cortical hypoattenuating focus suspicious for  infection. Bilateral urothelial enhancement also suspicious for infection. No obstructing uropathy.   - Urine and blood with candida  - f/u ID recs  - Strict Ins and Outs.

## 2024-11-01 NOTE — SWALLOW BEDSIDE ASSESSMENT ADULT - SWALLOW EVAL: DIAGNOSIS
Pt p/w oropharyngeal dysphagia c/b weak labial seal, prolonged bolus holding, slow A-P transport, oral stasis requiring multiple swallows, delayed swallow trigger, decreased hyolaryngeal elevation, and wet, gurgly vocal breathing post swallow w/ thin liquids.

## 2024-11-01 NOTE — SWALLOW BEDSIDE ASSESSMENT ADULT - COMMENTS
Wet, gurgly breathing post swallow Pt seen for bedside swallow evaluation s/p extubation (10/31) +NGT. Pt's son present at bedside. HOB elevated to 90°. AA+Ox0 +NC, SpO2 96%. Pt awake, nonverbal and inconsistently responsive to one-step directions. Oral suction provided prior to eval. PO trials included: ice, moderately thick, mildly thick, thin liquids, and pureed. Pt observed to fatigue w/ progressive PO presentation. Cued for repeat swallow

## 2024-11-01 NOTE — PROGRESS NOTE ADULT - ASSESSMENT
89F from [facility] CV[facility] with right sided deficits and aphasia, (bedbound/wheelchair bound), IDT2DM, HTN, HLD, seizure disorder (on Keppra) presenting from [facility] after witnessed tonic clonic seizure lasting >30min. Patient admitted to ICU after intubation in the field for status epilepticus, with course complicated by fungemia. S/p intubation on room air. 24 hour EEG revealed no captured seizures.

## 2024-11-01 NOTE — PROGRESS NOTE ADULT - SUBJECTIVE AND OBJECTIVE BOX
INTERVAL HPI/OVERNIGHT EVENTS: No acute events overnight. Patient passed TOV yesterday. Patient examined at bedside this AM.  Patient offers no acute complaints. Denies any pain.     PRESSORS: [ ] YES [X] NO  WHICH:    Antimicrobial:  fluconAZOLE IVPB 200 milliGRAM(s) IV Intermittent every 24 hours    Cardiovascular:  furosemide   Injectable 20 milliGRAM(s) IV Push once    Pulmonary:    Hematalogic:  apixaban 2.5 milliGRAM(s) Oral every 12 hours    Other:  acetaminophen   Oral Liquid .. 650 milliGRAM(s) Oral every 6 hours PRN  atorvastatin 20 milliGRAM(s) Oral at bedtime  chlorhexidine 2% Cloths 1 Application(s) Topical <User Schedule>  insulin lispro (ADMELOG) corrective regimen sliding scale   SubCutaneous every 6 hours  levETIRAcetam  Solution 750 milliGRAM(s) Oral two times a day  nystatin Powder 1 Application(s) Topical two times a day  pantoprazole   Suspension 40 milliGRAM(s) Enteral Tube daily  polyethylene glycol 3350 17 Gram(s) Oral daily  potassium phosphate IVPB 30 milliMole(s) IV Intermittent once  senna 2 Tablet(s) Oral at bedtime    acetaminophen   Oral Liquid .. 650 milliGRAM(s) Oral every 6 hours PRN  apixaban 2.5 milliGRAM(s) Oral every 12 hours  atorvastatin 20 milliGRAM(s) Oral at bedtime  chlorhexidine 2% Cloths 1 Application(s) Topical <User Schedule>  fluconAZOLE IVPB 200 milliGRAM(s) IV Intermittent every 24 hours  furosemide   Injectable 20 milliGRAM(s) IV Push once  insulin lispro (ADMELOG) corrective regimen sliding scale   SubCutaneous every 6 hours  levETIRAcetam  Solution 750 milliGRAM(s) Oral two times a day  nystatin Powder 1 Application(s) Topical two times a day  pantoprazole   Suspension 40 milliGRAM(s) Enteral Tube daily  polyethylene glycol 3350 17 Gram(s) Oral daily  potassium phosphate IVPB 30 milliMole(s) IV Intermittent once  senna 2 Tablet(s) Oral at bedtime    Drug Dosing Weight  Height (cm): 160 (28 Oct 2024 15:20)  Weight (kg): 59.3 (28 Oct 2024 21:00)  BMI (kg/m2): 23.2 (28 Oct 2024 21:00)  BSA (m2): 1.61 (28 Oct 2024 21:00)    CENTRAL LINE: [ ] YES [X] NO  LOCATION:   DATE INSERTED:  REMOVE: [ ] YES [ ] NO  EXPLAIN:    HUYNH: [ ] YES [X] NO    DATE INSERTED:  REMOVE:  [ ] YES [ ] NO  EXPLAIN:    A-LINE:  [ ] YES [X] NO  LOCATION:   DATE INSERTED:  REMOVE:  [ ] YES [ ] NO  EXPLAIN:    PMH -reviewed admission note, no change since admission  PAST MEDICAL & SURGICAL HISTORY:  Stroke      HTN (hypertension)      HLD (hyperlipidemia)      DM (diabetes mellitus)      DVT (deep venous thrombosis)      CVA (cerebrovascular accident)      Aphasia      Dysphagia      Apraxia      Right-sided muscle weakness      Nonverbal      Seizure          ICU Vital Signs Last 24 Hrs  T(C): 36.9 (01 Nov 2024 07:00), Max: 37.8 (31 Oct 2024 17:00)  T(F): 98.4 (01 Nov 2024 07:00), Max: 100 (31 Oct 2024 17:00)  HR: 83 (01 Nov 2024 07:00) (74 - 96)  BP: 144/63 (01 Nov 2024 07:00) (126/53 - 169/81)  BP(mean): 88 (01 Nov 2024 07:00) (76 - 104)  ABP: --  ABP(mean): --  RR: 13 (01 Nov 2024 07:00) (11 - 16)  SpO2: 96% (01 Nov 2024 07:00) (96% - 100%)    O2 Parameters below as of 31 Oct 2024 16:00  Patient On (Oxygen Delivery Method): room air                  10-31 @ 07:01  -  11-01 @ 07:00  --------------------------------------------------------  IN: 1460 mL / OUT: 1950 mL / NET: -490 mL            PHYSICAL EXAM:    GENERAL: NAD  ENMT: Moist mucous membranes,  NECK: Supple, normal appearance, No JVD; Normal thyroid; Trachea midline  NERVOUS SYSTEM:  Alert & Oriented X3,  Motor Strength 5/5 B/L upper and lower extremities; DTRs 2+ intact and symmetric  CHEST/LUNG: No chest deformity; Normal percussion bilaterally; No rales, rhonchi, wheezing   HEART: Regular rate and rhythm; No murmurs, rubs, or gallops  ABDOMEN: Soft, Nontender, Nondistended; Bowel sounds present  EXTREMITIES:  2+ Peripheral Pulses, No clubbing, cyanosis, or edema  LYMPH: No lymphadenopathy noted  SKIN: No rashes or lesions;  Good capillary refill      LABS:  CBC Full  -  ( 01 Nov 2024 04:53 )  WBC Count : 12.54 K/uL  RBC Count : 2.97 M/uL  Hemoglobin : 9.0 g/dL  Hematocrit : 27.4 %  Platelet Count - Automated : 257 K/uL  Mean Cell Volume : 92.3 fl  Mean Cell Hemoglobin : 30.3 pg  Mean Cell Hemoglobin Concentration : 32.8 g/dL  Auto Neutrophil # : x  Auto Lymphocyte # : x  Auto Monocyte # : x  Auto Eosinophil # : x  Auto Basophil # : x  Auto Neutrophil % : x  Auto Lymphocyte % : x  Auto Monocyte % : x  Auto Eosinophil % : x  Auto Basophil % : x    11-01    146[H]  |  120[H]  |  26[H]  ----------------------------<  178[H]  3.7   |  21[L]  |  1.29    Ca    8.3[L]      01 Nov 2024 04:53  Phos  2.0     11-01  Mg     1.8     11-01    TPro  7.1  /  Alb  1.7[L]  /  TBili  0.2  /  DBili  x   /  AST  17  /  ALT  15  /  AlkPhos  118  11-01      Urinalysis Basic - ( 01 Nov 2024 04:53 )    Color: x / Appearance: x / SG: x / pH: x  Gluc: 178 mg/dL / Ketone: x  / Bili: x / Urobili: x   Blood: x / Protein: x / Nitrite: x   Leuk Esterase: x / RBC: x / WBC x   Sq Epi: x / Non Sq Epi: x / Bacteria: x          RADIOLOGY & ADDITIONAL STUDIES REVIEWED:  ***    [ ]GOALS OF CARE DISCUSSION WITH PATIENT/FAMILY/PROXY:    CRITICAL CARE TIME SPENT: 35 minutes INTERVAL HPI/OVERNIGHT EVENTS: No acute events overnight. Patient passed TOV yesterday. Patient examined at bedside this AM.  Patient offers no acute complaints. Denies any pain.     PRESSORS: [ ] YES [X] NO  WHICH:    Antimicrobial:  fluconAZOLE IVPB 200 milliGRAM(s) IV Intermittent every 24 hours    Cardiovascular:  furosemide   Injectable 20 milliGRAM(s) IV Push once    Pulmonary:    Hematalogic:  apixaban 2.5 milliGRAM(s) Oral every 12 hours    Other:  acetaminophen   Oral Liquid .. 650 milliGRAM(s) Oral every 6 hours PRN  atorvastatin 20 milliGRAM(s) Oral at bedtime  chlorhexidine 2% Cloths 1 Application(s) Topical <User Schedule>  insulin lispro (ADMELOG) corrective regimen sliding scale   SubCutaneous every 6 hours  levETIRAcetam  Solution 750 milliGRAM(s) Oral two times a day  nystatin Powder 1 Application(s) Topical two times a day  pantoprazole   Suspension 40 milliGRAM(s) Enteral Tube daily  polyethylene glycol 3350 17 Gram(s) Oral daily  potassium phosphate IVPB 30 milliMole(s) IV Intermittent once  senna 2 Tablet(s) Oral at bedtime    acetaminophen   Oral Liquid .. 650 milliGRAM(s) Oral every 6 hours PRN  apixaban 2.5 milliGRAM(s) Oral every 12 hours  atorvastatin 20 milliGRAM(s) Oral at bedtime  chlorhexidine 2% Cloths 1 Application(s) Topical <User Schedule>  fluconAZOLE IVPB 200 milliGRAM(s) IV Intermittent every 24 hours  furosemide   Injectable 20 milliGRAM(s) IV Push once  insulin lispro (ADMELOG) corrective regimen sliding scale   SubCutaneous every 6 hours  levETIRAcetam  Solution 750 milliGRAM(s) Oral two times a day  nystatin Powder 1 Application(s) Topical two times a day  pantoprazole   Suspension 40 milliGRAM(s) Enteral Tube daily  polyethylene glycol 3350 17 Gram(s) Oral daily  potassium phosphate IVPB 30 milliMole(s) IV Intermittent once  senna 2 Tablet(s) Oral at bedtime    Drug Dosing Weight  Height (cm): 160 (28 Oct 2024 15:20)  Weight (kg): 59.3 (28 Oct 2024 21:00)  BMI (kg/m2): 23.2 (28 Oct 2024 21:00)  BSA (m2): 1.61 (28 Oct 2024 21:00)    CENTRAL LINE: [ ] YES [X] NO  LOCATION:   DATE INSERTED:  REMOVE: [ ] YES [ ] NO  EXPLAIN:    HUYNH: [ ] YES [X] NO    DATE INSERTED:  REMOVE:  [ ] YES [ ] NO  EXPLAIN:    A-LINE:  [ ] YES [X] NO  LOCATION:   DATE INSERTED:  REMOVE:  [ ] YES [ ] NO  EXPLAIN:    PMH -reviewed admission note, no change since admission  PAST MEDICAL & SURGICAL HISTORY:  Stroke      HTN (hypertension)      HLD (hyperlipidemia)      DM (diabetes mellitus)      DVT (deep venous thrombosis)      CVA (cerebrovascular accident)      Aphasia      Dysphagia      Apraxia      Right-sided muscle weakness      Nonverbal      Seizure          ICU Vital Signs Last 24 Hrs  T(C): 36.9 (01 Nov 2024 07:00), Max: 37.8 (31 Oct 2024 17:00)  T(F): 98.4 (01 Nov 2024 07:00), Max: 100 (31 Oct 2024 17:00)  HR: 83 (01 Nov 2024 07:00) (74 - 96)  BP: 144/63 (01 Nov 2024 07:00) (126/53 - 169/81)  BP(mean): 88 (01 Nov 2024 07:00) (76 - 104)  ABP: --  ABP(mean): --  RR: 13 (01 Nov 2024 07:00) (11 - 16)  SpO2: 96% (01 Nov 2024 07:00) (96% - 100%)    O2 Parameters below as of 31 Oct 2024 16:00  Patient On (Oxygen Delivery Method): room air                  10-31 @ 07:01  -  11-01 @ 07:00  --------------------------------------------------------  IN: 1460 mL / OUT: 1950 mL / NET: -490 mL            PHYSICAL EXAM:    GENERAL: NAD  ENMT: Moist mucous membranes, questionable candidiasis - after mouth cleaning, no more white plaque noted   NERVOUS SYSTEM:  Alert, responds to some commands, able to nod/yes no to questions  CHEST/LUNG: No chest deformity; Normal percussion bilaterally; No rales, rhonchi, wheezing   HEART: Regular rate and rhythm; No murmurs, rubs, or gallops  ABDOMEN: Soft, Nontender, distended; Bowel sounds present  EXTREMITIES: Hand edema bilateral.  2+ Peripheral Pulses, No clubbing, cyanosis  LYMPH: No lymphadenopathy noted  SKIN: No rashes or lesions;  Good capillary refill      LABS:  CBC Full  -  ( 01 Nov 2024 04:53 )  WBC Count : 12.54 K/uL  RBC Count : 2.97 M/uL  Hemoglobin : 9.0 g/dL  Hematocrit : 27.4 %  Platelet Count - Automated : 257 K/uL  Mean Cell Volume : 92.3 fl  Mean Cell Hemoglobin : 30.3 pg  Mean Cell Hemoglobin Concentration : 32.8 g/dL  Auto Neutrophil # : x  Auto Lymphocyte # : x  Auto Monocyte # : x  Auto Eosinophil # : x  Auto Basophil # : x  Auto Neutrophil % : x  Auto Lymphocyte % : x  Auto Monocyte % : x  Auto Eosinophil % : x  Auto Basophil % : x    11-01    146[H]  |  120[H]  |  26[H]  ----------------------------<  178[H]  3.7   |  21[L]  |  1.29    Ca    8.3[L]      01 Nov 2024 04:53  Phos  2.0     11-01  Mg     1.8     11-01    TPro  7.1  /  Alb  1.7[L]  /  TBili  0.2  /  DBili  x   /  AST  17  /  ALT  15  /  AlkPhos  118  11-01      Urinalysis Basic - ( 01 Nov 2024 04:53 )    Color: x / Appearance: x / SG: x / pH: x  Gluc: 178 mg/dL / Ketone: x  / Bili: x / Urobili: x   Blood: x / Protein: x / Nitrite: x   Leuk Esterase: x / RBC: x / WBC x   Sq Epi: x / Non Sq Epi: x / Bacteria: x          RADIOLOGY & ADDITIONAL STUDIES REVIEWED:   < from: CT Chest w/ IV Cont (10.31.24 @ 13:49) >    ACC: [id number] EXAM:  CT CHEST IC   ORDERED BY: BRITTNEY HO     ACC: [id number] EXAM:  CT ABDOMEN AND PELVIS IC   ORDERED BY: PEGGY CHAKRABORTY     PROCEDURE DATE:  10/31/2024          INTERPRETATION:  CLINICAL INFORMATION: 89 years  Female with Candidemia.    COMPARISON: Noncontrast CT chest abdomen and pelvis 10/17/2024    CONTRAST/COMPLICATIONS:  IV Contrast: Omnipaque 350 (accession [id number]), IV contrast documented   in unlinked concurrent exam (accession [id number])  90 cc administered   10   cc discarded  Oral Contrast: NONE  Complications: None reported at time of study completion    PROCEDURE:  CT of the Chest, Abdomen and Pelvis was performed.  Sagittal and coronal reformats were performed.    FINDINGS:  CHEST:  LUNGS AND LARGE AIRWAYS:Patent central airways. No pulmonary nodules.   Bilateral lower lobe passive atelectasis.  PLEURA: Small bilateral pleural effusions increased from prior.  VESSELS: Atherosclerotic aorta. Coronary atherosclerosis.  HEART: Cardiomegaly. No pericardialeffusion.  MEDIASTINUM AND WALTER: No lymphadenopathy.  CHEST WALL AND LOWER NECK: Subcentimeter thyroid nodules.    ABDOMEN AND PELVIS:  LIVER: Within normal limits.  BILE DUCTS: Normal caliber.  GALLBLADDER: Within normal limits.  SPLEEN: 3.4 cm cystwith partially calcified wall. Other scattered   hypodensities too small to characterize.  PANCREAS: 6.8 x 6.8 cm multiseptated pancreatic head cystic mass. 6 mm   and 8 mm internal calcifications. Distal pancreatic duct distention   measuring up to 1.1 cm.  ADRENALS: Within normal limits.  KIDNEYS/URETERS: Multicystic kidneys. Dominant 7.7 cm right upper pole   cyst. 3.9 x 3.0 cm hypoattenuating cortical focus in the mid right kidney   may represent pyelonephritis or malignancy. Multiple hypodensities too   small to characterize.  No hydroureteronephrosis. Left renal pelvic urothelial thickening   suggestive of infection.  6 mm right renal pelvic calculus. Right renal pelvic and ureteral   urothelial enhancement.    BLADDER: Within normal limits.  REPRODUCTIVE ORGANS: Abnormally distended endometrial cavity measuring up   to 1.5 cm with amorphous internal soft tissue. Several calcified myomata.    BOWEL: No bowel obstruction. Appendix is not visualized and cannot be   assessed.. Feeding tube terminates in the stomach. Rectal tube in situ.   Marked perirectal edema and fluid appear  PERITONEUM/RETROPERITONEUM: Within normal limits.  VESSELS: Advanced atherosclerotic changes.  LYMPH NODES: 8 mm right perirectal lymph node (2:240). 1.2 x 0.6 cm right   pelvic sidewall lymph node (2:242).  ABDOMINAL WALL: Anasarca.  BONES: Degenerative changes of the spine and hips.    IMPRESSION:  Minimal bilateral pleural effusions and underlying passive atelectasis.   Cardiomegaly.    Marked perirectal edema and fluid concerning for infectious or   inflammatory proctitis. Underlying malignancy is not excluded. Prominent   perirectal and right pelvic sidewall lymph nodes.    3.9 x 3.0 cm right renal cortical hypoattenuating focus suspicious for  infection. Bilateral urothelial enhancement also suspicious for infection.    Multiple indeterminate renal lesions. Polycystic kidneys. Nonobstructing   right renal calculus.    Redemonstrated multicystic pancreatic mass with distal pancreatic duct  distention.    Abnormally distended endometrial cavity with internal amorphous soft   tissue concerning for malignancy. Infection is not excluded.        --- End of Report ---            VALERIA SARMIENTO MD; Attending Radiologist  This document has been electronically signed. Oct 31 2024  2:42PM    < end of copied text >  < from: Xray Abdomen 1 View PORTABLE -Urgent (Xray Abdomen 1 View PORTABLE -Urgent .) (10.30.24 @ 12:48) >    ACC: [id number] EXAM:  XR ABDOMEN PORTABLE URGENT 1V   ORDERED BY: PEGGY CHAKRABORTY     PROCEDURE DATE:  10/30/2024          INTERPRETATION:  Constipation, bloating.    AP supine abdomen    IMPRESSION: Nasogastric tube tip in the stomach. There is mildgaseous   distention of the stomach and mild gaseous distention of large and small   bowel loops throughout the abdomen likely reflecting ileus. No definite   obstruction. Rectal temperature probe projects over the lower pelvis.   Vascular calcification. Marked bony productive change right hip.    Follow-up with serial plain films or CT as warranted    --- End of Report ---            NUSRAT ANTONY MD; Attending Radiologist  This document has been electronically signed. Nov 1 2024  8:09AM    < end of copied text >      [X]GOALS OF CARE DISCUSSION WITH PATIENT/FAMILY/PROXY: DNR/Intubate    CRITICAL CARE TIME SPENT: 35 minutes

## 2024-11-01 NOTE — PROGRESS NOTE ADULT - SUBJECTIVE AND OBJECTIVE BOX
INTERVAL HPI/OVERNIGHT EVENTS:  Patient seen,no new seizerus,events noticed  VITAL SIGNS:  T(F): 98.4 (11-01-24 @ 07:00)  HR: 83 (11-01-24 @ 07:00)  BP: 144/63 (11-01-24 @ 07:00)  RR: 13 (11-01-24 @ 07:00)  SpO2: 96% (11-01-24 @ 07:00)  Wt(kg): --    PHYSICAL EXAM:  awake,poor historian  Constitutional:  Eyes:  ENMT:perrla  Neck:  Respiratory:few rales  Cardiovascular:s1s2,m-none  Gastrointestinal:soft,bs pos  Extremities:mild edema,varicosity  Vascular:  Neurological:no focal deficit  Musculoskeletal:    MEDICATIONS  (STANDING):  apixaban 2.5 milliGRAM(s) Oral every 12 hours  atorvastatin 20 milliGRAM(s) Oral at bedtime  chlorhexidine 2% Cloths 1 Application(s) Topical <User Schedule>  fluconAZOLE IVPB 200 milliGRAM(s) IV Intermittent every 24 hours  furosemide   Injectable 20 milliGRAM(s) IV Push once  insulin lispro (ADMELOG) corrective regimen sliding scale   SubCutaneous every 6 hours  levETIRAcetam  Solution 750 milliGRAM(s) Oral two times a day  nystatin Powder 1 Application(s) Topical two times a day  pantoprazole   Suspension 40 milliGRAM(s) Enteral Tube daily  polyethylene glycol 3350 17 Gram(s) Oral daily  potassium phosphate IVPB 30 milliMole(s) IV Intermittent once  senna 2 Tablet(s) Oral at bedtime    MEDICATIONS  (PRN):  acetaminophen   Oral Liquid .. 650 milliGRAM(s) Oral every 6 hours PRN Mild Pain (1 - 3)      Allergies    No Known Allergies    Intolerances        LABS:                        9.0    12.54 )-----------( 257      ( 01 Nov 2024 04:53 )             27.4     11-01    146[H]  |  120[H]  |  26[H]  ----------------------------<  178[H]  3.7   |  21[L]  |  1.29    Ca    8.3[L]      01 Nov 2024 04:53  Phos  2.0     11-01  Mg     1.8     11-01    TPro  7.1  /  Alb  1.7[L]  /  TBili  0.2  /  DBili  x   /  AST  17  /  ALT  15  /  AlkPhos  118  11-01      Urinalysis Basic - ( 01 Nov 2024 04:53 )    Color: x / Appearance: x / SG: x / pH: x  Gluc: 178 mg/dL / Ketone: x  / Bili: x / Urobili: x   Blood: x / Protein: x / Nitrite: x   Leuk Esterase: x / RBC: x / WBC x   Sq Epi: x / Non Sq Epi: x / Bacteria: x        RADIOLOGY & ADDITIONAL TESTS:      · Assessment	  89F from PMH CVA with right sided deficits and aphasia, DM, HTN , HLD, seizure disorder presenting from [facility] after witnessed tonic clonic seizure.    #status epilepticus  #CVA  #DM  #lactic acidosis       =================== Neuro============================  #status epilepticus   received keppra 2g load, gave an additional 1.5 for 60mg/kg dose  started keppra 750 BID   Neurology Dr. Jennings consulted  ordered 24 hr EEG  f/u keppra levels    #h/o CVA  with right sided hemiplegia and aphasia (prior notes stating bedbound status, son at bedside stating pt ambulates with wheelchair)  on atorvastatin 20 and eliquis 2.5mg BID at home  continue with home meds via NGT    ================= Cardiovascular==========================  #HTN  takes norvasc 10 daily  was reported to be hypotensive, propofol weaned down and was placed on peripheral levophed  received 2L NS bolus  monitor BP      ================- Pulm=================================  #intubated and sedated  -stable ,extubated on 10/31  rhonchi heard b/l (possible aspiration during status epilepticus)   started on rocephin      ==================ID===================================  #UTI/Acute pyelo-   recently discharged on 10/23 for UTI with E.coli bacteremia   frost placed in ED with purulent output noted  UA positive  f/u blood cultures and urine cultures  id f/up appret with recomendations  cont abx      ================= Nephro================================  #TRAVIS   SCr 2.05, baseline 1.3  f/u urine lytes  received 2L IVF in ED  trend SCr    =================GI====================================  no active issues  start TF via NGT    ================ Heme==================================  #anemia  no active signs of bleeding   Hb 9.3, around baseline    =================Endocrine===============================  #DM  on humalog sliding scale and farxiga at home    will start mISS q6 while NPO on TF  monitor FS    ================= Skin/Catheters============================  Peripheral IV lines   Frost catheter     =================Prophylaxis =============================  eliquis DVT prophylaxis   protonix GI prophylaxis    ==================GOC==================================  FULL CODE   Disposition ICU

## 2024-11-01 NOTE — PROGRESS NOTE ADULT - ASSESSMENT
Subjective: NAD, afebrile, clinically stable, no new complains    MEDS:  acetaminophen   Oral Liquid .. 650 milliGRAM(s) Oral every 6 hours PRN  apixaban 2.5 milliGRAM(s) Oral every 12 hours  atorvastatin 20 milliGRAM(s) Oral at bedtime  chlorhexidine 2% Cloths 1 Application(s) Topical <User Schedule>  furosemide   Injectable 20 milliGRAM(s) IV Push once  insulin lispro (ADMELOG) corrective regimen sliding scale   SubCutaneous every 6 hours  levETIRAcetam  Solution 750 milliGRAM(s) Oral two times a day  nystatin Powder 1 Application(s) Topical two times a day  pantoprazole   Suspension 40 milliGRAM(s) Enteral Tube daily  polyethylene glycol 3350 17 Gram(s) Oral daily  potassium phosphate IVPB 30 milliMole(s) IV Intermittent once  senna 2 Tablet(s) Oral at bedtime    REVIEW OF SYSTEMS: Unable to obtain pt is minimally verbal    PE:  Vital Signs Last 24 Hrs  T(C): 36.9 (01 Nov 2024 07:00), Max: 37.8 (31 Oct 2024 17:00)  T(F): 98.4 (01 Nov 2024 07:00), Max: 100 (31 Oct 2024 17:00)  HR: 83 (01 Nov 2024 07:00) (74 - 96)  BP: 144/63 (01 Nov 2024 07:00) (126/53 - 169/81)  BP(mean): 88 (01 Nov 2024 07:00) (76 - 104)  RR: 13 (01 Nov 2024 07:00) (11 - 16)  SpO2: 96% (01 Nov 2024 07:00) (96% - 100%)    Parameters below as of 31 Oct 2024 16:00  Patient On (Oxygen Delivery Method): room air    Gen: AOx0-1 bed bound, minimally verbal, follow simple commands, R sided weakness but functional quadriplegic   HEAD:  Atraumatic  EYES: PERRLA, conjunctiva and sclera clear  ENT: Moist mucous membranes, no oral thrush  NECK: Supple, No JVD  CV: S1+S2+ no murmurs  Resp: clear lungs  Abd: Soft, nontender, +BS  Ext: No LE edema, no cyanosis, pulses +  : No dysuria, frost +  IV/Skin: No thrombophlebitis  Msk: No low back pain, no arthralgias, no joint swelling  Neuro: AAOx0-1 no new focal signs    LABS/DIAGNOSTIC TESTS:                        9.0    12.54 )-----------( 257      ( 01 Nov 2024 04:53 )             27.4     WBC Count: 12.54 K/uL (11-01 @ 04:53)  WBC Count: 9.20 K/uL (10-31 @ 03:44)  WBC Count: 13.27 K/uL (10-30 @ 05:07)    11-01    146[H]  |  120[H]  |  26[H]  ----------------------------<  178[H]  3.7   |  21[L]  |  1.29    Ca    8.3[L]      01 Nov 2024 04:53  Phos  2.0     11-01  Mg     1.8     11-01    TPro  7.1  /  Alb  1.7[L]  /  TBili  0.2  /  DBili  x   /  AST  17  /  ALT  15  /  AlkPhos  118  11-01    Urine Microscopic-Add On (NC) (10.28.24 @ 16:44)    Squamous Epithelial Cells: Present: few   Bacteria: Moderate /HPF   Comment - Urine: many pseudohayphae/yeasts and few fat globules present   Red Blood Cell - Urine: Too Numerous to count /HPF   White Blood Cell - Urine: >50 /HPF    CULTURES:   Culture - Blood (collected 10-31-24 @ 06:40)  Source: .Blood BLOOD  Preliminary Report (11-01-24 @ 13:01):    No growth at 24 hours    Culture - Blood (collected 10-31-24 @ 06:10)  Source: .Blood BLOOD  Preliminary Report (11-01-24 @ 13:01):    No growth at 24 hours    Urinalysis with Rflx Culture (collected 10-28-24 @ 16:44)    Culture - Urine (collected 10-28-24 @ 16:44)  Source: Clean Catch  Final Report (10-29-24 @ 22:44):    50,000 - 99,000 CFU/mL Candida albicans    "Susceptibilities not performed"    Culture - Blood (collected 10-28-24 @ 15:25)  Source: .Blood BLOOD  Gram Stain (10-30-24 @ 17:40):    Growth in aerobic bottle: Yeast like cells  Preliminary Report (11-01-24 @ 13:38):    Growth in aerobic bottle: Candida albicans    Referred to Mycology    Direct identification is available within approximately 3-5    hours either by Blood Panel Multiplexed PCR or Direct    MALDI-TOF. Details: [email or web address]  Organism: Blood Culture PCR (10-30-24 @ 20:04)  Organism: Blood Culture PCR (10-30-24 @ 20:04)      Method Type: PCR      -  Candida albicans: Detec    Culture - Blood (collected 10-28-24 @ 15:15)  Source: .Blood BLOOD  Gram Stain (10-31-24 @ 00:08):    Growth in aerobic bottle: Yeast like cells  Preliminary Report (11-01-24 @ 09:25):    Growth in aerobic bottle: Candida albicans    Referred to Mycology Susceptibility to follow.    Culture - Blood (collected 10-21-24 @ 05:20)  Source: .Blood BLOOD  Final Report (10-26-24 @ 12:00):    No growth at 5 days    Culture - Urine (collected 10-17-24 @ 16:21)  Source: Catheterized  Final Report (10-21-24 @ 07:27):    >100,000 CFU/ml Escherichia coli  Organism: Escherichia coli (10-21-24 @ 07:27)  Organism: Escherichia coli (10-21-24 @ 07:27)    Culture - Blood (collected 10-17-24 @ 11:00)  Source: .Blood BLOOD  Gram Stain (10-18-24 @ 05:12):    Growth in anaerobic bottle: Gram Negative Rods    Growth in aerobic bottle: Gram Negative Rods  Final Report (10-19-24 @ 15:39):    Growth in aerobic and anaerobic bottles: Escherichia coli    See previous culture [id number]    Culture - Blood (collected 10-17-24 @ 10:50)  Source: .Blood BLOOD  Gram Stain (10-18-24 @ 05:24):    Growth in anaerobic bottle: Gram Negative Rods    Growth in aerobic bottle: Gram Negative Rods  Final Report (10-19-24 @ 15:30):    Growth in aerobic and anaerobic bottles: Escherichia coli    Direct identification is available within approximately 3-5    hours either by Blood Panel Multiplexed PCR or Direct    MALDI-TOF. 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   Per EMS and ED records pt was febrile 101.4F PTA. Intubated in the ED for airway protection/extubated 10/31. BC + candida started on caspofungin later transitioned to fluconazole. Repeat blood cx 10/31 NGTD.   ECHO 10/31 EF 62, G2DD, severe pulmonary hypertension, no obvious vegetations.  CT A/P 10/31 reviewed w/Minimal bilateral pleural effusions and underlying passive atelectasis. Cardiomegaly. Marked perirectal edema and fluid concerning for infectious or inflammatory proctitis. Underlying malignancy is not excluded. Prominent perirectal and right pelvic sidewall lymph nodes. 3.9 x 3.0 cm right renal cortical hypoattenuating focus suspicious for infection. Bilateral urothelial enhancement also suspicious for infection. Multiple indeterminate renal lesions. Polycystic kidneys. Nonobstructing right renal calculus. 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 Abnormally distended endometrial cavity with internal amorphous soft tissue concerning for malignancy. Infection is not excluded.    -Sepsis due to BSI  -AHRF extubated 10/31  -Candidemia- GI source vs  source  -Proctitis  -UTI  ?  -Status epilepticus  -CVA w/ residual R sided weakness and aphasia   -DM  -Dementia     PLAN:  Continue Fluconazole 200 mg q24 hrs IV  follow up cx and sens.  ESR/CRP  SHRUTHI if able  Opthalmologic exam  Monitor LFTS and QTC while on fluconazole  Seizure precautions, aspiration precautions, chest PT, hailey care  Agree with Palliative eval for GOC  Discussed with pt's son on the bedside  Discussed with MICU NP    Please reach ID with any questions or concerns  Dr. Melida Garcia  Available in Teams

## 2024-11-02 LAB
-  FLUCONAZOLE: SIGNIFICANT CHANGE UP
-  FLUCONAZOLE: SIGNIFICANT CHANGE UP
ALBUMIN SERPL ELPH-MCNC: 1.8 G/DL — LOW (ref 3.5–5)
ALP SERPL-CCNC: 101 U/L — SIGNIFICANT CHANGE UP (ref 40–120)
ALT FLD-CCNC: 15 U/L DA — SIGNIFICANT CHANGE UP (ref 10–60)
ANION GAP SERPL CALC-SCNC: 6 MMOL/L — SIGNIFICANT CHANGE UP (ref 5–17)
AST SERPL-CCNC: 25 U/L — SIGNIFICANT CHANGE UP (ref 10–40)
BILIRUB SERPL-MCNC: 0.4 MG/DL — SIGNIFICANT CHANGE UP (ref 0.2–1.2)
BUN SERPL-MCNC: 24 MG/DL — HIGH (ref 7–18)
CALCIUM SERPL-MCNC: 8.3 MG/DL — LOW (ref 8.4–10.5)
CHLORIDE SERPL-SCNC: 120 MMOL/L — HIGH (ref 96–108)
CO2 SERPL-SCNC: 21 MMOL/L — LOW (ref 22–31)
CREAT SERPL-MCNC: 1.43 MG/DL — HIGH (ref 0.5–1.3)
CULTURE RESULTS: ABNORMAL
CULTURE RESULTS: ABNORMAL
EGFR: 33 ML/MIN/1.73M2 — LOW
GLUCOSE BLDC GLUCOMTR-MCNC: 135 MG/DL — HIGH (ref 70–99)
GLUCOSE BLDC GLUCOMTR-MCNC: 168 MG/DL — HIGH (ref 70–99)
GLUCOSE SERPL-MCNC: 146 MG/DL — HIGH (ref 70–99)
HCT VFR BLD CALC: 29.7 % — LOW (ref 34.5–45)
HGB BLD-MCNC: 9.6 G/DL — LOW (ref 11.5–15.5)
MAGNESIUM SERPL-MCNC: 1.9 MG/DL — SIGNIFICANT CHANGE UP (ref 1.6–2.6)
MCHC RBC-ENTMCNC: 30.5 PG — SIGNIFICANT CHANGE UP (ref 27–34)
MCHC RBC-ENTMCNC: 32.3 G/DL — SIGNIFICANT CHANGE UP (ref 32–36)
MCV RBC AUTO: 94.3 FL — SIGNIFICANT CHANGE UP (ref 80–100)
METHOD TYPE: SIGNIFICANT CHANGE UP
METHOD TYPE: SIGNIFICANT CHANGE UP
NRBC # BLD: 0 /100 WBCS — SIGNIFICANT CHANGE UP (ref 0–0)
ORGANISM # SPEC MICROSCOPIC CNT: ABNORMAL
PHOSPHATE SERPL-MCNC: 3.5 MG/DL — SIGNIFICANT CHANGE UP (ref 2.5–4.5)
PLATELET # BLD AUTO: 217 K/UL — SIGNIFICANT CHANGE UP (ref 150–400)
POTASSIUM SERPL-MCNC: 4.7 MMOL/L — SIGNIFICANT CHANGE UP (ref 3.5–5.3)
POTASSIUM SERPL-SCNC: 4.7 MMOL/L — SIGNIFICANT CHANGE UP (ref 3.5–5.3)
PROT SERPL-MCNC: 7 G/DL — SIGNIFICANT CHANGE UP (ref 6–8.3)
RBC # BLD: 3.15 M/UL — LOW (ref 3.8–5.2)
RBC # FLD: 18.3 % — HIGH (ref 10.3–14.5)
SODIUM SERPL-SCNC: 147 MMOL/L — HIGH (ref 135–145)
SPECIMEN SOURCE: SIGNIFICANT CHANGE UP
SPECIMEN SOURCE: SIGNIFICANT CHANGE UP
WBC # BLD: 14.84 K/UL — HIGH (ref 3.8–10.5)
WBC # FLD AUTO: 14.84 K/UL — HIGH (ref 3.8–10.5)

## 2024-11-02 PROCEDURE — 99232 SBSQ HOSP IP/OBS MODERATE 35: CPT

## 2024-11-02 RX ORDER — PANTOPRAZOLE SODIUM 40 MG/1
40 TABLET, DELAYED RELEASE ORAL
Refills: 0 | Status: DISCONTINUED | OUTPATIENT
Start: 2024-11-03 | End: 2024-11-06

## 2024-11-02 RX ORDER — SODIUM CHLORIDE 9 MG/ML
1000 INJECTION, SOLUTION INTRAMUSCULAR; INTRAVENOUS; SUBCUTANEOUS
Refills: 0 | Status: DISCONTINUED | OUTPATIENT
Start: 2024-11-02 | End: 2024-11-03

## 2024-11-02 RX ADMIN — POLYETHYLENE GLYCOL 3350 17 GRAM(S): 17 POWDER, FOR SOLUTION ORAL at 11:24

## 2024-11-02 RX ADMIN — NYSTATIN 1 APPLICATION(S): 100000 POWDER TOPICAL at 05:44

## 2024-11-02 RX ADMIN — Medication 2: at 11:24

## 2024-11-02 RX ADMIN — LEVETIRACETAM 750 MILLIGRAM(S): 500 TABLET, FILM COATED ORAL at 18:30

## 2024-11-02 RX ADMIN — APIXABAN 2.5 MILLIGRAM(S): 5 TABLET, FILM COATED ORAL at 05:45

## 2024-11-02 RX ADMIN — NYSTATIN 1 APPLICATION(S): 100000 POWDER TOPICAL at 18:31

## 2024-11-02 RX ADMIN — LEVETIRACETAM 750 MILLIGRAM(S): 500 TABLET, FILM COATED ORAL at 05:45

## 2024-11-02 RX ADMIN — SODIUM CHLORIDE 100 MILLILITER(S): 9 INJECTION, SOLUTION INTRAMUSCULAR; INTRAVENOUS; SUBCUTANEOUS at 14:08

## 2024-11-02 RX ADMIN — Medication 20 MILLIGRAM(S): at 22:39

## 2024-11-02 RX ADMIN — Medication 2 TABLET(S): at 22:39

## 2024-11-02 RX ADMIN — FLUCONAZOLE, SODIUM CHLORIDE 100 MILLIGRAM(S): 2 INJECTION INTRAVENOUS at 18:30

## 2024-11-02 RX ADMIN — CHLORHEXIDINE GLUCONATE 1 APPLICATION(S): 40 SOLUTION TOPICAL at 05:45

## 2024-11-02 RX ADMIN — APIXABAN 2.5 MILLIGRAM(S): 5 TABLET, FILM COATED ORAL at 18:31

## 2024-11-02 NOTE — PROGRESS NOTE ADULT - ASSESSMENT
Subjective: Pt downgraded from MICU, clinically stable, afebrile, no new complains    MEDS:  acetaminophen   Oral Liquid .. 650 milliGRAM(s) Oral every 6 hours PRN  apixaban 2.5 milliGRAM(s) Oral every 12 hours  atorvastatin 20 milliGRAM(s) Oral at bedtime  insulin lispro (ADMELOG) corrective regimen sliding scale   SubCutaneous every 6 hours  levETIRAcetam  Solution 750 milliGRAM(s) Oral two times a day  nystatin Powder 1 Application(s) Topical two times a day  polyethylene glycol 3350 17 Gram(s) Oral daily  senna 2 Tablet(s) Oral at bedtime  sodium chloride 0.9%. 1000 milliLiter(s) IV Continuous <Continuous>    REVIEW OF SYSTEMS: Unable to obtain pt non verbal    PE:  Vital Signs Last 24 Hrs  T(C): 36.7 (02 Nov 2024 11:00), Max: 38.4 (01 Nov 2024 18:00)  T(F): 98.1 (02 Nov 2024 11:00), Max: 101.1 (01 Nov 2024 18:00)  HR: 86 (02 Nov 2024 11:00) (79 - 116)  BP: 133/72 (02 Nov 2024 11:00) (124/64 - 202/79)  BP(mean): 92 (02 Nov 2024 11:00) (79 - 116)  RR: 10 (02 Nov 2024 11:00) (10 - 14)  SpO2: 98% (02 Nov 2024 11:00) (95% - 100%)    Gen: AOx0-1, non verbal, aphasic, bed bound r sided weakness  HEAD:  Atraumatic  EYES: PERRLA, conjunctiva and sclera clear  ENT: Moist mucous membranes  NECK: Supple, No JVD  CV: S1+S2 + no murmurs  Resp: CTA  Abd: Soft, nontender, +BS  Ext: + LE edema   : + Lyle  IV/Skin: No thrombophlebitis  Msk: No low back pain   Neuro: AAOx0-1 at baseline    LABS/DIAGNOSTIC TESTS:                        9.6    14.84 )-----------( 217      ( 02 Nov 2024 05:19 )             29.7     WBC Count: 14.84 K/uL (11-02 @ 05:19)  WBC Count: 12.54 K/uL (11-01 @ 04:53)  WBC Count: 9.20 K/uL (10-31 @ 03:44)    11-02    147[H]  |  120[H]  |  24[H]  ----------------------------<  146[H]  4.7   |  21[L]  |  1.43[H]    Ca    8.3[L]      02 Nov 2024 04:53  Phos  3.5     11-02  Mg     1.9     11-02    TPro  7.0  /  Alb  1.8[L]  /  TBili  0.4  /  DBili  x   /  AST  25  /  ALT  15  /  AlkPhos  101  11-02    Ferritin: 438 ng/mL *H* (10-20-24 @ 06:36)  MRSA PCR Result.: NotDetec (10-28-24 @ 22:43)  Sedimentation Rate, Erythrocyte: 115 mm/Hr *H* (11-01-24 @ 04:53)  C-Reactive Protein: 180.0 mg/L *H* (11-01-24 @ 04:53)    CULTURES:   Culture - Blood (collected 11-01-24 @ 05:30)  Source: .Blood BLOOD  Preliminary Report (11-02-24 @ 12:01):    No growth at 24 hours    Culture - Blood (collected 11-01-24 @ 05:25)  Source: .Blood BLOOD  Preliminary Report (11-02-24 @ 12:01):    No growth at 24 hours    Culture - Blood (collected 10-31-24 @ 06:40)  Source: .Blood BLOOD  Preliminary Report (11-02-24 @ 13:01):    No growth at 48 Hours    Culture - Blood (collected 10-31-24 @ 06:10)  Source: .Blood BLOOD  Preliminary Report (11-02-24 @ 13:01):    No growth at 48 Hours    Culture - Urine (collected 10-28-24 @ 16:44)  Source: Clean Catch  Final Report (10-29-24 @ 22:44):    50,000 - 99,000 CFU/mL Candida albicans    "Susceptibilities not performed"    Culture - Blood (collected 10-28-24 @ 15:25)  Source: .Blood BLOOD  Gram Stain (10-30-24 @ 17:40):    Growth in aerobic bottle: Yeast like cells  Preliminary Report (11-01-24 @ 13:38):    Growth in aerobic bottle: Candida albicans    Referred to Mycology    Direct identification is available within approximately 3-5    hours either by Blood Panel Multiplexed PCR or Direct    MALDI-TOF. Details: [email or web address]  Organism: Blood Culture PCR (10-30-24 @ 20:04)  Organism: Blood Culture PCR (10-30-24 @ 20:04)      Method Type: PCR      -  Candida albicans: Detec    Culture - Blood (collected 10-28-24 @ 15:15)  Source: .Blood BLOOD  Gram Stain (10-31-24 @ 00:08):    Growth in aerobic bottle: Yeast like cells  Preliminary Report (11-01-24 @ 09:25):    Growth in aerobic bottle: Candida albicans    Referred to Mycology Susceptibility to follow.    Culture - Blood (collected 10-21-24 @ 05:20)  Source: .Blood BLOOD  Final Report (10-26-24 @ 12:00):    No growth at 5 days    RADIOLOGY:   Available pertinent Imaging reviewed    ANTIBIOTICS:  fluconAZOLE IVPB 200 every 24 hours    IMPRESSION:  89F from PMH CVA with right sided deficits and aphasia, DM, HTN , HLD, seizure disorder presenting from [facility] after witnessed tonic clonic seizure, recently admitted to [facility] and tx for UTI.   Per EMS and ED records pt was febrile 101.4F PTA. Intubated in the ED for airway protection/extubated 10/31. BC + candida started on caspofungin later transitioned to fluconazole. Repeat blood cx 10/31 NGTD.   ECHO 10/31 EF 62, G2DD, severe pulmonary hypertension, no obvious vegetations.  CT A/P 10/31 reviewed w/Minimal bilateral pleural effusions and underlying passive atelectasis. Cardiomegaly. Marked perirectal edema and fluid concerning for infectious or inflammatory proctitis. Underlying malignancy is not excluded. Prominent perirectal and right pelvic sidewall lymph nodes. 3.9 x 3.0 cm right renal cortical hypoattenuating focus suspicious for infection. Bilateral urothelial enhancement also suspicious for infection. Multiple indeterminate renal lesions. Polycystic kidneys. Nonobstructing right renal calculus. 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 Abnormally distended endometrial cavity with internal amorphous soft tissue concerning for malignancy. Infection is not excluded.    -Sepsis due to BSI  -AHRF extubated 10/31  -Candidemia- GI source vs  source  -Proctitis  -UTI  ?  -Status epilepticus  -CVA w/ residual R sided weakness and aphasia   -DM  -Dementia     PLAN:  Continue Fluconazole 200 mg q24 hrs IV  follow up cx and sens.  Trend ESR/CRP  SHRUTHI if able  Opthalmologic exam  Monitor LFTS and QTC while on fluconazole  Seizure precautions, aspiration precautions, chest PT, hailey care, offloading  F/up Palliative    Please reach ID with any questions or concerns  Dr. Melida Garcia  Available in Teams

## 2024-11-02 NOTE — PROGRESS NOTE ADULT - SUBJECTIVE AND OBJECTIVE BOX
89F from [facility] CVA with right sided deficits and aphasia, DM, HTN , HLD, seizure disorder presenting from [facility] after witnessed tonic clonic seizure lasting >30minutes. Patient was intubated on the field by EMS and received 2g keppra load.     Patient was admitted to ICU for continuous EEG monitoring. Additional 1.5 g Keppra was given on admission. Patient found to have a UTI and ceftriaxone was started. Neurology was consulted. 24h EEG captured no seizures. Patient was extubated, satting 97% on room air.   CT Abd Pelvis: showed perirectal edema and fluid concerning for infectious or inflammatory proctitis. Underlying malignancy is not excluded. Prominent perirectal and right pelvic sidewall lymph nodes. 3.9 x 3.0 cm right renal cortical hypoattenuating focus suspicious for infection. Bilateral urothelial enhancement also suspicious for infection. Multiple indeterminate renal lesions. Polycystic kidneys. Nonobstructing right renal calculus. 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 Abnormally distended endometrial cavity with internal amorphous soft tissue concerning for malignancy. Infection is not excluded.    Urology was consulted - no acute intervention needed. Patient's course was complicated by blood cultures positive for Candida, and was started on fluconazole, ID following. TTE performed was negative for endocarditis. NG tube was removed and patient tolerating pureed diet.     Patient is stable for downgrade to floor under care of  ------------- for further management , covering NP Alana Mccartney was informed.    Follow up:  - Continue fluconazole for candidemia  - Possible SHRUTHI to rule out endocarditis.      89F from PMH CVA with right sided deficits and aphasia, DM, HTN , HLD, seizure disorder presenting from [facility] after witnessed tonic clonic seizure lasting >30minutes. Patient was intubated on the field by EMS and received 2g keppra load.     Patient was admitted to ICU for continuous EEG monitoring. Additional 1.5 g Keppra was given on admission. Patient found to have a UTI and ceftriaxone was started. Neurology was consulted. 24h EEG captured no seizures. Patient was extubated, satting 97% on room air.   CT Abd Pelvis: showed perirectal edema and fluid concerning for infectious or inflammatory proctitis. Underlying malignancy is not excluded. Prominent perirectal and right pelvic sidewall lymph nodes. 3.9 x 3.0 cm right renal cortical hypoattenuating focus suspicious for infection. Bilateral urothelial enhancement also suspicious for infection. Multiple indeterminate renal lesions. Polycystic kidneys. Nonobstructing right renal calculus. 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 Abnormally distended endometrial cavity with internal amorphous soft tissue concerning for malignancy. Infection is not excluded.    Urology was consulted - no acute intervention needed. Patient's course was complicated by blood cultures positive for Candida, and was started on fluconazole, ID following. TTE performed was negative for endocarditis. NG tube was removed and patient tolerating pureed diet.     Patient is stable for downgrade to floor under care of Dr. Yates for further management , covering NP Alana Mccartney was informed.    Follow up:  - Continue fluconazole for candidemia  - Possible SHRUTHI to rule out endocarditis.

## 2024-11-02 NOTE — PROGRESS NOTE ADULT - SUBJECTIVE AND OBJECTIVE BOX
INTERVAL HPI/OVERNIGHT EVENTS:       PRESSORS: [ ] YES [ ] NO  WHICH:    ANTIBIOTICS:                  DATE STARTED:  ANTIBIOTICS:                  DATE STARTED:    Antimicrobial:  fluconAZOLE IVPB 200 milliGRAM(s) IV Intermittent every 24 hours    Cardiovascular:    Pulmonary:    Hematalogic:  apixaban 2.5 milliGRAM(s) Oral every 12 hours    Other:  acetaminophen   Oral Liquid .. 650 milliGRAM(s) Oral every 6 hours PRN  atorvastatin 20 milliGRAM(s) Oral at bedtime  chlorhexidine 2% Cloths 1 Application(s) Topical <User Schedule>  insulin lispro (ADMELOG) corrective regimen sliding scale   SubCutaneous every 6 hours  levETIRAcetam  Solution 750 milliGRAM(s) Oral two times a day  nystatin Powder 1 Application(s) Topical two times a day  pantoprazole   Suspension 40 milliGRAM(s) Enteral Tube daily  polyethylene glycol 3350 17 Gram(s) Oral daily  senna 2 Tablet(s) Oral at bedtime    acetaminophen   Oral Liquid .. 650 milliGRAM(s) Oral every 6 hours PRN  apixaban 2.5 milliGRAM(s) Oral every 12 hours  atorvastatin 20 milliGRAM(s) Oral at bedtime  chlorhexidine 2% Cloths 1 Application(s) Topical <User Schedule>  fluconAZOLE IVPB 200 milliGRAM(s) IV Intermittent every 24 hours  insulin lispro (ADMELOG) corrective regimen sliding scale   SubCutaneous every 6 hours  levETIRAcetam  Solution 750 milliGRAM(s) Oral two times a day  nystatin Powder 1 Application(s) Topical two times a day  pantoprazole   Suspension 40 milliGRAM(s) Enteral Tube daily  polyethylene glycol 3350 17 Gram(s) Oral daily  senna 2 Tablet(s) Oral at bedtime      PHYSICAL EXAM:  GENERAL: NAD  EYES: EOMI, PERRLA  NECK: Supple, No JVD; Trachea midline: No LAD   NERVOUS SYSTEM:  Alert & Oriented X3,  Motor Strength 5/5 B/L upper and lower extremities  CHEST/LUNG:  breath sounds present bilaterally, No rales, rhonchi, wheezing  HEART: Regular rate and rhythm; No murmurs, rubs, or gallops  ABDOMEN: Soft, Nontender, Nondistended; Bowel sounds present, no pain or masses on palpation  : voiding well, Huynh in place  EXTREMITIES:  2+ Peripheral Pulses, No clubbing, cyanosis, or edema  SKIN: warm, intact, no lesions     LINES/DRAINS/DEVICES  CENTRAL LINE: [ ] YES [ ] NO  LOCATION:     HUYNH: [ ] YES [ ] NO     A-LINE:  [ ] YES [ ] NO  LOCATION:       ICU Vital Signs Last 24 Hrs  T(C): 36.5 (02 Nov 2024 00:00), Max: 38.4 (01 Nov 2024 18:00)  T(F): 97.7 (02 Nov 2024 00:00), Max: 101.1 (01 Nov 2024 18:00)  HR: 83 (02 Nov 2024 00:00) (76 - 116)  BP: 152/67 (02 Nov 2024 00:00) (134/57 - 202/79)  BP(mean): 93 (02 Nov 2024 00:00) (80 - 116)  ABP: --  ABP(mean): --  RR: 11 (02 Nov 2024 00:00) (10 - 16)  SpO2: 100% (02 Nov 2024 00:00) (94% - 100%)              10-31 @ 07:01  -  11-01 @ 07:00  --------------------------------------------------------  IN: 1460 mL / OUT: 1950 mL / NET: -490 mL              LABS:  CBC Full  -  ( 01 Nov 2024 04:53 )  WBC Count : 12.54 K/uL  RBC Count : 2.97 M/uL  Hemoglobin : 9.0 g/dL  Hematocrit : 27.4 %  Platelet Count - Automated : 257 K/uL  Mean Cell Volume : 92.3 fl  Mean Cell Hemoglobin : 30.3 pg  Mean Cell Hemoglobin Concentration : 32.8 g/dL  Auto Neutrophil # : x  Auto Lymphocyte # : x  Auto Monocyte # : x  Auto Eosinophil # : x  Auto Basophil # : x  Auto Neutrophil % : x  Auto Lymphocyte % : x  Auto Monocyte % : x  Auto Eosinophil % : x  Auto Basophil % : x    11-01    146[H]  |  120[H]  |  26[H]  ----------------------------<  178[H]  3.7   |  21[L]  |  1.29    Ca    8.3[L]      01 Nov 2024 04:53  Phos  2.0     11-01  Mg     1.8     11-01    TPro  7.1  /  Alb  1.7[L]  /  TBili  0.2  /  DBili  x   /  AST  17  /  ALT  15  /  AlkPhos  118  11-01      Urinalysis Basic - ( 01 Nov 2024 04:53 )    Color: x / Appearance: x / SG: x / pH: x  Gluc: 178 mg/dL / Ketone: x  / Bili: x / Urobili: x   Blood: x / Protein: x / Nitrite: x   Leuk Esterase: x / RBC: x / WBC x   Sq Epi: x / Non Sq Epi: x / Bacteria: x      Culture Results:   No growth at 24 hours (10-31 @ 06:40)  Culture Results:   No growth at 24 hours (10-31 @ 06:10)      RADIOLOGY & ADDITIONAL STUDIES REVIEWED DURING TEAM ROUNDS    [ ]GOALS OF CARE DISCUSSION WITH PATIENT/FAMILY/PROXY:     INTERVAL HPI/OVERNIGHT EVENTS: No acute events overnight. Yesterday 100.7 fever, treated with IV tylenol. Patient examined at bedside this AM.  Patient offers no complaints, resting comfortably in bed. Follows commands, shakes head yes/no to questions.       PRESSORS: [ ] YES [X] NO  WHICH:    ANTIBIOTICS:                  DATE STARTED:  ANTIBIOTICS:                  DATE STARTED:    Antimicrobial:  fluconAZOLE IVPB 200 milliGRAM(s) IV Intermittent every 24 hours    Cardiovascular:    Pulmonary:    Hematalogic:  apixaban 2.5 milliGRAM(s) Oral every 12 hours    Other:  acetaminophen   Oral Liquid .. 650 milliGRAM(s) Oral every 6 hours PRN  atorvastatin 20 milliGRAM(s) Oral at bedtime  chlorhexidine 2% Cloths 1 Application(s) Topical <User Schedule>  insulin lispro (ADMELOG) corrective regimen sliding scale   SubCutaneous every 6 hours  levETIRAcetam  Solution 750 milliGRAM(s) Oral two times a day  nystatin Powder 1 Application(s) Topical two times a day  pantoprazole   Suspension 40 milliGRAM(s) Enteral Tube daily  polyethylene glycol 3350 17 Gram(s) Oral daily  senna 2 Tablet(s) Oral at bedtime    acetaminophen   Oral Liquid .. 650 milliGRAM(s) Oral every 6 hours PRN  apixaban 2.5 milliGRAM(s) Oral every 12 hours  atorvastatin 20 milliGRAM(s) Oral at bedtime  chlorhexidine 2% Cloths 1 Application(s) Topical <User Schedule>  fluconAZOLE IVPB 200 milliGRAM(s) IV Intermittent every 24 hours  insulin lispro (ADMELOG) corrective regimen sliding scale   SubCutaneous every 6 hours  levETIRAcetam  Solution 750 milliGRAM(s) Oral two times a day  nystatin Powder 1 Application(s) Topical two times a day  pantoprazole   Suspension 40 milliGRAM(s) Enteral Tube daily  polyethylene glycol 3350 17 Gram(s) Oral daily  senna 2 Tablet(s) Oral at bedtime      PHYSICAL EXAM:  GENERAL: NAD  NERVOUS SYSTEM:  Alert & Oriented X1, non-verbal baseline status  CHEST/LUNG:  breath sounds present bilaterally, No rales, rhonchi, wheezing  HEART: Regular rate and rhythm; No murmurs, rubs, or gallops  ABDOMEN: Soft, Nontender, mildly distended; Bowel sounds present, no pain or masses on palpation  : voiding well  EXTREMITIES:  2+ Peripheral Pulses, No clubbing, cyanosis, or edema  SKIN: warm, intact, no lesions     LINES/DRAINS/DEVICES  CENTRAL LINE: [ ] YES [X] NO  LOCATION:     HUYNH: [ ] YES [X] NO     A-LINE:  [ ] YES [X] NO  LOCATION:       ICU Vital Signs Last 24 Hrs  T(C): 36.5 (02 Nov 2024 00:00), Max: 38.4 (01 Nov 2024 18:00)  T(F): 97.7 (02 Nov 2024 00:00), Max: 101.1 (01 Nov 2024 18:00)  HR: 83 (02 Nov 2024 00:00) (76 - 116)  BP: 152/67 (02 Nov 2024 00:00) (134/57 - 202/79)  BP(mean): 93 (02 Nov 2024 00:00) (80 - 116)  ABP: --  ABP(mean): --  RR: 11 (02 Nov 2024 00:00) (10 - 16)  SpO2: 100% (02 Nov 2024 00:00) (94% - 100%)              10-31 @ 07:01  -  11-01 @ 07:00  --------------------------------------------------------  IN: 1460 mL / OUT: 1950 mL / NET: -490 mL              LABS:  CBC Full  -  ( 01 Nov 2024 04:53 )  WBC Count : 12.54 K/uL  RBC Count : 2.97 M/uL  Hemoglobin : 9.0 g/dL  Hematocrit : 27.4 %  Platelet Count - Automated : 257 K/uL  Mean Cell Volume : 92.3 fl  Mean Cell Hemoglobin : 30.3 pg  Mean Cell Hemoglobin Concentration : 32.8 g/dL  Auto Neutrophil # : x  Auto Lymphocyte # : x  Auto Monocyte # : x  Auto Eosinophil # : x  Auto Basophil # : x  Auto Neutrophil % : x  Auto Lymphocyte % : x  Auto Monocyte % : x  Auto Eosinophil % : x  Auto Basophil % : x    11-01    146[H]  |  120[H]  |  26[H]  ----------------------------<  178[H]  3.7   |  21[L]  |  1.29    Ca    8.3[L]      01 Nov 2024 04:53  Phos  2.0     11-01  Mg     1.8     11-01    TPro  7.1  /  Alb  1.7[L]  /  TBili  0.2  /  DBili  x   /  AST  17  /  ALT  15  /  AlkPhos  118  11-01      Urinalysis Basic - ( 01 Nov 2024 04:53 )    Color: x / Appearance: x / SG: x / pH: x  Gluc: 178 mg/dL / Ketone: x  / Bili: x / Urobili: x   Blood: x / Protein: x / Nitrite: x   Leuk Esterase: x / RBC: x / WBC x   Sq Epi: x / Non Sq Epi: x / Bacteria: x      Culture Results:   No growth at 24 hours (10-31 @ 06:40)  Culture Results:   No growth at 24 hours (10-31 @ 06:10)      RADIOLOGY & ADDITIONAL STUDIES REVIEWED DURING TEAM ROUNDS      [ ]GOALS OF CARE DISCUSSION WITH PATIENT/FAMILY/PROXY:

## 2024-11-02 NOTE — PROGRESS NOTE ADULT - SUBJECTIVE AND OBJECTIVE BOX
Reason for Admission:  · Reason for Admission	status epilepticus      · Subjective and Objective:   INTERVAL HPI/OVERNIGHT EVENTS:       PRESSORS: [ ] YES [ ] NO  WHICH:    ANTIBIOTICS:                  DATE STARTED:  ANTIBIOTICS:                  DATE STARTED:    Antimicrobial:  fluconAZOLE IVPB 200 milliGRAM(s) IV Intermittent every 24 hours    Cardiovascular:    Pulmonary:    Hematalogic:  apixaban 2.5 milliGRAM(s) Oral every 12 hours    Other:  acetaminophen   Oral Liquid .. 650 milliGRAM(s) Oral every 6 hours PRN  atorvastatin 20 milliGRAM(s) Oral at bedtime  chlorhexidine 2% Cloths 1 Application(s) Topical <User Schedule>  insulin lispro (ADMELOG) corrective regimen sliding scale   SubCutaneous every 6 hours  levETIRAcetam  Solution 750 milliGRAM(s) Oral two times a day  nystatin Powder 1 Application(s) Topical two times a day  pantoprazole   Suspension 40 milliGRAM(s) Enteral Tube daily  polyethylene glycol 3350 17 Gram(s) Oral daily  senna 2 Tablet(s) Oral at bedtime    acetaminophen   Oral Liquid .. 650 milliGRAM(s) Oral every 6 hours PRN  apixaban 2.5 milliGRAM(s) Oral every 12 hours  atorvastatin 20 milliGRAM(s) Oral at bedtime  chlorhexidine 2% Cloths 1 Application(s) Topical <User Schedule>  fluconAZOLE IVPB 200 milliGRAM(s) IV Intermittent every 24 hours  insulin lispro (ADMELOG) corrective regimen sliding scale   SubCutaneous every 6 hours  levETIRAcetam  Solution 750 milliGRAM(s) Oral two times a day  nystatin Powder 1 Application(s) Topical two times a day  pantoprazole   Suspension 40 milliGRAM(s) Enteral Tube daily  polyethylene glycol 3350 17 Gram(s) Oral daily  senna 2 Tablet(s) Oral at bedtime      PHYSICAL EXAM:  GENERAL: NAD  EYES: EOMI, PERRLA  NECK: Supple, No JVD; Trachea midline: No LAD   NERVOUS SYSTEM:  Alert & Oriented X3,  Motor Strength 5/5 B/L upper and lower extremities  CHEST/LUNG:  breath sounds present bilaterally, No rales, rhonchi, wheezing  HEART: Regular rate and rhythm; No murmurs, rubs, or gallops  ABDOMEN: Soft, Nontender, Nondistended; Bowel sounds present, no pain or masses on palpation  : voiding well, Frost in place  EXTREMITIES:  2+ Peripheral Pulses, No clubbing, cyanosis, or edema  SKIN: warm, intact, no lesions     LINES/DRAINS/DEVICES  CENTRAL LINE: [ ] YES [ ] NO  LOCATION:     FROST: [ ] YES [ ] NO     A-LINE:  [ ] YES [ ] NO  LOCATION:       ICU Vital Signs Last 24 Hrs  T(C): 36.5 (02 Nov 2024 00:00), Max: 38.4 (01 Nov 2024 18:00)  T(F): 97.7 (02 Nov 2024 00:00), Max: 101.1 (01 Nov 2024 18:00)  HR: 83 (02 Nov 2024 00:00) (76 - 116)  BP: 152/67 (02 Nov 2024 00:00) (134/57 - 202/79)  BP(mean): 93 (02 Nov 2024 00:00) (80 - 116)  ABP: --  ABP(mean): --  RR: 11 (02 Nov 2024 00:00) (10 - 16)  SpO2: 100% (02 Nov 2024 00:00) (94% - 100%)              10-31 @ 07:01  -  11-01 @ 07:00  --------------------------------------------------------  IN: 1460 mL / OUT: 1950 mL / NET: -490 mL              LABS:  CBC Full  -  ( 01 Nov 2024 04:53 )  WBC Count : 12.54 K/uL  RBC Count : 2.97 M/uL  Hemoglobin : 9.0 g/dL  Hematocrit : 27.4 %  Platelet Count - Automated : 257 K/uL  Mean Cell Volume : 92.3 fl  Mean Cell Hemoglobin : 30.3 pg  Mean Cell Hemoglobin Concentration : 32.8 g/dL  Auto Neutrophil # : x  Auto Lymphocyte # : x  Auto Monocyte # : x  Auto Eosinophil # : x  Auto Basophil # : x  Auto Neutrophil % : x  Auto Lymphocyte % : x  Auto Monocyte % : x  Auto Eosinophil % : x  Auto Basophil % : x    11-01    146[H]  |  120[H]  |  26[H]  ----------------------------<  178[H]  3.7   |  21[L]  |  1.29    Ca    8.3[L]      01 Nov 2024 04:53  Phos  2.0     11-01  Mg     1.8     11-01    TPro  7.1  /  Alb  1.7[L]  /  TBili  0.2  /  DBili  x   /  AST  17  /  ALT  15  /  AlkPhos  118  11-01      Urinalysis Basic - ( 01 Nov 2024 04:53 )    Color: x / Appearance: x / SG: x / pH: x  Gluc: 178 mg/dL / Ketone: x  / Bili: x / Urobili: x   Blood: x / Protein: x / Nitrite: x   Leuk Esterase: x / RBC: x / WBC x   Sq Epi: x / Non Sq Epi: x / Bacteria: x      Culture Results:   No growth at 24 hours (10-31 @ 06:40)  Culture Results:   No growth at 24 hours (10-31 @ 06:10)      RADIOLOGY & ADDITIONAL STUDIES REVIEWED DURING TEAM ROUNDS    [ ]GOALS OF CARE DISCUSSION WITH PATIENT/FAMILY/PROXY:          Assessment and Plan:   · Assessment	   Assessment	  89F from PMHx 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 Patient admitted to ICU after intubation in the field for status epilepticus, with course complicated by fungemia being treated with fluconazole. S/p intubation on room air. 24 hour EEG revealed no captured seizures.     #status epilepticus  #CVA  #DM  #lactic acidosis       =================== Neuro============================  #status epilepticus   #Hx of seizures (on Keppra 500mg bid)   p/w [facility] with witnessed tonic clonic seizure for > 30  minutes prior to EMS arrival, intubated in the field  s/p keppra 2g load + additional 1.5g for 60mg/kg dose  10/29 vEEG not working, spot EEG: Moderate-severe diffuse cerebral dysfunction is nonspecific in etiology.  No epileptiform abnormalities or seizures.  discontinued  24h EEG   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 (prior notes stating bedbound status, son at bedside stating pt ambulates with wheelchair and eats with assistance and says few words)   on atorvastatin 20 and eliquis 2.5mg BID at home  c/w home meds via NGT    ================= Cardiovascular==========================  #HTN  takes norvasc 10 daily  /62 upon evaluation in ED  was reported to be hypotensive, propofol weaned down and was placed on peripheral levophed  peripheral levophed weaned off in ED  s/p 2L NS bolus  monitor BP  Start lasix due to increased BP and hand edema      ================- Pulm=================================  #intubated and sedated  CXR without any infiltrates   rhonchi heard b/l (possible aspiration during status epilepticus)   started on rocephin  mechanical vent settings 350/15/5/40%  ABG 7.39/30/255/18  10/29 passed SAT, SBT  10/30 trial SAT, SBT   10/31 s/p extubation, on room air    ==================ID===================================  #UTI  recently discharged on 10/23 for UTI with E.coli bacteremia   frost placed in ED with purulent output noted  UA +  s/p zosyn in ED  c/w ceftriaxone 1g qd   UCx: candida    #lactic acidosis   lactate 6.2 > 3.4 >1.6  s/p 2L NS bolus in ED  RESOLVED     #Fungemia   - BCx positive for candida albicans  - ESR/CRP elevated  - Ophtho exam?   - Discontinued caspofungin, started fluconazole  - ID Dr. Burnett following  - F/u fungal culture, repeat BCx     ================= Nephro================================  #TRAVIS  BUN/Cr 29/2.05 (Baseline Scr 1.29)  s/p 2L IVF in ED  500 LR bolus after IV contrast from   BUN/Cr 24/1.50   FeNa 3.5%   Improving    #Hypernatremia   Na 147, FWD 1.3L  c/w free water 250cc q4h    =================GI====================================  no active issues  distended abdomen  Abdominal xray - f/u  CT Abd Pelvis: Minimal bilateral pleural effusions and underlying passive atelectasis. Cardiomegaly. Marked perirectal edema and fluid concerning for infectious or inflammatory proctitis. Underlying malignancy is not excluded. Prominent perirectal and right pelvic sidewall lymph nodes. 3.9 x 3.0 cm right renal cortical hypoattenuating focus suspicious for infection. Bilateral urothelial enhancement also suspicious for infection. Multiple indeterminate renal lesions. Polycystic kidneys. Nonobstructing right renal calculus. 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 Abnormally distended endometrial cavity with internal amorphous soft tissue concerning for malignancy. Infection is not excluded.    Passed dysphagia - started puree diet  Continue NGT for now    ================ Heme==================================  #anemia  Hgb 9.3 > 8.2 (Baseline Hgb 8)  no active signs of bleeding       =================Endocrine===============================  #DM  on Humalog sliding scale and farxiga at home  will start mISS q6 while NPO on TF  monitor FS    ================= Skin/Catheters============================  Peripheral IV lines  Ex dwell right upper   Frost catheter     =================Prophylaxis =============================  eliquis DVT prophylaxis   protonix GI prophylaxis    ==================GOC==================================  FULL CODE   Disposition ICU

## 2024-11-02 NOTE — PROGRESS NOTE ADULT - ASSESSMENT
Assessment	  89F from PMHx CVA with right sided deficits and aphasia, (bedbound/wheelchair bound), IDT2DM, HTN, HLD, seizure disorder (on Keppra) presenting from [facility] after witnessed tonic clonic seizure lasting >30min. Patient admitted to ICU after intubation in the field for status epilepticus, with course complicated by fungemia being treated with fluconazole. S/p intubation on room air. 24 hour EEG revealed no captured seizures.     #status epilepticus  #CVA  #DM  #lactic acidosis       =================== Neuro============================  #status epilepticus   #Hx of seizures (on Keppra 500mg bid)   p/w [facility] with witnessed tonic clonic seizure for > 30  minutes prior to EMS arrival, intubated in the field  s/p keppra 2g load + additional 1.5g for 60mg/kg dose  10/29 vEEG not working, spot EEG: Moderate-severe diffuse cerebral dysfunction is nonspecific in etiology.  No epileptiform abnormalities or seizures.  discontinued  24h EEG   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 (prior notes stating bedbound status, son at bedside stating pt ambulates with wheelchair and eats with assistance and says few words)   on atorvastatin 20 and eliquis 2.5mg BID at home  c/w home meds via NGT    ================= Cardiovascular==========================  #HTN  takes norvasc 10 daily  /62 upon evaluation in ED  was reported to be hypotensive, propofol weaned down and was placed on peripheral levophed  peripheral levophed weaned off in ED  s/p 2L NS bolus  monitor BP  Start lasix due to increased BP and hand edema      ================- Pulm=================================  #intubated and sedated  CXR without any infiltrates   rhonchi heard b/l (possible aspiration during status epilepticus)   started on rocephin  mechanical vent settings 350/15/5/40%  ABG 7.39/30/255/18  10/29 passed SAT, SBT  10/30 trial SAT, SBT   10/31 s/p extubation, on room air    ==================ID===================================  #UTI  recently discharged on 10/23 for UTI with E.coli bacteremia   frost placed in ED with purulent output noted  UA +  s/p zosyn in ED  c/w ceftriaxone 1g qd   UCx: candida    #lactic acidosis   lactate 6.2 > 3.4 >1.6  s/p 2L NS bolus in ED  RESOLVED     #Fungemia   - BCx positive for candida albicans  - ESR/CRP elevated  - Ophtho exam?   - Discontinued caspofungin, started fluconazole  - ID Dr. Burnett following  - F/u fungal culture, repeat BCx     ================= Nephro================================  #TRAVIS  BUN/Cr 29/2.05 (Baseline Scr 1.29)  s/p 2L IVF in ED  500 LR bolus after IV contrast from   BUN/Cr 24/1.50   FeNa 3.5%   Improving    #Hypernatremia   Na 147, FWD 1.3L  c/w free water 250cc q4h    =================GI====================================  no active issues  distended abdomen  Abdominal xray - f/u  CT Abd Pelvis: Minimal bilateral pleural effusions and underlying passive atelectasis. Cardiomegaly. Marked perirectal edema and fluid concerning for infectious or inflammatory proctitis. Underlying malignancy is not excluded. Prominent perirectal and right pelvic sidewall lymph nodes. 3.9 x 3.0 cm right renal cortical hypoattenuating focus suspicious for infection. Bilateral urothelial enhancement also suspicious for infection. Multiple indeterminate renal lesions. Polycystic kidneys. Nonobstructing right renal calculus. 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 Abnormally distended endometrial cavity with internal amorphous soft tissue concerning for malignancy. Infection is not excluded.    Passed dysphagia - started puree diet  Continue NGT for now    ================ Heme==================================  #anemia  Hgb 9.3 > 8.2 (Baseline Hgb 8)  no active signs of bleeding       =================Endocrine===============================  #DM  on Humalog sliding scale and farxiga at home  will start mISS q6 while NPO on TF  monitor FS    ================= Skin/Catheters============================  Peripheral IV lines  Ex dwell right upper   Frost catheter     =================Prophylaxis =============================  eliquis DVT prophylaxis   protonix GI prophylaxis    ==================GOC==================================  FULL CODE   Disposition ICU    Assessment	  89F from PMHx CVA with right sided deficits and aphasia, (bedbound/wheelchair bound), IDT2DM, HTN, HLD, seizure disorder (on Keppra) presenting from [facility] after witnessed tonic clonic seizure lasting >30min. Patient admitted to ICU after intubation in the field for status epilepticus, with course complicated by fungemia being treated with fluconazole. S/p intubation on room air. 24 hour EEG revealed no captured seizures. NG tube removed, diet advanced to purees.     #status epilepticus  #CVA  #DM  #lactic acidosis       =================== Neuro============================  #status epilepticus   #Hx of seizures (on Keppra 500mg bid)   p/w [facility] with witnessed tonic clonic seizure for > 30  minutes prior to EMS arrival, intubated in the field  s/p keppra 2g load + additional 1.5g for 60mg/kg dose  10/29 vEEG not working, spot EEG: Moderate-severe diffuse cerebral dysfunction is nonspecific in etiology.  No epileptiform abnormalities or seizures.  discontinued  24h EEG   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 (prior notes stating bedbound status, son at bedside stating pt ambulates with wheelchair and eats with assistance and says few words)   on atorvastatin 20 and eliquis 2.5mg BID at home  c/w home meds via NGT    ================= Cardiovascular==========================  #HTN  takes norvasc 10 daily  /62 upon evaluation in ED  was reported to be hypotensive, propofol weaned down and was placed on peripheral levophed  peripheral levophed weaned off in ED  s/p 2L NS bolus  monitor BP  Start lasix due to increased BP and hand edema      ================- Pulm=================================  #intubated and sedated  CXR without any infiltrates   rhonchi heard b/l (possible aspiration during status epilepticus)   started on rocephin  mechanical vent settings 350/15/5/40%  ABG 7.39/30/255/18  10/29 passed SAT, SBT  10/30 trial SAT, SBT   10/31 s/p extubation, on room air    ==================ID===================================  #UTI  recently discharged on 10/23 for UTI with E.coli bacteremia   frost placed in ED with purulent output noted  UA +  s/p zosyn in ED  c/w ceftriaxone 1g qd   UCx: candida    #lactic acidosis   lactate 6.2 > 3.4 >1.6  s/p 2L NS bolus in ED  RESOLVED     #Fungemia   - BCx positive for candida albicans  - ESR/CRP elevated  - Ophtho exam?   - Discontinued caspofungin, started fluconazole  - ID Dr. Burnett following  - F/u fungal culture, repeat BCx     ================= Nephro================================  #TRAVIS  BUN/Cr 29/2.05 (Baseline Scr 1.29)  s/p 2L IVF in ED  500 LR bolus after IV contrast from   BUN/Cr 24/1.50   FeNa 3.5%   Improving    #Hypernatremia   Na 147, FWD 1.3L  c/w free water 250cc q4h    =================GI====================================  no active issues  distended abdomen  Abdominal xray - f/u  CT Abd Pelvis: Minimal bilateral pleural effusions and underlying passive atelectasis. Cardiomegaly. Marked perirectal edema and fluid concerning for infectious or inflammatory proctitis. Underlying malignancy is not excluded. Prominent perirectal and right pelvic sidewall lymph nodes. 3.9 x 3.0 cm right renal cortical hypoattenuating focus suspicious for infection. Bilateral urothelial enhancement also suspicious for infection. Multiple indeterminate renal lesions. Polycystic kidneys. Nonobstructing right renal calculus. 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 Abnormally distended endometrial cavity with internal amorphous soft tissue concerning for malignancy. Infection is not excluded.    Passed dysphagia - started puree diet  Discontinue NG tube    ================ Heme==================================  #anemia  Hgb 9.3 > 8.2 (Baseline Hgb 8)  no active signs of bleeding       =================Endocrine===============================  #DM  on Humalog sliding scale and farxiga at home  will start mISS q6 while NPO on TF  monitor FS    ================= Skin/Catheters============================  Peripheral IV lines  Ex dwell right upper   Frost catheter     =================Prophylaxis =============================  eliquis DVT prophylaxis   protonix GI prophylaxis    ==================GOC==================================  FULL CODE   Disposition ICU

## 2024-11-03 LAB
ALBUMIN SERPL ELPH-MCNC: 1.7 G/DL — LOW (ref 3.5–5)
ALP SERPL-CCNC: 91 U/L — SIGNIFICANT CHANGE UP (ref 40–120)
ALT FLD-CCNC: 10 U/L DA — SIGNIFICANT CHANGE UP (ref 10–60)
ANION GAP SERPL CALC-SCNC: 4 MMOL/L — LOW (ref 5–17)
AST SERPL-CCNC: 16 U/L — SIGNIFICANT CHANGE UP (ref 10–40)
BASOPHILS # BLD AUTO: 0.04 K/UL — SIGNIFICANT CHANGE UP (ref 0–0.2)
BASOPHILS NFR BLD AUTO: 0.6 % — SIGNIFICANT CHANGE UP (ref 0–2)
BILIRUB SERPL-MCNC: 0.3 MG/DL — SIGNIFICANT CHANGE UP (ref 0.2–1.2)
BUN SERPL-MCNC: 21 MG/DL — HIGH (ref 7–18)
CALCIUM SERPL-MCNC: 8.4 MG/DL — SIGNIFICANT CHANGE UP (ref 8.4–10.5)
CHLORIDE SERPL-SCNC: 122 MMOL/L — HIGH (ref 96–108)
CO2 SERPL-SCNC: 24 MMOL/L — SIGNIFICANT CHANGE UP (ref 22–31)
CREAT SERPL-MCNC: 1.05 MG/DL — SIGNIFICANT CHANGE UP (ref 0.5–1.3)
EGFR: 48 ML/MIN/1.73M2 — LOW
EOSINOPHIL # BLD AUTO: 0.26 K/UL — SIGNIFICANT CHANGE UP (ref 0–0.5)
EOSINOPHIL NFR BLD AUTO: 3.6 % — SIGNIFICANT CHANGE UP (ref 0–6)
GLUCOSE BLDC GLUCOMTR-MCNC: 128 MG/DL — HIGH (ref 70–99)
GLUCOSE BLDC GLUCOMTR-MCNC: 137 MG/DL — HIGH (ref 70–99)
GLUCOSE BLDC GLUCOMTR-MCNC: 146 MG/DL — HIGH (ref 70–99)
GLUCOSE BLDC GLUCOMTR-MCNC: 187 MG/DL — HIGH (ref 70–99)
GLUCOSE BLDC GLUCOMTR-MCNC: 203 MG/DL — HIGH (ref 70–99)
GLUCOSE BLDC GLUCOMTR-MCNC: 203 MG/DL — HIGH (ref 70–99)
GLUCOSE BLDC GLUCOMTR-MCNC: 229 MG/DL — HIGH (ref 70–99)
GLUCOSE SERPL-MCNC: 136 MG/DL — HIGH (ref 70–99)
HCT VFR BLD CALC: 25.7 % — LOW (ref 34.5–45)
HGB BLD-MCNC: 8.2 G/DL — LOW (ref 11.5–15.5)
IMM GRANULOCYTES NFR BLD AUTO: 1.4 % — HIGH (ref 0–0.9)
LYMPHOCYTES # BLD AUTO: 0.99 K/UL — LOW (ref 1–3.3)
LYMPHOCYTES # BLD AUTO: 13.7 % — SIGNIFICANT CHANGE UP (ref 13–44)
MAGNESIUM SERPL-MCNC: 2 MG/DL — SIGNIFICANT CHANGE UP (ref 1.6–2.6)
MCHC RBC-ENTMCNC: 29.1 PG — SIGNIFICANT CHANGE UP (ref 27–34)
MCHC RBC-ENTMCNC: 31.9 G/DL — LOW (ref 32–36)
MCV RBC AUTO: 91.1 FL — SIGNIFICANT CHANGE UP (ref 80–100)
MONOCYTES # BLD AUTO: 0.54 K/UL — SIGNIFICANT CHANGE UP (ref 0–0.9)
MONOCYTES NFR BLD AUTO: 7.5 % — SIGNIFICANT CHANGE UP (ref 2–14)
NEUTROPHILS # BLD AUTO: 5.29 K/UL — SIGNIFICANT CHANGE UP (ref 1.8–7.4)
NEUTROPHILS NFR BLD AUTO: 73.2 % — SIGNIFICANT CHANGE UP (ref 43–77)
NRBC # BLD: 0 /100 WBCS — SIGNIFICANT CHANGE UP (ref 0–0)
PHOSPHATE SERPL-MCNC: 2.6 MG/DL — SIGNIFICANT CHANGE UP (ref 2.5–4.5)
PLATELET # BLD AUTO: 202 K/UL — SIGNIFICANT CHANGE UP (ref 150–400)
POTASSIUM SERPL-MCNC: 3.8 MMOL/L — SIGNIFICANT CHANGE UP (ref 3.5–5.3)
POTASSIUM SERPL-SCNC: 3.8 MMOL/L — SIGNIFICANT CHANGE UP (ref 3.5–5.3)
PROT SERPL-MCNC: 6.5 G/DL — SIGNIFICANT CHANGE UP (ref 6–8.3)
RBC # BLD: 2.82 M/UL — LOW (ref 3.8–5.2)
RBC # FLD: 18.2 % — HIGH (ref 10.3–14.5)
SODIUM SERPL-SCNC: 150 MMOL/L — HIGH (ref 135–145)
WBC # BLD: 7.22 K/UL — SIGNIFICANT CHANGE UP (ref 3.8–10.5)
WBC # FLD AUTO: 7.22 K/UL — SIGNIFICANT CHANGE UP (ref 3.8–10.5)

## 2024-11-03 PROCEDURE — 99231 SBSQ HOSP IP/OBS SF/LOW 25: CPT

## 2024-11-03 RX ORDER — SODIUM CHLORIDE, SODIUM GLUCONATE, SODIUM ACETATE, POTASSIUM CHLORIDE AND MAGNESIUM CHLORIDE 30; 37; 368; 526; 502 MG/100ML; MG/100ML; MG/100ML; MG/100ML; MG/100ML
1000 INJECTION, SOLUTION INTRAVENOUS
Refills: 0 | Status: DISCONTINUED | OUTPATIENT
Start: 2024-11-03 | End: 2024-11-06

## 2024-11-03 RX ADMIN — APIXABAN 2.5 MILLIGRAM(S): 5 TABLET, FILM COATED ORAL at 06:14

## 2024-11-03 RX ADMIN — POLYETHYLENE GLYCOL 3350 17 GRAM(S): 17 POWDER, FOR SOLUTION ORAL at 12:01

## 2024-11-03 RX ADMIN — Medication 20 MILLIGRAM(S): at 21:28

## 2024-11-03 RX ADMIN — APIXABAN 2.5 MILLIGRAM(S): 5 TABLET, FILM COATED ORAL at 17:02

## 2024-11-03 RX ADMIN — NYSTATIN 1 APPLICATION(S): 100000 POWDER TOPICAL at 17:03

## 2024-11-03 RX ADMIN — PANTOPRAZOLE SODIUM 40 MILLIGRAM(S): 40 TABLET, DELAYED RELEASE ORAL at 06:14

## 2024-11-03 RX ADMIN — Medication 2 TABLET(S): at 21:29

## 2024-11-03 RX ADMIN — Medication 650 MILLIGRAM(S): at 17:02

## 2024-11-03 RX ADMIN — SODIUM CHLORIDE, SODIUM GLUCONATE, SODIUM ACETATE, POTASSIUM CHLORIDE AND MAGNESIUM CHLORIDE 50 MILLILITER(S): 30; 37; 368; 526; 502 INJECTION, SOLUTION INTRAVENOUS at 12:03

## 2024-11-03 RX ADMIN — Medication 4: at 12:01

## 2024-11-03 RX ADMIN — FLUCONAZOLE, SODIUM CHLORIDE 100 MILLIGRAM(S): 2 INJECTION INTRAVENOUS at 17:03

## 2024-11-03 RX ADMIN — NYSTATIN 1 APPLICATION(S): 100000 POWDER TOPICAL at 06:15

## 2024-11-03 RX ADMIN — LEVETIRACETAM 750 MILLIGRAM(S): 500 TABLET, FILM COATED ORAL at 06:15

## 2024-11-03 RX ADMIN — LEVETIRACETAM 750 MILLIGRAM(S): 500 TABLET, FILM COATED ORAL at 17:03

## 2024-11-03 RX ADMIN — Medication 4: at 17:04

## 2024-11-03 NOTE — CONSULT NOTE ADULT - SUBJECTIVE AND OBJECTIVE BOX
Date of Service  24 @ 11:28    CHIEF COMPLAINT:Patient is a 89y old  Female who presents with a chief complaint of status epilepticus (2024 01:09)      HPI:  89F from [facility] CVA with right sided deficits and aphasia, DM, HTN , HLD, seizure disorder presenting from [facility] after witnessed tonic clonic seizure. Son at bedside stated he visited his mother this morning and she was at her baseline mental status, with no active complaints. He then received the call from the facility about the seizures around 1pm. As per ED, EMS was called and the seizures lasted 30 minutes prior to EMS arrival. Valium 5mg x 2 and 10mg x 1 was given, however pt continued having seizures. The patient was then intubated in the field for airway protection. Son states that his mother was recently admitted for UTI with E.coli bacteremia and discharged on ceftin for 14 day course. He reports that she has been having decreased appetite for the past few days. Pt was intubated in field and extubated in ICU. patient under treatment for UTI and seizures.      PAST MEDICAL & SURGICAL HISTORY:  Stroke      HTN (hypertension)      HLD (hyperlipidemia)      DM (diabetes mellitus)      DVT (deep venous thrombosis)      CVA (cerebrovascular accident)      Aphasia      Dysphagia      Apraxia      Right-sided muscle weakness      Nonverbal      Seizure          MEDICATIONS  (STANDING):  apixaban 2.5 milliGRAM(s) Oral every 12 hours  atorvastatin 20 milliGRAM(s) Oral at bedtime  dextrose 5% with potassium chloride 20 mEq/L 1000 milliLiter(s) (50 mL/Hr) IV Continuous <Continuous>  fluconAZOLE IVPB 200 milliGRAM(s) IV Intermittent every 24 hours  insulin lispro (ADMELOG) corrective regimen sliding scale   SubCutaneous every 6 hours  levETIRAcetam  Solution 750 milliGRAM(s) Oral two times a day  nystatin Powder 1 Application(s) Topical two times a day  pantoprazole    Tablet 40 milliGRAM(s) Oral before breakfast  polyethylene glycol 3350 17 Gram(s) Oral daily  senna 2 Tablet(s) Oral at bedtime    MEDICATIONS  (PRN):  acetaminophen   Oral Liquid .. 650 milliGRAM(s) Oral every 6 hours PRN Mild Pain (1 - 3)      FAMILY HISTORY:unable to obtain      SOCIAL HISTORY:    unable to obtain    Allergies    No Known Allergies    Intolerances    	    REVIEW OF SYSTEMS:    [x ] Unable to obtain    PHYSICAL EXAM:  T(C): 36.8 (11-03-24 @ 05:10), Max: 37 (24 @ 20:56)  HR: 76 (24 @ 05:10) (76 - 83)  BP: 118/82 (24 @ 05:10) (118/82 - 158/74)  RR: 17 (24 @ 05:10) (12 - 17)  SpO2: 94% (24 @ 05:10) (93% - 97%)  Wt(kg): --  I&O's Summary    2024 08:01  -  2024 07:00  --------------------------------------------------------  IN: 100 mL / OUT: 1450 mL / NET: -1350 mL        Appearance: Normal	  HEENT:   Normal oral mucosa, PERRL, EOMI	  Lymphatic: No lymphadenopathy  Cardiovascular: Normal S1 S2, No JVD, No murmurs, No edema  Respiratory: Lungs clear to auscultation	  Gastrointestinal:  Soft, Non-tender, + BS	  Skin: No rashes, No ecchymoses, No cyanosis	  Extremities: Normal range of motion, No clubbing, cyanosis or edema  Vascular: Peripheral pulses palpable 2+ bilaterally    	    ECG:  ordered	  	  	  LABS:	 	    CARDIAC MARKERS:                              8.2    7.22  )-----------(       ( 2024 07:40 )             25.7     -    150[H]  |  122[H]  |  21[H]  ----------------------------<  136[H]  3.8   |  24  |  1.05    Ca    8.4      2024 07:40  Phos  2.6     -  Mg     2.0         TPro  6.5  /  Alb  1.7[L]  /  TBili  0.3  /  DBili  x   /  AST  16  /  ALT  10  /  AlkPhos  91      Culture - Urine (10.28.24 @ 16:44)   Specimen Source: Clean Catch  Culture Results:   50,000 - 99,000 CFU/mL Candida albicans   "Susceptibilities not performed"Urinalysis with Rflx Culture (10.28.24 @ 16:44)   Urine Appearance: Turbid  Color: Yellow  Specific Gravity: 1.013  pH Urine: 6.0  Protein, Urine: 100 mg/dL  Glucose Qualitative, Urine: 500 mg/dL  Ketone - Urine: Negative mg/dL  Blood, Urine: Large  Bilirubin: Negative  Urobilinogen: 0.2 mg/dL  Leukocyte Esterase Concentration: Large  Nitrite: NegativeCulture - Urine (10.17.24 @ 16:21)   - Piperacillin/Tazobactam: S <=8  - Tobramycin: S <=2  - Trimethoprim/Sulfamethoxazole: R >2/38  - Ampicillin/Sulbactam: S 8/4  - Aztreonam: S <=4  - Cefazolin: S <=2 For uncomplicated UTI with K. pneumoniae, E. coli, or P. mirablis: AGA <=16 is sensitive and AGA >=32 is resistant. This also predicts results for oral agents cefaclor, cefdinir, cefpodoxime, cefprozil, cefuroxime axetil, cephalexin and locarbef for uncomplicated UTI. Note that some isolates may be susceptible to these agents while testing resistant to cefazolin.  - Cefepime: S <=2  - Cefoxitin: S <=8  - Ceftriaxone: S <=1  - Cefuroxime: S <=4  - Ciprofloxacin: S <=0.25  - Ertapenem: S <=0.5  - Gentamicin: S <=2  - Imipenem: S <=1  - Levofloxacin: S <=0.5  - Meropenem: S <=1  - Nitrofurantoin: S <=32 Should not be used to treat pyelonephritis  - Amoxicillin/Clavulanic Acid: S <=8/4  - Ampicillin: R >16 These ampicillin results predict results for amoxicillin  Specimen Source: Catheterized  Culture Results:   >100,000 CFU/ml Escherichia coli  Organism Identification: Escherichia coli  Organism: Escherichia coli  Method Type: AGA< from: TTE W or WO Ultrasound Enhancing Agent (10.31.24 @ 15:40) >     CONCLUSIONS:      1. Left ventricular systolic function is normal with an ejection fraction of 62 % by Gaytan's method of disks.   2. There is moderate (grade 2) left ventricular diastolic dysfunction.   3. Normal right ventricular cavity size and increased wall thickness,.   4. Left atrium is mildly dilated.   5. There is mild calcification of the mitral valve annulus.   6. Mild to moderate mitral regurgitation.   7. There is calcification of the aortic valve leaflets. No aortic valve stenosis.   8. Moderate to severetricuspid regurgitation.   9. Mild pulmonic regurgitation.  10. Estimated pulmonary artery systolic pressure is 63 mmHg, consistent with severe pulmonary hypertension.  11. Left pleural effusion noted.  12. No prior echocardiogram is available for comparison.  13. No pericardial effusion seen.    < end of copied text >      EEG REPORT:   EEG Report:  · EEG Report	  [facility] EPILEPSY [place]   REPORT OF CONTINUOUS VIDEO EEG     [facility]: [address] [place] [address], [address], [place] [address], Ph#: [phone]  [facility]: [id number] [place] [address], Ph#: [phone]  Office: [address] [place] [address] Ph#: [phone]    Patient Name: MICHAEL BARFIELD  Age and : 89y ([birth year])  MRN #: [id number]  Location: [place] [address]  Referring Physician: Abram Miranda    Study Date: 10-30-24 at 08:00 - 10-31-24 at 10:33  Duration: 26 hr 27 min  _____________________________________________________________  STUDY INFORMATION    EEG Recording Technique:  The patient underwent continuous Video-EEG monitoring, using Telemetry System hardware on the XLTek Digital System. EEG and video data were stored on a computer hard drive with important events saved in digital archive files. The material was reviewed by a physician (electroencephalographer / epileptologist) on a daily basis. Clarence and seizure detection algorithms were utilized and reviewed. An EEG Technician attended to the patient, and was available throughout daytime work hours.  The epilepsy center neurologist was available in person or on call 24-hours per day.    EEG Placement and Labeling of Electrodes:  The EEG was performed utilizing 20 channel referential EEG connections (coronal over temporal over parasagittal montage) using all standard 10-20 electrode placements with EKG, with additional electrodes placed in the inferior temporal region using the modified 10-10 montage electrode placements for elective admissions, or if deemed necessary. Recording was at a sampling rate of 256 samples per second per channel. Time synchronized digital video recording was done simultaneously with EEG recording. A low light infrared camera was used for low light recording.     _____________________________________________________________  HISTORY    Patient is a 89y old  Female who presents with a chief complaint of status epilepticus (30 Oct 2024 07:32)      PERTINENT MEDICATION:  MEDICATIONS  (STANDING):  apixaban 2.5 milliGRAM(s) Oral every 12 hours  atorvastatin 20 milliGRAM(s) Oral at bedtime  cefTRIAXone   IVPB 1000 milliGRAM(s) IV Intermittent every 24 hours  chlorhexidine 0.12% Liquid 15 milliLiter(s) Oral Mucosa every 12 hours  chlorhexidine 2% Cloths 1 Application(s) Topical <User Schedule>  insulin lispro (ADMELOG) corrective regimen sliding scale   SubCutaneous every 6 hours  levETIRAcetam  Solution 750 milliGRAM(s) Oral two times a day  pantoprazole  Injectable 40 milliGRAM(s) IV Push daily  propofol Infusion. 14.053 MICROgram(s)/kG/Min (5 mL/Hr) IV Continuous <Continuous>    _____________________________________________________________  INTERPRETATION    Findings: The background was continuous, spontaneously variable and reactive. During wakefulness, the posterior dominant rhythm consisted of asymmetric (lower voltage in left posterior quadrant), poorly-modulated 6-7 Hz activity, with amplitude to 30 uV, that attenuated to eye opening. Low amplitude frontal beta was noted in wakefulness.    Background Slowing:  -Continuous diffuse theta and polymorphic delta slowing.  -Slowing of PDR    Focal Slowing:   -Continuous polymorphic delta activity and voltage attenuation in the left hemisphere    Sleep Background:  Drowsiness was characterized by fragmentation, attenuation, and slowing of the background activity.    Sleep was characterized by the presence of vertex waves, spindles, lateralized to right hemisphere.    Other Non-Epileptiform Findings:  None were present.    Interictal Epileptiform Activity:   None were present.    Events:  Clinical events: None recorded.  Seizures: None recorded.    Artifacts:  Intermittent myogenic and movement artifacts were noted.    ECG:  The heart rate on single channel ECG was predominantly between 60-80 BPM. Date of Service  24 @ 11:28    CHIEF COMPLAINT:Patient is a 89y old  Female who presents with a chief complaint of status epilepticus (2024 01:09)      HPI:  89F from [facility] CVA with right sided deficits and aphasia, DM, HTN , HLD, seizure disorder presenting from [facility] after witnessed tonic clonic seizure. Son at bedside stated he visited his mother this morning and she was at her baseline mental status, with no active complaints. He then received the call from the facility about the seizures around 1pm. As per ED, EMS was called and the seizures lasted 30 minutes prior to EMS arrival. Valium 5mg x 2 and 10mg x 1 was given, however pt continued having seizures. The patient was then intubated in the field for airway protection. Son states that his mother was recently admitted for UTI with E.coli bacteremia and discharged on ceftin for 14 day course. He reports that she has been having decreased appetite for the past few days. Pt was intubated in field and extubated in ICU. Patient under treatment for UTI and seizures.      PAST MEDICAL & SURGICAL HISTORY:  Stroke      HTN (hypertension)      HLD (hyperlipidemia)      DM (diabetes mellitus)      DVT (deep venous thrombosis)      CVA (cerebrovascular accident)      Aphasia      Dysphagia      Apraxia      Right-sided muscle weakness      Nonverbal      Seizure          MEDICATIONS  (STANDING):  apixaban 2.5 milliGRAM(s) Oral every 12 hours  atorvastatin 20 milliGRAM(s) Oral at bedtime  dextrose 5% with potassium chloride 20 mEq/L 1000 milliLiter(s) (50 mL/Hr) IV Continuous <Continuous>  fluconAZOLE IVPB 200 milliGRAM(s) IV Intermittent every 24 hours  insulin lispro (ADMELOG) corrective regimen sliding scale   SubCutaneous every 6 hours  levETIRAcetam  Solution 750 milliGRAM(s) Oral two times a day  nystatin Powder 1 Application(s) Topical two times a day  pantoprazole    Tablet 40 milliGRAM(s) Oral before breakfast  polyethylene glycol 3350 17 Gram(s) Oral daily  senna 2 Tablet(s) Oral at bedtime    MEDICATIONS  (PRN):  acetaminophen   Oral Liquid .. 650 milliGRAM(s) Oral every 6 hours PRN Mild Pain (1 - 3)      FAMILY HISTORY:unable to obtain      SOCIAL HISTORY:    unable to obtain    Allergies    No Known Allergies    Intolerances    	    REVIEW OF SYSTEMS:    [x ] Unable to obtain    PHYSICAL EXAM:  T(C): 36.8 (11-03-24 @ 05:10), Max: 37 (24 @ 20:56)  HR: 76 (24 @ 05:10) (76 - 83)  BP: 118/82 (24 @ 05:10) (118/82 - 158/74)  RR: 17 (24 @ 05:10) (12 - 17)  SpO2: 94% (24 @ 05:10) (93% - 97%)  Wt(kg): --  I&O's Summary    2024 08:01  -  2024 07:00  --------------------------------------------------------  IN: 100 mL / OUT: 1450 mL / NET: -1350 mL        Appearance: Normal	  HEENT:   Normal oral mucosa, PERRL, EOMI	  Lymphatic: No lymphadenopathy  Cardiovascular: Normal S1 S2, No JVD, No murmurs, No edema  Respiratory: Lungs clear to auscultation	  Gastrointestinal:  Soft, Non-tender, + BS	  Skin: No rashes, No ecchymoses, No cyanosis	  Extremities: Normal range of motion, No clubbing, cyanosis or edema  Vascular: Peripheral pulses palpable 2+ bilaterally    	    ECG:  ordered	  	  	  LABS:	 	    CARDIAC MARKERS:                              8.2    7.22  )-----------(       ( 2024 07:40 )             25.7     -    150[H]  |  122[H]  |  21[H]  ----------------------------<  136[H]  3.8   |  24  |  1.05    Ca    8.4      2024 07:40  Phos  2.6     -  Mg     2.0         TPro  6.5  /  Alb  1.7[L]  /  TBili  0.3  /  DBili  x   /  AST  16  /  ALT  10  /  AlkPhos  91      Culture - Urine (10.28.24 @ 16:44)   Specimen Source: Clean Catch  Culture Results:   50,000 - 99,000 CFU/mL Candida albicans   "Susceptibilities not performed"Urinalysis with Rflx Culture (10.28.24 @ 16:44)   Urine Appearance: Turbid  Color: Yellow  Specific Gravity: 1.013  pH Urine: 6.0  Protein, Urine: 100 mg/dL  Glucose Qualitative, Urine: 500 mg/dL  Ketone - Urine: Negative mg/dL  Blood, Urine: Large  Bilirubin: Negative  Urobilinogen: 0.2 mg/dL  Leukocyte Esterase Concentration: Large  Nitrite: NegativeCulture - Urine (10.17.24 @ 16:21)   - Piperacillin/Tazobactam: S <=8  - Tobramycin: S <=2  - Trimethoprim/Sulfamethoxazole: R >2/38  - Ampicillin/Sulbactam: S 8/4  - Aztreonam: S <=4  - Cefazolin: S <=2 For uncomplicated UTI with K. pneumoniae, E. coli, or P. mirablis: AGA <=16 is sensitive and AGA >=32 is resistant. This also predicts results for oral agents cefaclor, cefdinir, cefpodoxime, cefprozil, cefuroxime axetil, cephalexin and locarbef for uncomplicated UTI. Note that some isolates may be susceptible to these agents while testing resistant to cefazolin.  - Cefepime: S <=2  - Cefoxitin: S <=8  - Ceftriaxone: S <=1  - Cefuroxime: S <=4  - Ciprofloxacin: S <=0.25  - Ertapenem: S <=0.5  - Gentamicin: S <=2  - Imipenem: S <=1  - Levofloxacin: S <=0.5  - Meropenem: S <=1  - Nitrofurantoin: S <=32 Should not be used to treat pyelonephritis  - Amoxicillin/Clavulanic Acid: S <=8/4  - Ampicillin: R >16 These ampicillin results predict results for amoxicillin  Specimen Source: Catheterized  Culture Results:   >100,000 CFU/ml Escherichia coli  Organism Identification: Escherichia coli  Organism: Escherichia coli  Method Type: AGA< from: TTE W or WO Ultrasound Enhancing Agent (10.31.24 @ 15:40) >     CONCLUSIONS:      1. Left ventricular systolic function is normal with an ejection fraction of 62 % by Gaytan's method of disks.   2. There is moderate (grade 2) left ventricular diastolic dysfunction.   3. Normal right ventricular cavity size and increased wall thickness,.   4. Left atrium is mildly dilated.   5. There is mild calcification of the mitral valve annulus.   6. Mild to moderate mitral regurgitation.   7. There is calcification of the aortic valve leaflets. No aortic valve stenosis.   8. Moderate to severetricuspid regurgitation.   9. Mild pulmonic regurgitation.  10. Estimated pulmonary artery systolic pressure is 63 mmHg, consistent with severe pulmonary hypertension.  11. Left pleural effusion noted.  12. No prior echocardiogram is available for comparison.  13. No pericardial effusion seen.    < end of copied text >      EEG REPORT:   EEG Report:  · EEG Report	  [facility] EPILEPSY [place]   REPORT OF CONTINUOUS VIDEO EEG     [facility]: [address] [place] [address], [address], [place] [address], Ph#: [phone]  [facility]: [id number] [place] [id number], Ph#: [phone]  Office: [address] [place] [address] Ph#: [phone]    Patient Name: MICHAEL BARFIELD  Age and : 89y ([birth year])  MRN #: [id number]  Location: [place] [address]  Referring Physician: Abram Miranda    Study Date: 10-30-24 at 08:00 - 10-31-24 at 10:33  Duration: 26 hr 27 min  _____________________________________________________________  STUDY INFORMATION    EEG Recording Technique:  The patient underwent continuous Video-EEG monitoring, using Telemetry System hardware on the XLTek Digital System. EEG and video data were stored on a computer hard drive with important events saved in digital archive files. The material was reviewed by a physician (electroencephalographer / epileptologist) on a daily basis. Clarence and seizure detection algorithms were utilized and reviewed. An EEG Technician attended to the patient, and was available throughout daytime work hours.  The epilepsy center neurologist was available in person or on call 24-hours per day.    EEG Placement and Labeling of Electrodes:  The EEG was performed utilizing 20 channel referential EEG connections (coronal over temporal over parasagittal montage) using all standard 10-20 electrode placements with EKG, with additional electrodes placed in the inferior temporal region using the modified 10-10 montage electrode placements for elective admissions, or if deemed necessary. Recording was at a sampling rate of 256 samples per second per channel. Time synchronized digital video recording was done simultaneously with EEG recording. A low light infrared camera was used for low light recording.     _____________________________________________________________  HISTORY    Patient is a 89y old  Female who presents with a chief complaint of status epilepticus (30 Oct 2024 07:32)      PERTINENT MEDICATION:  MEDICATIONS  (STANDING):  apixaban 2.5 milliGRAM(s) Oral every 12 hours  atorvastatin 20 milliGRAM(s) Oral at bedtime  cefTRIAXone   IVPB 1000 milliGRAM(s) IV Intermittent every 24 hours  chlorhexidine 0.12% Liquid 15 milliLiter(s) Oral Mucosa every 12 hours  chlorhexidine 2% Cloths 1 Application(s) Topical <User Schedule>  insulin lispro (ADMELOG) corrective regimen sliding scale   SubCutaneous every 6 hours  levETIRAcetam  Solution 750 milliGRAM(s) Oral two times a day  pantoprazole  Injectable 40 milliGRAM(s) IV Push daily  propofol Infusion. 14.053 MICROgram(s)/kG/Min (5 mL/Hr) IV Continuous <Continuous>    _____________________________________________________________  INTERPRETATION    Findings: The background was continuous, spontaneously variable and reactive. During wakefulness, the posterior dominant rhythm consisted of asymmetric (lower voltage in left posterior quadrant), poorly-modulated 6-7 Hz activity, with amplitude to 30 uV, that attenuated to eye opening. Low amplitude frontal beta was noted in wakefulness.    Background Slowing:  -Continuous diffuse theta and polymorphic delta slowing.  -Slowing of PDR    Focal Slowing:   -Continuous polymorphic delta activity and voltage attenuation in the left hemisphere    Sleep Background:  Drowsiness was characterized by fragmentation, attenuation, and slowing of the background activity.    Sleep was characterized by the presence of vertex waves, spindles, lateralized to right hemisphere.    Other Non-Epileptiform Findings:  None were present.    Interictal Epileptiform Activity:   None were present.    Events:  Clinical events: None recorded.  Seizures: None recorded.    Artifacts:  Intermittent myogenic and movement artifacts were noted.    ECG:  The heart rate on single channel ECG was predominantly between 60-80 BPM.

## 2024-11-03 NOTE — PROGRESS NOTE ADULT - SUBJECTIVE AND OBJECTIVE BOX
follow up on:  complex medical decision making related to goals of care    [facility] Geriatric and Palliative Consult Service:  Genesis Castillo DO: cell ([phone])  Austen Pierre MD: cell ([phone])  Rosalino Wilhelm NP: cell ([phone])   Ruiz Kidd LMSW: cell ([phone])   Jessica Fleischer-Black, MD: cell: ([phone])    Can contact any Palliative Team member via [employer] Teams for consults and questions    OVERNIGHT EVENTS: Passed speech and swallow. NGT removed. Downgraded to floor. Now on RA. No distress. No family at bedside at time of exam.      Present Symptoms: Mild    Review of Systems: Unable to determine due to mental status  Present Symptoms:   Pain: denies             Location -                               Aggravating factors -             Quality -             Radiation -             Timing-             Severity (0-10 scale):             Minimal acceptable level (0-10 scale):  Fatigue:  Nausea:  Lack of Appetite:   SOB: denies  Depression:  Anxiety:  Constipation:     MEDICATIONS  (STANDING):  apixaban 2.5 milliGRAM(s) Oral every 12 hours  atorvastatin 20 milliGRAM(s) Oral at bedtime  chlorhexidine 2% Cloths 1 Application(s) Topical <User Schedule>  fluconAZOLE IVPB 200 milliGRAM(s) IV Intermittent every 24 hours  furosemide   Injectable 20 milliGRAM(s) IV Push once  insulin lispro (ADMELOG) corrective regimen sliding scale   SubCutaneous every 6 hours  levETIRAcetam  Solution 750 milliGRAM(s) Oral two times a day  nystatin Powder 1 Application(s) Topical two times a day  pantoprazole   Suspension 40 milliGRAM(s) Enteral Tube daily  polyethylene glycol 3350 17 Gram(s) Oral daily  potassium phosphate IVPB 30 milliMole(s) IV Intermittent once  senna 2 Tablet(s) Oral at bedtime    MEDICATIONS  (PRN):  acetaminophen   Oral Liquid .. 650 milliGRAM(s) Oral every 6 hours PRN Mild Pain (1 - 3)      PHYSICAL EXAM:  Vital Signs Last 24 Hrs  T(C): 36.9 (01 Nov 2024 07:00), Max: 37.8 (31 Oct 2024 17:00)  T(F): 98.4 (01 Nov 2024 07:00), Max: 100 (31 Oct 2024 17:00)  HR: 83 (01 Nov 2024 07:00) (74 - 96)  BP: 144/63 (01 Nov 2024 07:00) (126/53 - 169/81)  BP(mean): 88 (01 Nov 2024 07:00) (76 - 104)  RR: 13 (01 Nov 2024 07:00) (11 - 16)  SpO2: 96% (01 Nov 2024 07:00) (96% - 100%)    Parameters below as of 31 Oct 2024 16:00  Patient On (Oxygen Delivery Method): room air      General: Frail-appearing elderly woman, NGT    Palliative Performance Scale/Karnofsky Score: 20%  http://npcrc.org/files/news/palliative_performance_scale_ppsv2.pdf    HEENT: bitemporal wasting  Lungs: CTA b/l  CV: RRR, S1S2  GI: soft non distended non tender  incontinent              last BM: none documented  : incontinent / frost  Musculoskeletal: weakness x4, bedbound, no edema  Skin: no abnormal skin lesions, poor skin turgor  Neuro: no deficits, unable to follow commands, aphasia  Oral intake ability: unable/only mouth care  Diet: NGT feeds    LABS:                          9.0    12.54 )-----------( 257      ( 01 Nov 2024 04:53 )             27.4     11-01    146[H]  |  120[H]  |  26[H]  ----------------------------<  178[H]  3.7   |  21[L]  |  1.29    Ca    8.3[L]      01 Nov 2024 04:53  Phos  2.0     11-01  Mg     1.8     11-01    TPro  7.1  /  Alb  1.7[L]  /  TBili  0.2  /  DBili  x   /  AST  17  /  ALT  15  /  AlkPhos  118  11-01    Urinalysis Basic - ( 01 Nov 2024 04:53 )    Color: x / Appearance: x / SG: x / pH: x  Gluc: 178 mg/dL / Ketone: x  / Bili: x / Urobili: x   Blood: x / Protein: x / Nitrite: x   Leuk Esterase: x / RBC: x / WBC x   Sq Epi: x / Non Sq Epi: x / Bacteria: x        RADIOLOGY & ADDITIONAL STUDIES: Reviewed

## 2024-11-03 NOTE — CONSULT NOTE ADULT - CONSULT REASON
Acute resp failure
Candidemia
Discuss complex medical decision making related to goals of care
CVA,HTN
status epilepticus
CT findings

## 2024-11-03 NOTE — PROGRESS NOTE ADULT - SUBJECTIVE AND OBJECTIVE BOX
INTERVAL HPI/OVERNIGHT EVENTS:  Patient seen,doing better,transferred to regular floor  VITAL SIGNS:  T(F): 98.6 (11-02-24 @ 20:56)  HR: 79 (11-02-24 @ 20:56)  BP: 151/70 (11-02-24 @ 20:56)  RR: 14 (11-02-24 @ 20:56)  SpO2: 97% (11-02-24 @ 20:56)  Wt(kg): --    PHYSICAL EXAM:  awake  Constitutional:  Eyes:  ENMT:perrla  Neck:  Respiratory:clear  Cardiovascular:s1s2,m-none  Gastrointestinal:soft,bs pos  Extremities:  Vascular:  Neurological:no focal deficit  Musculoskeletal:    MEDICATIONS  (STANDING):  apixaban 2.5 milliGRAM(s) Oral every 12 hours  atorvastatin 20 milliGRAM(s) Oral at bedtime  fluconAZOLE IVPB 200 milliGRAM(s) IV Intermittent every 24 hours  insulin lispro (ADMELOG) corrective regimen sliding scale   SubCutaneous every 6 hours  levETIRAcetam  Solution 750 milliGRAM(s) Oral two times a day  nystatin Powder 1 Application(s) Topical two times a day  pantoprazole    Tablet 40 milliGRAM(s) Oral before breakfast  polyethylene glycol 3350 17 Gram(s) Oral daily  senna 2 Tablet(s) Oral at bedtime  sodium chloride 0.9%. 1000 milliLiter(s) (100 mL/Hr) IV Continuous <Continuous>    MEDICATIONS  (PRN):  acetaminophen   Oral Liquid .. 650 milliGRAM(s) Oral every 6 hours PRN Mild Pain (1 - 3)      Allergies    No Known Allergies    Intolerances        LABS:                        9.6    14.84 )-----------( 217      ( 02 Nov 2024 05:19 )             29.7     11-02    147[H]  |  120[H]  |  24[H]  ----------------------------<  146[H]  4.7   |  21[L]  |  1.43[H]    Ca    8.3[L]      02 Nov 2024 04:53  Phos  3.5     11-02  Mg     1.9     11-02    TPro  7.0  /  Alb  1.8[L]  /  TBili  0.4  /  DBili  x   /  AST  25  /  ALT  15  /  AlkPhos  101  11-02      Urinalysis Basic - ( 02 Nov 2024 04:53 )    Color: x / Appearance: x / SG: x / pH: x  Gluc: 146 mg/dL / Ketone: x  / Bili: x / Urobili: x   Blood: x / Protein: x / Nitrite: x   Leuk Esterase: x / RBC: x / WBC x   Sq Epi: x / Non Sq Epi: x / Bacteria: x        RADIOLOGY & ADDITIONAL TESTS:      Assessment and Plan:   · Assessment	   Assessment	  89F from [facility] with right sided deficits and aphasia, (bedbound/wheelchair bound), IDT2DM, HTN, HLD, seizure disorder (on Keppra) presenting from [facility] after witnessed tonic clonic seizure lasting >30min. Patient admitted to ICU after intubation in the field for status epilepticus, with course complicated by fungemia being treated with fluconazole. S/p intubation on room air. 24 hour EEG revealed no captured seizures.     #status epilepticus  #CVA  #DM  #lactic acidosis       =================== Neuro============================  #status epilepticus -stable clinically  #Hx of seizures (on Keppra 500mg bid)   s/p keppra 2g load + additional 1.5g for 60mg/kg dose  10/29 vEEG not working, spot EEG: Moderate-severe diffuse cerebral dysfunction is nonspecific in etiology.  No epileptiform abnormalities or seizures.  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 (prior notes stating bedbound status, son at bedside stating pt ambulates with wheelchair and eats with assistance and says few words)   on atorvastatin 20 and eliquis 2.5mg BID at home  c/w home meds via NGT    ================= Cardiovascular==========================  #HTN  takes norvasc 10 daily  monitor BP  Start lasix due to increased BP and hand edema    ==================ID===================================  #UTI  recently discharged on 10/23 for UTI with E.coli bacteremia   frost placed in ED with purulent output noted  UA +  s/p zosyn in ED  c/w ceftriaxone 1g qd   UCx: candida    #Fungemia   - BCx positive for candida albicans  - ESR/CRP elevated  - Ophtho exam?   - Discontinued caspofungin, started fluconazole  - ID Dr. Burnett following  - F/u fungal culture, repeat BCx     ================= Nephro================================  #TRAVIS  BUN/Cr 24/1.50   FeNa 3.5%   Improving    =================GI====================================  Passed dysphagia - started puree diet  Continue NGT for now    ================ Heme==================================  #anemia  Hgb 9.3 > 8.2 (Baseline Hgb 8)  no active signs of bleeding       =================Endocrine===============================  #DM  on Humalog sliding scale and farxiga at home  will start mISS q6 while NPO on TF  monitor FS  =================Prophylaxis =============================  eliquis DVT prophylaxis   protonix GI prophylaxis    ==================GOC==================================  FULL CODE   Disposition ICU

## 2024-11-03 NOTE — PROGRESS NOTE ADULT - PROBLEM SELECTOR PLAN 5
No family at bedside today.  Pt looks comfortable, in no distress  DNR/trial of intubation  No further discussion of hospice today

## 2024-11-03 NOTE — CONSULT NOTE ADULT - CONSULT REQUESTED DATE/TIME
01-Nov-2024 09:17
03-Nov-2024 11:28
29-Oct-2024 08:40
30-Oct-2024 19:25
30-Oct-2024
31-Oct-2024 15:56

## 2024-11-03 NOTE — CONSULT NOTE ADULT - ASSESSMENT
89F from [facility] CVA with right sided deficits and aphasia, DM, HTN , HLD, seizure disorder presenting from [facility] after witnessed tonic clonic seizure,fungal uti.  1.Fungal uti-diflucan.  2.DM-insulin.  3.HTN-stable off bp medication.  4.Hypernatremia-cahnge IVF to d5w +kcl.  5.CVA-eliquis.  6.Seizures-levETIRAcetam.  7.Lipid d/o-statin.  8.PPI. 89F from [facility] CVA with right sided deficits and aphasia, DM, HTN , HLD, seizure disorder presenting from [facility] after witnessed tonic clonic seizure,fungal uti and fungemia.  1.Fungal uti,fungemia-diflucan.  2.DM-insulin.  3.HTN-stable off bp medication.  4.Hypernatremia-cahnge IVF to d5w +kcl.  5.CVA-eliquis.  6.Seizures-levETIRAcetam.  7.Lipid d/o-statin.  8.PPI.

## 2024-11-04 DIAGNOSIS — N17.9 ACUTE KIDNEY FAILURE, UNSPECIFIED: ICD-10-CM

## 2024-11-04 DIAGNOSIS — Z71.89 OTHER SPECIFIED COUNSELING: ICD-10-CM

## 2024-11-04 DIAGNOSIS — I10 ESSENTIAL (PRIMARY) HYPERTENSION: ICD-10-CM

## 2024-11-04 DIAGNOSIS — I63.9 CEREBRAL INFARCTION, UNSPECIFIED: ICD-10-CM

## 2024-11-04 DIAGNOSIS — E87.0 HYPEROSMOLALITY AND HYPERNATREMIA: ICD-10-CM

## 2024-11-04 DIAGNOSIS — N39.0 URINARY TRACT INFECTION, SITE NOT SPECIFIED: ICD-10-CM

## 2024-11-04 DIAGNOSIS — R93.89 ABNORMAL FINDINGS ON DIAGNOSTIC IMAGING OF OTHER SPECIFIED BODY STRUCTURES: ICD-10-CM

## 2024-11-04 DIAGNOSIS — D63.8 ANEMIA IN OTHER CHRONIC DISEASES CLASSIFIED ELSEWHERE: ICD-10-CM

## 2024-11-04 LAB
ALBUMIN SERPL ELPH-MCNC: 1.7 G/DL — LOW (ref 3.5–5)
ALP SERPL-CCNC: 90 U/L — SIGNIFICANT CHANGE UP (ref 40–120)
ALT FLD-CCNC: 11 U/L DA — SIGNIFICANT CHANGE UP (ref 10–60)
ANION GAP SERPL CALC-SCNC: 4 MMOL/L — LOW (ref 5–17)
AST SERPL-CCNC: 15 U/L — SIGNIFICANT CHANGE UP (ref 10–40)
BASOPHILS # BLD AUTO: 0 K/UL — SIGNIFICANT CHANGE UP (ref 0–0.2)
BASOPHILS NFR BLD AUTO: 0 % — SIGNIFICANT CHANGE UP (ref 0–2)
BILIRUB SERPL-MCNC: 0.2 MG/DL — SIGNIFICANT CHANGE UP (ref 0.2–1.2)
BUN SERPL-MCNC: 14 MG/DL — SIGNIFICANT CHANGE UP (ref 7–18)
CALCIUM SERPL-MCNC: 8.2 MG/DL — LOW (ref 8.4–10.5)
CHLORIDE SERPL-SCNC: 119 MMOL/L — HIGH (ref 96–108)
CO2 SERPL-SCNC: 24 MMOL/L — SIGNIFICANT CHANGE UP (ref 22–31)
CREAT SERPL-MCNC: 1.05 MG/DL — SIGNIFICANT CHANGE UP (ref 0.5–1.3)
DACRYOCYTES BLD QL SMEAR: SLIGHT — SIGNIFICANT CHANGE UP
EGFR: 48 ML/MIN/1.73M2 — LOW
EOSINOPHIL # BLD AUTO: 0.39 K/UL — SIGNIFICANT CHANGE UP (ref 0–0.5)
EOSINOPHIL NFR BLD AUTO: 5 % — SIGNIFICANT CHANGE UP (ref 0–6)
GIANT PLATELETS BLD QL SMEAR: PRESENT — SIGNIFICANT CHANGE UP
GLUCOSE BLDC GLUCOMTR-MCNC: 148 MG/DL — HIGH (ref 70–99)
GLUCOSE BLDC GLUCOMTR-MCNC: 148 MG/DL — HIGH (ref 70–99)
GLUCOSE BLDC GLUCOMTR-MCNC: 162 MG/DL — HIGH (ref 70–99)
GLUCOSE BLDC GLUCOMTR-MCNC: 218 MG/DL — HIGH (ref 70–99)
GLUCOSE BLDC GLUCOMTR-MCNC: 245 MG/DL — HIGH (ref 70–99)
GLUCOSE SERPL-MCNC: 161 MG/DL — HIGH (ref 70–99)
HCT VFR BLD CALC: 26.8 % — LOW (ref 34.5–45)
HGB BLD-MCNC: 8.7 G/DL — LOW (ref 11.5–15.5)
HYPOCHROMIA BLD QL: SLIGHT — SIGNIFICANT CHANGE UP
LEVETIRACETAM SERPL-MCNC: 78.6 UG/ML — HIGH (ref 10–40)
LG PLATELETS BLD QL AUTO: SLIGHT — SIGNIFICANT CHANGE UP
LYMPHOCYTES # BLD AUTO: 1.32 K/UL — SIGNIFICANT CHANGE UP (ref 1–3.3)
LYMPHOCYTES # BLD AUTO: 17 % — SIGNIFICANT CHANGE UP (ref 13–44)
MAGNESIUM SERPL-MCNC: 1.8 MG/DL — SIGNIFICANT CHANGE UP (ref 1.6–2.6)
MANUAL SMEAR VERIFICATION: SIGNIFICANT CHANGE UP
MCHC RBC-ENTMCNC: 30 PG — SIGNIFICANT CHANGE UP (ref 27–34)
MCHC RBC-ENTMCNC: 32.5 G/DL — SIGNIFICANT CHANGE UP (ref 32–36)
MCV RBC AUTO: 92.4 FL — SIGNIFICANT CHANGE UP (ref 80–100)
MONOCYTES # BLD AUTO: 0.78 K/UL — SIGNIFICANT CHANGE UP (ref 0–0.9)
MONOCYTES NFR BLD AUTO: 10 % — SIGNIFICANT CHANGE UP (ref 2–14)
MYELOCYTES NFR BLD: 3 % — HIGH (ref 0–0)
NEUTROPHILS # BLD AUTO: 4.58 K/UL — SIGNIFICANT CHANGE UP (ref 1.8–7.4)
NEUTROPHILS NFR BLD AUTO: 59 % — SIGNIFICANT CHANGE UP (ref 43–77)
NRBC # BLD: 0 /100 WBCS — SIGNIFICANT CHANGE UP (ref 0–0)
OVALOCYTES BLD QL SMEAR: SLIGHT — SIGNIFICANT CHANGE UP
PHOSPHATE SERPL-MCNC: 1.9 MG/DL — LOW (ref 2.5–4.5)
PLAT MORPH BLD: NORMAL — SIGNIFICANT CHANGE UP
PLATELET # BLD AUTO: 207 K/UL — SIGNIFICANT CHANGE UP (ref 150–400)
PLATELET COUNT - ESTIMATE: NORMAL — SIGNIFICANT CHANGE UP
POIKILOCYTOSIS BLD QL AUTO: SLIGHT — SIGNIFICANT CHANGE UP
POLYCHROMASIA BLD QL SMEAR: SLIGHT — SIGNIFICANT CHANGE UP
POTASSIUM SERPL-MCNC: 3.5 MMOL/L — SIGNIFICANT CHANGE UP (ref 3.5–5.3)
POTASSIUM SERPL-SCNC: 3.5 MMOL/L — SIGNIFICANT CHANGE UP (ref 3.5–5.3)
PROT SERPL-MCNC: 6.7 G/DL — SIGNIFICANT CHANGE UP (ref 6–8.3)
RBC # BLD: 2.9 M/UL — LOW (ref 3.8–5.2)
RBC # FLD: 18 % — HIGH (ref 10.3–14.5)
RBC BLD AUTO: ABNORMAL
SCHISTOCYTES BLD QL AUTO: SLIGHT — SIGNIFICANT CHANGE UP
SODIUM SERPL-SCNC: 147 MMOL/L — HIGH (ref 135–145)
VARIANT LYMPHS # BLD: 6 % — SIGNIFICANT CHANGE UP (ref 0–6)
WBC # BLD: 7.77 K/UL — SIGNIFICANT CHANGE UP (ref 3.8–10.5)
WBC # FLD AUTO: 7.77 K/UL — SIGNIFICANT CHANGE UP (ref 3.8–10.5)

## 2024-11-04 PROCEDURE — 99232 SBSQ HOSP IP/OBS MODERATE 35: CPT

## 2024-11-04 RX ORDER — POTASSIUM PHOSPHATE 236; 224 MG/ML; MG/ML
30 INJECTION, SOLUTION INTRAVENOUS ONCE
Refills: 0 | Status: COMPLETED | OUTPATIENT
Start: 2024-11-04 | End: 2024-11-04

## 2024-11-04 RX ORDER — INSULIN LISPRO 100/ML
VIAL (ML) SUBCUTANEOUS AT BEDTIME
Refills: 0 | Status: DISCONTINUED | OUTPATIENT
Start: 2024-11-04 | End: 2024-11-06

## 2024-11-04 RX ORDER — INSULIN LISPRO 100/ML
VIAL (ML) SUBCUTANEOUS
Refills: 0 | Status: DISCONTINUED | OUTPATIENT
Start: 2024-11-04 | End: 2024-11-06

## 2024-11-04 RX ADMIN — POLYETHYLENE GLYCOL 3350 17 GRAM(S): 17 POWDER, FOR SOLUTION ORAL at 11:25

## 2024-11-04 RX ADMIN — PANTOPRAZOLE SODIUM 40 MILLIGRAM(S): 40 TABLET, DELAYED RELEASE ORAL at 05:29

## 2024-11-04 RX ADMIN — LEVETIRACETAM 750 MILLIGRAM(S): 500 TABLET, FILM COATED ORAL at 19:04

## 2024-11-04 RX ADMIN — POTASSIUM PHOSPHATE 83.33 MILLIMOLE(S): 236; 224 INJECTION, SOLUTION INTRAVENOUS at 11:32

## 2024-11-04 RX ADMIN — LEVETIRACETAM 750 MILLIGRAM(S): 500 TABLET, FILM COATED ORAL at 05:30

## 2024-11-04 RX ADMIN — APIXABAN 2.5 MILLIGRAM(S): 5 TABLET, FILM COATED ORAL at 05:29

## 2024-11-04 RX ADMIN — NYSTATIN 1 APPLICATION(S): 100000 POWDER TOPICAL at 18:51

## 2024-11-04 RX ADMIN — APIXABAN 2.5 MILLIGRAM(S): 5 TABLET, FILM COATED ORAL at 18:50

## 2024-11-04 RX ADMIN — Medication 2 TABLET(S): at 21:28

## 2024-11-04 RX ADMIN — SODIUM CHLORIDE, SODIUM GLUCONATE, SODIUM ACETATE, POTASSIUM CHLORIDE AND MAGNESIUM CHLORIDE 50 MILLILITER(S): 30; 37; 368; 526; 502 INJECTION, SOLUTION INTRAVENOUS at 08:22

## 2024-11-04 RX ADMIN — Medication 20 MILLIGRAM(S): at 21:28

## 2024-11-04 RX ADMIN — NYSTATIN 1 APPLICATION(S): 100000 POWDER TOPICAL at 05:30

## 2024-11-04 RX ADMIN — FLUCONAZOLE, SODIUM CHLORIDE 100 MILLIGRAM(S): 2 INJECTION INTRAVENOUS at 18:53

## 2024-11-04 RX ADMIN — Medication 4: at 11:24

## 2024-11-04 RX ADMIN — Medication 4: at 00:09

## 2024-11-04 NOTE — PROGRESS NOTE ADULT - ASSESSMENT
***** in progress Subjective: NAD, afebrile, more away and interactive, no new complains.    REVIEW OF SYSTEMS: unable to obtain, pt minimally verbal    PE:  Vital Signs Last 24 Hrs  T(C): 36.6 (05 Nov 2024 05:00), Max: 36.6 (05 Nov 2024 05:00)  T(F): 97.9 (05 Nov 2024 05:00), Max: 97.9 (05 Nov 2024 05:00)  HR: 83 (05 Nov 2024 05:00) (82 - 88)  BP: 162/76 (05 Nov 2024 05:00) (154/62 - 171/90)  BP(mean): 105 (05 Nov 2024 05:00) (105 - 105)  RR: 18 (05 Nov 2024 05:00) (18 - 18)  SpO2: 97% (05 Nov 2024 05:00) (97% - 98%)    Parameters below as of 05 Nov 2024 05:00  Patient On (Oxygen Delivery Method): room air    Gen: AOx1  HEAD:  Atraumatic  EYES: PERRLA, conjunctiva and sclera clear  ENT: Moist mucous membranes  NECK: Supple, No JVD  CV: S1+S2 normal, no murmurs  Resp: CTA  Abd: Soft, nontender, +BS  Ext: No LE edema, no cyanosis, pulses +  : No Lyle, no dysuria  IV/Skin: No thrombophlebitis  Msk: no joint swelling  Neuro: AAOx1 non verbal, bed bound, hemiplegic (R side) at baseline    LABS/DIAGNOSTIC TESTS:                        9.1    6.40  )-----------( 210      ( 05 Nov 2024 06:15 )             27.9     WBC Count: 6.40 K/uL (11-05 @ 06:15)  WBC Count: 7.77 K/uL (11-04 @ 06:49)  WBC Count: 7.22 K/uL (11-03 @ 07:40)    11-05    142  |  113[H]  |  11  ----------------------------<  194[H]  4.0   |  24  |  0.92    Ca    8.2[L]      05 Nov 2024 06:15  Phos  2.6     11-05  Mg     1.8     11-04    TPro  6.9  /  Alb  1.8[L]  /  TBili  0.3  /  DBili  x   /  AST  23  /  ALT  13  /  AlkPhos  93  11-05    MRSA PCR Result.: NotDetec (10-28-24 @ 22:43)  Sedimentation Rate, Erythrocyte: 115 mm/Hr *H* (11-01-24 @ 04:53)  C-Reactive Protein: 180.0 mg/L *H* (11-01-24 @ 04:53)    CULTURES:   Culture - Blood (collected 11-01-24 @ 05:30)  Source: .Blood BLOOD  Preliminary Report (11-04-24 @ 12:01):    No growth at 72 Hours    Culture - Blood (collected 11-01-24 @ 05:25)  Source: .Blood BLOOD  Preliminary Report (11-04-24 @ 12:01):    No growth at 72 Hours    Culture - Blood (collected 10-31-24 @ 06:40)  Source: .Blood BLOOD  Preliminary Report (11-04-24 @ 13:01):    No growth at 4 days    Culture - Blood (collected 10-31-24 @ 06:10)  Source: .Blood BLOOD  Preliminary Report (11-04-24 @ 13:01):    No growth at 4 days    Culture - Urine (collected 10-28-24 @ 16:44)  Source: Clean Catch  Final Report (10-29-24 @ 22:44):    50,000 - 99,000 CFU/mL Candida albicans    "Susceptibilities not performed"    Culture - Blood (collected 10-28-24 @ 15:25)  Source: .Blood BLOOD  Gram Stain (10-30-24 @ 17:40):    Growth in aerobic bottle: Yeast like cells  Final Report (11-02-24 @ 14:16):    Growth in aerobic bottle: Candida albicans    Direct identification is available within approximately 3-5    hours either by Blood Panel Multiplexed PCR or Direct    MALDI-TOF. Details: [email or web address]  Organism: Blood Culture PCR  Candida albicans (11-02-24 @ 14:16)  Organism: Candida albicans (11-02-24 @ 14:16)      Method Type: YSTMIC      -  Fluconazole: S 0.5 Fluconazole: Susceptibility depends on achieving the maximum possible blood level. Doses higher than the standard dosing amount (6mg/kg/day) may be needed in adults with normal renal function and body habitus.  This test was developed and itsperformance characteristics determined by [facility] [employer] [facility], [place]  It has not been cleared or approved by the U.S. Food and Drug Administration. S - Susceptible; SDD - Susceptible Dose Dependent; I - Intermediate; R - Resistant; N/I - No Interpretation Available  Organism: Blood Culture PCR (11-02-24 @ 14:16)      Method Type: PCR      -  Candida albicans: Detec    Culture - Blood (collected 10-28-24 @ 15:15)  Source: .Blood BLOOD  Gram Stain (10-31-24 @ 00:08):    Growth in aerobic bottle: Yeast like cells  Final Report (11-02-24 @ 14:24):    Growth in aerobic bottle: Candida albicans  Organism: Candida albicans (11-02-24 @ 14:24)  Organism: Candida albicans (11-02-24 @ 14:24)      Method Type: YSTMIC      -  Fluconazole: S 0.5 Fluconazole: Susceptibility depends on achieving the maximum possible blood level. Doses higher than the standard dosing amount (6mg/kg/day) may be needed in adults with normal renal function and body habitus.  This test was developed and itsperformance characteristics determined by [facility] [employer] [facility] [place]  It has not been cleared or approved by the U.S. Food and Drug Administration. S - Susceptible; SDD - Susceptible Dose Dependent; I - Intermediate; R - Resistant; N/I - No Interpretation Available    Culture - Blood (collected 10-21-24 @ 05:20)  Source: .Blood BLOOD  Final Report (10-26-24 @ 12:00):    No growth at 5 days    Culture - Urine (collected 10-17-24 @ 16:21)  Source: Catheterized  Final Report (10-21-24 @ 07:27):    >100,000 CFU/ml Escherichia coli  Organism: Escherichia coli (10-21-24 @ 07:27)  Organism: Escherichia coli (10-21-24 @ 07:27)      Method Type: AGA      -  Amoxicillin/Clavulanic Acid: S <=8/4      -  Ampicillin: R >16 These ampicillin results predict results for amoxicillin      -  Ampicillin/Sulbactam: S 8/4      -  Aztreonam: S <=4      -  Cefazolin: S <=2 For uncomplicated UTI with K. pneumoniae, E. coli, or P. mirablis: AGA <=16 is sensitive and AGA >=32 is resistant. This also predicts results for oral agents cefaclor, cefdinir, cefpodoxime, cefprozil, cefuroxime axetil, cephalexin and locarbef for uncomplicated UTI. Note that some isolates may be susceptible to these agents while testing resistant to cefazolin.      -  Cefepime: S <=2      -  Cefoxitin: S <=8      -  Ceftriaxone: S <=1      -  Cefuroxime: S <=4      -  Ciprofloxacin: S <=0.25      -  Ertapenem: S <=0.5      -  Gentamicin: S <=2      -  Imipenem: S <=1      -  Levofloxacin: S <=0.5      -  Meropenem: S <=1      -  Nitrofurantoin: S <=32 Should not be used to treat pyelonephritis      -  Piperacillin/Tazobactam: S <=8      -  Tobramycin: S <=2      -  Trimethoprim/Sulfamethoxazole: R >2/38    Culture - Blood (collected 10-17-24 @ 11:00)  Source: .Blood BLOOD  Gram Stain (10-18-24 @ 05:12):    Growth in anaerobic bottle: Gram Negative Rods    Growth in aerobic bottle: Gram Negative Rods  Final Report (10-19-24 @ 15:39):    Growth in aerobic and anaerobic bottles: Escherichia coli    See previous culture [id number]    Culture - Blood (collected 10-17-24 @ 10:50)  Source: .Blood BLOOD  Gram Stain (10-18-24 @ 05:24):    Growth in anaerobic bottle: Gram Negative Rods    Growth in aerobic bottle: Gram Negative Rods  Final Report (10-19-24 @ 15:30):    Growth in aerobic and anaerobic bottles: Escherichia coli    Direct identification is available within approximately 3-5    hours either by Blood Panel Multiplexed PCR or Direct    MALDI-TOF. Details: [email or web address]  Organism: Blood Culture PCR  Escherichia coli (10-19-24 @ 15:30)  Organism: Escherichia coli (10-19-24 @ 15:30)      Method Type: AGA      -  Ampicillin: R >16 These ampicillin results predict results for amoxicillin      -  Ampicillin/Sulbactam: S 8/4      -  Aztreonam: S <=4      -  Cefazolin: S <=2      -  Cefepime: S <=2      -  Cefoxitin: S <=8      -  Ceftriaxone: S <=1      -  Ciprofloxacin: S <=0.25      -  Ertapenem: S <=0.5      -  Gentamicin: S <=2      -  Imipenem: S <=1      -  Levofloxacin: S <=0.5      -  Meropenem: S <=1      -  Piperacillin/Tazobactam: S <=8      -  Tobramycin: S <=2      -  Trimethoprim/Sulfamethoxazole: R >2/38  Organism: Blood Culture PCR (10-19-24 @ 15:30)      Method Type: PCR      -  Escherichia coli: Detec    RADIOLOGY:   Available pertinent Imaging reviewed    ANTIBIOTICS:  fluconAZOLE IVPB 200 every 24 hours    IMPRESSION:  89F from [facility] CVA with right sided deficits and aphasia, DM, HTN , HLD, seizure disorder presenting from [facility] after witnessed tonic clonic seizure, recently admitted to [facility] and tx for UTI.   Per EMS and ED records pt was febrile 101.4F PTA. Intubated in the ED for airway protection/extubated 10/31. BC + candida started on caspofungin later transitioned to fluconazole. Repeat blood cx 10/31 NGTD.   ECHO 10/31 EF 62, G2DD, severe pulmonary hypertension, no obvious vegetations.  CT A/P 10/31 reviewed w/Minimal bilateral pleural effusions and underlying passive atelectasis. Cardiomegaly. Marked perirectal edema and fluid concerning for infectious or inflammatory proctitis. Underlying malignancy is not excluded. Prominent perirectal and right pelvic sidewall lymph nodes. 3.9 x 3.0 cm right renal cortical hypoattenuating focus suspicious for infection. Bilateral urothelial enhancement also suspicious for infection. Multiple indeterminate renal lesions. Polycystic kidneys. Nonobstructing right renal calculus. 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 Abnormally distended endometrial cavity with internal amorphous soft tissue concerning for malignancy. Infection is not excluded.    -Sepsis due to BSI   -AHRF extubated 10/31  -Candidemia- GI source vs  source  -Proctitis  -UTI  -Status epilepticus  -CVA w/ residual R sided weakness and aphasia   -DM  -Dementia     PLAN:  Continue Fluconazole 200 mg q24 hrs IV for total 28 days since pt refused SHRUTHI (until 11/28)  Weekly labs while on atb CBC w/diff, CMP, ESR and CRP  Opthalmologic exam- if no able inpatient get OP appointment   Monitor LFTS and QTC while on fluconazole  Seizure precautions, aspiration precautions, chest PT, hailey care, offloading  F/up Palliative- GOC  Discussed with pt's son(proxy)  Discussed with medicine    Please reach ID with any questions or concerns  Dr. Melida Garcia  Available in Teams

## 2024-11-04 NOTE — PROGRESS NOTE ADULT - SUBJECTIVE AND OBJECTIVE BOX
Time of Visit:  Patient seen and examined. pat is lying in bed comfortable     MEDICATIONS  (STANDING):  apixaban 2.5 milliGRAM(s) Oral every 12 hours  atorvastatin 20 milliGRAM(s) Oral at bedtime  dextrose 5% with potassium chloride 20 mEq/L 1000 milliLiter(s) (50 mL/Hr) IV Continuous <Continuous>  fluconAZOLE IVPB 200 milliGRAM(s) IV Intermittent every 24 hours  insulin lispro (ADMELOG) corrective regimen sliding scale   SubCutaneous three times a day before meals  insulin lispro (ADMELOG) corrective regimen sliding scale   SubCutaneous at bedtime  levETIRAcetam  Solution 750 milliGRAM(s) Oral two times a day  nystatin Powder 1 Application(s) Topical two times a day  pantoprazole    Tablet 40 milliGRAM(s) Oral before breakfast  polyethylene glycol 3350 17 Gram(s) Oral daily  senna 2 Tablet(s) Oral at bedtime      MEDICATIONS  (PRN):  acetaminophen   Oral Liquid .. 650 milliGRAM(s) Oral every 6 hours PRN Mild Pain (1 - 3)       Medications up to date at time of exam.      PHYSICAL EXAMINATION:  Patient has no new complaints.  GENERAL: The patient  in no apparent distress.     Vital Signs Last 24 Hrs  T(C): 36.5 (04 Nov 2024 12:26), Max: 36.9 (03 Nov 2024 20:24)  T(F): 97.7 (04 Nov 2024 12:26), Max: 98.5 (03 Nov 2024 20:24)  HR: 82 (04 Nov 2024 12:26) (78 - 88)  BP: 154/62 (04 Nov 2024 12:26) (152/72 - 179/77)  BP(mean): --  RR: 18 (04 Nov 2024 12:26) (18 - 18)  SpO2: 98% (04 Nov 2024 12:26) (95% - 98%)    Parameters below as of 04 Nov 2024 12:26  Patient On (Oxygen Delivery Method): room air       (if applicable)    Chest Tube (if applicable)    HEENT: Head is normocephalic and atraumatic. Extraocular muscles are intact. Mucous membranes are moist.     NECK: Supple, no palpable adenopathy.    LUNGS: Fair bilateral air entry   no wheezing, rales, or rhonchi.    HEART: Regular rate and rhythm without murmur.    ABDOMEN: Soft, nontender, and nondistended.  No hepatosplenomegaly is noted.    : No painful voiding, no pelvic pain    EXTREMITIES: Without any cyanosis, clubbing, rash, lesions or edema.    NEUROLOGIC: Awake,     SKIN: Warm, dry, good turgor.      LABS:                        8.7    7.77  )-----------( 207      ( 04 Nov 2024 06:49 )             26.8     11-04    147[H]  |  119[H]  |  14  ----------------------------<  161[H]  3.5   |  24  |  1.05    Ca    8.2[L]      04 Nov 2024 06:49  Phos  1.9     11-04  Mg     1.8     11-04    TPro  6.7  /  Alb  1.7[L]  /  TBili  0.2  /  DBili  x   /  AST  15  /  ALT  11  /  AlkPhos  90  11-04      Urinalysis Basic - ( 04 Nov 2024 06:49 )    Color: x / Appearance: x / SG: x / pH: x  Gluc: 161 mg/dL / Ketone: x  / Bili: x / Urobili: x   Blood: x / Protein: x / Nitrite: x   Leuk Esterase: x / RBC: x / WBC x   Sq Epi: x / Non Sq Epi: x / Bacteria: x    MICROBIOLOGY: (if applicable)    RADIOLOGY & ADDITIONAL STUDIES:  EKG:   CXR:  ECHO:    IMPRESSION: 89y Female PAST MEDICAL & SURGICAL HISTORY:  Stroke      HTN (hypertension)      HLD (hyperlipidemia)      DM (diabetes mellitus)      DVT (deep venous thrombosis)      CVA (cerebrovascular accident)      Aphasia      Dysphagia      Apraxia      Right-sided muscle weakness      Nonverbal      Seizure       p/w       IMP: This is a 89 yr old woman with  CVA w/ residual R deficits and aphasia, seizure disorder, HTN, DM and recent UTI on oral antibiotics who was sent from [facility] to the ED with breakthrough seizures/status epilepticus s/p intubation in the field. She received multiple doses of IV Diazepam and loaded with IV Keppra. She was also started on IV antibiotics for possible UTI; admitted to ICU for further management of seizures and mechanical ventilation    ASSESSMENT  - Acute hypoxemic respiratory failure from seizures - s/p intubation  - Status Epilepticus  - Type A Lactic acidosis  - TRAVIS, likely prerenal  - Hypernatremia, mild  - Sepsis /UTI  - Seizures  - CVA with right sided deficit/aphasia,   - HTN HLD  - DM, uncontrol       Sugg:      - O2 supp as needed   - Asp precaution   - KEPPRA  - Monitor blood glucose with coverage   - DVT GI prophy   - Pureed diet

## 2024-11-04 NOTE — PROGRESS NOTE ADULT - PROBLEM SELECTOR PLAN 9
###Advanced dementia, CVA, bedbound, non verbal  ###Protein calorie malnutrition/functional quadriplegia due to dementia  -assist with feeding  --bowel regimen for proctitis, constipation.   -supportive care, incontinent care  -out of bed daily.  -skin integrity.   -fall precaution. ###Advanced dementia, CVA, bedbound, non verbal  ###Protein calorie malnutrition/functional quadriplegia due to dementia  -assist with feeding  --bowel regimen for proctitis, constipation.   -supportive care, incontinent care  -out of bed daily.  -skin integrity.   -fall precaution.  -palliative following, DNR only

## 2024-11-04 NOTE — PROGRESS NOTE ADULT - SUBJECTIVE AND OBJECTIVE BOX
Reason for Admission:  · Reason for Admission	status epilepticus      · Subjective and Objective:   NP Note discussed with  Primary Attending    INTERVAL HPI/OVERNIGHT EVENTS: no new complaints    MEDICATIONS  (STANDING):  apixaban 2.5 milliGRAM(s) Oral every 12 hours  atorvastatin 20 milliGRAM(s) Oral at bedtime  dextrose 5% with potassium chloride 20 mEq/L 1000 milliLiter(s) (50 mL/Hr) IV Continuous <Continuous>  fluconAZOLE IVPB 200 milliGRAM(s) IV Intermittent every 24 hours  insulin lispro (ADMELOG) corrective regimen sliding scale   SubCutaneous every 6 hours  levETIRAcetam  Solution 750 milliGRAM(s) Oral two times a day  nystatin Powder 1 Application(s) Topical two times a day  pantoprazole    Tablet 40 milliGRAM(s) Oral before breakfast  polyethylene glycol 3350 17 Gram(s) Oral daily  senna 2 Tablet(s) Oral at bedtime    MEDICATIONS  (PRN):  acetaminophen   Oral Liquid .. 650 milliGRAM(s) Oral every 6 hours PRN Mild Pain (1 - 3)      __________________________________________________  REVIEW OF SYSTEMS:    CONSTITUTIONAL: No fever,   EYES: no acute visual disturbances  NECK: No pain or stiffness  RESPIRATORY: No cough; No shortness of breath  CARDIOVASCULAR: No chest pain, no palpitations  GASTROINTESTINAL: No pain. No nausea or vomiting; No diarrhea   NEUROLOGICAL: No headache or numbness, no tremors  MUSCULOSKELETAL: No joint pain, no muscle pain  GENITOURINARY: no dysuria, no frequency, no hesitancy  PSYCHIATRY: no depression , no anxiety  ALL OTHER  ROS negative        Vital Signs Last 24 Hrs  T(C): 36.5 (04 Nov 2024 12:26), Max: 36.9 (03 Nov 2024 20:24)  T(F): 97.7 (04 Nov 2024 12:26), Max: 98.5 (03 Nov 2024 20:24)  HR: 82 (04 Nov 2024 12:26) (78 - 88)  BP: 154/62 (04 Nov 2024 12:26) (152/72 - 179/77)  BP(mean): --  RR: 18 (04 Nov 2024 12:26) (18 - 18)  SpO2: 98% (04 Nov 2024 12:26) (95% - 98%)    Parameters below as of 04 Nov 2024 12:26  Patient On (Oxygen Delivery Method): room air        ________________________________________________  PHYSICAL EXAM:  GENERAL: NAD  HEENT: Normocephalic;  conjunctivae and sclerae clear; moist mucous membranes;   NECK : supple  CHEST/LUNG: Clear to auscultation bilaterally with good air entry   HEART: S1 S2  regular; no murmurs, gallops or rubs  ABDOMEN: Soft, Nontender, Nondistended; Bowel sounds present  EXTREMITIES: no cyanosis; no edema; no calf tenderness  SKIN: warm and dry; no rash  NERVOUS SYSTEM:  Awake and alert; Oriented  to place, person and time ; no new deficits    _________________________________________________  LABS:                        8.7    7.77  )-----------( 207      ( 04 Nov 2024 06:49 )             26.8     11-04    147[H]  |  119[H]  |  14  ----------------------------<  161[H]  3.5   |  24  |  1.05    Ca    8.2[L]      04 Nov 2024 06:49  Phos  1.9     11-04  Mg     1.8     11-04    TPro  6.7  /  Alb  1.7[L]  /  TBili  0.2  /  DBili  x   /  AST  15  /  ALT  11  /  AlkPhos  90  11-04      Urinalysis Basic - ( 04 Nov 2024 06:49 )    Color: x / Appearance: x / SG: x / pH: x  Gluc: 161 mg/dL / Ketone: x  / Bili: x / Urobili: x   Blood: x / Protein: x / Nitrite: x   Leuk Esterase: x / RBC: x / WBC x   Sq Epi: x / Non Sq Epi: x / Bacteria: x      CAPILLARY BLOOD GLUCOSE     Reason for Admission:  · Reason for Admission	status epilepticus      · Subjective and Objective:   NP Note discussed with  Primary Attending    INTERVAL HPI/OVERNIGHT EVENTS: no new complaints    Vital Signs Last 24 Hrs  T(C): 36.5 (04 Nov 2024 12:26), Max: 36.9 (03 Nov 2024 20:24)  T(F): 97.7 (04 Nov 2024 12:26), Max: 98.5 (03 Nov 2024 20:24)  HR: 82 (04 Nov 2024 12:26) (78 - 88)  BP: 154/62 (04 Nov 2024 12:26) (152/72 - 179/77)  BP(mean): --  RR: 18 (04 Nov 2024 12:26) (18 - 18)  SpO2: 98% (04 Nov 2024 12:26) (95% - 98%)    Parameters below as of 04 Nov 2024 12:26  Patient On (Oxygen Delivery Method): room air    LABS:                        8.7    7.77  )-----------( 207      ( 04 Nov 2024 06:49 )             26.8     11-04    147[H]  |  119[H]  |  14  ----------------------------<  161[H]  3.5   |  24  |  1.05    Ca    8.2[L]      04 Nov 2024 06:49  Phos  1.9     11-04  Mg     1.8     11-04    TPro  6.7  /  Alb  1.7[L]  /  TBili  0.2  /  DBili  x   /  AST  15  /  ALT  11  /  AlkPhos  90  11-04      Urinalysis Basic - ( 04 Nov 2024 06:49 )    Color: x / Appearance: x / SG: x / pH: x  Gluc: 161 mg/dL / Ketone: x  / Bili: x / Urobili: x   Blood: x / Protein: x / Nitrite: x   Leuk Esterase: x / RBC: x / WBC x   Sq Epi: x / Non Sq Epi: x / Bacteria: x    Assessment and Plan:   · Assessment	   Assessment	  89F from [facility] CV[facility] with right sided deficits and aphasia, (bedbound/wheelchair bound), IDT2DM, HTN, HLD, seizure disorder (on Keppra) presenting from [facility] after witnessed tonic clonic seizure lasting >30min. Patient admitted to ICU after intubation in the field for status epilepticus, with course complicated by fungemia being treated with fluconazole. S/p intubation on room air. 24 hour EEG revealed no captured seizures.     #status epilepticus  #CVA  #DM  #lactic acidosis       =================== Neuro============================  #status epilepticus -stable clinically  #Hx of seizures (on Keppra 500mg bid)   s/p keppra 2g load + additional 1.5g for 60mg/kg dose  10/29 vEEG not working, spot EEG: Moderate-severe diffuse cerebral dysfunction is nonspecific in etiology.  No epileptiform abnormalities or seizures.  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 (prior notes stating bedbound status, son at bedside stating pt ambulates with wheelchair and eats with assistance and says few words)   on atorvastatin 20 and eliquis 2.5mg BID at home  c/w home meds via NGT    ================= Cardiovascular==========================  #HTN  takes norvasc 10 daily  monitor BP  Start lasix due to increased BP and hand edema    ==================ID===================================  #UTI  recently discharged on 10/23 for UTI with E.coli bacteremia   frost placed in ED with purulent output noted  UA +  s/p zosyn in ED  c/w ceftriaxone 1g qd   UCx: candida    #Fungemia   - BCx positive for candida albicans  - ESR/CRP elevated  - Ophtho exam?   - Discontinued caspofungin, started fluconazole  - ID Dr. Burnett following  - F/u fungal culture, repeat BCx     ================= Nephro================================  #TRAVIS  BUN/Cr 24/1.50   FeNa 3.5%   Improving    =================GI====================================  Passed dysphagia - started puree diet  Continue NGT for now    ================ Heme==================================  #anemia  Hgb 9.3 > 8.2 (Baseline Hgb 8)  no active signs of bleeding       =================Endocrine===============================  #DM  on Humalog sliding scale and farxiga at home  will start mISS q6 while NPO on TF  monitor FS  =================Prophylaxis =============================  eliquis DVT prophylaxis   protonix GI prophylaxis

## 2024-11-04 NOTE — CHART NOTE - NSCHARTNOTEFT_GEN_A_CORE
89F from [facility] CVA with right sided deficits and aphasia, DM, HTN , HLD, seizure disorder presenting from [facility] after witnessed tonic clonic seizure lasting >30minutes. Patient was intubated on the field by EMS and received 2g keppra load. Additional 1.5 g Keppra was given on admission.    Patient was admitted to ICU s/p intubation and for continuous EEG monitoring. Patient found to have a UTI and ceftriaxone was started. Neurology was consulted. 24h EEG captured no seizures. Patient was extubated, satting 97% on room air. CT Abd Pelvis: showed perirectal edema and fluid concerning for infectious or inflammatory proctitis. Underlying malignancy is not excluded. Prominent perirectal and right pelvic sidewall lymph nodes. 3.9 x 3.0 cm right renal cortical hypoattenuating focus suspicious for infection. Bilateral urothelial enhancement also suspicious for infection.Multiple indeterminate renal lesions. Polycystic kidneys.Nonobstructing right renal calculus. 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 Abnormally distended endometrial cavity with internal amorphous soft tissue concerning for malignancy. Infection is not excluded.  Urology was consulted - no acute intervention needed.    Patient's course was complicated by blood cultures positive for Candida, and was started on fluconazole. ID following    Patient is stable for downgrade to floor under care of  ------------- for further management , covering resident ---------- was informed.    <Things to follow up>.
Thank you for allowing us to participate in your patient's care.  Goals are clear.  Will sign off at this time however please don't hesitate to call back with any questions or concerns

## 2024-11-04 NOTE — PROGRESS NOTE ADULT - SUBJECTIVE AND OBJECTIVE BOX
Date of Service 11-04-24 @ 10:58    CHIEF COMPLAINT:Patient is a 89y old  Female who presents with a chief complaint of status epilepticus.Pt appears comfortable.    	  REVIEW OF SYSTEMS:    [x ] Unable to obtain    PHYSICAL EXAM:  T(C): 36.8 (11-04-24 @ 05:24), Max: 36.9 (11-03-24 @ 12:45)  HR: 78 (11-04-24 @ 05:24) (78 - 88)  BP: 152/72 (11-04-24 @ 05:24) (152/72 - 179/77)  RR: 18 (11-04-24 @ 05:24) (17 - 18)  SpO2: 95% (11-04-24 @ 05:24) (95% - 96%)  Wt(kg): --  I&O's Summary    03 Nov 2024 07:01  -  04 Nov 2024 07:00  --------------------------------------------------------  IN: 0 mL / OUT: 850 mL / NET: -850 mL        Appearance: Normal	  HEENT:   Normal oral mucosa, PERRL, EOMI	  Lymphatic: No lymphadenopathy  Cardiovascular: Normal S1 S2, No JVD, No murmurs, No edema  Respiratory: Lungs clear to auscultation	  Gastrointestinal:  Soft, Non-tender, + BS	  Skin: No rashes, No ecchymoses, No cyanosis	  Extremities: Normal range of motion, No clubbing, cyanosis or edema  Vascular: Peripheral pulses palpable 2+ bilaterally    MEDICATIONS  (STANDING):  apixaban 2.5 milliGRAM(s) Oral every 12 hours  atorvastatin 20 milliGRAM(s) Oral at bedtime  dextrose 5% with potassium chloride 20 mEq/L 1000 milliLiter(s) (50 mL/Hr) IV Continuous <Continuous>  fluconAZOLE IVPB 200 milliGRAM(s) IV Intermittent every 24 hours  insulin lispro (ADMELOG) corrective regimen sliding scale   SubCutaneous every 6 hours  levETIRAcetam  Solution 750 milliGRAM(s) Oral two times a day  nystatin Powder 1 Application(s) Topical two times a day  pantoprazole    Tablet 40 milliGRAM(s) Oral before breakfast  polyethylene glycol 3350 17 Gram(s) Oral daily  potassium phosphate IVPB 30 milliMole(s) IV Intermittent once  senna 2 Tablet(s) Oral at bedtime        	  LABS:	 	                              8.7    7.77  )-----------( 207      ( 04 Nov 2024 06:49 )             26.8     11-04    147[H]  |  119[H]  |  14  ----------------------------<  161[H]  3.5   |  24  |  1.05    Ca    8.2[L]      04 Nov 2024 06:49  Phos  1.9     11-04  Mg     1.8     11-04    TPro  6.7  /  Alb  1.7[L]  /  TBili  0.2  /  DBili  x   /  AST  15  /  ALT  11  /  AlkPhos  90  11-04    Blood cx-.    < from: TTE W or WO Ultrasound Enhancing Agent (10.31.24 @ 15:40) >  CONCLUSIONS:      1. Left ventricular systolic function is normal with an ejection fraction of 62 % by Gaytan's method of disks.   2. There is moderate (grade 2) left ventricular diastolic dysfunction.   3. Normal right ventricular cavity size and increased wall thickness,.   4. Left atrium is mildly dilated.   5. There is mild calcification of the mitral valve annulus.   6. Mild to moderate mitral regurgitation.   7. There is calcification of the aortic valve leaflets. No aortic valve stenosis.   8. Moderate to severetricuspid regurgitation.   9. Mild pulmonic regurgitation.  10. Estimated pulmonary artery systolic pressure is 63 mmHg, consistent with severe pulmonary hypertension.  11. Left pleural effusion noted.  12. No prior echocardiogram is available for comparison.  13. No pericardial effusion seen.

## 2024-11-04 NOTE — PROGRESS NOTE ADULT - PROBLEM SELECTOR PLAN 11
-CT Abd Pelvis: Minimal bilateral pleural effusions and underlying passive atelectasis. Cardiomegaly. Marked perirectal edema and fluid concerning for infectious or inflammatory proctitis. Underlying malignancy is not excluded. Prominent perirectal and right pelvic sidewall lymph nodes. 3.9 x 3.0 cm right renal cortical hypoattenuating focus suspicious for infection. Bilateral urothelial enhancement also suspicious for infection. Multiple indeterminate renal lesions. Polycystic kidneys. Non-obstructing right renal calculus. Re-demonstrated multicystic pancreatic mass with distal pancreatic duct distention. Abnormally distended endometrial cavity with internal amorphous soft tissue concerning for malignancy. Infection is not excluded.  -family not pursuing further work up. -CT Abd Pelvis: Minimal bilateral pleural effusions and underlying passive atelectasis. Cardiomegaly. Marked perirectal edema and fluid concerning for infectious or inflammatory proctitis. Underlying malignancy is not excluded. Prominent perirectal and right pelvic sidewall lymph nodes. 3.9 x 3.0 cm right renal cortical hypoattenuating focus suspicious for infection. Bilateral urothelial enhancement also suspicious for infection. Multiple indeterminate renal lesions. Polycystic kidneys. Non-obstructing right renal calculus. Re-demonstrated multicystic pancreatic mass with distal pancreatic duct distention. Abnormally distended endometrial cavity with internal amorphous soft tissue concerning for malignancy. Infection is not excluded.  - Urology was consulted - no acute intervention needed.   -family not pursuing further work up.  -c/w abt per ID.

## 2024-11-04 NOTE — PROGRESS NOTE ADULT - SUBJECTIVE AND OBJECTIVE BOX
NP Note discussed with  Primary Attending    INTERVAL HPI/OVERNIGHT EVENTS: no new complaints    MEDICATIONS  (STANDING):  apixaban 2.5 milliGRAM(s) Oral every 12 hours  atorvastatin 20 milliGRAM(s) Oral at bedtime  dextrose 5% with potassium chloride 20 mEq/L 1000 milliLiter(s) (50 mL/Hr) IV Continuous <Continuous>  fluconAZOLE IVPB 200 milliGRAM(s) IV Intermittent every 24 hours  insulin lispro (ADMELOG) corrective regimen sliding scale   SubCutaneous every 6 hours  levETIRAcetam  Solution 750 milliGRAM(s) Oral two times a day  nystatin Powder 1 Application(s) Topical two times a day  pantoprazole    Tablet 40 milliGRAM(s) Oral before breakfast  polyethylene glycol 3350 17 Gram(s) Oral daily  senna 2 Tablet(s) Oral at bedtime    MEDICATIONS  (PRN):  acetaminophen   Oral Liquid .. 650 milliGRAM(s) Oral every 6 hours PRN Mild Pain (1 - 3)      __________________________________________________  REVIEW OF SYSTEMS:    CONSTITUTIONAL: No fever,   EYES: no acute visual disturbances  NECK: No pain or stiffness  RESPIRATORY: No cough; No shortness of breath  CARDIOVASCULAR: No chest pain, no palpitations  GASTROINTESTINAL: No pain. No nausea or vomiting; No diarrhea   NEUROLOGICAL: No headache or numbness, no tremors  MUSCULOSKELETAL: No joint pain, no muscle pain  GENITOURINARY: no dysuria, no frequency, no hesitancy  PSYCHIATRY: no depression , no anxiety  ALL OTHER  ROS negative        Vital Signs Last 24 Hrs  T(C): 36.5 (04 Nov 2024 12:26), Max: 36.9 (03 Nov 2024 20:24)  T(F): 97.7 (04 Nov 2024 12:26), Max: 98.5 (03 Nov 2024 20:24)  HR: 82 (04 Nov 2024 12:26) (78 - 88)  BP: 154/62 (04 Nov 2024 12:26) (152/72 - 179/77)  BP(mean): --  RR: 18 (04 Nov 2024 12:26) (18 - 18)  SpO2: 98% (04 Nov 2024 12:26) (95% - 98%)    Parameters below as of 04 Nov 2024 12:26  Patient On (Oxygen Delivery Method): room air        ________________________________________________  PHYSICAL EXAM:  GENERAL: NAD  HEENT: Normocephalic;  conjunctivae and sclerae clear; moist mucous membranes;   NECK : supple  CHEST/LUNG: Clear to auscultation bilaterally with good air entry   HEART: S1 S2  regular; no murmurs, gallops or rubs  ABDOMEN: Soft, Nontender, Nondistended; Bowel sounds present  EXTREMITIES: no cyanosis; no edema; no calf tenderness  SKIN: warm and dry; no rash  NERVOUS SYSTEM:  Awake and alert; Oriented  to place, person and time ; no new deficits    _________________________________________________  LABS:                        8.7    7.77  )-----------( 207      ( 04 Nov 2024 06:49 )             26.8     11-04    147[H]  |  119[H]  |  14  ----------------------------<  161[H]  3.5   |  24  |  1.05    Ca    8.2[L]      04 Nov 2024 06:49  Phos  1.9     11-04  Mg     1.8     11-04    TPro  6.7  /  Alb  1.7[L]  /  TBili  0.2  /  DBili  x   /  AST  15  /  ALT  11  /  AlkPhos  90  11-04      Urinalysis Basic - ( 04 Nov 2024 06:49 )    Color: x / Appearance: x / SG: x / pH: x  Gluc: 161 mg/dL / Ketone: x  / Bili: x / Urobili: x   Blood: x / Protein: x / Nitrite: x   Leuk Esterase: x / RBC: x / WBC x   Sq Epi: x / Non Sq Epi: x / Bacteria: x      CAPILLARY BLOOD GLUCOSE      POCT Blood Glucose.: 245 mg/dL (04 Nov 2024 11:08)  POCT Blood Glucose.: 148 mg/dL (04 Nov 2024 07:57)  POCT Blood Glucose.: 148 mg/dL (04 Nov 2024 05:44)  POCT Blood Glucose.: 229 mg/dL (03 Nov 2024 23:25)  POCT Blood Glucose.: 187 mg/dL (03 Nov 2024 21:07)        RADIOLOGY & ADDITIONAL TESTS:    Imaging  Reviewed:  YES    Consultant(s) Notes Reviewed:   YES      Plan of care was discussed with patient and /or primary care giver; all questions and concerns were addressed  NP Note discussed with  Primary Attending    INTERVAL HPI/OVERNIGHT EVENTS: no new complaints, spoke with son at bedside.     MEDICATIONS  (STANDING):  apixaban 2.5 milliGRAM(s) Oral every 12 hours  atorvastatin 20 milliGRAM(s) Oral at bedtime  dextrose 5% with potassium chloride 20 mEq/L 1000 milliLiter(s) (50 mL/Hr) IV Continuous <Continuous>  fluconAZOLE IVPB 200 milliGRAM(s) IV Intermittent every 24 hours  insulin lispro (ADMELOG) corrective regimen sliding scale   SubCutaneous every 6 hours  levETIRAcetam  Solution 750 milliGRAM(s) Oral two times a day  nystatin Powder 1 Application(s) Topical two times a day  pantoprazole    Tablet 40 milliGRAM(s) Oral before breakfast  polyethylene glycol 3350 17 Gram(s) Oral daily  senna 2 Tablet(s) Oral at bedtime    MEDICATIONS  (PRN):  acetaminophen   Oral Liquid .. 650 milliGRAM(s) Oral every 6 hours PRN Mild Pain (1 - 3)      __________________________________________________  REVIEW OF SYSTEMS:  unable to obtain due to impaired cognition.     Vital Signs Last 24 Hrs  T(C): 36.5 (04 Nov 2024 12:26), Max: 36.9 (03 Nov 2024 20:24)  T(F): 97.7 (04 Nov 2024 12:26), Max: 98.5 (03 Nov 2024 20:24)  HR: 82 (04 Nov 2024 12:26) (78 - 88)  BP: 154/62 (04 Nov 2024 12:26) (152/72 - 179/77)  BP(mean): --  RR: 18 (04 Nov 2024 12:26) (18 - 18)  SpO2: 98% (04 Nov 2024 12:26) (95% - 98%)    Parameters below as of 04 Nov 2024 12:26  Patient On (Oxygen Delivery Method): room air        ________________________________________________  PHYSICAL EXAM:  GENERAL: NAD, frail elderly F  HEENT: Normocephalic;  conjunctivae and sclerae clear; moist mucous membranes; edentulous   NECK : supple  CHEST/LUNG: Clear to auscultation bilaterally with good air entry   HEART: S1 S2  regular; no murmurs, gallops or rubs  ABDOMEN: Soft, Nontender, Nondistended; Bowel sounds present  EXTREMITIES: no cyanosis; no edema; no calf tenderness  SKIN: warm and dry; no rash  NERVOUS SYSTEM:  Awake and alert; Oriented  to self, non verbal.     _________________________________________________  LABS:                        8.7    7.77  )-----------( 207      ( 04 Nov 2024 06:49 )             26.8     11-04    147[H]  |  119[H]  |  14  ----------------------------<  161[H]  3.5   |  24  |  1.05    Ca    8.2[L]      04 Nov 2024 06:49  Phos  1.9     11-04  Mg     1.8     11-04    TPro  6.7  /  Alb  1.7[L]  /  TBili  0.2  /  DBili  x   /  AST  15  /  ALT  11  /  AlkPhos  90  11-04      Urinalysis Basic - ( 04 Nov 2024 06:49 )    Color: x / Appearance: x / SG: x / pH: x  Gluc: 161 mg/dL / Ketone: x  / Bili: x / Urobili: x   Blood: x / Protein: x / Nitrite: x   Leuk Esterase: x / RBC: x / WBC x   Sq Epi: x / Non Sq Epi: x / Bacteria: x      CAPILLARY BLOOD GLUCOSE      POCT Blood Glucose.: 245 mg/dL (04 Nov 2024 11:08)  POCT Blood Glucose.: 148 mg/dL (04 Nov 2024 07:57)  POCT Blood Glucose.: 148 mg/dL (04 Nov 2024 05:44)  POCT Blood Glucose.: 229 mg/dL (03 Nov 2024 23:25)  POCT Blood Glucose.: 187 mg/dL (03 Nov 2024 21:07)        RADIOLOGY & ADDITIONAL TESTS:    Imaging  Reviewed:  YES  < from: CT Chest w/ IV Cont (10.31.24 @ 13:49) >    ACC: [id number] EXAM:  CT CHEST IC   ORDERED BY: BRITTNEY HO     ACC: [id number] EXAM:  CT ABDOMEN AND PELVIS IC   ORDERED BY: LEONAADI PALMER     PROCEDURE DATE:  10/31/2024          INTERPRETATION:  CLINICAL INFORMATION: 89 years  Female with Candidemia.    COMPARISON: Noncontrast CT chest abdomen and pelvis 10/17/2024    CONTRAST/COMPLICATIONS:  IV Contrast: Omnipaque 350 (accession [id number]), IV contrast documented   in unlinked concurrent exam (accession [id number])  90 cc administered   10   cc discarded  Oral Contrast: NONE  Complications: None reported at time of study completion    PROCEDURE:  CT of the Chest, Abdomen and Pelvis was performed.  Sagittal and coronal reformats were performed.    FINDINGS:  CHEST:  LUNGS AND LARGE AIRWAYS:Patent central airways. No pulmonary nodules.   Bilateral lower lobe passive atelectasis.  PLEURA: Small bilateral pleural effusions increased from prior.  VESSELS: Atherosclerotic aorta. Coronary atherosclerosis.  HEART: Cardiomegaly. No pericardialeffusion.  MEDIASTINUM AND WALTER: No lymphadenopathy.  CHEST WALL AND LOWER NECK: Subcentimeter thyroid nodules.    ABDOMEN AND PELVIS:  LIVER: Within normal limits.  BILE DUCTS: Normal caliber.  GALLBLADDER: Within normal limits.  SPLEEN: 3.4 cm cystwith partially calcified wall. Other scattered   hypodensities too small to characterize.  PANCREAS: 6.8 x 6.8 cm multiseptated pancreatic head cystic mass. 6 mm   and 8 mm internal calcifications. Distal pancreatic duct distention   measuring up to 1.1 cm.  ADRENALS: Within normal limits.  KIDNEYS/URETERS: Multicystic kidneys. Dominant 7.7 cm right upper pole   cyst. 3.9 x 3.0 cm hypoattenuating cortical focus in the mid right kidney   may represent pyelonephritis or malignancy. Multiple hypodensities too   small to characterize.  No hydroureteronephrosis. Left renal pelvic urothelial thickening   suggestive of infection.  6 mm right renal pelvic calculus. Right renal pelvic and ureteral   urothelial enhancement.    BLADDER: Within normal limits.  REPRODUCTIVE ORGANS: Abnormally distended endometrial cavity measuring up   to 1.5 cm with amorphous internal soft tissue. Several calcified myomata.    BOWEL: No bowel obstruction. Appendix is not visualized and cannot be   assessed.. Feeding tube terminates in the stomach. Rectal tube in situ.   Marked perirectal edema and fluid appear  PERITONEUM/RETROPERITONEUM: Within normal limits.  VESSELS: Advanced atherosclerotic changes.  LYMPH NODES: 8 mm right perirectal lymph node (2:240). 1.2 x 0.6 cm right   pelvic sidewall lymph node (2:242).  ABDOMINAL WALL: Anasarca.  BONES: Degenerative changes of the spine and hips.    IMPRESSION:  Minimal bilateral pleural effusions and underlying passive atelectasis.   Cardiomegaly.    Marked perirectal edema and fluid concerning for infectious or   inflammatory proctitis. Underlying malignancy is not excluded. Prominent   perirectal and right pelvic sidewall lymph nodes.    3.9 x 3.0 cm right renal cortical hypoattenuating focus suspicious for  infection. Bilateral urothelial enhancement also suspicious for infection.    Multiple indeterminate renal lesions. Polycystic kidneys. Nonobstructing   right renal calculus.    Redemonstrated multicystic pancreatic mass with distal pancreatic duct  distention.    Abnormally distended endometrial cavity with internal amorphous soft   tissue concerning for malignancy. Infection is not excluded.        --- End of Report ---            VALERIA SARMIENTO MD; Attending Radiologist  This document has been electronically signed. Oct 31 2024  2:42PM    < end of copied text >    Consultant(s) Notes Reviewed:   YES      Plan of care was discussed with patient and /or primary care giver; all questions and concerns were addressed

## 2024-11-04 NOTE — PROGRESS NOTE ADULT - ASSESSMENT
89F from [facility] CVA with right sided deficits and aphasia, DM, HTN , HLD, seizure disorder presenting from [facility] after witnessed tonic clonic seizure,fungal uti and fungemia.  1.Fungal uti,fungemia-diflucan.Pt being followed by palliative, She is not a candidate for SHRUTHI,rec empiric tx for 4-6 weeks,no vegetation see on TTE.  2.DM-insulin.  3.HTN-stable off bp medication.  4.Hypernatremia- IVF d5w +kcl.  5.CVA-eliquis.  6.Seizures-levETIRAcetam.  7.Lipid d/o-statin.  8.PPI. 89F from [facility] CVA with right sided deficits and aphasia, DM, HTN , HLD, seizure disorder presenting from [facility] after witnessed tonic clonic seizure,fungal uti and fungemia.  1.Fungal uti,fungemia-diflucan.Pt being followed by palliative, She is not a candidate for SHRUTHI,rec empiric tx for 4-6 weeks,no vegetation seen on TTE.D/W son as well, he declines any invasive procedure ie SHRUTHI at this time.  2.DM-insulin.  3.HTN-stable off bp medication.  4.Hypernatremia- IVF d5w +kcl.  5.CVA-eliquis.  6.Seizures-levETIRAcetam.  7.Lipid d/o-statin.  8.PPI.

## 2024-11-04 NOTE — PROGRESS NOTE ADULT - TIME BILLING
- Review of records, vital signs and daily labs, Imaging, microbiology and other Infectious Diseases related work up  - General physical examination performed  - Generation of Infectious Diseases treatment plan discussed with attending Physician  - Coordination of care with the multidisciplinary team  -Counseling of the patient and/or family members
This includes chart review, patient assessment, discussion and collaboration with interdisciplinary team members, excluding ACP.

## 2024-11-04 NOTE — PROGRESS NOTE ADULT - ASSESSMENT
89F [facility] CV[facility] with right sided deficits and aphasia, DM, HTN , HLD, seizure disorder presenting from [facility] after witnessed tonic clonic seizure lasting >30minutes. Patient was intubated on the field by EMS and received 2g keppra load. Additional 1.5 g Keppra was given on admission.    Patient was admitted to ICU s/p intubation and for continuous EEG monitoring. Patient found to have a UTI and ceftriaxone was started. Neurology was consulted. 24h EEG captured no seizures. Patient was extubated, satting 97% on room air. CT Abd Pelvis: showed perirectal edema and fluid concerning for infectious or inflammatory proctitis. Underlying malignancy is not excluded. Prominent perirectal and right pelvic sidewall lymph nodes. 3.9 x 3.0 cm right renal cortical hypoattenuating focus suspicious for infection. Bilateral urothelial enhancement also suspicious for infection.Multiple indeterminate renal lesions. Polycystic kidneys.Nonobstructing right renal calculus. 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 Abnormally distended endometrial cavity with internal amorphous soft tissue concerning for malignancy. Infection is not excluded.  Urology was consulted - no acute intervention needed.    Patient's course was complicated by blood cultures positive for Candida, and was started on fluconazole. ID following   89F from [facility] CV[facility] with right sided deficits and aphasia, DM, HTN , HLD, seizure disorder presenting from [facility] after witnessed tonic clonic seizure lasting >30minutes. Patient was intubated on the field by EMS and received 2g keppra load. Additional 1.5 g Keppra was given on admission.    Patient was admitted to ICU for airway protection in the setting of status epilepticus. s/p intubation and for continuous EEG monitoring. Neurology was consulted. 24h EEG captured no seizures. Patient was extubated 10/31.   Pt tolerates well on room air with saturating 97%.  Patient found to have a UTI and ceftriaxone was started.   CT Abd Pelvis: showed perirectal edema and fluid concerning for infectious or inflammatory proctitis. Underlying malignancy is not excluded. Prominent perirectal and right pelvic sidewall lymph nodes. 3.9 x 3.0 cm right renal cortical hypoattenuating focus suspicious for infection. Bilateral urothelial enhancement also suspicious for infection. Multiple indeterminate renal lesions. Polycystic kidneys. Non-obstructing  right renal calculus. Re- demonstrated multicystic pancreatic mass with distal pancreatic duct distention. Abnormally distended endometrial cavity with internal amorphous soft tissue concerning for malignancy. Infection is not excluded.  Urology was consulted - no acute intervention needed. Palliative consulted. DNR only.     Patient's course was complicated by blood cultures positive for Candida, and was started on fluconazole. ID following. repeat Bcx negative from 11/1.   TTE negative for vegetation. Pt not a candidate for SHRUTHI. son denies as well. Cardiology following.   Pt downgraded to medicine 11/2.   on IVF for hypernatremia. pending ID reccs for Abt duration.

## 2024-11-05 LAB
ALBUMIN SERPL ELPH-MCNC: 1.8 G/DL — LOW (ref 3.5–5)
ALP SERPL-CCNC: 93 U/L — SIGNIFICANT CHANGE UP (ref 40–120)
ALT FLD-CCNC: 13 U/L DA — SIGNIFICANT CHANGE UP (ref 10–60)
ANION GAP SERPL CALC-SCNC: 5 MMOL/L — SIGNIFICANT CHANGE UP (ref 5–17)
AST SERPL-CCNC: 23 U/L — SIGNIFICANT CHANGE UP (ref 10–40)
BASE EXCESS BLDV CALC-SCNC: 0.5 MMOL/L — SIGNIFICANT CHANGE UP
BILIRUB SERPL-MCNC: 0.3 MG/DL — SIGNIFICANT CHANGE UP (ref 0.2–1.2)
BUN SERPL-MCNC: 11 MG/DL — SIGNIFICANT CHANGE UP (ref 7–18)
CALCIUM SERPL-MCNC: 8.2 MG/DL — LOW (ref 8.4–10.5)
CHLORIDE SERPL-SCNC: 113 MMOL/L — HIGH (ref 96–108)
CO2 SERPL-SCNC: 24 MMOL/L — SIGNIFICANT CHANGE UP (ref 22–31)
CREAT SERPL-MCNC: 0.92 MG/DL — SIGNIFICANT CHANGE UP (ref 0.5–1.3)
CRP SERPL-MCNC: 148 MG/L — HIGH (ref 0–5)
CULTURE RESULTS: SIGNIFICANT CHANGE UP
CULTURE RESULTS: SIGNIFICANT CHANGE UP
EGFR: 56 ML/MIN/1.73M2 — LOW
ERYTHROCYTE [SEDIMENTATION RATE] IN BLOOD: 108 MM/HR — HIGH (ref 0–20)
GLUCOSE BLDC GLUCOMTR-MCNC: 139 MG/DL — HIGH (ref 70–99)
GLUCOSE BLDC GLUCOMTR-MCNC: 156 MG/DL — HIGH (ref 70–99)
GLUCOSE BLDC GLUCOMTR-MCNC: 173 MG/DL — HIGH (ref 70–99)
GLUCOSE BLDC GLUCOMTR-MCNC: 185 MG/DL — HIGH (ref 70–99)
GLUCOSE SERPL-MCNC: 194 MG/DL — HIGH (ref 70–99)
HCO3 BLDV-SCNC: 25 MMOL/L — SIGNIFICANT CHANGE UP (ref 22–29)
HCT VFR BLD CALC: 27.9 % — LOW (ref 34.5–45)
HGB BLD-MCNC: 9.1 G/DL — LOW (ref 11.5–15.5)
MCHC RBC-ENTMCNC: 29.2 PG — SIGNIFICANT CHANGE UP (ref 27–34)
MCHC RBC-ENTMCNC: 32.6 G/DL — SIGNIFICANT CHANGE UP (ref 32–36)
MCV RBC AUTO: 89.4 FL — SIGNIFICANT CHANGE UP (ref 80–100)
NRBC # BLD: 0 /100 WBCS — SIGNIFICANT CHANGE UP (ref 0–0)
PCO2 BLDV: 41 MMHG — SIGNIFICANT CHANGE UP (ref 39–42)
PH BLDV: 7.4 — SIGNIFICANT CHANGE UP (ref 7.32–7.43)
PHOSPHATE SERPL-MCNC: 2.6 MG/DL — SIGNIFICANT CHANGE UP (ref 2.5–4.5)
PLATELET # BLD AUTO: 210 K/UL — SIGNIFICANT CHANGE UP (ref 150–400)
PO2 BLDV: 34 MMHG — SIGNIFICANT CHANGE UP
POTASSIUM SERPL-MCNC: 4 MMOL/L — SIGNIFICANT CHANGE UP (ref 3.5–5.3)
POTASSIUM SERPL-SCNC: 4 MMOL/L — SIGNIFICANT CHANGE UP (ref 3.5–5.3)
PROT SERPL-MCNC: 6.9 G/DL — SIGNIFICANT CHANGE UP (ref 6–8.3)
RBC # BLD: 3.12 M/UL — LOW (ref 3.8–5.2)
RBC # FLD: 17.9 % — HIGH (ref 10.3–14.5)
SAO2 % BLDV: 55.4 % — SIGNIFICANT CHANGE UP
SODIUM SERPL-SCNC: 142 MMOL/L — SIGNIFICANT CHANGE UP (ref 135–145)
SPECIMEN SOURCE: SIGNIFICANT CHANGE UP
SPECIMEN SOURCE: SIGNIFICANT CHANGE UP
WBC # BLD: 6.4 K/UL — SIGNIFICANT CHANGE UP (ref 3.8–10.5)
WBC # FLD AUTO: 6.4 K/UL — SIGNIFICANT CHANGE UP (ref 3.8–10.5)

## 2024-11-05 PROCEDURE — 76937 US GUIDE VASCULAR ACCESS: CPT | Mod: 26

## 2024-11-05 PROCEDURE — 36000 PLACE NEEDLE IN VEIN: CPT

## 2024-11-05 RX ADMIN — NYSTATIN 1 APPLICATION(S): 100000 POWDER TOPICAL at 05:18

## 2024-11-05 RX ADMIN — APIXABAN 2.5 MILLIGRAM(S): 5 TABLET, FILM COATED ORAL at 17:37

## 2024-11-05 RX ADMIN — PANTOPRAZOLE SODIUM 40 MILLIGRAM(S): 40 TABLET, DELAYED RELEASE ORAL at 05:19

## 2024-11-05 RX ADMIN — Medication 2 TABLET(S): at 21:25

## 2024-11-05 RX ADMIN — NYSTATIN 1 APPLICATION(S): 100000 POWDER TOPICAL at 17:38

## 2024-11-05 RX ADMIN — POLYETHYLENE GLYCOL 3350 17 GRAM(S): 17 POWDER, FOR SOLUTION ORAL at 11:56

## 2024-11-05 RX ADMIN — SODIUM CHLORIDE, SODIUM GLUCONATE, SODIUM ACETATE, POTASSIUM CHLORIDE AND MAGNESIUM CHLORIDE 50 MILLILITER(S): 30; 37; 368; 526; 502 INJECTION, SOLUTION INTRAVENOUS at 11:51

## 2024-11-05 RX ADMIN — Medication 2: at 08:30

## 2024-11-05 RX ADMIN — FLUCONAZOLE, SODIUM CHLORIDE 100 MILLIGRAM(S): 2 INJECTION INTRAVENOUS at 17:36

## 2024-11-05 RX ADMIN — APIXABAN 2.5 MILLIGRAM(S): 5 TABLET, FILM COATED ORAL at 05:18

## 2024-11-05 RX ADMIN — Medication 20 MILLIGRAM(S): at 21:25

## 2024-11-05 RX ADMIN — LEVETIRACETAM 750 MILLIGRAM(S): 500 TABLET, FILM COATED ORAL at 17:37

## 2024-11-05 RX ADMIN — Medication 2: at 11:50

## 2024-11-05 RX ADMIN — LEVETIRACETAM 750 MILLIGRAM(S): 500 TABLET, FILM COATED ORAL at 05:24

## 2024-11-05 NOTE — PROGRESS NOTE ADULT - PROBLEM SELECTOR PROBLEM 3
Severe protein-calorie malnutrition
UTI (urinary tract infection)
UTI (urinary tract infection)

## 2024-11-05 NOTE — PROGRESS NOTE ADULT - PROBLEM SELECTOR PLAN 9
###Advanced dementia, CVA, bedbound, non verbal  ###Protein calorie malnutrition/functional quadriplegia due to dementia  -assist with feeding  --bowel regimen for proctitis, constipation.   -supportive care, incontinent care  -out of bed daily.  -skin integrity.   -fall precaution.  -palliative following, DNR only

## 2024-11-05 NOTE — PROGRESS NOTE ADULT - PROBLEM SELECTOR PLAN 1
Hx of seizure disorder on Keppra p/w witnessed tonic clonic seizure for > 30  minutes intubated for airway protection. Neurology following      Pt passed spontaneous breathing trial for medical extubation today  CHARLOTTE drafted: DNR/trial of intubation/artificial nutrition if needed
Hx of seizure disorder on Keppra p/w witnessed tonic clonic seizure for > 30  minutes intubated for airway protection. Neurology following      Extubated. Weaned to RA  CHARLOTTE drafted: DNR/trial of intubation/artificial nutrition if needed.
Hx of seizure disorder on Keppra p/w witnessed tonic clonic seizure for > 30  minutes intubated for airway protection. Neurology following      Extubated. Weaned to RA  DNR/trial of intubation/artificial nutrition if needed.
#Admitted to ICU for AHRF due to status epilepticus, required airway protection s/p Vent/intubation/extubated 10/31   #Hx of seizures (on Keppra 500mg bid)   -p/w [facility] with witnessed tonic clonic seizure for > 30  minutes prior to EMS arrival, intubated in the field  -s/p keppra 2g load + additional 1.5g for 60mg/kg dose  -10/29  pot EEG: Moderate-severe diffuse cerebral dysfunction is nonspecific in etiology.  No epileptiform abnormalities or seizures.  -s/p 24h EEG in ICU  -c/w keppra 750 BID  -Neurology followed   -monitor keppra levels
#Admitted to ICU for AHRF due to status epilepticus, required airway protection s/p Vent/intubation/extubated 10/31   #Hx of seizures (on Keppra 500mg bid)   -p/w [facility] with witnessed tonic clonic seizure for > 30  minutes prior to EMS arrival, intubated in the field  -s/p keppra 2g load + additional 1.5g for 60mg/kg dose  -10/29  pot EEG: Moderate-severe diffuse cerebral dysfunction is nonspecific in etiology.  No epileptiform abnormalities or seizures.  -s/p 24h EEG in ICU  -c/w keppra 750 BID  -Neurology followed   -monitor keppra levels

## 2024-11-05 NOTE — PROGRESS NOTE ADULT - ASSESSMENT
89F from [facility] CVA with right sided deficits and aphasia, DM, HTN , HLD, seizure disorder presenting from [facility] after witnessed tonic clonic seizure lasting >30minutes. Patient was intubated on the field by EMS and received 2g keppra load. Additional 1.5 g Keppra was given on admission.    Patient was admitted to ICU for airway protection in the setting of status epilepticus. s/p intubation and for continuous EEG monitoring. Neurology was consulted. 24h EEG captured no seizures. Patient was extubated 10/31.   Pt tolerates well on room air with saturating 97%.  Patient found to have a UTI and ceftriaxone was started.   CT Abd Pelvis: showed perirectal edema and fluid concerning for infectious or inflammatory proctitis. Underlying malignancy is not excluded. Prominent perirectal and right pelvic sidewall lymph nodes. 3.9 x 3.0 cm right renal cortical hypoattenuating focus suspicious for infection. Bilateral urothelial enhancement also suspicious for infection. Multiple indeterminate renal lesions. Polycystic kidneys. Non-obstructing  right renal calculus. Re- demonstrated multicystic pancreatic mass with distal pancreatic duct distention. Abnormally distended endometrial cavity with internal amorphous soft tissue concerning for malignancy. Infection is not excluded.  Urology was consulted - no acute intervention needed. Palliative consulted. DNR only.     Patient's course was complicated by blood cultures positive for Candida, and was started on fluconazole. ID following. Repeat Bcx negative from 11/1.   TTE negative for vegetation. Pt not a candidate for SHRUTHI. Son denies as well. Cardiology following.   Pt downgraded to medicine 11/2.   on IVF for hypernatremia. ID reccs Fluconazole 200mg IV until 11/28. Mid line placed 11/5

## 2024-11-05 NOTE — PROGRESS NOTE ADULT - PROBLEM SELECTOR PLAN 4
Hx of CVA w right sided weakness. Bedbound. Total care.  High risk for skin failure  Supportive Care  Freq positioning    Pt eval.
-BUN/Cr 29/2.05 (Baseline Scr 1.29)  -s/p 2L IVF in ED  --Improving  -monitor BMP    #lactic acidosis   lactate 6.2 > 3.4 >1.6  s/p 2L NS bolus in ED  RESOLVED
-BUN/Cr 29/2.05 (Baseline Scr 1.29)  -s/p 2L IVF in ED  --Improving  -monitor BMP    #lactic acidosis   lactate 6.2 > 3.4 >1.6  s/p 2L NS bolus in ED  RESOLVED

## 2024-11-05 NOTE — PROGRESS NOTE ADULT - PROBLEM SELECTOR PLAN 7
-on Humalog sliding scale and farxiga at home  -Monitor BS [facility]chs  -c/w Sliding scale.   -A1C 7.9 10/2024
-on Humalog sliding scale and farxiga at home  -Monitor BS [facility]  -c/w Sliding scale.   -A1C 7.9 10/2024

## 2024-11-05 NOTE — PROGRESS NOTE ADULT - PROBLEM SELECTOR PLAN 6
-takes norvasc 10 daily at home  -s/p lasix in ICU  -monitor BP/HR
-takes norvasc 10 daily at home  -s/p lasix in ICU  -monitor BP/HR

## 2024-11-05 NOTE — PROGRESS NOTE ADULT - PROBLEM SELECTOR PROBLEM 9
724.[address] [place] [address]    J31.47 724.3      338.29    3. Fibromyalgia M79.7 729.1 [facility]           Plan    No orders of the defined types were placed in this encounter. Orders Placed This Encounter   Procedures   [id number] KATHRYN Alfonso        Return in about 3 months (around 11/29/2018). There are no Patient Instructions on file for this visit.
Advanced dementia
Advanced dementia

## 2024-11-05 NOTE — PROGRESS NOTE ADULT - ATTENDING COMMENTS
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 She received multiple doses of IV Diazepam and loaded with IV Keppra. She was also started on IV antibiotics for possible UTI; admitted to ICU for further management of seizures and mechanical ventilation    ASSESSMENT  Acute hypoxemic respiratory failure from seizures - s/p intubation  Status Epilepticus  Type A Lactic acidosis  TRAVIS, likely prerenal  Hypernatremia, mild  Sepsis  UTI  Candidemia   H/o Seizures  H/o CVA with right sided deficit/aphasia,   H/o HTN  H/o DM,  H/o Hyperlipidemia    Plan  - Extubated 10/31  - cont Keppra 750mg q12h  - No further seizures recorded   - ID consult for given candidemia   - Cont. antimycotic agents per ID recs  - Urology recs appreciated, no intervention indicated at this time  - Obtain Echo to r/o endocarditis   - Void trial  - enteral access for feeding and medications  - Swallow evaluation   - replete lytes as needed  - DVT prophylaxis with home apixaban   - GI prophylaxis with Pepcid  - palliative care eval and GOC conversation appreciated - DNR with trial of intubation   - Transfer out of ICU
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 She received multiple doses of IV Diazepam and loaded with IV Keppra. She was also started on IV antibiotics for possible UTI; admitted to ICU for further management of seizures and mechanical ventilation    ASSESSMENT  Acute hypoxemic respiratory failure from seizures - s/p intubation  Status Epilepticus  Type A Lactic acidosis  TRAVIS, likely prerenal  Hypernatremia, mild  Sepsis  UTI  Candidemia   H/o Seizures  H/o CVA with right sided deficit/aphasia,   H/o HTN  H/o DM,  H/o Hyperlipidemia    Plan  - Extubated 10/31  - cont Keppra 750mg q12h  - No further seizures recorded   - ID consult for given candidemia   - Cont. antimycotic agents per ID recs  - Urology recs appreciated, no intervention indicated at this time  - Obtain Echo to r/o endocarditis   - Void trial  - enteral access for feeding and medications  - Swallow evaluation   - replete lytes as needed  - DVT prophylaxis with home apixaban   - GI prophylaxis with Pepcid  - palliative care eval and GOC conversation appreciated - DNR with trial of intubation   - discussed with son at bedside  - Transfer out of ICU
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 She received multiple doses of IV Diazepam and loaded with IV Keppra. She was also started on IV antibiotics for possible UTI; admitted to ICU for further management of seizures and mechanical ventilation    ASSESSMENT  Acute hypoxemic respiratory failure from seizures - s/p intubation  Status Epilepticus  Type A Lactic acidosis  TRAVIS, likely prerenal  Hypernatremia, mild  Sepsis  UTI  H/o Seizures  H/o CVA with right sided deficit/aphasia,   H/o HTN  H/o DM,  H/o Hyperlipidemia    Plan  - continue mechanical ventilation, monitor ABG and adjust vent settings as needed  - daily SAT/SBT  - continuous hemodynamic monitoring  - cont IV Keppra 750mg q12h  - place EEG to r/o nonconvulsive seizures  - neurology consult  - empiric IV Ceftriaxone for UTI will complete as ordered  - urine and blood cultures negative, urine culture with Candida  - lactate cleared s/p IVF resuscitation with signs of appropriate end-organ perfusion  - trend I/O, UOP and renal function  - enteral access for feeding and medications  - replete lytes as needed  - DVT prophylaxis with home apixaban   - GI prophylaxis with Pepcid  - palliative care eval and GOC conversation appreciated - DNR with trial of intubation   - Cont. ICU care, discussed with son at bedside
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 She received multiple doses of IV Diazepam and loaded with IV Keppra. She was also started on IV antibiotics for possible UTI; admitted to ICU for further management of seizures and mechanical ventilation    ASSESSMENT  Acute hypoxemic respiratory failure from seizures - s/p intubation  Status Epilepticus  Type A Lactic acidosis  TRAVIS, likely prerenal  Hypernatremia, mild  Sepsis  UTI  Candidemia   H/o Seizures  H/o CVA with right sided deficit/aphasia,   H/o HTN  H/o DM,  H/o Hyperlipidemia    Plan  - Tolerating SBT, Extubated today   - cont IV Keppra 750mg q12h  - No further seizures recorded   - D/c EEG monitoring   - D/c ceftriaxone  - ID consult for given candidemia   - Cont. antimycotic agents per ID recs  - Obtain CT Chest/Abdomen/Pelvis   - Obtain Echo to r/o endocarditis   - trend I/O, UOP and renal function  - enteral access for feeding and medications  - replete lytes as needed  - Dysphagia screening   - Cont NGT feeds for now  - DVT prophylaxis with home apixaban   - GI prophylaxis with Pepcid  - palliative care eval and GOC conversation appreciated - DNR with trial of intubation   - Cont. ICU care, discussed with son at bedside.
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 She received multiple doses of IV Diazepam and loaded with IV Keppra. She was also started on IV antibiotics for UTI; admitted to ICU for further management of seizures and mechanical ventilation    ASSESSMENT  Acute hypoxemic respiratory failure from seizures - s/p intubation  Status Epilepticus  Type A Lactic acidosis  TRAVIS, likely prerenal  Hypernatremia, mild  Sepsis  UTI  H/o Seizures  H/o CVA with right sided deficit/aphasia,   H/o HTN  H/o DM,  H/o Hyperlipidemia    Plan  - continue mechanical ventilation, monitor ABG and adjust vent settings as needed  - daily SAT/SBT  - continuous hemodynamic monitoring  - cont IV Keppra 750mg q12h  - place EEG to r/o nonconvulsive seizures  - neurology consult  - empiric IV Ceftriaxone for UTI  - urine and blood cultures and follow on sepsis workup  - lactate cleared s/p IVF resuscitation with signs of appropriate end-organ perfusion  - trend I/O, UOP and renal function.   - enteral access for feeding and medications  - replete lytes as needed  - DVT prophylaxis with home apixaban   - GI prophylaxis with Pepcid  - palliative care eval and GOC conversation appreciated - remains full code for now, pt has many children who will be discussing her care and following up with our team
d/w staff plan of care

## 2024-11-05 NOTE — PROGRESS NOTE ADULT - PROBLEM SELECTOR PLAN 3
Clinical evidence indicates that the patient has Severe protein calorie malnutrition/ 3rd degree. Albumin 1.5 poor po intake prior to admission     In context of   Chronic Illness (>1 month)    Energy/Food intake <50% of estimated energy requirement >5 days  Weight loss: Moderate - severe (lbs lost recently)  Body Fat loss: Severe   Cachexia, temporal wasting,  muscle atrophy  Muscle mass loss: Severe  (Skin failure/pressure ulcers)   Strength: weakened severe (bedbound)    Recommend:   pleasure feeds as tolerated - aspiration precautions, careful hand-feeding, teaching to caregivers  nutritional supplements as tolerated, nutrition consult    Currently on NGT feeds.
Clinical evidence indicates that the patient has Severe protein calorie malnutrition/ 3rd degree. Albumin 1.5 poor po intake prior to admission     In context of   Chronic Illness (>1 month)    Energy/Food intake <50% of estimated energy requirement >5 days  Weight loss: Moderate - severe (lbs lost recently)  Body Fat loss: Severe   Cachexia, temporal wasting,  muscle atrophy  Muscle mass loss: Severe  (Skin failure/pressure ulcers)   Strength: weakened severe (bedbound)    Recommend:   pleasure feeds as tolerated - aspiration precautions, careful hand-feeding, teaching to caregivers  nutritional supplements as tolerated, nutrition consult
Clinical evidence indicates that the patient has Severe protein calorie malnutrition/ 3rd degree. Albumin 1.5 poor po intake prior to admission     In context of   Chronic Illness (>1 month)    Energy/Food intake <50% of estimated energy requirement >5 days  Weight loss: Moderate - severe (lbs lost recently)  Body Fat loss: Severe   Cachexia, temporal wasting,  muscle atrophy  Muscle mass loss: Severe  (Skin failure/pressure ulcers)   Strength: weakened severe (bedbound)    Recommend:   pleasure feeds as tolerated - aspiration precautions, careful hand-feeding, teaching to caregivers  nutritional supplements as tolerated, nutrition consult
-recently discharged on 10/23 for UTI with E.coli bacteremia   -frost placed in ED with purulent output noted  -UA +  -UCx: candida 10/28  -s/p zosyn in ED  -c/w Fluconazole  -f/u ID reccs  for final abt duration
-recently discharged on 10/23 for UTI with E.coli bacteremia   -frost placed in ED with purulent output noted  -UA +  -UCx: candida 10/28  -s/p zosyn in ED  -c/w Fluconazole 200mg IN until 11/28  -f/u ID reccs  for final abt duration

## 2024-11-05 NOTE — PROGRESS NOTE ADULT - PROBLEM SELECTOR PLAN 8
-hx CVA with right sided hemiplegia and aphasia (prior notes stating bedbound status, son at bedside stating pt ambulates with wheelchair and eats with assistance and says few words)   -on atorvastatin 20 and eliquis 2.5mg BID at home  -s/p NGT in ICU while on vent, now po diet.   -aspiration precaution  -Passed dysphagia screen, Puree diet.
-hx CVA with right sided hemiplegia and aphasia (prior notes stating bedbound status, son at bedside stating pt ambulates with wheelchair and eats with assistance and says few words)   -on atorvastatin 20 and eliquis 2.5mg BID at home  -s/p NGT in ICU while on vent, now po diet.   -aspiration precaution  -Passed dysphagia screen, Puree diet.

## 2024-11-05 NOTE — PROGRESS NOTE ADULT - PROBLEM SELECTOR PLAN 12
DVT ppx : Eliquis  GI ppx: PPI  right upper arm Extended dwell place in ICU.  Code status: DNR only    ### Discharge planning  From [facility]  D/C in AM to [facility]
DVT ppx : Eliquis  GI ppx: PPI  right upper arm Extended dwell place in ICU.  Code status: DNR only    ### Discharge planning  From [facility]  f/u ID reccs final abt duration  monitor Na daily.

## 2024-11-05 NOTE — PROGRESS NOTE ADULT - PROBLEM SELECTOR PLAN 11
-CT Abd Pelvis: Minimal bilateral pleural effusions and underlying passive atelectasis. Cardiomegaly. Marked perirectal edema and fluid concerning for infectious or inflammatory proctitis. Underlying malignancy is not excluded. Prominent perirectal and right pelvic sidewall lymph nodes. 3.9 x 3.0 cm right renal cortical hypoattenuating focus suspicious for infection. Bilateral urothelial enhancement also suspicious for infection. Multiple indeterminate renal lesions. Polycystic kidneys. Non-obstructing right renal calculus. Re-demonstrated multicystic pancreatic mass with distal pancreatic duct distention. Abnormally distended endometrial cavity with internal amorphous soft tissue concerning for malignancy. Infection is not excluded.  - Urology was consulted - no acute intervention needed.   -family not pursuing further work up.  -c/w abt per ID.

## 2024-11-05 NOTE — PROGRESS NOTE ADULT - PROBLEM SELECTOR PLAN 2
Hx of CVA w seizure disorder. Bedbound. Non verbal. Total care. No behavior issues.  FAST 7d.  Appropriate for hospice  Discussed dementia trajectory and provided anticipatory guidance  Educated pt's son Hari about hospice philosophy and service provided.    Family not ready for hospice.  DNR/trial of intubation
- BCx 10/28 positive for candida albicans  - 10/28 : Ucx 50,000 - 99,000 CFU/mL Candida albicans   - ESR/CRP elevated  -CXR without any infiltrates   -s/p rocephin and caspofungin  - ID Dr. Burnett following  - repeat BCx 11/1 NGTD  -TTE negative for vegetation  -ID reccs SHRUTHI but She is not a candidate for SHRUTHI per Cardio dr. Duran. Son also declines any invasive procedure ie SHRUTHI at this time.  -started fluconazole, f/u ID reccs for duration. monitor QtC
Hx of CVA w seizure disorder. Bedbound. Non verbal. Total care. No behavior issues.  FAST 7d.  Appropriate for hospice  Discussed dementia trajectory and provided anticipatory guidance  Educated pt's son Hari about hospice philosophy and service provided.    Family not ready for hospice.  DNR/trial of intubation.
Hx of CVA w seizure disorder. Bedbound. Non verbal. Total care. No behavior issues.  FAST 7d.  Appropriate for hospice  Discussed dementia trajectory and provided anticipatory guidance  Educated pt's son Hari about hospice philosophy and service provided.    Family not ready for hospice.  DNR/trial of intubation.
- BCx 10/28 positive for candida albicans  - 10/28 : Ucx 50,000 - 99,000 CFU/mL Candida albicans   - ESR/CRP elevated  -CXR without any infiltrates   -s/p rocephin and caspofungin  - ID Dr. Burnett following  - repeat BCx 11/1 NGTD  -TTE negative for vegetation  -ID reccs SHRUTHI but She is not a candidate for SHRUTHI per Cardio dr. Duran. Son also declines any invasive procedure ie SHRUTHI at this time.  -started fluconazole 200mg IV until 11/28  - monitor QtC

## 2024-11-05 NOTE — PROGRESS NOTE ADULT - PROBLEM SELECTOR PLAN 5
-Na 147, FWD 1.3L  -uptrending  -c/w free water 250cc q4h and IVF D5W  -monitor SNa    ## hypophosphatemia  -phos 1.9  -give K phos IV x 1  -monitor serum lytes

## 2024-11-05 NOTE — PROGRESS NOTE ADULT - ASSESSMENT
89F from [facility] CVA with right sided deficits and aphasia, DM, HTN , HLD, seizure disorder presenting from [facility] after witnessed tonic clonic seizure,fungal uti and fungemia.  1.Fungal uti,fungemia-diflucan.Pt being followed by palliative, She is not a candidate for SHRUTHI,rec empiric tx for 4-6 weeks,no vegetation seen on TTE.D/W son as well, he declines any invasive procedure ie SHRUTHI at this time.  2.DM-insulin.  3.HTN-stable off bp medication.  4.Hypernatremia- IVF d5w +kcl.  5.CVA-eliquis.  6.Seizures-levETIRAcetam.  7.Lipid d/o-statin.  8.PPI.

## 2024-11-05 NOTE — PROGRESS NOTE ADULT - SUBJECTIVE AND OBJECTIVE BOX
NP Note discussed with  primary attending    Patient is a 89y old  Female who presents with a chief complaint of status epilepticus (05 Nov 2024 11:20)      INTERVAL HPI/OVERNIGHT EVENTS: no new complaints    MEDICATIONS  (STANDING):  apixaban 2.5 milliGRAM(s) Oral every 12 hours  atorvastatin 20 milliGRAM(s) Oral at bedtime  dextrose 5% with potassium chloride 20 mEq/L 1000 milliLiter(s) (50 mL/Hr) IV Continuous <Continuous>  fluconAZOLE IVPB 200 milliGRAM(s) IV Intermittent every 24 hours  insulin lispro (ADMELOG) corrective regimen sliding scale   SubCutaneous at bedtime  insulin lispro (ADMELOG) corrective regimen sliding scale   SubCutaneous three times a day before meals  levETIRAcetam  Solution 750 milliGRAM(s) Oral two times a day  nystatin Powder 1 Application(s) Topical two times a day  pantoprazole    Tablet 40 milliGRAM(s) Oral before breakfast  polyethylene glycol 3350 17 Gram(s) Oral daily  senna 2 Tablet(s) Oral at bedtime    MEDICATIONS  (PRN):  acetaminophen   Oral Liquid .. 650 milliGRAM(s) Oral every 6 hours PRN Mild Pain (1 - 3)      __________________________________________________  REVIEW OF SYSTEMS: Unable to elicit ROS due to impaired cognition    CONSTITUTIONAL: No fever,       Vital Signs Last 24 Hrs  T(C): 36.8 (05 Nov 2024 14:34), Max: 36.8 (05 Nov 2024 14:34)  T(F): 98.3 (05 Nov 2024 14:34), Max: 98.3 (05 Nov 2024 14:34)  HR: 102 (05 Nov 2024 14:34) (83 - 102)  BP: 119/54 (05 Nov 2024 14:34) (119/54 - 171/90)  BP(mean): 105 (05 Nov 2024 05:00) (105 - 105)  RR: 18 (05 Nov 2024 14:34) (18 - 18)  SpO2: 96% (05 Nov 2024 14:34) (96% - 97%)    Parameters below as of 05 Nov 2024 14:34  Patient On (Oxygen Delivery Method): room air        ________________________________________________  PHYSICAL EXAM:  GENERAL: NAD  HEENT: Normocephalic;  conjunctivae and sclerae clear; moist mucous membranes;   NECK : supple  CHEST/LUNG: Clear to auscultation bilaterally with good air entry   HEART: S1 S2  regular; no murmurs, gallops or rubs  ABDOMEN: Soft, Nontender, Nondistended; Bowel sounds present  EXTREMITIES: no cyanosis; no edema; no calf tenderness  SKIN: warm and dry; no rash  NERVOUS SYSTEM:  A&Ox0    _________________________________________________  LABS:                        9.1    6.40  )-----------( 210      ( 05 Nov 2024 06:15 )             27.9     11-05    142  |  113[H]  |  11  ----------------------------<  194[H]  4.0   |  24  |  0.92    Ca    8.2[L]      05 Nov 2024 06:15  Phos  2.6     11-05  Mg     1.8     11-04    TPro  6.9  /  Alb  1.8[L]  /  TBili  0.3  /  DBili  x   /  AST  23  /  ALT  13  /  AlkPhos  93  11-05      Urinalysis Basic - ( 05 Nov 2024 06:15 )    Color: x / Appearance: x / SG: x / pH: x  Gluc: 194 mg/dL / Ketone: x  / Bili: x / Urobili: x   Blood: x / Protein: x / Nitrite: x   Leuk Esterase: x / RBC: x / WBC x   Sq Epi: x / Non Sq Epi: x / Bacteria: x      CAPILLARY BLOOD GLUCOSE      POCT Blood Glucose.: 185 mg/dL (05 Nov 2024 11:39)  POCT Blood Glucose.: 173 mg/dL (05 Nov 2024 08:01)  POCT Blood Glucose.: 218 mg/dL (04 Nov 2024 22:02)  POCT Blood Glucose.: 162 mg/dL (04 Nov 2024 17:57)        RADIOLOGY & ADDITIONAL TESTS:  < from: CT Chest w/ IV Cont (10.31.24 @ 13:49) >  IMPRESSION:  Minimal bilateral pleural effusions and underlying passive atelectasis.   Cardiomegaly.    Marked perirectal edema and fluid concerning for infectious or   inflammatory proctitis. Underlying malignancy is not excluded. Prominent   perirectal and right pelvic sidewall lymph nodes.    3.9 x 3.0 cm right renal cortical hypoattenuating focus suspicious for  infection. Bilateral urothelial enhancement also suspicious for infection.    Multiple indeterminate renal lesions. Polycystic kidneys. Nonobstructing   right renal calculus.    Redemonstrated multicystic pancreatic mass with distal pancreatic duct  distention.    Abnormally distended endometrial cavity with internal amorphous soft   tissue concerning for malignancy. Infection is not excluded.      < end of copied text >    Imaging Personally Reviewed:  YES    Consultant(s) Notes Reviewed:   YES    Care Discussed with Consultants :     Plan of care was discussed with patient and /or primary care giver; all questions and concerns were addressed and care was aligned with patient's wishes.

## 2024-11-05 NOTE — PROGRESS NOTE ADULT - PROBLEM SELECTOR PROBLEM 5
Palliative care encounter
Palliative care encounter
Hypernatremia
Palliative care encounter
Hypernatremia

## 2024-11-05 NOTE — PROGRESS NOTE ADULT - PROBLEM SELECTOR PLAN 10
-Hgb 9.3 > 8.7 (Baseline Hgb 8)  -no active signs of bleeding   -monitor CBC
-Hgb 9.3 > 8.7 (Baseline Hgb 8)  -no active signs of bleeding   -monitor CBC

## 2024-11-05 NOTE — PROCEDURE NOTE - ADDITIONAL PROCEDURE DETAILS
18g 6cm extended dwell IV (Arrow) placed with sterile technique under ultrasound guidance. Confirmed via ultrasound and VBG. Can be used for up to 28 days. Above details and extended dwell policy reviewed in full with primary team.
Midline

## 2024-11-05 NOTE — PROCEDURE NOTE - NSPROCDETAILS_GEN_ALL_CORE
location identified, draped/prepped, sterile technique used/blood seen on insertion/dressing applied/flushes easily/secured in place
location identified, draped/prepped, sterile technique used/sterile dressing applied/sterile technique, catheter placed/supine position/ultrasound guidance

## 2024-11-05 NOTE — PROGRESS NOTE ADULT - SUBJECTIVE AND OBJECTIVE BOX
Date of Service 11-05-24 @ 11:20    CHIEF COMPLAINT:Patient is a 89y old  Female who presents with a chief complaint of status epilepticus.Pt appears comfortable.    	  REVIEW OF SYSTEMS:  	  [x ] Unable to obtain    PHYSICAL EXAM:  T(C): 36.6 (11-05-24 @ 05:00), Max: 36.6 (11-05-24 @ 05:00)  HR: 83 (11-05-24 @ 05:00) (82 - 88)  BP: 162/76 (11-05-24 @ 05:00) (154/62 - 171/90)  RR: 18 (11-05-24 @ 05:00) (18 - 18)  SpO2: 97% (11-05-24 @ 05:00) (97% - 98%)  Wt(kg): --  I&O's Summary    04 Nov 2024 07:01  -  05 Nov 2024 07:00  --------------------------------------------------------  IN: 0 mL / OUT: 1800 mL / NET: -1800 mL        Appearance: Normal	  HEENT:   Normal oral mucosa, PERRL, EOMI	  Lymphatic: No lymphadenopathy  Cardiovascular: Normal S1 S2, No JVD, No murmurs, No edema  Respiratory: Lungs clear to auscultation	  Gastrointestinal:  Soft, Non-tender, + BS	  Skin: No rashes, No ecchymoses, No cyanosis	  Extremities: Normal range of motion, No clubbing, cyanosis or edema  Vascular: Peripheral pulses palpable 2+ bilaterally    MEDICATIONS  (STANDING):  apixaban 2.5 milliGRAM(s) Oral every 12 hours  atorvastatin 20 milliGRAM(s) Oral at bedtime  dextrose 5% with potassium chloride 20 mEq/L 1000 milliLiter(s) (50 mL/Hr) IV Continuous <Continuous>  fluconAZOLE IVPB 200 milliGRAM(s) IV Intermittent every 24 hours  insulin lispro (ADMELOG) corrective regimen sliding scale   SubCutaneous three times a day before meals  insulin lispro (ADMELOG) corrective regimen sliding scale   SubCutaneous at bedtime  levETIRAcetam  Solution 750 milliGRAM(s) Oral two times a day  nystatin Powder 1 Application(s) Topical two times a day  pantoprazole    Tablet 40 milliGRAM(s) Oral before breakfast  polyethylene glycol 3350 17 Gram(s) Oral daily  senna 2 Tablet(s) Oral at bedtime      	  	  LABS:	 	                              9.1    6.40  )-----------( 210      ( 05 Nov 2024 06:15 )             27.9     11-05    142  |  113[H]  |  11  ----------------------------<  194[H]  4.0   |  24  |  0.92    Ca    8.2[L]      05 Nov 2024 06:15  Phos  2.6     11-05  Mg     1.8     11-04    TPro  6.9  /  Alb  1.8[L]  /  TBili  0.3  /  DBili  x   /  AST  23  /  ALT  13  /  AlkPhos  93  11-05

## 2024-11-06 VITALS
HEART RATE: 87 BPM | RESPIRATION RATE: 17 BRPM | OXYGEN SATURATION: 93 % | DIASTOLIC BLOOD PRESSURE: 88 MMHG | SYSTOLIC BLOOD PRESSURE: 156 MMHG | TEMPERATURE: 98 F

## 2024-11-06 LAB
ALBUMIN SERPL ELPH-MCNC: 1.8 G/DL — LOW (ref 3.5–5)
ALP SERPL-CCNC: 94 U/L — SIGNIFICANT CHANGE UP (ref 40–120)
ALT FLD-CCNC: 12 U/L DA — SIGNIFICANT CHANGE UP (ref 10–60)
ANION GAP SERPL CALC-SCNC: 5 MMOL/L — SIGNIFICANT CHANGE UP (ref 5–17)
AST SERPL-CCNC: 24 U/L — SIGNIFICANT CHANGE UP (ref 10–40)
BILIRUB SERPL-MCNC: 0.3 MG/DL — SIGNIFICANT CHANGE UP (ref 0.2–1.2)
BUN SERPL-MCNC: 8 MG/DL — SIGNIFICANT CHANGE UP (ref 7–18)
CALCIUM SERPL-MCNC: 8.1 MG/DL — LOW (ref 8.4–10.5)
CHLORIDE SERPL-SCNC: 109 MMOL/L — HIGH (ref 96–108)
CO2 SERPL-SCNC: 23 MMOL/L — SIGNIFICANT CHANGE UP (ref 22–31)
CREAT SERPL-MCNC: 0.94 MG/DL — SIGNIFICANT CHANGE UP (ref 0.5–1.3)
CULTURE RESULTS: SIGNIFICANT CHANGE UP
CULTURE RESULTS: SIGNIFICANT CHANGE UP
EGFR: 54 ML/MIN/1.73M2 — LOW
GLUCOSE BLDC GLUCOMTR-MCNC: 180 MG/DL — HIGH (ref 70–99)
GLUCOSE BLDC GLUCOMTR-MCNC: 197 MG/DL — HIGH (ref 70–99)
GLUCOSE SERPL-MCNC: 184 MG/DL — HIGH (ref 70–99)
HCT VFR BLD CALC: 27.1 % — LOW (ref 34.5–45)
HGB BLD-MCNC: 8.8 G/DL — LOW (ref 11.5–15.5)
MCHC RBC-ENTMCNC: 29.8 PG — SIGNIFICANT CHANGE UP (ref 27–34)
MCHC RBC-ENTMCNC: 32.5 G/DL — SIGNIFICANT CHANGE UP (ref 32–36)
MCV RBC AUTO: 91.9 FL — SIGNIFICANT CHANGE UP (ref 80–100)
NRBC # BLD: 0 /100 WBCS — SIGNIFICANT CHANGE UP (ref 0–0)
PLATELET # BLD AUTO: 193 K/UL — SIGNIFICANT CHANGE UP (ref 150–400)
POTASSIUM SERPL-MCNC: 4.7 MMOL/L — SIGNIFICANT CHANGE UP (ref 3.5–5.3)
POTASSIUM SERPL-SCNC: 4.7 MMOL/L — SIGNIFICANT CHANGE UP (ref 3.5–5.3)
PROT SERPL-MCNC: 7.1 G/DL — SIGNIFICANT CHANGE UP (ref 6–8.3)
RBC # BLD: 2.95 M/UL — LOW (ref 3.8–5.2)
RBC # FLD: 17.6 % — HIGH (ref 10.3–14.5)
SARS-COV-2 RNA SPEC QL NAA+PROBE: SIGNIFICANT CHANGE UP
SODIUM SERPL-SCNC: 137 MMOL/L — SIGNIFICANT CHANGE UP (ref 135–145)
SPECIMEN SOURCE: SIGNIFICANT CHANGE UP
SPECIMEN SOURCE: SIGNIFICANT CHANGE UP
WBC # BLD: 6.78 K/UL — SIGNIFICANT CHANGE UP (ref 3.8–10.5)
WBC # FLD AUTO: 6.78 K/UL — SIGNIFICANT CHANGE UP (ref 3.8–10.5)

## 2024-11-06 PROCEDURE — 87640 STAPH A DNA AMP PROBE: CPT

## 2024-11-06 PROCEDURE — 36415 COLL VENOUS BLD VENIPUNCTURE: CPT

## 2024-11-06 PROCEDURE — 86850 RBC ANTIBODY SCREEN: CPT

## 2024-11-06 PROCEDURE — 86140 C-REACTIVE PROTEIN: CPT

## 2024-11-06 PROCEDURE — 80053 COMPREHEN METABOLIC PANEL: CPT

## 2024-11-06 PROCEDURE — 96365 THER/PROPH/DIAG IV INF INIT: CPT

## 2024-11-06 PROCEDURE — 87186 SC STD MICRODIL/AGAR DIL: CPT

## 2024-11-06 PROCEDURE — 74018 RADEX ABDOMEN 1 VIEW: CPT

## 2024-11-06 PROCEDURE — 0241U: CPT

## 2024-11-06 PROCEDURE — 87641 MR-STAPH DNA AMP PROBE: CPT

## 2024-11-06 PROCEDURE — 83605 ASSAY OF LACTIC ACID: CPT

## 2024-11-06 PROCEDURE — 99285 EMERGENCY DEPT VISIT HI MDM: CPT

## 2024-11-06 PROCEDURE — 87637 SARSCOV2&INF A&B&RSV AMP PRB: CPT

## 2024-11-06 PROCEDURE — 96375 TX/PRO/DX INJ NEW DRUG ADDON: CPT

## 2024-11-06 PROCEDURE — 84100 ASSAY OF PHOSPHORUS: CPT

## 2024-11-06 PROCEDURE — P9040: CPT

## 2024-11-06 PROCEDURE — 94003 VENT MGMT INPAT SUBQ DAY: CPT

## 2024-11-06 PROCEDURE — 85025 COMPLETE CBC W/AUTO DIFF WBC: CPT

## 2024-11-06 PROCEDURE — 87150 DNA/RNA AMPLIFIED PROBE: CPT

## 2024-11-06 PROCEDURE — 93005 ELECTROCARDIOGRAM TRACING: CPT

## 2024-11-06 PROCEDURE — 96376 TX/PRO/DX INJ SAME DRUG ADON: CPT

## 2024-11-06 PROCEDURE — 83935 ASSAY OF URINE OSMOLALITY: CPT

## 2024-11-06 PROCEDURE — 82803 BLOOD GASES ANY COMBINATION: CPT

## 2024-11-06 PROCEDURE — 84295 ASSAY OF SERUM SODIUM: CPT

## 2024-11-06 PROCEDURE — 87040 BLOOD CULTURE FOR BACTERIA: CPT

## 2024-11-06 PROCEDURE — 95957 EEG DIGITAL ANALYSIS: CPT

## 2024-11-06 PROCEDURE — 82570 ASSAY OF URINE CREATININE: CPT

## 2024-11-06 PROCEDURE — 85730 THROMBOPLASTIN TIME PARTIAL: CPT

## 2024-11-06 PROCEDURE — 71045 X-RAY EXAM CHEST 1 VIEW: CPT

## 2024-11-06 PROCEDURE — 85652 RBC SED RATE AUTOMATED: CPT

## 2024-11-06 PROCEDURE — 82962 GLUCOSE BLOOD TEST: CPT

## 2024-11-06 PROCEDURE — 81001 URINALYSIS AUTO W/SCOPE: CPT

## 2024-11-06 PROCEDURE — 87077 CULTURE AEROBIC IDENTIFY: CPT

## 2024-11-06 PROCEDURE — 99291 CRITICAL CARE FIRST HOUR: CPT

## 2024-11-06 PROCEDURE — 86901 BLOOD TYPING SEROLOGIC RH(D): CPT

## 2024-11-06 PROCEDURE — 87086 URINE CULTURE/COLONY COUNT: CPT

## 2024-11-06 PROCEDURE — 95816 EEG AWAKE AND DROWSY: CPT

## 2024-11-06 PROCEDURE — 84132 ASSAY OF SERUM POTASSIUM: CPT

## 2024-11-06 PROCEDURE — 84300 ASSAY OF URINE SODIUM: CPT

## 2024-11-06 PROCEDURE — 85027 COMPLETE CBC AUTOMATED: CPT

## 2024-11-06 PROCEDURE — 83735 ASSAY OF MAGNESIUM: CPT

## 2024-11-06 PROCEDURE — 87635 SARS-COV-2 COVID-19 AMP PRB: CPT

## 2024-11-06 PROCEDURE — 74177 CT ABD & PELVIS W/CONTRAST: CPT | Mod: MC

## 2024-11-06 PROCEDURE — 82330 ASSAY OF CALCIUM: CPT

## 2024-11-06 PROCEDURE — 71260 CT THORAX DX C+: CPT | Mod: MC

## 2024-11-06 PROCEDURE — 36430 TRANSFUSION BLD/BLD COMPNT: CPT

## 2024-11-06 PROCEDURE — 92610 EVALUATE SWALLOWING FUNCTION: CPT

## 2024-11-06 PROCEDURE — 85610 PROTHROMBIN TIME: CPT

## 2024-11-06 PROCEDURE — 86900 BLOOD TYPING SEROLOGIC ABO: CPT

## 2024-11-06 PROCEDURE — 70450 CT HEAD/BRAIN W/O DYE: CPT | Mod: MC

## 2024-11-06 PROCEDURE — 80177 DRUG SCRN QUAN LEVETIRACETAM: CPT

## 2024-11-06 PROCEDURE — 93306 TTE W/DOPPLER COMPLETE: CPT

## 2024-11-06 PROCEDURE — 94002 VENT MGMT INPAT INIT DAY: CPT

## 2024-11-06 PROCEDURE — 86923 COMPATIBILITY TEST ELECTRIC: CPT

## 2024-11-06 RX ORDER — FLUCONAZOLE, SODIUM CHLORIDE 2 MG/ML
200 INJECTION INTRAVENOUS
Qty: 1 | Refills: 0
Start: 2024-11-06 | End: 2024-11-27

## 2024-11-06 RX ADMIN — LEVETIRACETAM 750 MILLIGRAM(S): 500 TABLET, FILM COATED ORAL at 06:16

## 2024-11-06 RX ADMIN — PANTOPRAZOLE SODIUM 40 MILLIGRAM(S): 40 TABLET, DELAYED RELEASE ORAL at 06:15

## 2024-11-06 RX ADMIN — SODIUM CHLORIDE, SODIUM GLUCONATE, SODIUM ACETATE, POTASSIUM CHLORIDE AND MAGNESIUM CHLORIDE 50 MILLILITER(S): 30; 37; 368; 526; 502 INJECTION, SOLUTION INTRAVENOUS at 08:18

## 2024-11-06 RX ADMIN — NYSTATIN 1 APPLICATION(S): 100000 POWDER TOPICAL at 06:17

## 2024-11-06 RX ADMIN — Medication 2: at 08:15

## 2024-11-06 RX ADMIN — Medication 2: at 12:35

## 2024-11-06 RX ADMIN — APIXABAN 2.5 MILLIGRAM(S): 5 TABLET, FILM COATED ORAL at 06:15

## 2024-11-06 NOTE — DISCHARGE NOTE NURSING/CASE MANAGEMENT/SOCIAL WORK - NSTRANSFERBELONGINGSDISPO_GEN_A_NUR
Onset: 3-4 days ago  Left breast pain - like a burning, rated 5 out of 10   Comes and go - intermittent  Not related to exercise  Not like heartburn    Denies any lumps, no ripples in tissue, no discharge    No shortness of breath   No radiation    Given OV with PCP for 7/11/23.       Intubated and sedated unable to acquire information.

## 2024-11-06 NOTE — PHARMACOTHERAPY INTERVENTION NOTE - COMMENTS
Pt's K=4.7.  Recommended to d/c D5 with 20meq KCL solution (day #4). 
Recommended changing pantoprazole IV to oral tube feeding.
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    Suggest to de-escalate caspofungin to fluconazole 800 mg x 1 followed by 200 mg once daily.
Given fungemic with candida albicans, suggest to obtain ID consult.
Propofol order was entered for status epilepticus and non-titratable. Infusion rate was ordered as 14.053 mcg/kg/min, but actual infusion rate in flowsheet was running at 5 mcg/kg/min.   Recommended change propofol to nurse-titratable order.

## 2024-11-06 NOTE — DISCHARGE NOTE PROVIDER - HOSPITAL COURSE
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 Patient was intubated on the field by EMS and received 2g keppra load. Additional 1.5 g Keppra was given on admission.    Patient was admitted to ICU for airway protection in the setting of status epilepticus. s/p intubation and for continuous EEG monitoring. Neurology was consulted. 24h EEG captured no seizures. Patient was extubated 10/31.   Pt tolerates well on room air with saturating 97%.  Patient found to have a UTI and ceftriaxone was started.   CT Abd Pelvis: showed perirectal edema and fluid concerning for infectious or inflammatory proctitis. Underlying malignancy is not excluded. Prominent perirectal and right pelvic sidewall lymph nodes. 3.9 x 3.0 cm right renal cortical hypoattenuating focus suspicious for infection. Bilateral urothelial enhancement also suspicious for infection. Multiple indeterminate renal lesions. Polycystic kidneys. Non-obstructing  right renal calculus. Re- demonstrated multicystic pancreatic mass with distal pancreatic duct distention. Abnormally distended endometrial cavity with internal amorphous soft tissue concerning for malignancy. Infection is not excluded.  Urology was consulted - no acute intervention needed. Palliative consulted. DNR only.     Patient's course was complicated by blood cultures positive for Candida, and was started on fluconazole. ID following. Repeat Bcx negative from 11/1.   TTE negative for vegetation. Pt not a candidate for SHRUTHI. Son denies as well. Cardiology following.   Pt downgraded to medicine 11/2.   On IVF for hypernatremia. ID reccs Fluconazole 200mg IV until 11/28. Mid line placed 11/5    Please note that this a brief summary of hospital course please refer to daily progress notes and consult notes for full course and events. Patient seen and examined at bedside, discussed with medical attending. Patient medically cleared for discharge to.     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 Patient was intubated on the field by EMS and received 2g keppra load. Additional 1.5 g Keppra was given on admission.    Patient was admitted to ICU for airway protection in the setting of status epilepticus. s/p intubation and for continuous EEG monitoring. Neurology was consulted. 24h EEG captured no seizures. Patient was extubated 10/31.   Pt tolerates well on room air with saturating 97%.  Patient found to have a UTI and ceftriaxone was started.   CT Abd Pelvis: showed perirectal edema and fluid concerning for infectious or inflammatory proctitis. Underlying malignancy is not excluded. Prominent perirectal and right pelvic sidewall lymph nodes. 3.9 x 3.0 cm right renal cortical hypoattenuating focus suspicious for infection. Bilateral urothelial enhancement also suspicious for infection. Multiple indeterminate renal lesions. Polycystic kidneys. Non-obstructing  right renal calculus. Re- demonstrated multicystic pancreatic mass with distal pancreatic duct distention. Abnormally distended endometrial cavity with internal amorphous soft tissue concerning for malignancy. Infection is not excluded.  Urology was consulted - no acute intervention needed. Palliative consulted. DNR only.     Patient's course was complicated by blood cultures positive for Candida, and was started on fluconazole. ID following. Repeat Bcx negative from 11/1.   TTE negative for vegetation. Pt not a candidate for SHRUTHI. Son denies as well. Cardiology following.   Pt downgraded to medicine 11/2.   Patient was treated with IVF for hypernatremia. Mid line placed 11/5    ID recs:  Continue Fluconazole 200 mg q24 hrs IV for total 28 days since pt refused SHRUTHI (until 11/28)  Weekly labs while on atb CBC w/diff, CMP, ESR and CRP  Opthalmologic exam- if no able inpatient get OP appointment   Monitor LFTS and QTC while on fluconazole  Seizure precautions, aspiration precautions, chest PT, hailey care, offloading      Please note that this a brief summary of hospital course please refer to daily progress notes and consult notes for full course and events. Patient seen and examined at bedside, discussed with medical attending. Patient medically cleared for discharge to.

## 2024-11-06 NOTE — PROGRESS NOTE ADULT - SUBJECTIVE AND OBJECTIVE BOX
INTERVAL HPI/OVERNIGHT EVENTS:  Patient seen,no events,awake  VITAL SIGNS:  T(F): 98.1 (11-06-24 @ 05:25)  HR: 70 (11-06-24 @ 05:25)  BP: 166/87 (11-06-24 @ 05:25)  RR: 18 (11-06-24 @ 05:25)  SpO2: 98% (11-06-24 @ 05:25)  Wt(kg): --    PHYSICAL EXAM:  awake  Constitutional:  Eyes:  ENMT:perrla  Neck:  Respiratory:clear  Cardiovascular:s1s2,m-none  Gastrointestinal:soft,bs pos  Extremities:  Vascular:  Neurological:no focal deficit  Musculoskeletal:    MEDICATIONS  (STANDING):  apixaban 2.5 milliGRAM(s) Oral every 12 hours  atorvastatin 20 milliGRAM(s) Oral at bedtime  dextrose 5% with potassium chloride 20 mEq/L 1000 milliLiter(s) (50 mL/Hr) IV Continuous <Continuous>  fluconAZOLE IVPB 200 milliGRAM(s) IV Intermittent every 24 hours  insulin lispro (ADMELOG) corrective regimen sliding scale   SubCutaneous three times a day before meals  insulin lispro (ADMELOG) corrective regimen sliding scale   SubCutaneous at bedtime  levETIRAcetam  Solution 750 milliGRAM(s) Oral two times a day  nystatin Powder 1 Application(s) Topical two times a day  pantoprazole    Tablet 40 milliGRAM(s) Oral before breakfast  polyethylene glycol 3350 17 Gram(s) Oral daily  senna 2 Tablet(s) Oral at bedtime    MEDICATIONS  (PRN):  acetaminophen   Oral Liquid .. 650 milliGRAM(s) Oral every 6 hours PRN Mild Pain (1 - 3)      Allergies    No Known Allergies    Intolerances        LABS:                        8.8    6.78  )-----------( 193      ( 06 Nov 2024 06:33 )             27.1     11-06    137  |  109[H]  |  8   ----------------------------<  184[H]  4.7   |  23  |  0.94    Ca    8.1[L]      06 Nov 2024 06:33  Phos  2.6     11-05    TPro  7.1  /  Alb  1.8[L]  /  TBili  0.3  /  DBili  x   /  AST  24  /  ALT  12  /  AlkPhos  94  11-06      Urinalysis Basic - ( 06 Nov 2024 06:33 )    Color: x / Appearance: x / SG: x / pH: x  Gluc: 184 mg/dL / Ketone: x  / Bili: x / Urobili: x   Blood: x / Protein: x / Nitrite: x   Leuk Esterase: x / RBC: x / WBC x   Sq Epi: x / Non Sq Epi: x / Bacteria: x        RADIOLOGY & ADDITIONAL TESTS:      Assessment and Plan:   · Assessment	  89F from [facility] 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 Patient was intubated on the field by EMS and received 2g keppra load. Additional 1.5 g Keppra was given on admission.    Patient was admitted to ICU for airway protection in the setting of status epilepticus. s/p intubation and for continuous EEG monitoring. Neurology was consulted. 24h EEG captured no seizures. Patient was extubated 10/31.   Pt tolerates well on room air with saturating 97%.  Patient found to have a UTI and ceftriaxone was started.   CT Abd Pelvis: showed perirectal edema and fluid concerning for infectious or inflammatory proctitis. Underlying malignancy is not excluded. Prominent perirectal and right pelvic sidewall lymph nodes. 3.9 x 3.0 cm right renal cortical hypoattenuating focus suspicious for infection. Bilateral urothelial enhancement also suspicious for infection. Multiple indeterminate renal lesions. Polycystic kidneys. Non-obstructing  right renal calculus. Re- demonstrated multicystic pancreatic mass with distal pancreatic duct distention. Abnormally distended endometrial cavity with internal amorphous soft tissue concerning for malignancy. Infection is not excluded.  Urology was consulted - no acute intervention needed. Palliative consulted. DNR only.     Patient's course was complicated by blood cultures positive for Candida, and was started on fluconazole. ID following. Repeat Bcx negative from 11/1.   TTE negative for vegetation. Pt not a candidate for SHRUTHI. Son denies as well. Cardiology following.   Pt downgraded to medicine 11/2.   on IVF for hypernatremia. ID reccs Fluconazole 200mg IV until 11/28. Mid line placed 11/5         Problem/Plan - 1:  ·  Problem: Seizure-stable clinically  ·  Plan: #Admitted to ICU for AHRF due to status epilepticus, required airway protection s/p Vent/intubation/extubated 10/31   #Hx of seizures (on Keppra 500mg bid)   -p/w [facility] with witnessed tonic clonic seizure for > 30  minutes prior to EMS arrival, intubated in the field  -s/p keppra 2g load + additional 1.5g for 60mg/kg dose  -10/29  pot EEG: Moderate-severe diffuse cerebral dysfunction is nonspecific in etiology.  No epileptiform abnormalities or seizures.  -s/p 24h EEG in ICU  -c/w keppra 750 BID  -Neurology followed   -monitor keppra levels.     Problem/Plan - 2:  ·  Problem: Candidemia.   ·  Plan: - BCx 10/28 positive for candida albicans  - 10/28 : Ucx 50,000 - 99,000 CFU/mL Candida albicans   - ESR/CRP elevated  -CXR without any infiltrates   -s/p rocephin and caspofungin  - ID Dr. Burnett following  - repeat BCx 11/1 NGTD  -TTE negative for vegetation  -ID reccs SHRUTHI but She is not a candidate for SHRUTHI per Cardio dr. Duran. Son also declines any invasive procedure ie SHRUTHI at this time.  -started fluconazole 200mg IV until 11/28  - monitor QtC.     Problem/Plan - 3:  ·  Problem: UTI (urinary tract infection).   ·  Plan: -recently discharged on 10/23 for UTI with E.coli bacteremia   -frost placed in ED with purulent output noted  -UA +  -UCx: candida 10/28  -s/p zosyn in ED  -c/w Fluconazole 200mg IN until 11/28  -f/u ID reccs  for final abt duration.     Problem/Plan - 4:  ·  Problem: TRAVIS (acute kidney injury).   ·  Plan: -BUN/Cr 29/2.05 (Baseline Scr 1.29)  -s/p 2L IVF in ED  --Improving  -monitor BMP    #lactic acidosis   lactate 6.2 > 3.4 >1.6  s/p 2L NS bolus in ED  RESOLVED.     Problem/Plan - 5:  ·  Problem: Hypernatremia.   ·  Plan: -Na 147, FWD 1.3L  -uptrending  -c/w free water 250cc q4h and IVF D5W  -monitor SNa    ## hypophosphatemia  -phos 1.9  -give K phos IV x 1  -monitor serum lytes.     Problem/Plan - 6:  ·  Problem: Hypertension.   ·  Plan: -takes norvasc 10 daily at home  -s/p lasix in ICU  -monitor BP/HR.     Problem/Plan - 7:  ·  Problem: Diabetes mellitus.   ·  Plan: -on Humalog sliding scale and farxiga at home  -Monitor BS achs  -c/w Sliding scale.   -A1C 7.9 10/2024.     Problem/Plan - 8:  ·  Problem: CVA (cerebrovascular accident).   ·  Plan: -hx CVA with right sided hemiplegia and aphasia (prior notes stating bedbound status, son at bedside stating pt ambulates with wheelchair and eats with assistance and says few words)   -on atorvastatin 20 and eliquis 2.5mg BID at home  -s/p NGT in ICU while on vent, now po diet.   -aspiration precaution  -Passed dysphagia screen, Puree diet.     Problem/Plan - 9:  ·  Problem: Advanced dementia.   ·  Plan: ###Advanced dementia, CVA, bedbound, non verbal  ###Protein calorie malnutrition/functional quadriplegia due to dementia  -assist with feeding  --bowel regimen for proctitis, constipation.   -supportive care, incontinent care  -out of bed daily.  -skin integrity.   -fall precaution.  -palliative following, DNR only.     Problem/Plan - 10:  ·  Problem: Anemia of chronic disease.   ·  Plan; -Hgb 9.3 > 8.7 (Baseline Hgb 8)  -no active signs of bleeding   -monitor CBC.     Problem/Plan - 11:  ·  Problem: Abnormal finding on CT scan.   ·  Plan: -CT Abd Pelvis: Minimal bilateral pleural effusions and underlying passive atelectasis. Cardiomegaly. Marked perirectal edema and fluid concerning for infectious or inflammatory proctitis. Underlying malignancy is not excluded. Prominent perirectal and right pelvic sidewall lymph nodes. 3.9 x 3.0 cm right renal cortical hypoattenuating focus suspicious for infection. Bilateral urothelial enhancement also suspicious for infection. Multiple indeterminate renal lesions. Polycystic kidneys. Non-obstructing right renal calculus. Re-demonstrated multicystic pancreatic mass with distal pancreatic duct distention. Abnormally distended endometrial cavity with internal amorphous soft tissue concerning for malignancy. Infection is not excluded.  - Urology was consulted - no acute intervention needed.   -family not pursuing further work up.  -c/w abt per ID.     Problem/Plan - 12:  ·  Problem: Advance care planning.   ·  Plan: DVT ppx : Eliquis  GI ppx: PPI  right upper arm Extended dwell place in ICU.  Code status: DNR only    ### Discharge planning  From [facility]  D/C in AM to [facility].

## 2024-11-06 NOTE — DISCHARGE NOTE NURSING/CASE MANAGEMENT/SOCIAL WORK - FINANCIAL ASSISTANCE
[facility] provides services at a reduced cost to those who are determined to be eligible through [facility]’s financial assistance program. Information regarding [facility]’s financial assistance program can be found by going to [email or web address] or by calling [phone].

## 2024-11-06 NOTE — PROGRESS NOTE ADULT - PROVIDER SPECIALTY LIST ADULT
Cardiology
Critical Care
Infectious Disease
Infectious Disease
Internal Medicine
Pulmonology
Cardiology
Critical Care
Internal Medicine
Internal Medicine
Pulmonology
Cardiology
Critical Care
Internal Medicine
Infectious Disease
Palliative Care
Internal Medicine

## 2024-11-06 NOTE — PROGRESS NOTE ADULT - SUBJECTIVE AND OBJECTIVE BOX
Date of Service 11-06-24 @ 12:15    CHIEF COMPLAINT:Patient is a 89y old  Female who presents with a chief complaint of status epilepticus .Pt appears comfortable.    	  REVIEW OF SYSTEMS:    [x ] Unable to obtain    PHYSICAL EXAM:  T(C): 36.7 (11-06-24 @ 05:25), Max: 36.8 (11-05-24 @ 14:34)  HR: 70 (11-06-24 @ 05:25) (70 - 102)  BP: 166/87 (11-06-24 @ 05:25) (119/54 - 166/87)  RR: 18 (11-06-24 @ 05:25) (18 - 18)  SpO2: 98% (11-06-24 @ 05:25) (96% - 98%)  Wt(kg): --  I&O's Summary    05 Nov 2024 07:01  -  06 Nov 2024 07:00  --------------------------------------------------------  IN: 0 mL / OUT: 1450 mL / NET: -1450 mL        Appearance: Normal	  HEENT:   Normal oral mucosa, PERRL, EOMI	  Lymphatic: No lymphadenopathy  Cardiovascular: Normal S1 S2, No JVD, No murmurs, No edema  Respiratory: Lungs clear to auscultation	  Gastrointestinal:  Soft, Non-tender, + BS	  Skin: No rashes, No ecchymoses, No cyanosis	  Extremities: Normal range of motion, No clubbing, cyanosis or edema  Vascular: Peripheral pulses palpable 2+ bilaterally    MEDICATIONS  (STANDING):  apixaban 2.5 milliGRAM(s) Oral every 12 hours  atorvastatin 20 milliGRAM(s) Oral at bedtime  fluconAZOLE IVPB 200 milliGRAM(s) IV Intermittent every 24 hours  insulin lispro (ADMELOG) corrective regimen sliding scale   SubCutaneous three times a day before meals  insulin lispro (ADMELOG) corrective regimen sliding scale   SubCutaneous at bedtime  levETIRAcetam  Solution 750 milliGRAM(s) Oral two times a day  nystatin Powder 1 Application(s) Topical two times a day  pantoprazole    Tablet 40 milliGRAM(s) Oral before breakfast  polyethylene glycol 3350 17 Gram(s) Oral daily  senna 2 Tablet(s) Oral at bedtime        LABS:	 	                       8.8    6.78  )-----------( 193      ( 06 Nov 2024 06:33 )             27.1     11-06    137  |  109[H]  |  8   ----------------------------<  184[H]  4.7   |  23  |  0.94    Ca    8.1[L]      06 Nov 2024 06:33  Phos  2.6     11-05    TPro  7.1  /  Alb  1.8[L]  /  TBili  0.3  /  DBili  x   /  AST  24  /  ALT  12  /  AlkPhos  94  11-06

## 2024-11-06 NOTE — DISCHARGE NOTE NURSING/CASE MANAGEMENT/SOCIAL WORK - PATIENT PORTAL LINK FT
You can access the FollowMyHealth Patient Portal offered by [facility] by registering at the following website: [email or web address]. By joining [facility]’s FollowMyHealth portal, you will also be able to view your health information using other applications (apps) compatible with our system.

## 2024-11-06 NOTE — DISCHARGE NOTE PROVIDER - NSDCMRMEDTOKEN_GEN_ALL_CORE_FT
acetaminophen 325 mg oral tablet: 2 tab(s) orally every 6 hours As needed Temp greater or equal to 38C (100.4F), Mild Pain (1 - 3)  Actos 15 mg oral tablet: 1 orally once a day  apixaban 2.5 mg oral tablet: 1 tab(s) orally every 12 hours  ascorbic acid 500 mg/5 mL oral liquid: 5 milliliter(s) orally once a day  atorvastatin 20 mg oral tablet: 1 tab(s) orally once a day (at bedtime)  Farxiga 10 mg oral tablet: 1 tab(s) orally once a day  ferrous sulfate 325 mg (65 mg elemental iron) oral tablet: 1 tab(s) orally once a day  fluconazole 200 mg/100 mL-NaCl 0.9% intravenous solution: 200 milligram(s) intravenously once a day Until 11/28/24  HumaLOG KwikPen 100 units/mL injectable solution: subcutaneously 3 times a day (-250: 2 units; 251-300: 4 units; 301-350: 6 units; 351-400: 8 units)  levETIRAcetam 100 mg/mL oral solution: 5 milliliter(s) orally 2 times a day  MiraLax oral powder for reconstitution: 17 gram(s) orally 2 times a day  Multiple Vitamins oral tablet: 1 tab(s) orally once a day  nitroglycerin 0.2 mg/hr transdermal film, extended release: 1 patch transdermally once a day  Norvasc 10 mg oral tablet: 1 orally once a day  nystatin 100,000 units/g topical powder: Apply topically to affected area 2 times a day as needed for  rash  senna (sennosides) 8.6 mg oral tablet: 2 tab(s) orally once a day (at bedtime)  simethicone 125 mg oral tablet, chewable: 1 tab(s) chewed once a day AFTER OTHER MEDICATIONS  zinc oxide 20% topical ointment: Apply topically to affected area

## 2024-11-06 NOTE — PROGRESS NOTE ADULT - REASON FOR ADMISSION
status epilepticus

## 2024-11-06 NOTE — PROGRESS NOTE ADULT - ASSESSMENT
89F from [facility] CVA with right sided deficits and aphasia, DM, HTN , HLD, seizure disorder presenting from [facility] after witnessed tonic clonic seizure,fungal uti and fungemia.  1.Fungal uti,fungemia-diflucan.Pt being followed by palliative, She is not a candidate for SHRUTHI,rec empiric tx for 4-6 weeks,no vegetation seen on TTE.D/W son as well, he declines any invasive procedure ie SHRUTHI at this time.  2.DM-insulin.  3.HTN-stable off bp medication.  4.Hypernatremia- IVF d5w +kcl.  5.CVA-eliquis.  6.Seizures-levETIRAcetam.  7.Lipid d/o-statin.  8.PPI.

## 2024-11-06 NOTE — DISCHARGE NOTE PROVIDER - NSDCCPCAREPLAN_GEN_ALL_CORE_FT
PRINCIPAL DISCHARGE DIAGNOSIS  Diagnosis: Status epilepticus  Assessment and Plan of Treatment:   You were admitted to ICU for airway protection in the setting of status epilepticus. s/p intubation and for continuous EEG monitoring. Neurology was consulted. 24h EEG captured no seizures. You were extubated 10/31.   Follow-up with your primary care physician.  When walking: ensure proper footwear, adequate lighting ,avoid loose rugs and clutter in walkway.   Ensure adequate rest ,nutrition, and hydration.        SECONDARY DISCHARGE DIAGNOSES  Diagnosis: Candidemia  Assessment and Plan of Treatment: Your hospital  course was complicated by blood cultures & urine cultures positive for Candida, and was started on fluconazole. ID following. Repeat Bcx negative from 11/1.   TTE negative for vegetation. Pt not a candidate for SHRUTHI. Son denies as well. Cardiology following.   ID doctor recommends Fluconazole 200mg IV until 11/28. Mid line placed 11/5      Diagnosis: Advanced dementia  Assessment and Plan of Treatment: Dementia care: Create a safe environment. Remove locks on bathroom doors. Cover electrical outlets, use childproof latched on cabinets. Install childproof devices to keep doors and windows secured. Childproof stoves , ovens, medications, water temperature on water heaters. Secure knives, lighters, matches, power tools, and guns. Falls precautions, free from clutter, remove throw rugs. Insure adequate lighting. Install grab rails as needed. Obtain life line, use baby monitors. Provide 24hr /7 day per week supervision Reduce confusion. Keep familiar objects and people around. Use night lights or dim lights at night. Use reminders, notes, or directions for daily activities or tasks. Keep a simple, consistent routine for waking, meals, bathing, dressing, and bedtime. Create a calm, quiet environment. Place large clocks and calendars prominently.  Display emergency numbers and home address near all telephones. Ensure a regular walking or physical activity schedule. Involve the person in daily activities as much as possible. Limit napping during the day.  Limit caffeine. Attend social events that stimulate rather than overwhelm the senses. Encourage good nutrition and hydration. Reduce distractions during meal times and snacks. Monitor chewing and swallowing ability. Continue with routine vision, hearing, dental, and medical screenings. All medications per MD only. If change in behavior or patient becomes more confused or letharic seek medical attention.    Any new problem with brain function happens. This includes problems with balance, speech, or falling a lot.   New or worsened confusion develops. New or worsened sleepiness develops. Staying awake becomes hard to do. This could indicate an infection if fever develops.       PRINCIPAL DISCHARGE DIAGNOSIS  Diagnosis: Status epilepticus  Assessment and Plan of Treatment:   You were admitted to ICU for airway protection in the setting of status epilepticus. s/p intubation and for continuous EEG monitoring. Neurology was consulted. 24h EEG captured no seizures. You were extubated 10/31.   Follow-up with your primary care physician.  When walking: ensure proper footwear, adequate lighting ,avoid loose rugs and clutter in walkway.   Ensure adequate rest ,nutrition, and hydration.        SECONDARY DISCHARGE DIAGNOSES  Diagnosis: Candidemia  Assessment and Plan of Treatment: Your hospital  course was complicated by blood cultures & urine cultures positive for Candida, and was started on fluconazole. ID following. Repeat Bcx negative from 11/1.   TTE negative for vegetation. Pt not a candidate for SHRUTHI. Son denies as well. Cardiology following.   ID doctor recommends Fluconazole 200mg IV until 11/28. Mid line placed 11/5  ID doctor recommendations:  - Continue Fluconazole 200 mg q24 hrs IV for total 28 days since pt refused SHRUTHI (until 11/28)  - Weekly labs while on atb CBC w/diff, CMP, ESR and CRP  - Opthalmologic exam- if not able inpatient get O/P appointment   - Monitor LFTS and QTC while on fluconazole  - Seizure precautions, aspiration precautions, chest PT, hailey care, offloading      Diagnosis: Advanced dementia  Assessment and Plan of Treatment: Dementia care: Create a safe environment. Remove locks on bathroom doors. Cover electrical outlets, use childproof latched on cabinets. Install childproof devices to keep doors and windows secured. Childproof stoves , ovens, medications, water temperature on water heaters. Secure knives, lighters, matches, power tools, and guns. Falls precautions, free from clutter, remove throw rugs. Insure adequate lighting. Install grab rails as needed. Obtain life line, use baby monitors. Provide 24hr /7 day per week supervision Reduce confusion. Keep familiar objects and people around. Use night lights or dim lights at night. Use reminders, notes, or directions for daily activities or tasks. Keep a simple, consistent routine for waking, meals, bathing, dressing, and bedtime. Create a calm, quiet environment. Place large clocks and calendars prominently.  Display emergency numbers and home address near all telephones. Ensure a regular walking or physical activity schedule. Involve the person in daily activities as much as possible. Limit napping during the day.  Limit caffeine. Attend social events that stimulate rather than overwhelm the senses. Encourage good nutrition and hydration. Reduce distractions during meal times and snacks. Monitor chewing and swallowing ability. Continue with routine vision, hearing, dental, and medical screenings. All medications per MD only. If change in behavior or patient becomes more confused or letharic seek medical attention.    Any new problem with brain function happens. This includes problems with balance, speech, or falling a lot.   New or worsened confusion develops. New or worsened sleepiness develops. Staying awake becomes hard to do. This could indicate an infection if fever develops.

## 2024-11-06 NOTE — DISCHARGE NOTE PROVIDER - CARE PROVIDER_API CALL
Geovany Dennis  Internal Medicine  [id number] [address] [place] [address], [address] [place] [id number]  Phone: [phone]  Fax: [phone]  Established Patient  Follow Up Time:

## 2024-11-06 NOTE — DISCHARGE NOTE NURSING/CASE MANAGEMENT/SOCIAL WORK - NSDCPEFALRISK_GEN_ALL_CORE
For information on Fall & Injury Prevention, visit: [email or web address] OR  [email or web address] OR  https://www.cdc.gov/steadi/patient.html

## 2025-03-12 NOTE — H&P ADULT - VTE RISK ASSESSMENT
Rec L spine xrays.  Rec tylenol XS 2 tabs twice daily.  Pt has tramadol at home.  Ok to use at bedtime for severe pain.  No NSAIDs.   <<--- Click to launch